# Patient Record
Sex: MALE | Race: WHITE | NOT HISPANIC OR LATINO
[De-identification: names, ages, dates, MRNs, and addresses within clinical notes are randomized per-mention and may not be internally consistent; named-entity substitution may affect disease eponyms.]

---

## 2020-08-07 ENCOUNTER — TRANSCRIPTION ENCOUNTER (OUTPATIENT)
Age: 80
End: 2020-08-07

## 2022-05-14 ENCOUNTER — NON-APPOINTMENT (OUTPATIENT)
Age: 82
End: 2022-05-14

## 2022-05-15 ENCOUNTER — EMERGENCY (EMERGENCY)
Facility: HOSPITAL | Age: 82
LOS: 0 days | Discharge: HOME | End: 2022-05-15
Attending: EMERGENCY MEDICINE
Payer: MEDICARE

## 2022-05-15 VITALS
RESPIRATION RATE: 18 BRPM | SYSTOLIC BLOOD PRESSURE: 154 MMHG | HEART RATE: 66 BPM | DIASTOLIC BLOOD PRESSURE: 81 MMHG | OXYGEN SATURATION: 96 % | TEMPERATURE: 98 F

## 2022-05-15 VITALS — WEIGHT: 203.93 LBS | HEIGHT: 69 IN

## 2022-05-15 DIAGNOSIS — R42 DIZZINESS AND GIDDINESS: ICD-10-CM

## 2022-05-15 DIAGNOSIS — I25.10 ATHEROSCLEROTIC HEART DISEASE OF NATIVE CORONARY ARTERY WITHOUT ANGINA PECTORIS: ICD-10-CM

## 2022-05-15 DIAGNOSIS — Z20.822 CONTACT WITH AND (SUSPECTED) EXPOSURE TO COVID-19: ICD-10-CM

## 2022-05-15 DIAGNOSIS — Z88.0 ALLERGY STATUS TO PENICILLIN: ICD-10-CM

## 2022-05-15 LAB
ALBUMIN SERPL ELPH-MCNC: 3.5 G/DL — SIGNIFICANT CHANGE UP (ref 3.3–5)
ALP SERPL-CCNC: 50 U/L — SIGNIFICANT CHANGE UP (ref 40–120)
ALT FLD-CCNC: 27 U/L — SIGNIFICANT CHANGE UP (ref 12–78)
ANION GAP SERPL CALC-SCNC: 4 MMOL/L — LOW (ref 5–17)
AST SERPL-CCNC: 26 U/L — SIGNIFICANT CHANGE UP (ref 15–37)
BASOPHILS # BLD AUTO: 0.03 K/UL — SIGNIFICANT CHANGE UP (ref 0–0.2)
BASOPHILS NFR BLD AUTO: 0.4 % — SIGNIFICANT CHANGE UP (ref 0–2)
BILIRUB SERPL-MCNC: 0.5 MG/DL — SIGNIFICANT CHANGE UP (ref 0.2–1.2)
BUN SERPL-MCNC: 18 MG/DL — SIGNIFICANT CHANGE UP (ref 7–23)
CALCIUM SERPL-MCNC: 8.6 MG/DL — SIGNIFICANT CHANGE UP (ref 8.5–10.1)
CHLORIDE SERPL-SCNC: 111 MMOL/L — HIGH (ref 96–108)
CO2 SERPL-SCNC: 27 MMOL/L — SIGNIFICANT CHANGE UP (ref 22–31)
CREAT SERPL-MCNC: 0.83 MG/DL — SIGNIFICANT CHANGE UP (ref 0.5–1.3)
EGFR: 88 ML/MIN/1.73M2 — SIGNIFICANT CHANGE UP
EOSINOPHIL # BLD AUTO: 0.15 K/UL — SIGNIFICANT CHANGE UP (ref 0–0.5)
EOSINOPHIL NFR BLD AUTO: 2 % — SIGNIFICANT CHANGE UP (ref 0–6)
GLUCOSE SERPL-MCNC: 94 MG/DL — SIGNIFICANT CHANGE UP (ref 70–99)
HCT VFR BLD CALC: 45.2 % — SIGNIFICANT CHANGE UP (ref 39–50)
HGB BLD-MCNC: 15.3 G/DL — SIGNIFICANT CHANGE UP (ref 13–17)
IMM GRANULOCYTES NFR BLD AUTO: 0.1 % — SIGNIFICANT CHANGE UP (ref 0–1.5)
LYMPHOCYTES # BLD AUTO: 2.13 K/UL — SIGNIFICANT CHANGE UP (ref 1–3.3)
LYMPHOCYTES # BLD AUTO: 28.4 % — SIGNIFICANT CHANGE UP (ref 13–44)
MCHC RBC-ENTMCNC: 32.8 PG — SIGNIFICANT CHANGE UP (ref 27–34)
MCHC RBC-ENTMCNC: 33.8 GM/DL — SIGNIFICANT CHANGE UP (ref 32–36)
MCV RBC AUTO: 96.8 FL — SIGNIFICANT CHANGE UP (ref 80–100)
MONOCYTES # BLD AUTO: 0.58 K/UL — SIGNIFICANT CHANGE UP (ref 0–0.9)
MONOCYTES NFR BLD AUTO: 7.7 % — SIGNIFICANT CHANGE UP (ref 2–14)
NEUTROPHILS # BLD AUTO: 4.61 K/UL — SIGNIFICANT CHANGE UP (ref 1.8–7.4)
NEUTROPHILS NFR BLD AUTO: 61.4 % — SIGNIFICANT CHANGE UP (ref 43–77)
PLATELET # BLD AUTO: 210 K/UL — SIGNIFICANT CHANGE UP (ref 150–400)
POTASSIUM SERPL-MCNC: 4.1 MMOL/L — SIGNIFICANT CHANGE UP (ref 3.5–5.3)
POTASSIUM SERPL-SCNC: 4.1 MMOL/L — SIGNIFICANT CHANGE UP (ref 3.5–5.3)
PROT SERPL-MCNC: 6.9 GM/DL — SIGNIFICANT CHANGE UP (ref 6–8.3)
RBC # BLD: 4.67 M/UL — SIGNIFICANT CHANGE UP (ref 4.2–5.8)
RBC # FLD: 12.4 % — SIGNIFICANT CHANGE UP (ref 10.3–14.5)
SODIUM SERPL-SCNC: 142 MMOL/L — SIGNIFICANT CHANGE UP (ref 135–145)
TROPONIN I, HIGH SENSITIVITY RESULT: 38.95 NG/L — SIGNIFICANT CHANGE UP
WBC # BLD: 7.51 K/UL — SIGNIFICANT CHANGE UP (ref 3.8–10.5)
WBC # FLD AUTO: 7.51 K/UL — SIGNIFICANT CHANGE UP (ref 3.8–10.5)

## 2022-05-15 PROCEDURE — 71046 X-RAY EXAM CHEST 2 VIEWS: CPT

## 2022-05-15 PROCEDURE — U0003: CPT

## 2022-05-15 PROCEDURE — 71046 X-RAY EXAM CHEST 2 VIEWS: CPT | Mod: 26

## 2022-05-15 PROCEDURE — 85025 COMPLETE CBC W/AUTO DIFF WBC: CPT

## 2022-05-15 PROCEDURE — U0005: CPT

## 2022-05-15 PROCEDURE — 36415 COLL VENOUS BLD VENIPUNCTURE: CPT

## 2022-05-15 PROCEDURE — 84484 ASSAY OF TROPONIN QUANT: CPT

## 2022-05-15 PROCEDURE — 99285 EMERGENCY DEPT VISIT HI MDM: CPT | Mod: FS

## 2022-05-15 PROCEDURE — 80053 COMPREHEN METABOLIC PANEL: CPT

## 2022-05-15 PROCEDURE — 93010 ELECTROCARDIOGRAM REPORT: CPT

## 2022-05-15 PROCEDURE — 99285 EMERGENCY DEPT VISIT HI MDM: CPT | Mod: 25

## 2022-05-15 PROCEDURE — 93005 ELECTROCARDIOGRAM TRACING: CPT

## 2022-05-15 RX ORDER — MECLIZINE HCL 12.5 MG
1 TABLET ORAL
Qty: 15 | Refills: 0
Start: 2022-05-15 | End: 2022-05-19

## 2022-05-15 NOTE — ED PROVIDER NOTE - NS ED ATTENDING STATEMENT MOD
Attending Only This was a shared visit with the THIAGO. I reviewed and verified the documentation and independently performed the documented:

## 2022-05-15 NOTE — ED PROVIDER NOTE - OBJECTIVE STATEMENT
81 y/o male with a PMHx of CAD s/p CABG due to four way blockage in [address] by Dr. Stahl presents to the ED with dizziness. Pt reports mild vertigo, but felt better after sitting down. Also states it was less severe than previous episodes. Pt believes it was due to standing up too quickly from bed. Went to urgent care in order to have Meclizine prescribed since that has helped previous episodes. Pt is on Zetia, lipitor, and baby ASA. Pt reports EKG is chronically abnormal and goes to cardiologist every three months. Denies CP, SOB, diaphoresis, and nausea. Cardio: Dr. Vora . Pt has an appointment with cardiologist on the 24th.

## 2022-05-15 NOTE — ED PROVIDER NOTE - PATIENT PORTAL LINK FT
You can access the FollowMyHealth Patient Portal offered by [facility] by registering at the following website: [email or web address]. By joining [employer]’s FollowMyHealth portal, you will also be able to view your health information using other applications (apps) compatible with our system.

## 2022-05-15 NOTE — ED PROVIDER NOTE - CLINICAL SUMMARY MEDICAL DECISION MAKING FREE TEXT BOX
pt presents with abnormal EKG from urgent care with no coronary symptoms has grossly abnormal EKG will compare prior EKG and determine need for further workup.

## 2022-05-15 NOTE — ED PROVIDER NOTE - ATTENDING APP SHARED VISIT CONTRIBUTION OF CARE
I personally saw the patient with the THIAGO, and completed the key components of the history and physical exam. I then discussed the management plan with the THIAGO.

## 2022-05-15 NOTE — ED ADULT NURSE NOTE - OBJECTIVE STATEMENT
patient ambulatory to ED from urgent care.  patient had episode of dizziness this morning last about 15 seconds after sitting down.  went to urgent care, sent for "abnormal EKG."  denies chest pain, SOB, n/v.

## 2022-05-15 NOTE — ED PROVIDER NOTE - PROGRESS NOTE DETAILS
Breanne Sims for attending   discussed with Dr. Vora EKG with cardiologist. Breanne Sims for attending   discussed with Dr. Vora EKG with cardiologist. No change from previous

## 2022-05-15 NOTE — ED ADULT TRIAGE NOTE - CHIEF COMPLAINT QUOTE
pt presents to Ed c/o dizziness that began this Am. Pt was seen at urgent care where they performed EKG and finger stick. As per pt, tests were negative in urgent care but pt was advised to go to ED for further eval. hx quadruple bypass.

## 2023-04-26 ENCOUNTER — OFFICE (OUTPATIENT)
Dept: URBAN - METROPOLITAN AREA CLINIC 94 | Facility: CLINIC | Age: 83
Setting detail: OPHTHALMOLOGY
End: 2023-04-26
Payer: MEDICARE

## 2023-04-26 DIAGNOSIS — H35.373: ICD-10-CM

## 2023-04-26 DIAGNOSIS — H43.813: ICD-10-CM

## 2023-04-26 PROCEDURE — 92134 CPTRZ OPH DX IMG PST SGM RTA: CPT | Performed by: OPHTHALMOLOGY

## 2023-04-26 PROCEDURE — 92014 COMPRE OPH EXAM EST PT 1/>: CPT | Performed by: OPHTHALMOLOGY

## 2023-04-26 ASSESSMENT — CONFRONTATIONAL VISUAL FIELD TEST (CVF)
OD_FINDINGS: FULL
OS_FINDINGS: FULL

## 2023-04-26 ASSESSMENT — KERATOMETRY
OS_K1POWER_DIOPTERS: 44.00
OD_K2POWER_DIOPTERS: 45.00
OD_AXISANGLE_DEGREES: 090
OS_K2POWER_DIOPTERS: 44.25
OD_K1POWER_DIOPTERS: 45.00
OS_AXISANGLE_DEGREES: 021

## 2023-04-26 ASSESSMENT — LID EXAM ASSESSMENTS
OS_BLEPHARITIS: LUL 1+ 2+
OD_BLEPHARITIS: RUL 1+ 2+

## 2023-04-26 ASSESSMENT — REFRACTION_AUTOREFRACTION
OD_AXIS: 099
OD_CYLINDER: -0.25
OS_AXIS: 094
OS_CYLINDER: -0.75
OS_SPHERE: +0.75
OD_SPHERE: PLANO

## 2023-04-26 ASSESSMENT — VISUAL ACUITY
OS_BCVA: 20/25+1
OD_BCVA: 20/25

## 2023-04-26 ASSESSMENT — TONOMETRY: OS_IOP_MMHG: 10

## 2023-04-26 ASSESSMENT — AXIALLENGTH_DERIVED: OS_AL: 23.2221

## 2023-04-26 ASSESSMENT — SUPERFICIAL PUNCTATE KERATITIS (SPK)
OD_SPK: T
OS_SPK: T

## 2023-04-26 ASSESSMENT — SPHEQUIV_DERIVED: OS_SPHEQUIV: 0.375

## 2023-05-08 PROBLEM — I25.10 ATHEROSCLEROTIC HEART DISEASE OF NATIVE CORONARY ARTERY WITHOUT ANGINA PECTORIS: Chronic | Status: ACTIVE | Noted: 2022-05-16

## 2023-06-19 PROBLEM — Z00.00 ENCOUNTER FOR PREVENTIVE HEALTH EXAMINATION: Status: ACTIVE | Noted: 2023-06-19

## 2023-07-11 ENCOUNTER — APPOINTMENT (OUTPATIENT)
Dept: ORTHOPEDIC SURGERY | Facility: CLINIC | Age: 83
End: 2023-07-11
Payer: MEDICARE

## 2023-07-11 ENCOUNTER — TRANSCRIPTION ENCOUNTER (OUTPATIENT)
Age: 83
End: 2023-07-11

## 2023-07-11 VITALS — HEART RATE: 75 BPM | SYSTOLIC BLOOD PRESSURE: 129 MMHG | DIASTOLIC BLOOD PRESSURE: 80 MMHG

## 2023-07-11 VITALS — HEIGHT: 69 IN | WEIGHT: 210 LBS | BODY MASS INDEX: 31.1 KG/M2

## 2023-07-11 DIAGNOSIS — Z78.9 OTHER SPECIFIED HEALTH STATUS: ICD-10-CM

## 2023-07-11 PROCEDURE — 99204 OFFICE O/P NEW MOD 45 MIN: CPT

## 2023-07-11 PROCEDURE — 73564 X-RAY EXAM KNEE 4 OR MORE: CPT | Mod: RT

## 2023-07-11 RX ORDER — ATORVASTATIN CALCIUM 40 MG/1
40 TABLET, FILM COATED ORAL
Refills: 0 | Status: ACTIVE | COMMUNITY

## 2023-07-11 RX ORDER — RAMIPRIL 10 MG
10 TABLET ORAL
Refills: 0 | Status: ACTIVE | COMMUNITY

## 2023-07-11 RX ORDER — EZETIMIBE 10 MG/1
10 TABLET ORAL
Refills: 0 | Status: ACTIVE | COMMUNITY

## 2023-07-11 RX ORDER — ASPIRIN 81 MG
81 TABLET, DELAYED RELEASE (ENTERIC COATED) ORAL
Refills: 0 | Status: ACTIVE | COMMUNITY

## 2023-07-11 NOTE — CONSULT LETTER
[Dear  ___] : Dear  [unfilled], [FreeTextEntry1] : I had the pleasure of evaluating your patient in the office today for complaints of right knee pain secondary to knee osteoarthritis. I have enclosed a copy of today's office notes for your charts and for your review. Patient will require full medical clearance if he wishes to proceed with surgery. \par \par Sincerely, \par \par Jessee Ch M.D.\par Professor and \par Department of Orthopedic Surgery\par [facility] [place]\par

## 2023-07-11 NOTE — DISCUSSION/SUMMARY
[Surgical risks reviewed] : Surgical risks reviewed [de-identified] : Pt is a pleasant 83 year old male with R knee pain secondary to knee osteoarthritis.   A lengthy discussion was held regarding the patients condition and treatment options including all risks, benefits, prognosis and outcomes of each were discussed in detail.  Non-operative treatment was discussed and a knee treatment handout was given and reviewed with the patient. Discussed surgical options of total knee arthroplasty. Explained that the knee replacement should be decided based on when the patient feels ready and hast exhausted all other nonoperative interventions. Dedicated time for recovery is need, for physical therapy and use of ambulatory aides and this should be considered by patient as well. Risk and benefits of surgery were discussed. Patient expectations were set based on discussion on satisfaction levels and risks of complications. Discussed numerous minor and major risks of the postoperative course. Recommended that prior to surgical intervention, patient should do more PT to strengthen musculature in LLE. Patient should also attempt weight loss strategies. Patient will require full medical clearance if he wishes to proceed with surgery. Discussed expected postoperative course. Patient to book surgical appointment for October.\par The patient will contact me if there are any concerns. The patient expressed understanding and all questions were answered.\par

## 2023-07-11 NOTE — HISTORY OF PRESENT ILLNESS
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 He denies any injury or trauma. He was seen by Dr. Kj Dennis and had cortisone and HA injection which did help. He states the pain is a 5/10 especially when he gets up from a sitting position.He has been going to PT which does help. He has difficulty getting up from a sitting position and pain with walking. He denies any numbness or tingling. \par \par \par Hx: Quadruple bypass in 2004 - ASA. HLD. Squamous cell about 20 years agoand basal cell about a year ago. MOHs

## 2023-07-11 NOTE — PHYSICAL EXAM
[de-identified] : Pt is a pleasant 83 year old male in NAD and AAOx3. 31.0 BMI. The pt has a mildly antalgic gait. Physical examination of both knees reveals normal contours,  no signs of infection, no erythema, no swelling, no effusion, no distal lymphedema or phlebitis, no patholaxity. Mild venous stasis changes. Incisional scars consistent with saphenous harvest. Mild pedal edema. ROM of the Right knee reveals 10°-100° Strength is 5/5 within this arc of motion. There is pain on palpation to the medial<lateral joint line. Patient noted to have genu varum. There are no neurological deficits. DP/PT 1+\par  [de-identified] : X-rays were ordered, obtained, and interpreted by me today: 4 views of the R knee demonstrate severe patellofemoral OA and tricompartment degenerative changes, vascular clips noted, with no acute fracture or dislocation.\par

## 2023-07-27 ENCOUNTER — NON-APPOINTMENT (OUTPATIENT)
Age: 83
End: 2023-07-27

## 2023-10-02 ENCOUNTER — OUTPATIENT (OUTPATIENT)
Dept: OUTPATIENT SERVICES | Facility: HOSPITAL | Age: 83
LOS: 1 days | End: 2023-10-02
Payer: MEDICARE

## 2023-10-02 VITALS
HEART RATE: 82 BPM | RESPIRATION RATE: 18 BRPM | TEMPERATURE: 98 F | DIASTOLIC BLOOD PRESSURE: 88 MMHG | SYSTOLIC BLOOD PRESSURE: 142 MMHG | HEIGHT: 69 IN | OXYGEN SATURATION: 96 % | WEIGHT: 214.95 LBS

## 2023-10-02 DIAGNOSIS — Z95.1 PRESENCE OF AORTOCORONARY BYPASS GRAFT: Chronic | ICD-10-CM

## 2023-10-02 DIAGNOSIS — Z98.890 OTHER SPECIFIED POSTPROCEDURAL STATES: Chronic | ICD-10-CM

## 2023-10-02 DIAGNOSIS — Z01.818 ENCOUNTER FOR OTHER PREPROCEDURAL EXAMINATION: ICD-10-CM

## 2023-10-02 DIAGNOSIS — Z29.9 ENCOUNTER FOR PROPHYLACTIC MEASURES, UNSPECIFIED: ICD-10-CM

## 2023-10-02 DIAGNOSIS — M17.12 UNILATERAL PRIMARY OSTEOARTHRITIS, LEFT KNEE: ICD-10-CM

## 2023-10-02 DIAGNOSIS — M17.11 UNILATERAL PRIMARY OSTEOARTHRITIS, RIGHT KNEE: ICD-10-CM

## 2023-10-02 LAB
ANION GAP SERPL CALC-SCNC: 16 MMOL/L — SIGNIFICANT CHANGE UP (ref 5–17)
BLD GP AB SCN SERPL QL: NEGATIVE — SIGNIFICANT CHANGE UP
BUN SERPL-MCNC: 18 MG/DL — SIGNIFICANT CHANGE UP (ref 7–23)
CALCIUM SERPL-MCNC: 9.4 MG/DL — SIGNIFICANT CHANGE UP (ref 8.4–10.5)
CHLORIDE SERPL-SCNC: 106 MMOL/L — SIGNIFICANT CHANGE UP (ref 96–108)
CO2 SERPL-SCNC: 22 MMOL/L — SIGNIFICANT CHANGE UP (ref 22–31)
CREAT SERPL-MCNC: 0.89 MG/DL — SIGNIFICANT CHANGE UP (ref 0.5–1.3)
EGFR: 86 ML/MIN/1.73M2 — SIGNIFICANT CHANGE UP
GLUCOSE SERPL-MCNC: 83 MG/DL — SIGNIFICANT CHANGE UP (ref 70–99)
HCT VFR BLD CALC: 47.6 % — SIGNIFICANT CHANGE UP (ref 39–50)
HGB BLD-MCNC: 15.9 G/DL — SIGNIFICANT CHANGE UP (ref 13–17)
MCHC RBC-ENTMCNC: 32.3 PG — SIGNIFICANT CHANGE UP (ref 27–34)
MCHC RBC-ENTMCNC: 33.4 GM/DL — SIGNIFICANT CHANGE UP (ref 32–36)
MCV RBC AUTO: 96.6 FL — SIGNIFICANT CHANGE UP (ref 80–100)
NRBC # BLD: 0 /100 WBCS — SIGNIFICANT CHANGE UP (ref 0–0)
PLATELET # BLD AUTO: 259 K/UL — SIGNIFICANT CHANGE UP (ref 150–400)
POTASSIUM SERPL-MCNC: 4 MMOL/L — SIGNIFICANT CHANGE UP (ref 3.5–5.3)
POTASSIUM SERPL-SCNC: 4 MMOL/L — SIGNIFICANT CHANGE UP (ref 3.5–5.3)
RBC # BLD: 4.93 M/UL — SIGNIFICANT CHANGE UP (ref 4.2–5.8)
RBC # FLD: 12.7 % — SIGNIFICANT CHANGE UP (ref 10.3–14.5)
RH IG SCN BLD-IMP: POSITIVE — SIGNIFICANT CHANGE UP
SODIUM SERPL-SCNC: 144 MMOL/L — SIGNIFICANT CHANGE UP (ref 135–145)
WBC # BLD: 7.32 K/UL — SIGNIFICANT CHANGE UP (ref 3.8–10.5)
WBC # FLD AUTO: 7.32 K/UL — SIGNIFICANT CHANGE UP (ref 3.8–10.5)

## 2023-10-02 PROCEDURE — 86900 BLOOD TYPING SEROLOGIC ABO: CPT

## 2023-10-02 PROCEDURE — 87641 MR-STAPH DNA AMP PROBE: CPT

## 2023-10-02 PROCEDURE — 85027 COMPLETE CBC AUTOMATED: CPT

## 2023-10-02 PROCEDURE — G0463: CPT

## 2023-10-02 PROCEDURE — 86901 BLOOD TYPING SEROLOGIC RH(D): CPT

## 2023-10-02 PROCEDURE — 87640 STAPH A DNA AMP PROBE: CPT

## 2023-10-02 PROCEDURE — 80048 BASIC METABOLIC PNL TOTAL CA: CPT

## 2023-10-02 PROCEDURE — 83036 HEMOGLOBIN GLYCOSYLATED A1C: CPT

## 2023-10-02 PROCEDURE — 86850 RBC ANTIBODY SCREEN: CPT

## 2023-10-02 NOTE — H&P PST ADULT - ASSESSMENT
CAPRINI SCORE [CLOT updated 18]    AGE RELATED RISK FACTORS                                                       MOBILITY RELATED FACTORS  [ ] Age 41-60 years                                            (1 Point)                    [ ] Bed rest                                                        (1 Point)  [ ] Age: 61-74 years                                           (2 Points)                  [ ] Plaster cast                                                   (2 Points)  [ ] Age= 75 years                                              (3 Points)                    [ ] Bed bound for more than 72 hours                 (2 Points)    DISEASE RELATED RISK FACTORS                                               GENDER SPECIFIC FACTORS  [ ] Edema in the lower extremities                       (1 Point)              [ ] Pregnancy                                                     (1 Point)  [ ] Varicose veins                                               (1 Point)                     [ ] Post-partum < 6 weeks                                   (1 Point)             [ ] BMI > 25 Kg/m2                                            (1 Point)                     [ ] Hormonal therapy  or oral contraception          (1 Point)                 [ ] Sepsis (in the previous month)                        (1 Point)               [ ] History of pregnancy complications                 (1 point)  [ ] Pneumonia or serious lung disease                                               [ ] Unexplained or recurrent                     (1 Point)           (in the previous month)                               (1 Point)  [ ] Abnormal pulmonary function test                     (1 Point)                 SURGERY RELATED RISK FACTORS  [ ] Acute myocardial infarction                              (1 Point)               [ ]  Section                                             (1 Point)  [ ] Congestive heart failure (in the previous month)  (1 Point)      [ ] Minor surgery                                                  (1 Point)   [ ] Inflammatory bowel disease                             (1 Point)               [ ] Arthroscopic surgery                                        (2 Points)  [ ] Central venous access                                      (2 Points)                [ ] General surgery lasting more than 45 minutes (2 points)  [ ] Present or previous malignancy                     (2 Points)                [ ] Elective arthroplasty                                         (5 points)    [ ] Stroke (in the previous month)                          (5 Points)                                                                                                                                                           HEMATOLOGY RELATED FACTORS                                                 TRAUMA RELATED RISK FACTORS  [ ] Prior episodes of VTE                                     (3 Points)                [ ] Fracture of the hip, pelvis, or leg                       (5 Points)  [ ] Positive family history for VTE                         (3 Points)             [ ] Acute spinal cord injury (in the previous month)  (5 Points)  [ ] Prothrombin 73812 A                                     (3 Points)               [ ] Paralysis  (less than 1 month)                             (5 Points)  [ ] Factor V Leiden                                             (3 Points)                  [ ] Multiple Trauma within 1 month                        (5 Points)  [ ] Lupus anticoagulants                                     (3 Points)                                                           [ ] Anticardiolipin antibodies                               (3 Points)                                                       [ ] High homocysteine in the blood                      (3 Points)                                             [ ] Other congenital or acquired thrombophilia      (3 Points)                                                [ ] Heparin induced thrombocytopenia                  (3 Points)                                     Total Score [ 9  ]  Denies loose/cracked/removable teeth  Mallampati II

## 2023-10-02 NOTE — H&P PST ADULT - HISTORY OF PRESENT ILLNESS
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 He denies any injury or trauma. He was seen by Dr. Kj Dennis and had cortisone and HA injection which did help. He states the pain is a 5/10 especially when he gets up from a sitting position and pain with walking. He has been going to PT which does help and denies any numbness or tingling. PMH also includes HTN, HLD, GERD and CAD (s/p CABG 19yrs ago, followed by Cards- note in chart). Denies recent fevers, chills, cough, chest pain or SOB and feels well otherwise.  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 He denies any injury or trauma. He was seen by Dr. Kj Dennis and had cortisone and HA injection which did help. He states the pain is a 5/10 especially when he gets up from a sitting position and pain with walking. He has been going to PT which does help and denies any numbness or tingling. He now presents to PST for a scheduled Right Total Knee Replacement on 10/16/2023. PMH also includes HTN, HLD, GERD and CAD (s/p CABG 19yrs ago, followed by Cards- note in chart). Denies recent fevers, chills, cough, chest pain or SOB and feels well otherwise.

## 2023-10-02 NOTE — H&P PST ADULT - PROBLEM SELECTOR PLAN 1
Scheduled for sx on 10/16/2023. Surgical, chlorhexidine and ERP instructions reviewed and provided to pt. PCP and Cards both seen already, notes in chart.

## 2023-10-02 NOTE — H&P PST ADULT - NSICDXPASTMEDICALHX_GEN_ALL_CORE_FT
PAST MEDICAL HISTORY:  CAD (coronary artery disease)     GERD (gastroesophageal reflux disease)     HLD (hyperlipidemia)     HTN (hypertension)     Unilateral primary osteoarthritis, left knee

## 2023-10-02 NOTE — H&P PST ADULT - NSICDXPASTSURGICALHX_GEN_ALL_CORE_FT
PAST SURGICAL HISTORY:  History of colonoscopy     History of inguinal hernia repair     S/P CABG x 4

## 2023-10-03 LAB
A1C WITH ESTIMATED AVERAGE GLUCOSE RESULT: 5.8 % — HIGH (ref 4–5.6)
ESTIMATED AVERAGE GLUCOSE: 120 MG/DL — HIGH (ref 68–114)
MRSA PCR RESULT.: SIGNIFICANT CHANGE UP
S AUREUS DNA NOSE QL NAA+PROBE: SIGNIFICANT CHANGE UP

## 2023-10-15 ENCOUNTER — TRANSCRIPTION ENCOUNTER (OUTPATIENT)
Age: 83
End: 2023-10-15

## 2023-10-16 ENCOUNTER — TRANSCRIPTION ENCOUNTER (OUTPATIENT)
Age: 83
End: 2023-10-16

## 2023-10-16 ENCOUNTER — APPOINTMENT (OUTPATIENT)
Dept: ORTHOPEDIC SURGERY | Facility: HOSPITAL | Age: 83
End: 2023-10-16

## 2023-10-16 ENCOUNTER — INPATIENT (INPATIENT)
Facility: HOSPITAL | Age: 83
LOS: 0 days | Discharge: HOME HEALTH | DRG: 470 | End: 2023-10-17
Attending: ORTHOPAEDIC SURGERY | Admitting: ORTHOPAEDIC SURGERY
Payer: COMMERCIAL

## 2023-10-16 VITALS
RESPIRATION RATE: 16 BRPM | SYSTOLIC BLOOD PRESSURE: 149 MMHG | WEIGHT: 214.95 LBS | DIASTOLIC BLOOD PRESSURE: 84 MMHG | HEIGHT: 69 IN | TEMPERATURE: 98 F | OXYGEN SATURATION: 95 % | HEART RATE: 93 BPM

## 2023-10-16 DIAGNOSIS — Z98.890 OTHER SPECIFIED POSTPROCEDURAL STATES: Chronic | ICD-10-CM

## 2023-10-16 DIAGNOSIS — M17.11 UNILATERAL PRIMARY OSTEOARTHRITIS, RIGHT KNEE: ICD-10-CM

## 2023-10-16 DIAGNOSIS — M17.12 UNILATERAL PRIMARY OSTEOARTHRITIS, LEFT KNEE: ICD-10-CM

## 2023-10-16 DIAGNOSIS — Z95.1 PRESENCE OF AORTOCORONARY BYPASS GRAFT: Chronic | ICD-10-CM

## 2023-10-16 LAB
ANION GAP SERPL CALC-SCNC: 11 MMOL/L — SIGNIFICANT CHANGE UP (ref 5–17)
BUN SERPL-MCNC: 13 MG/DL — SIGNIFICANT CHANGE UP (ref 7–23)
CALCIUM SERPL-MCNC: 8.4 MG/DL — SIGNIFICANT CHANGE UP (ref 8.4–10.5)
CHLORIDE SERPL-SCNC: 105 MMOL/L — SIGNIFICANT CHANGE UP (ref 96–108)
CO2 SERPL-SCNC: 24 MMOL/L — SIGNIFICANT CHANGE UP (ref 22–31)
CREAT SERPL-MCNC: 0.81 MG/DL — SIGNIFICANT CHANGE UP (ref 0.5–1.3)
EGFR: 89 ML/MIN/1.73M2 — SIGNIFICANT CHANGE UP
GLUCOSE BLDC GLUCOMTR-MCNC: 106 MG/DL — HIGH (ref 70–99)
GLUCOSE SERPL-MCNC: 122 MG/DL — HIGH (ref 70–99)
HCT VFR BLD CALC: 40.7 % — SIGNIFICANT CHANGE UP (ref 39–50)
HGB BLD-MCNC: 13.5 G/DL — SIGNIFICANT CHANGE UP (ref 13–17)
MCHC RBC-ENTMCNC: 32.4 PG — SIGNIFICANT CHANGE UP (ref 27–34)
MCHC RBC-ENTMCNC: 33.2 GM/DL — SIGNIFICANT CHANGE UP (ref 32–36)
MCV RBC AUTO: 97.6 FL — SIGNIFICANT CHANGE UP (ref 80–100)
NRBC # BLD: 0 /100 WBCS — SIGNIFICANT CHANGE UP (ref 0–0)
PLATELET # BLD AUTO: 187 K/UL — SIGNIFICANT CHANGE UP (ref 150–400)
POTASSIUM SERPL-MCNC: 4.3 MMOL/L — SIGNIFICANT CHANGE UP (ref 3.5–5.3)
POTASSIUM SERPL-SCNC: 4.3 MMOL/L — SIGNIFICANT CHANGE UP (ref 3.5–5.3)
RBC # BLD: 4.17 M/UL — LOW (ref 4.2–5.8)
RBC # FLD: 12.4 % — SIGNIFICANT CHANGE UP (ref 10.3–14.5)
SODIUM SERPL-SCNC: 140 MMOL/L — SIGNIFICANT CHANGE UP (ref 135–145)
WBC # BLD: 8.13 K/UL — SIGNIFICANT CHANGE UP (ref 3.8–10.5)
WBC # FLD AUTO: 8.13 K/UL — SIGNIFICANT CHANGE UP (ref 3.8–10.5)

## 2023-10-16 PROCEDURE — 73560 X-RAY EXAM OF KNEE 1 OR 2: CPT | Mod: 26,RT

## 2023-10-16 PROCEDURE — 27447 TOTAL KNEE ARTHROPLASTY: CPT | Mod: RT

## 2023-10-16 DEVICE — CEMENT SIMPLEX P 40GM: Type: IMPLANTABLE DEVICE | Site: RIGHT | Status: FUNCTIONAL

## 2023-10-16 DEVICE — TRAY TIB I-BEAM FIX BAR 71MM: Type: IMPLANTABLE DEVICE | Site: RIGHT | Status: FUNCTIONAL

## 2023-10-16 DEVICE — PAT 3 PEG 34MM POLY: Type: IMPLANTABLE DEVICE | Site: RIGHT | Status: FUNCTIONAL

## 2023-10-16 DEVICE — BEARING TIB VANGUARD VE PS 71/75X10MM: Type: IMPLANTABLE DEVICE | Site: RIGHT | Status: FUNCTIONAL

## 2023-10-16 DEVICE — IMPLANTABLE DEVICE: Type: IMPLANTABLE DEVICE | Site: RIGHT | Status: FUNCTIONAL

## 2023-10-16 RX ORDER — CEFAZOLIN SODIUM 1 G
2000 VIAL (EA) INJECTION EVERY 8 HOURS
Refills: 0 | Status: DISCONTINUED | OUTPATIENT
Start: 2023-10-16 | End: 2023-10-16

## 2023-10-16 RX ORDER — ACETAMINOPHEN 500 MG
2 TABLET ORAL
Qty: 56 | Refills: 0
Start: 2023-10-16 | End: 2023-10-29

## 2023-10-16 RX ORDER — EZETIMIBE 10 MG/1
10 TABLET ORAL DAILY
Refills: 0 | Status: DISCONTINUED | OUTPATIENT
Start: 2023-10-16 | End: 2023-10-17

## 2023-10-16 RX ORDER — OXYCODONE HYDROCHLORIDE 5 MG/1
10 TABLET ORAL ONCE
Refills: 0 | Status: DISCONTINUED | OUTPATIENT
Start: 2023-10-16 | End: 2023-10-16

## 2023-10-16 RX ORDER — TRAMADOL HYDROCHLORIDE 50 MG/1
50 TABLET ORAL ONCE
Refills: 0 | Status: DISCONTINUED | OUTPATIENT
Start: 2023-10-16 | End: 2023-10-16

## 2023-10-16 RX ORDER — FOLIC ACID 0.8 MG
1 TABLET ORAL DAILY
Refills: 0 | Status: DISCONTINUED | OUTPATIENT
Start: 2023-10-16 | End: 2023-10-17

## 2023-10-16 RX ORDER — ONDANSETRON 8 MG/1
4 TABLET, FILM COATED ORAL EVERY 4 HOURS
Refills: 0 | Status: DISCONTINUED | OUTPATIENT
Start: 2023-10-16 | End: 2023-10-16

## 2023-10-16 RX ORDER — PANTOPRAZOLE SODIUM 20 MG/1
40 TABLET, DELAYED RELEASE ORAL
Refills: 0 | Status: DISCONTINUED | OUTPATIENT
Start: 2023-10-16 | End: 2023-10-17

## 2023-10-16 RX ORDER — ASCORBIC ACID 60 MG
500 TABLET,CHEWABLE ORAL
Refills: 0 | Status: DISCONTINUED | OUTPATIENT
Start: 2023-10-16 | End: 2023-10-17

## 2023-10-16 RX ORDER — ONDANSETRON 8 MG/1
4 TABLET, FILM COATED ORAL EVERY 6 HOURS
Refills: 0 | Status: DISCONTINUED | OUTPATIENT
Start: 2023-10-16 | End: 2023-10-17

## 2023-10-16 RX ORDER — GENTAMICIN SULFATE 40 MG/ML
80 VIAL (ML) INJECTION ONCE
Refills: 0 | Status: DISCONTINUED | OUTPATIENT
Start: 2023-10-16 | End: 2023-10-16

## 2023-10-16 RX ORDER — ASPIRIN/CALCIUM CARB/MAGNESIUM 324 MG
325 TABLET ORAL
Refills: 0 | Status: DISCONTINUED | OUTPATIENT
Start: 2023-10-16 | End: 2023-10-17

## 2023-10-16 RX ORDER — ATORVASTATIN CALCIUM 80 MG/1
40 TABLET, FILM COATED ORAL AT BEDTIME
Refills: 0 | Status: DISCONTINUED | OUTPATIENT
Start: 2023-10-16 | End: 2023-10-17

## 2023-10-16 RX ORDER — OXYCODONE HYDROCHLORIDE 5 MG/1
10 TABLET ORAL
Refills: 0 | Status: DISCONTINUED | OUTPATIENT
Start: 2023-10-16 | End: 2023-10-17

## 2023-10-16 RX ORDER — ACETAMINOPHEN 500 MG
975 TABLET ORAL EVERY 8 HOURS
Refills: 0 | Status: DISCONTINUED | OUTPATIENT
Start: 2023-10-17 | End: 2023-10-17

## 2023-10-16 RX ORDER — LISINOPRIL 2.5 MG/1
40 TABLET ORAL DAILY
Refills: 0 | Status: DISCONTINUED | OUTPATIENT
Start: 2023-10-16 | End: 2023-10-17

## 2023-10-16 RX ORDER — VANCOMYCIN HCL 1 G
1500 VIAL (EA) INTRAVENOUS ONCE
Refills: 0 | Status: COMPLETED | OUTPATIENT
Start: 2023-10-16 | End: 2023-10-16

## 2023-10-16 RX ORDER — POLYETHYLENE GLYCOL 3350 17 G/17G
17 POWDER, FOR SOLUTION ORAL AT BEDTIME
Refills: 0 | Status: DISCONTINUED | OUTPATIENT
Start: 2023-10-16 | End: 2023-10-17

## 2023-10-16 RX ORDER — ASPIRIN/CALCIUM CARB/MAGNESIUM 324 MG
325 TABLET ORAL
Refills: 0 | Status: DISCONTINUED | OUTPATIENT
Start: 2023-10-16 | End: 2023-10-16

## 2023-10-16 RX ORDER — LIDOCAINE HCL 20 MG/ML
0.2 VIAL (ML) INJECTION ONCE
Refills: 0 | Status: DISCONTINUED | OUTPATIENT
Start: 2023-10-16 | End: 2023-10-16

## 2023-10-16 RX ORDER — SENNA PLUS 8.6 MG/1
2 TABLET ORAL AT BEDTIME
Refills: 0 | Status: DISCONTINUED | OUTPATIENT
Start: 2023-10-16 | End: 2023-10-17

## 2023-10-16 RX ORDER — ACETAMINOPHEN 500 MG
2 TABLET ORAL
Refills: 0 | DISCHARGE

## 2023-10-16 RX ORDER — ASPIRIN/CALCIUM CARB/MAGNESIUM 324 MG
1 TABLET ORAL
Qty: 84 | Refills: 0
Start: 2023-10-16 | End: 2023-11-26

## 2023-10-16 RX ORDER — CHLORHEXIDINE GLUCONATE 213 G/1000ML
1 SOLUTION TOPICAL ONCE
Refills: 0 | Status: DISCONTINUED | OUTPATIENT
Start: 2023-10-16 | End: 2023-10-16

## 2023-10-16 RX ORDER — SODIUM CHLORIDE 9 MG/ML
500 INJECTION INTRAMUSCULAR; INTRAVENOUS; SUBCUTANEOUS ONCE
Refills: 0 | Status: COMPLETED | OUTPATIENT
Start: 2023-10-17 | End: 2023-10-17

## 2023-10-16 RX ORDER — ASPIRIN/CALCIUM CARB/MAGNESIUM 324 MG
1 TABLET ORAL
Refills: 0 | DISCHARGE

## 2023-10-16 RX ORDER — OXYCODONE HYDROCHLORIDE 5 MG/1
5 TABLET ORAL ONCE
Refills: 0 | Status: DISCONTINUED | OUTPATIENT
Start: 2023-10-16 | End: 2023-10-16

## 2023-10-16 RX ORDER — PANTOPRAZOLE SODIUM 20 MG/1
40 TABLET, DELAYED RELEASE ORAL ONCE
Refills: 0 | Status: COMPLETED | OUTPATIENT
Start: 2023-10-16 | End: 2023-10-16

## 2023-10-16 RX ORDER — TRAMADOL HYDROCHLORIDE 50 MG/1
25 TABLET ORAL EVERY 6 HOURS
Refills: 0 | Status: DISCONTINUED | OUTPATIENT
Start: 2023-10-16 | End: 2023-10-17

## 2023-10-16 RX ORDER — OXYCODONE HYDROCHLORIDE 5 MG/1
5 TABLET ORAL
Refills: 0 | Status: DISCONTINUED | OUTPATIENT
Start: 2023-10-16 | End: 2023-10-17

## 2023-10-16 RX ORDER — OXYCODONE HYDROCHLORIDE 5 MG/1
1 TABLET ORAL
Qty: 28 | Refills: 0
Start: 2023-10-16 | End: 2023-10-22

## 2023-10-16 RX ORDER — ACETAMINOPHEN 500 MG
1000 TABLET ORAL ONCE
Refills: 0 | Status: COMPLETED | OUTPATIENT
Start: 2023-10-16 | End: 2023-10-16

## 2023-10-16 RX ORDER — ACETAMINOPHEN 500 MG
650 TABLET ORAL EVERY 6 HOURS
Refills: 0 | Status: DISCONTINUED | OUTPATIENT
Start: 2023-10-16 | End: 2023-10-16

## 2023-10-16 RX ORDER — SODIUM CHLORIDE 9 MG/ML
3 INJECTION INTRAMUSCULAR; INTRAVENOUS; SUBCUTANEOUS EVERY 8 HOURS
Refills: 0 | Status: DISCONTINUED | OUTPATIENT
Start: 2023-10-16 | End: 2023-10-16

## 2023-10-16 RX ORDER — TRAMADOL HYDROCHLORIDE 50 MG/1
1 TABLET ORAL
Qty: 40 | Refills: 0
Start: 2023-10-16

## 2023-10-16 RX ORDER — SODIUM CHLORIDE 9 MG/ML
500 INJECTION INTRAMUSCULAR; INTRAVENOUS; SUBCUTANEOUS ONCE
Refills: 0 | Status: COMPLETED | OUTPATIENT
Start: 2023-10-16 | End: 2023-10-16

## 2023-10-16 RX ADMIN — ONDANSETRON 4 MILLIGRAM(S): 8 TABLET, FILM COATED ORAL at 12:28

## 2023-10-16 RX ADMIN — Medication 500 MILLIGRAM(S): at 17:43

## 2023-10-16 RX ADMIN — Medication 300 MILLIGRAM(S): at 17:43

## 2023-10-16 RX ADMIN — Medication 650 MILLIGRAM(S): at 13:51

## 2023-10-16 RX ADMIN — SODIUM CHLORIDE 1000 MILLILITER(S): 9 INJECTION INTRAMUSCULAR; INTRAVENOUS; SUBCUTANEOUS at 15:52

## 2023-10-16 RX ADMIN — Medication 300 MILLIGRAM(S): at 05:56

## 2023-10-16 RX ADMIN — Medication 1000 MILLIGRAM(S): at 22:00

## 2023-10-16 RX ADMIN — Medication 650 MILLIGRAM(S): at 13:49

## 2023-10-16 RX ADMIN — TRAMADOL HYDROCHLORIDE 50 MILLIGRAM(S): 50 TABLET ORAL at 06:18

## 2023-10-16 RX ADMIN — PANTOPRAZOLE SODIUM 40 MILLIGRAM(S): 20 TABLET, DELAYED RELEASE ORAL at 06:19

## 2023-10-16 RX ADMIN — Medication 325 MILLIGRAM(S): at 17:44

## 2023-10-16 RX ADMIN — Medication 400 MILLIGRAM(S): at 21:29

## 2023-10-16 NOTE — DISCHARGE NOTE PROVIDER - NSDCMRMEDTOKEN_GEN_ALL_CORE_FT
acetaminophen 500 mg oral tablet: 2 tab(s) orally 2 times a day as needed for  mild pain  Allegra 180 mg oral tablet: 1 tab(s) orally once a day (at bedtime)  Altace 10 mg oral tablet: orally once a day (at bedtime)  aspirin 325 mg oral tablet: 1 tab(s) orally 2 times a day  Lipitor 40 mg oral tablet: 1 tab(s) orally once a day (at bedtime)  Multiple Vitamins oral tablet: 1 tab(s) orally once a day (at bedtime)  naproxen 500 mg oral tablet: 1 tab(s) orally 2 times a day as needed for  mild pain  oxyCODONE 5 mg oral tablet: 1 tab(s) orally every 6 hours as needed for  severe pain MDD: 4  Pepcid 40 mg oral tablet: 1 tab(s) orally once a day (at bedtime)  traMADol 50 mg oral tablet: 1 tab(s) orally every 6 hours as needed for  moderate pain MDD: 4  Vitamin C 500 mg oral capsule: 2 cap(s) orally once a day (at bedtime)  Vitamin D3 50 mcg (2000 intl units) oral tablet: 1 tab(s) orally once a day (at bedtime)  Zetia 10 mg oral tablet: 1 tab(s) orally once a day (at bedtime)

## 2023-10-16 NOTE — OCCUPATIONAL THERAPY INITIAL EVALUATION ADULT - PERTINENT HX OF CURRENT PROBLEM, REHAB EVAL
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 He denies any injury or trauma. He was seen by Dr. Kj Dennis and had cortisone and HA injection which did help. He states the pain is a 5/10 especially when he gets up from a sitting position and pain with walking. He has been going to PT which does help and denies any numbness or tingling. He now presents to PST for a scheduled Right Total Knee Replacement on 10/16/2023. PMH also includes HTN, HLD, GERD and CAD (s/p CABG 19yrs ago, followed by Cards- note in chart). Denies recent fevers, chills, cough, chest pain or SOB and feels well otherwise. Now s/p Right total knee replacement 10/16.

## 2023-10-16 NOTE — BRIEF OPERATIVE NOTE - NSICDXBRIEFPREOP_GEN_ALL_CORE_FT
PRE-OP DIAGNOSIS:  Unilateral primary osteoarthritis, right knee 16-Oct-2023 08:51:30  Pancho Thomas

## 2023-10-16 NOTE — PHYSICAL THERAPY INITIAL EVALUATION ADULT - IMPAIRED TRANSFERS: BED/CHAIR, REHAB EVAL
impaired balance/decreased flexibility/pain/impaired postural control/impaired sensory feedback/decreased strength

## 2023-10-16 NOTE — CHART NOTE - NSCHARTNOTEFT_GEN_A_CORE
Resting without complaints.  No Chest Pain, SOB, N/V.    T(C): 36.7 (10-16-23 @ 09:03), Max: 36.7 (10-16-23 @ 05:53)  HR: 61 (10-16-23 @ 10:00) (61 - 93)  BP: 121/70 (10-16-23 @ 10:00) (90/55 - 149/84)  RR: 16 (10-16-23 @ 10:00) (16 - 16)  SpO2: 100% (10-16-23 @ 10:00) (91% - 100%)      Exam:  Alert and [place], No Acute Distress  Card: +S1/S2, RRR  Pulm: CTAB  Laterality: R Knee  Knee Immobilizer in place, ace bandage c/d/i  Calves soft, non-tender bilaterally  +PF/DF/EHL/FHL  SILT  + DP pulse    Xray: S/P R Total Knee Arthroplasty, Prosthesis in place and intact with no signs of dorothy-prosthetic Fx                          13.5   8.13  )-----------( 187      ( 16 Oct 2023 09:55 )             40.7    10-16    140  |  105  |  13  ----------------------------<  122<H>  4.3   |  24  |  0.81    Ca    8.4      16 Oct 2023 09:55        A/P: 83y Male S/p R Total Knee Arthroplasty. VSS. NAD  -PT/OT-WBAT RLE, Knee Immobilizer when in bed sleeping  -IS  -DVT PPx: ASA 325mg PO BID, SCDs, early OOB and Amb  -Pain Control  -Continue Current Tx  -Dispo planning to home once PT cleared.    Ted Dennis PA-C  Orthopedic Surgery Team  Team Pager #[phone]

## 2023-10-16 NOTE — DISCHARGE NOTE PROVIDER - NSDCFUSCHEDAPPT_GEN_ALL_CORE_FT
Jessee Ch  [facility] Physician [facility]  ORTHOSURG [address] [facility]  Scheduled Appointment: 11/14/2023

## 2023-10-16 NOTE — PHYSICAL THERAPY INITIAL EVALUATION ADULT - OCCUPATION
General


Chief Complaint:  Skin/Wound Problems


Stated Complaint:  SUNBURN


Nursing Triage Note:  


MOTHER REPORTED THE PT HAD BEEN SWIMMING 2 DAYS AGO AND WAS SUNBURNED. THE 


PATIENT WAS GIVEN BENADRYL AND ALOE VERA ON THE SUNBURN. THE SUNBURN IS ON HER 


STOMACH AND BACK AS WELL AS THE TOP OF  HER  CHEST AND  ARMS.


Source:  family





History of Present Illness


Date Seen by Provider:  Jun 30, 2019


Time Seen by Provider:  01:00


Initial Comments


Patient was brought in by mom with a sunburn. Mom reports that she been swimming

2 days ago and now has a sunburn. Sunburn is over her stomach, chest back and 

arms. There is no blistering. Mom reports she got really fussy and upset so she 

gave her Benadryl and brought her here. She does report that she's also use 

Allegra.





Allergies and Home Medications


Allergies


Coded Allergies:  


     No Known Drug Allergies (Unverified , 6/30/19)





Patient Home Medication List


Home Medication List Reviewed:  Yes





Review of Systems


Review of Systems


Constitutional:  No chills, No fever


EENTM:  see HPI


Respiratory:  no symptoms reported


Cardiovascular:  no symptoms reported


Genitourinary:  no symptoms reported


Skin:  see HPI





Past Medical-Social-Family Hx


Past Med/Social Hx:  Reviewed Nursing Past Med/Soc Hx


Patient Social History


Recent Foreign Travel:  No


Contact w/Someone Who Travel:  No


Recent Infectious Disease Expo:  No


Ebola Symptoms:  Denies Symptoms Listed





Physical Exam


Vital Signs





Vital Signs - First Documented








 6/30/19





 00:56


 


Pulse 76


 


Resp 16


 


B/P (MAP) 0/0


 


O2 Delivery Room Air





Capillary Refill :


General Appearance:  WD/WN, no apparent distress


Neck:  non-tender


Cardiovascular:  normal peripheral pulses, regular rate, rhythm


Respiratory:  lungs clear, normal breath sounds


Gastrointestinal:  non tender, soft


Neurologic/Psychiatric:  normal mood/affect, oriented x 3


Skin:  other (Patient with first-degree sunburn with no blistering to her 

stomach, chest, back and arms with her stomach being the worst)





Progress/Results/Core Measures


Results/Orders


My Orders





Orders - RADHA BATEMAN DO


Acetaminophen Oral Solution (Tylenol Ora (6/30/19 01:15)





Vital Signs/I&O











 6/30/19





 00:56


 


Pulse 76


 


Resp 16


 


B/P (MAP) 0/0


 


O2 Delivery Room Air











Departure


Impression





   Primary Impression:  


   1st degree sunburn


Disposition:  01 HOME, SELF-CARE


Condition:  Stable





Departure-Patient Inst.


Referrals:  


CULLEN ARAUZ MD (PCP/Family)


Primary Care Physician


Patient Instructions:  Sunburn (DC), Sunburn





Add. Discharge Instructions:  


Topical over-the-counter sunburn lotions with lidocaine, Aloa Vera other topical

sunburn lotions as directed on package Tylenol or ibuprofen every 4-6 hours as 

needed for pain 


All discharge instructions reviewed with patient and/or family. Voiced 

understanding.











RADHA BATEMAN DO               Jun 30, 2019 01:10 retired

## 2023-10-16 NOTE — DISCHARGE NOTE PROVIDER - NSDCHC_MEDRECSTATUS_GEN_ALL_CORE
Admission Reconciliation is Not Complete  Discharge Reconciliation is Completed Admission Reconciliation is Completed  Discharge Reconciliation is Not Complete

## 2023-10-16 NOTE — DISCHARGE NOTE PROVIDER - HOSPITAL COURSE
The patient is a 83y year old Male status post elective right total knee arthroplasty after failing outpatient nonoperative conservative management. The patient's goal of Care: "to be more activate, want to travel again." The patient presented to [facility] after being medically cleared for an elective surgical procedure. The patient was taken to the operating room on the date mentioned above. Prophylactic antibiotics were started before the procedure. There were no complications during the procedure and patient tolerated the procedure well. The patient was transferred to the recovery room in stable condition. The patient was placed on aspirin 325mg twice a day for anticoagulation. All appropriate home medications were continued. The patient received physical therapy evaluation in the PACU. The rest of the hospital stay was unremarkable and he was discharged from the recovery room.

## 2023-10-16 NOTE — PRE-ANESTHESIA EVALUATION ADULT - LAST ECHOCARDIOGRAM
Pharmacy request.   Former [facility] patient.     Patient was last seen in office on 6/10/2020 by Dr. Aranda for TCM.   Last refill of the gabapentin on 11/16/2020 #90.  Unable to refill medication which was sent to the provider on call. Please advise.    w/in the last 6mo normal as per pt

## 2023-10-16 NOTE — BRIEF OPERATIVE NOTE - NSICDXBRIEFPOSTOP_GEN_ALL_CORE_FT
POST-OP DIAGNOSIS:  Unilateral primary osteoarthritis, right knee 16-Oct-2023 08:51:27  Pancho Thomas

## 2023-10-16 NOTE — PHYSICAL THERAPY INITIAL EVALUATION ADULT - PLANNED THERAPY INTERVENTIONS, PT EVAL
Goal: In 2-weeks pt will be able to ascend/descend 1 flight of stairs to facilitate safe entry to living room upon d/c home./balance training/bed mobility training/gait training/strengthening/transfer training

## 2023-10-16 NOTE — DISCHARGE NOTE PROVIDER - NSDCFUADDINST_GEN_ALL_CORE_FT
Discharge Instructions for Knee Arthroplasty    1. Pain medication will be sent electronically to your pharmacy - take these as needed.     2. Follow up with your orthopaedic surgeon in office in 2 weeks. Please call the office number to make an appointment, or if you have any questions.    3. Call with fevers over 101F; wound redness; drainage or opened incisions; or calf pain/swelling.    4. Resume your previous diet.     5. You may walking with full weight bearing as tolerated; use your assistive devices (walker/cane) as needed. Walk plenty.     6. Follow your daily physical therapy protocol.     7. Keep your dressing clean and dry. Change the dressing every 7 days or if it becomes visibly soiled. To change, cover the incision with gauze and then with Tegaderm or paper tape on top.     8. It is ok to shower with an Aquacel or Mepilex bandage only.  Avoid direct water beating on bandage. Otherwise, if you have any other kind of dressing, do not shower and keep your dressing clean and dry.     9. Do not take a bath/soak/hot tub, and do not submerge your dressing.     10. Continue to place ice packs on the knee.

## 2023-10-16 NOTE — OCCUPATIONAL THERAPY INITIAL EVALUATION ADULT - NSOTDISCHREC_GEN_A_CORE
assist with ADLs and functional transfers as needed/No skilled OT needs assist with ADLs and functional transfers as needed/Home OT

## 2023-10-16 NOTE — DISCHARGE NOTE PROVIDER - NSDCCPTREATMENT_GEN_ALL_CORE_FT
PRINCIPAL PROCEDURE  Procedure: Right total knee replacement  Findings and Treatment: [employer]

## 2023-10-16 NOTE — PHYSICAL THERAPY INITIAL EVALUATION ADULT - PERTINENT HX OF CURRENT PROBLEM, REHAB EVAL
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 He denies any injury or trauma. He was seen by Dr. Kj Dennis and had cortisone and HA injection which did help. He states the pain is a 5/10 especially when he gets up from a sitting position and pain with walking. He has been going to PT which does help and denies any numbness or tingling. He now presents to PST for a scheduled Right Total Knee Replacement on 10/16/2023. PMH also includes HTN, HLD, GERD and CAD (s/p CABG 19yrs ago, followed by Cards- note in chart). Denies recent fevers, chills, cough, chest pain or SOB and feels well otherwise. Pt now s/p R TKA WBAT RLE, Knee Immobilizer when in bed sleeping.

## 2023-10-16 NOTE — PHYSICAL THERAPY INITIAL EVALUATION ADULT - IMPAIRMENTS CONTRIBUTING TO GAIT DEVIATIONS, PT EVAL
impaired balance/decreased flexibility/narrow base of support/pain/impaired postural control/impaired sensory feedback/decreased strength

## 2023-10-16 NOTE — OCCUPATIONAL THERAPY INITIAL EVALUATION ADULT - ADDITIONAL COMMENTS
Pt reports living in a condo with his wife, no stairs to enter, elevator access, walk in shower. Pt reports being independent with ADLs and not ambulating with any AD prior to admission.

## 2023-10-16 NOTE — DISCHARGE NOTE PROVIDER - CARE PROVIDER_API CALL
Jessee Ch  Orthopaedic Surgery  [address], [address]  [place] [address]  Phone: [phone]  Fax: [phone]  Follow Up Time:

## 2023-10-16 NOTE — PHYSICAL THERAPY INITIAL EVALUATION ADULT - ADDITIONAL COMMENTS
Pt lives with wife in a private condo with elevator access to main level with bedroom/bathroom, stairs to living room. Walk-in shower with grab bars. Prior to admission, pt was independent with ADLs and ambulation without AD. Pt denies owning any DME, states wife is available to assist him as needed at home.

## 2023-10-17 ENCOUNTER — TRANSCRIPTION ENCOUNTER (OUTPATIENT)
Age: 83
End: 2023-10-17

## 2023-10-17 VITALS
DIASTOLIC BLOOD PRESSURE: 76 MMHG | SYSTOLIC BLOOD PRESSURE: 121 MMHG | OXYGEN SATURATION: 96 % | HEART RATE: 75 BPM | TEMPERATURE: 98 F | RESPIRATION RATE: 18 BRPM

## 2023-10-17 LAB
ANION GAP SERPL CALC-SCNC: 18 MMOL/L — HIGH (ref 5–17)
ANION GAP SERPL CALC-SCNC: 18 MMOL/L — HIGH (ref 5–17)
BUN SERPL-MCNC: 15 MG/DL — SIGNIFICANT CHANGE UP (ref 7–23)
BUN SERPL-MCNC: 15 MG/DL — SIGNIFICANT CHANGE UP (ref 7–23)
CALCIUM SERPL-MCNC: 8.9 MG/DL — SIGNIFICANT CHANGE UP (ref 8.4–10.5)
CALCIUM SERPL-MCNC: 8.9 MG/DL — SIGNIFICANT CHANGE UP (ref 8.4–10.5)
CHLORIDE SERPL-SCNC: 104 MMOL/L — SIGNIFICANT CHANGE UP (ref 96–108)
CHLORIDE SERPL-SCNC: 104 MMOL/L — SIGNIFICANT CHANGE UP (ref 96–108)
CO2 SERPL-SCNC: 20 MMOL/L — LOW (ref 22–31)
CO2 SERPL-SCNC: 20 MMOL/L — LOW (ref 22–31)
CREAT SERPL-MCNC: 0.86 MG/DL — SIGNIFICANT CHANGE UP (ref 0.5–1.3)
CREAT SERPL-MCNC: 0.86 MG/DL — SIGNIFICANT CHANGE UP (ref 0.5–1.3)
EGFR: 87 ML/MIN/1.73M2 — SIGNIFICANT CHANGE UP
EGFR: 87 ML/MIN/1.73M2 — SIGNIFICANT CHANGE UP
GLUCOSE SERPL-MCNC: 118 MG/DL — HIGH (ref 70–99)
GLUCOSE SERPL-MCNC: 118 MG/DL — HIGH (ref 70–99)
HCT VFR BLD CALC: 43.7 % — SIGNIFICANT CHANGE UP (ref 39–50)
HCT VFR BLD CALC: 43.7 % — SIGNIFICANT CHANGE UP (ref 39–50)
HGB BLD-MCNC: 14.5 G/DL — SIGNIFICANT CHANGE UP (ref 13–17)
HGB BLD-MCNC: 14.5 G/DL — SIGNIFICANT CHANGE UP (ref 13–17)
MCHC RBC-ENTMCNC: 32.2 PG — SIGNIFICANT CHANGE UP (ref 27–34)
MCHC RBC-ENTMCNC: 32.2 PG — SIGNIFICANT CHANGE UP (ref 27–34)
MCHC RBC-ENTMCNC: 33.2 GM/DL — SIGNIFICANT CHANGE UP (ref 32–36)
MCHC RBC-ENTMCNC: 33.2 GM/DL — SIGNIFICANT CHANGE UP (ref 32–36)
MCV RBC AUTO: 97.1 FL — SIGNIFICANT CHANGE UP (ref 80–100)
MCV RBC AUTO: 97.1 FL — SIGNIFICANT CHANGE UP (ref 80–100)
NRBC # BLD: 0 /100 WBCS — SIGNIFICANT CHANGE UP (ref 0–0)
NRBC # BLD: 0 /100 WBCS — SIGNIFICANT CHANGE UP (ref 0–0)
PLATELET # BLD AUTO: 220 K/UL — SIGNIFICANT CHANGE UP (ref 150–400)
PLATELET # BLD AUTO: 220 K/UL — SIGNIFICANT CHANGE UP (ref 150–400)
POTASSIUM SERPL-MCNC: 4.5 MMOL/L — SIGNIFICANT CHANGE UP (ref 3.5–5.3)
POTASSIUM SERPL-MCNC: 4.5 MMOL/L — SIGNIFICANT CHANGE UP (ref 3.5–5.3)
POTASSIUM SERPL-SCNC: 4.5 MMOL/L — SIGNIFICANT CHANGE UP (ref 3.5–5.3)
POTASSIUM SERPL-SCNC: 4.5 MMOL/L — SIGNIFICANT CHANGE UP (ref 3.5–5.3)
RBC # BLD: 4.5 M/UL — SIGNIFICANT CHANGE UP (ref 4.2–5.8)
RBC # BLD: 4.5 M/UL — SIGNIFICANT CHANGE UP (ref 4.2–5.8)
RBC # FLD: 12.6 % — SIGNIFICANT CHANGE UP (ref 10.3–14.5)
RBC # FLD: 12.6 % — SIGNIFICANT CHANGE UP (ref 10.3–14.5)
SODIUM SERPL-SCNC: 142 MMOL/L — SIGNIFICANT CHANGE UP (ref 135–145)
SODIUM SERPL-SCNC: 142 MMOL/L — SIGNIFICANT CHANGE UP (ref 135–145)
WBC # BLD: 15.22 K/UL — HIGH (ref 3.8–10.5)
WBC # BLD: 15.22 K/UL — HIGH (ref 3.8–10.5)
WBC # FLD AUTO: 15.22 K/UL — HIGH (ref 3.8–10.5)
WBC # FLD AUTO: 15.22 K/UL — HIGH (ref 3.8–10.5)

## 2023-10-17 PROCEDURE — C1776: CPT

## 2023-10-17 PROCEDURE — 73560 X-RAY EXAM OF KNEE 1 OR 2: CPT

## 2023-10-17 PROCEDURE — 97166 OT EVAL MOD COMPLEX 45 MIN: CPT

## 2023-10-17 PROCEDURE — 85027 COMPLETE CBC AUTOMATED: CPT

## 2023-10-17 PROCEDURE — C1713: CPT

## 2023-10-17 PROCEDURE — 82962 GLUCOSE BLOOD TEST: CPT

## 2023-10-17 PROCEDURE — 80048 BASIC METABOLIC PNL TOTAL CA: CPT

## 2023-10-17 PROCEDURE — 97116 GAIT TRAINING THERAPY: CPT

## 2023-10-17 PROCEDURE — 97110 THERAPEUTIC EXERCISES: CPT

## 2023-10-17 PROCEDURE — 27447 TOTAL KNEE ARTHROPLASTY: CPT

## 2023-10-17 PROCEDURE — 36415 COLL VENOUS BLD VENIPUNCTURE: CPT

## 2023-10-17 PROCEDURE — 97161 PT EVAL LOW COMPLEX 20 MIN: CPT

## 2023-10-17 RX ADMIN — Medication 975 MILLIGRAM(S): at 05:47

## 2023-10-17 RX ADMIN — TRAMADOL HYDROCHLORIDE 25 MILLIGRAM(S): 50 TABLET ORAL at 06:17

## 2023-10-17 RX ADMIN — SODIUM CHLORIDE 1000 MILLILITER(S): 9 INJECTION INTRAMUSCULAR; INTRAVENOUS; SUBCUTANEOUS at 05:48

## 2023-10-17 RX ADMIN — PANTOPRAZOLE SODIUM 40 MILLIGRAM(S): 20 TABLET, DELAYED RELEASE ORAL at 05:48

## 2023-10-17 RX ADMIN — LISINOPRIL 40 MILLIGRAM(S): 2.5 TABLET ORAL at 06:45

## 2023-10-17 RX ADMIN — Medication 500 MILLIGRAM(S): at 05:48

## 2023-10-17 RX ADMIN — Medication 325 MILLIGRAM(S): at 05:47

## 2023-10-17 RX ADMIN — Medication 975 MILLIGRAM(S): at 06:17

## 2023-10-17 RX ADMIN — TRAMADOL HYDROCHLORIDE 25 MILLIGRAM(S): 50 TABLET ORAL at 05:48

## 2023-10-17 NOTE — DISCHARGE NOTE NURSING/CASE MANAGEMENT/SOCIAL WORK - NSDCPEFALRISK_GEN_ALL_CORE
For information on Fall & Injury Prevention, visit: [email or web address] OR  [email or web address] OR  https://www.cdc.gov/steadi/patient.html

## 2023-10-17 NOTE — DISCHARGE NOTE NURSING/CASE MANAGEMENT/SOCIAL WORK - PATIENT PORTAL LINK FT
You can access the FollowMyHealth Patient Portal offered by [facility] by registering at the following website: [email or web address]. By joining [facility]’s FollowMyHealth portal, you will also be able to view your health information using other applications (apps) compatible with our system.

## 2023-10-17 NOTE — PROGRESS NOTE ADULT - SUBJECTIVE AND OBJECTIVE BOX
Patient seen and examined at bedside. Pain is well controlled with tylenol and tramadol. Feels ready to go home later today. Doing well with PT. Patient denies any numbness, tingling, weakness, or any other orthopaedic complaint.    Exam:  T(C): 36.7 (10-17-23 @ 04:23), Max: 36.7 (10-16-23 @ 05:53)  T(F): 98 (10-17-23 @ 04:23), Max: 98.1 (10-16-23 @ 05:53)  HR: 75 (10-17-23 @ 04:23) (56 - 93)  BP: 122/76 (10-17-23 @ 04:23) (90/55 - 154/78)  RR: 18 (10-17-23 @ 04:23) (16 - 18)  SpO2: 93% (10-17-23 @ 04:23) (91% - 100%)    Gen: Resting in bed, no acute distress  RLE  Dressing in place, clean/dry/intact. Knee immobilizer off at this time.  Lynda-incisional tenderness to palpation; otherwise, NTTP throughout the rest of the extremity.   SILT L2-S1.  GSC/TA/EHL/FHL intact. Q/H unable to assess 2' pain.   DP pulse palpable.   No calf tenderness bilaterally.   Compartments soft and compressible.                           13.5   8.13  )-----------( 187      ( 16 Oct 2023 09:55 )             40.7       10-16    140  |  105  |  13  ----------------------------<  122<H>  4.3   |  24  |  0.81    Ca    8.4      16 Oct 2023 09:55    Assessment and Plan:  83y Male POD1 R TKA    WBAT, PT/OT. Knee immobilizer while sleeping.   Pain management PRN. Medications sent to pharmacy, picked up by wife.   Continue prophylactic antibiotics  DVT PPx: aspirin 325mg BID  Incentive spirometry  Dispo: Home per PT today  Will discuss with Dr. Ch and advise of any changes to the plan.

## 2023-10-18 ENCOUNTER — TRANSCRIPTION ENCOUNTER (OUTPATIENT)
Age: 83
End: 2023-10-18

## 2023-10-20 ENCOUNTER — TRANSCRIPTION ENCOUNTER (OUTPATIENT)
Age: 83
End: 2023-10-20

## 2023-10-21 ENCOUNTER — TRANSCRIPTION ENCOUNTER (OUTPATIENT)
Age: 83
End: 2023-10-21

## 2023-10-27 ENCOUNTER — TRANSCRIPTION ENCOUNTER (OUTPATIENT)
Age: 83
End: 2023-10-27

## 2023-10-28 ENCOUNTER — TRANSCRIPTION ENCOUNTER (OUTPATIENT)
Age: 83
End: 2023-10-28

## 2023-11-04 ENCOUNTER — TRANSCRIPTION ENCOUNTER (OUTPATIENT)
Age: 83
End: 2023-11-04

## 2023-11-08 ENCOUNTER — TRANSCRIPTION ENCOUNTER (OUTPATIENT)
Age: 83
End: 2023-11-08

## 2023-11-12 ENCOUNTER — TRANSCRIPTION ENCOUNTER (OUTPATIENT)
Age: 83
End: 2023-11-12

## 2023-11-14 ENCOUNTER — APPOINTMENT (OUTPATIENT)
Dept: ORTHOPEDIC SURGERY | Facility: CLINIC | Age: 83
End: 2023-11-14
Payer: MEDICARE

## 2023-11-14 PROBLEM — E78.5 HYPERLIPIDEMIA, UNSPECIFIED: Chronic | Status: ACTIVE | Noted: 2023-10-02

## 2023-11-14 PROBLEM — M17.11 UNILATERAL PRIMARY OSTEOARTHRITIS, RIGHT KNEE: Chronic | Status: ACTIVE | Noted: 2023-10-02

## 2023-11-14 PROBLEM — I10 ESSENTIAL (PRIMARY) HYPERTENSION: Chronic | Status: ACTIVE | Noted: 2023-10-02

## 2023-11-14 PROBLEM — K21.9 GASTRO-ESOPHAGEAL REFLUX DISEASE WITHOUT ESOPHAGITIS: Chronic | Status: ACTIVE | Noted: 2023-10-02

## 2023-11-14 PROCEDURE — 73562 X-RAY EXAM OF KNEE 3: CPT | Mod: RT

## 2023-11-14 PROCEDURE — 99024 POSTOP FOLLOW-UP VISIT: CPT

## 2023-11-14 RX ORDER — TRAMADOL HYDROCHLORIDE 50 MG/1
50 TABLET, COATED ORAL
Qty: 30 | Refills: 0 | Status: ACTIVE | COMMUNITY
Start: 2023-11-14 | End: 1900-01-01

## 2023-12-05 ENCOUNTER — TRANSCRIPTION ENCOUNTER (OUTPATIENT)
Age: 83
End: 2023-12-05

## 2023-12-10 ENCOUNTER — NON-APPOINTMENT (OUTPATIENT)
Age: 83
End: 2023-12-10

## 2023-12-14 ENCOUNTER — APPOINTMENT (OUTPATIENT)
Dept: ORTHOPEDIC SURGERY | Facility: CLINIC | Age: 83
End: 2023-12-14
Payer: MEDICARE

## 2023-12-14 PROCEDURE — 99024 POSTOP FOLLOW-UP VISIT: CPT

## 2023-12-14 NOTE — HISTORY OF PRESENT ILLNESS
[de-identified] : 82 y/o Male s/p 10/16/2023 Right Total Knee Replacement is overall doing well. He is going to PT 2 x week at [facility].  He states that his Physical Therapist and his Physical Therapist's student are "really working [him]" and that it "hurts like hell," but adds he feels he is making progress. He denies any numbness or tingling.   Hx: Quadruple bypass in 2004 - ASA. HLD. Squamous cell about 20 years ago and basal cell about a year ago. MOHs.

## 2023-12-14 NOTE — CONSULT LETTER
[Dear  ___] : Dear  [unfilled], [Consult Letter:] : I had the pleasure of evaluating your patient, [unfilled]. [Consult Closing:] : Thank you very much for allowing me to participate in the care of this patient.  If you have any questions, please do not hesitate to contact me. [FreeTextEntry1] : I had the pleasure of evaluating your patient in the office today for routine follow up s/p right total knee replacement. I have enclosed a copy of today's office notes for your charts and for your review.  Sincerely,   Jessee Ch M.D. Professor and  Department of Orthopedic Surgery [facility] [place]

## 2023-12-14 NOTE — REASON FOR VISIT
[Post Operative Visit] : a post operative visit for [Aftercare Following Surgery] : aftercare following surgery [Spouse] : spouse [FreeTextEntry2] : POSTOP s/p 10/16/2023 Right Total Knee Replacment

## 2023-12-14 NOTE — PHYSICAL EXAM
[Wide-Based] : wide-based [LE] : Sensory: Intact in bilateral lower extremities [Knee] : patellar 2+ and symmetric bilaterally [de-identified] : Pt is a pleasant 83 year old male in NAD and AAOx3. 31.0 BMI. The patient has a mildly antalgic gait. Physical examination of both knees reveals a well-healing surgical incision over the Right knee as well as mild venous stasis changes, incisional scars consistent with saphenous harvest, and; mild pedal edema; both knees otherwise with normal contours, skin intact with no signs of infection, no erythema, no swelling, no effusion, no distal lymphedema or phlebitis, no patholaxity. ROM of the Right knee reveals -10 to 100. Strength is 5/5 within this arc of motion.  [de-identified] : X-rays from previous office visit (11/14/23) were again reviewed today (12/12/23): 4 views of the Right knee status-post total arthroplasty demonstrate well-aligned components, with no acute fracture or dislocation, nor any evidence of osteolysis or loosnening. .

## 2023-12-14 NOTE — DISCUSSION/SUMMARY
[de-identified] : The patient is a pleasant 83M status-post Right total knee arthroplasty. Imaging findings were reviewed and discussed with the patient.  A lengthy discussion was held regarding the patient's condition, postoperative course, and next steps. Patient states that overall he has been "doing okay." The patent states that feels that he is progressing at Physical Therapy, and adds that his providers are "really working [him]" even though "it hurts like hell hell." The patient clarifies he is very pleased to his Physical Therapist. However, the patient's spouse, present throughout the patient's interview and examination, states that the patient has not been keeping up with his home exercises. The patient is counseled that at present, he is still unable to achieve full extension, but that he has made progress in flexion by 18 degrees. The patient is advised to continue to push himself at Physical Therapy. The patient provided with a new prescription for Physical Therapy, with recommendations made to be more aggressive with range of motion and regaining extension, with specific recommendations made for utilization of prone hangs with weight and hyperextension roll use, as well as teaching of a home exercise program (10 minutes extension exercises, 10x day or for 100 minutes per day total, every day). HEP directive were written down and provided to the patient o a handout.  All of the patient's questions and concerns were answered and addressed. The patient expressed understanding and is in agreement with the plan. Patient advised to follow up in 2 months.

## 2023-12-20 ENCOUNTER — TRANSCRIPTION ENCOUNTER (OUTPATIENT)
Age: 83
End: 2023-12-20

## 2023-12-27 ENCOUNTER — TRANSCRIPTION ENCOUNTER (OUTPATIENT)
Age: 83
End: 2023-12-27

## 2024-01-09 ENCOUNTER — APPOINTMENT (OUTPATIENT)
Dept: ORTHOPEDIC SURGERY | Facility: CLINIC | Age: 84
End: 2024-01-09
Payer: MEDICARE

## 2024-01-09 DIAGNOSIS — M17.11 UNILATERAL PRIMARY OSTEOARTHRITIS, RIGHT KNEE: ICD-10-CM

## 2024-01-09 PROCEDURE — 99024 POSTOP FOLLOW-UP VISIT: CPT

## 2024-01-16 ENCOUNTER — NON-APPOINTMENT (OUTPATIENT)
Age: 84
End: 2024-01-16

## 2024-02-08 ENCOUNTER — APPOINTMENT (OUTPATIENT)
Dept: ORTHOPEDIC SURGERY | Facility: CLINIC | Age: 84
End: 2024-02-08

## 2024-03-21 ENCOUNTER — INPATIENT (INPATIENT)
Facility: HOSPITAL | Age: 84
LOS: 3 days | Discharge: NURSING, REHAB OR LONG-TERM CARE | DRG: 948 | End: 2024-03-25
Attending: INTERNAL MEDICINE | Admitting: STUDENT IN AN ORGANIZED HEALTH CARE EDUCATION/TRAINING PROGRAM
Payer: MEDICARE

## 2024-03-21 VITALS
TEMPERATURE: 98 F | OXYGEN SATURATION: 92 % | HEART RATE: 89 BPM | WEIGHT: 199.96 LBS | RESPIRATION RATE: 18 BRPM | DIASTOLIC BLOOD PRESSURE: 88 MMHG | HEIGHT: 69 IN | SYSTOLIC BLOOD PRESSURE: 156 MMHG

## 2024-03-21 DIAGNOSIS — Z96.651 PRESENCE OF RIGHT ARTIFICIAL KNEE JOINT: ICD-10-CM

## 2024-03-21 DIAGNOSIS — Z79.82 LONG TERM (CURRENT) USE OF ASPIRIN: ICD-10-CM

## 2024-03-21 DIAGNOSIS — Z88.0 ALLERGY STATUS TO PENICILLIN: ICD-10-CM

## 2024-03-21 DIAGNOSIS — Z98.890 OTHER SPECIFIED POSTPROCEDURAL STATES: Chronic | ICD-10-CM

## 2024-03-21 DIAGNOSIS — A08.4 VIRAL INTESTINAL INFECTION, UNSPECIFIED: ICD-10-CM

## 2024-03-21 DIAGNOSIS — K21.9 GASTRO-ESOPHAGEAL REFLUX DISEASE WITHOUT ESOPHAGITIS: ICD-10-CM

## 2024-03-21 DIAGNOSIS — Z95.1 PRESENCE OF AORTOCORONARY BYPASS GRAFT: ICD-10-CM

## 2024-03-21 DIAGNOSIS — Z95.1 PRESENCE OF AORTOCORONARY BYPASS GRAFT: Chronic | ICD-10-CM

## 2024-03-21 DIAGNOSIS — I45.10 UNSPECIFIED RIGHT BUNDLE-BRANCH BLOCK: ICD-10-CM

## 2024-03-21 DIAGNOSIS — I25.10 ATHEROSCLEROTIC HEART DISEASE OF NATIVE CORONARY ARTERY WITHOUT ANGINA PECTORIS: ICD-10-CM

## 2024-03-21 DIAGNOSIS — E78.5 HYPERLIPIDEMIA, UNSPECIFIED: ICD-10-CM

## 2024-03-21 DIAGNOSIS — G47.33 OBSTRUCTIVE SLEEP APNEA (ADULT) (PEDIATRIC): ICD-10-CM

## 2024-03-21 DIAGNOSIS — I08.0 RHEUMATIC DISORDERS OF BOTH MITRAL AND AORTIC VALVES: ICD-10-CM

## 2024-03-21 DIAGNOSIS — E86.0 DEHYDRATION: ICD-10-CM

## 2024-03-21 DIAGNOSIS — I10 ESSENTIAL (PRIMARY) HYPERTENSION: ICD-10-CM

## 2024-03-21 LAB
ALBUMIN SERPL ELPH-MCNC: 3.2 G/DL — LOW (ref 3.3–5)
ALP SERPL-CCNC: 65 U/L — SIGNIFICANT CHANGE UP (ref 40–120)
ALT FLD-CCNC: 22 U/L — SIGNIFICANT CHANGE UP (ref 12–78)
ANION GAP SERPL CALC-SCNC: 5 MMOL/L — SIGNIFICANT CHANGE UP (ref 5–17)
APPEARANCE UR: CLEAR — SIGNIFICANT CHANGE UP
APTT BLD: 28.6 SEC — SIGNIFICANT CHANGE UP (ref 24.5–35.6)
AST SERPL-CCNC: 26 U/L — SIGNIFICANT CHANGE UP (ref 15–37)
BACTERIA # UR AUTO: NEGATIVE /HPF — SIGNIFICANT CHANGE UP
BASOPHILS # BLD AUTO: 0.03 K/UL — SIGNIFICANT CHANGE UP (ref 0–0.2)
BASOPHILS NFR BLD AUTO: 0.4 % — SIGNIFICANT CHANGE UP (ref 0–2)
BILIRUB SERPL-MCNC: 0.8 MG/DL — SIGNIFICANT CHANGE UP (ref 0.2–1.2)
BILIRUB UR-MCNC: NEGATIVE — SIGNIFICANT CHANGE UP
BUN SERPL-MCNC: 10 MG/DL — SIGNIFICANT CHANGE UP (ref 7–23)
CALCIUM SERPL-MCNC: 8.6 MG/DL — SIGNIFICANT CHANGE UP (ref 8.5–10.1)
CAST: 0 /LPF — SIGNIFICANT CHANGE UP (ref 0–4)
CHLORIDE SERPL-SCNC: 113 MMOL/L — HIGH (ref 96–108)
CK SERPL-CCNC: 207 U/L — SIGNIFICANT CHANGE UP (ref 26–308)
CO2 SERPL-SCNC: 30 MMOL/L — SIGNIFICANT CHANGE UP (ref 22–31)
COLOR SPEC: YELLOW — SIGNIFICANT CHANGE UP
CREAT SERPL-MCNC: 0.91 MG/DL — SIGNIFICANT CHANGE UP (ref 0.5–1.3)
DIFF PNL FLD: ABNORMAL
EGFR: 85 ML/MIN/1.73M2 — SIGNIFICANT CHANGE UP
EOSINOPHIL # BLD AUTO: 0.11 K/UL — SIGNIFICANT CHANGE UP (ref 0–0.5)
EOSINOPHIL NFR BLD AUTO: 1.3 % — SIGNIFICANT CHANGE UP (ref 0–6)
GLUCOSE SERPL-MCNC: 105 MG/DL — HIGH (ref 70–99)
GLUCOSE UR QL: NEGATIVE MG/DL — SIGNIFICANT CHANGE UP
HCT VFR BLD CALC: 42.5 % — SIGNIFICANT CHANGE UP (ref 39–50)
HGB BLD-MCNC: 14 G/DL — SIGNIFICANT CHANGE UP (ref 13–17)
IMM GRANULOCYTES NFR BLD AUTO: 0.4 % — SIGNIFICANT CHANGE UP (ref 0–0.9)
INR BLD: 1.18 RATIO — SIGNIFICANT CHANGE UP (ref 0.85–1.18)
KETONES UR-MCNC: NEGATIVE MG/DL — SIGNIFICANT CHANGE UP
LEUKOCYTE ESTERASE UR-ACNC: NEGATIVE — SIGNIFICANT CHANGE UP
LYMPHOCYTES # BLD AUTO: 1.73 K/UL — SIGNIFICANT CHANGE UP (ref 1–3.3)
LYMPHOCYTES # BLD AUTO: 20.9 % — SIGNIFICANT CHANGE UP (ref 13–44)
MCHC RBC-ENTMCNC: 31.6 PG — SIGNIFICANT CHANGE UP (ref 27–34)
MCHC RBC-ENTMCNC: 32.9 GM/DL — SIGNIFICANT CHANGE UP (ref 32–36)
MCV RBC AUTO: 95.9 FL — SIGNIFICANT CHANGE UP (ref 80–100)
MONOCYTES # BLD AUTO: 0.57 K/UL — SIGNIFICANT CHANGE UP (ref 0–0.9)
MONOCYTES NFR BLD AUTO: 6.9 % — SIGNIFICANT CHANGE UP (ref 2–14)
NEUTROPHILS # BLD AUTO: 5.8 K/UL — SIGNIFICANT CHANGE UP (ref 1.8–7.4)
NEUTROPHILS NFR BLD AUTO: 70.1 % — SIGNIFICANT CHANGE UP (ref 43–77)
NITRITE UR-MCNC: NEGATIVE — SIGNIFICANT CHANGE UP
PH UR: 5.5 — SIGNIFICANT CHANGE UP (ref 5–8)
PLATELET # BLD AUTO: 237 K/UL — SIGNIFICANT CHANGE UP (ref 150–400)
POTASSIUM SERPL-MCNC: 3.4 MMOL/L — LOW (ref 3.5–5.3)
POTASSIUM SERPL-SCNC: 3.4 MMOL/L — LOW (ref 3.5–5.3)
PROT SERPL-MCNC: 6.5 GM/DL — SIGNIFICANT CHANGE UP (ref 6–8.3)
PROT UR-MCNC: SIGNIFICANT CHANGE UP MG/DL
PROTHROM AB SERPL-ACNC: 13.3 SEC — HIGH (ref 9.5–13)
RBC # BLD: 4.43 M/UL — SIGNIFICANT CHANGE UP (ref 4.2–5.8)
RBC # FLD: 12.8 % — SIGNIFICANT CHANGE UP (ref 10.3–14.5)
RBC CASTS # UR COMP ASSIST: 4 /HPF — SIGNIFICANT CHANGE UP (ref 0–4)
SODIUM SERPL-SCNC: 148 MMOL/L — HIGH (ref 135–145)
SP GR SPEC: 1.02 — SIGNIFICANT CHANGE UP (ref 1–1.03)
SQUAMOUS # UR AUTO: 1 /HPF — SIGNIFICANT CHANGE UP (ref 0–5)
TROPONIN I, HIGH SENSITIVITY RESULT: 105.38 NG/L — HIGH
TROPONIN I, HIGH SENSITIVITY RESULT: 95.59 NG/L — HIGH
UROBILINOGEN FLD QL: 1 MG/DL — SIGNIFICANT CHANGE UP (ref 0.2–1)
WBC # BLD: 8.27 K/UL — SIGNIFICANT CHANGE UP (ref 3.8–10.5)
WBC # FLD AUTO: 8.27 K/UL — SIGNIFICANT CHANGE UP (ref 3.8–10.5)
WBC UR QL: 0 /HPF — SIGNIFICANT CHANGE UP (ref 0–5)

## 2024-03-21 PROCEDURE — 99285 EMERGENCY DEPT VISIT HI MDM: CPT

## 2024-03-21 RX ORDER — SODIUM CHLORIDE 9 MG/ML
1000 INJECTION INTRAMUSCULAR; INTRAVENOUS; SUBCUTANEOUS
Refills: 0 | Status: DISCONTINUED | OUTPATIENT
Start: 2024-03-21 | End: 2024-03-22

## 2024-03-21 RX ORDER — ACETAMINOPHEN 500 MG
650 TABLET ORAL ONCE
Refills: 0 | Status: DISCONTINUED | OUTPATIENT
Start: 2024-03-21 | End: 2024-03-25

## 2024-03-21 RX ORDER — ONDANSETRON 8 MG/1
4 TABLET, FILM COATED ORAL EVERY 8 HOURS
Refills: 0 | Status: DISCONTINUED | OUTPATIENT
Start: 2024-03-21 | End: 2024-03-25

## 2024-03-21 RX ORDER — SODIUM CHLORIDE 9 MG/ML
500 INJECTION INTRAMUSCULAR; INTRAVENOUS; SUBCUTANEOUS
Refills: 0 | Status: DISCONTINUED | OUTPATIENT
Start: 2024-03-21 | End: 2024-03-23

## 2024-03-21 RX ORDER — LANOLIN ALCOHOL/MO/W.PET/CERES
3 CREAM (GRAM) TOPICAL AT BEDTIME
Refills: 0 | Status: DISCONTINUED | OUTPATIENT
Start: 2024-03-21 | End: 2024-03-25

## 2024-03-21 RX ORDER — ASPIRIN/CALCIUM CARB/MAGNESIUM 324 MG
81 TABLET ORAL DAILY
Refills: 0 | Status: DISCONTINUED | OUTPATIENT
Start: 2024-03-21 | End: 2024-03-25

## 2024-03-21 RX ORDER — CHOLECALCIFEROL (VITAMIN D3) 125 MCG
1 CAPSULE ORAL
Refills: 0 | DISCHARGE

## 2024-03-21 RX ORDER — FEXOFENADINE HCL 30 MG
1 TABLET ORAL
Refills: 0 | DISCHARGE

## 2024-03-21 RX ORDER — POTASSIUM CHLORIDE 20 MEQ
40 PACKET (EA) ORAL EVERY 4 HOURS
Refills: 0 | Status: COMPLETED | OUTPATIENT
Start: 2024-03-21 | End: 2024-03-21

## 2024-03-21 RX ORDER — FAMOTIDINE 10 MG/ML
40 INJECTION INTRAVENOUS
Refills: 0 | Status: DISCONTINUED | OUTPATIENT
Start: 2024-03-21 | End: 2024-03-25

## 2024-03-21 RX ORDER — LISINOPRIL 2.5 MG/1
40 TABLET ORAL DAILY
Refills: 0 | Status: DISCONTINUED | OUTPATIENT
Start: 2024-03-21 | End: 2024-03-25

## 2024-03-21 RX ORDER — ASCORBIC ACID 60 MG
2 TABLET,CHEWABLE ORAL
Refills: 0 | DISCHARGE

## 2024-03-21 RX ORDER — HEPARIN SODIUM 5000 [USP'U]/ML
5000 INJECTION INTRAVENOUS; SUBCUTANEOUS EVERY 8 HOURS
Refills: 0 | Status: DISCONTINUED | OUTPATIENT
Start: 2024-03-21 | End: 2024-03-25

## 2024-03-21 RX ORDER — ATORVASTATIN CALCIUM 80 MG/1
40 TABLET, FILM COATED ORAL AT BEDTIME
Refills: 0 | Status: DISCONTINUED | OUTPATIENT
Start: 2024-03-21 | End: 2024-03-25

## 2024-03-21 RX ORDER — SODIUM CHLORIDE 9 MG/ML
500 INJECTION INTRAMUSCULAR; INTRAVENOUS; SUBCUTANEOUS ONCE
Refills: 0 | Status: COMPLETED | OUTPATIENT
Start: 2024-03-21 | End: 2024-03-21

## 2024-03-21 RX ADMIN — Medication 1 TABLET(S): at 21:36

## 2024-03-21 RX ADMIN — SODIUM CHLORIDE 50 MILLILITER(S): 9 INJECTION INTRAMUSCULAR; INTRAVENOUS; SUBCUTANEOUS at 21:44

## 2024-03-21 RX ADMIN — Medication 40 MILLIEQUIVALENT(S): at 21:37

## 2024-03-21 RX ADMIN — SODIUM CHLORIDE 75 MILLILITER(S): 9 INJECTION INTRAMUSCULAR; INTRAVENOUS; SUBCUTANEOUS at 15:26

## 2024-03-21 RX ADMIN — LISINOPRIL 40 MILLIGRAM(S): 2.5 TABLET ORAL at 23:14

## 2024-03-21 RX ADMIN — ATORVASTATIN CALCIUM 40 MILLIGRAM(S): 80 TABLET, FILM COATED ORAL at 21:36

## 2024-03-21 RX ADMIN — Medication 40 MILLIEQUIVALENT(S): at 19:13

## 2024-03-21 RX ADMIN — SODIUM CHLORIDE 75 MILLILITER(S): 9 INJECTION INTRAMUSCULAR; INTRAVENOUS; SUBCUTANEOUS at 16:26

## 2024-03-21 RX ADMIN — FAMOTIDINE 40 MILLIGRAM(S): 10 INJECTION INTRAVENOUS at 21:36

## 2024-03-21 RX ADMIN — Medication 81 MILLIGRAM(S): at 21:36

## 2024-03-21 RX ADMIN — SODIUM CHLORIDE 500 MILLILITER(S): 9 INJECTION INTRAMUSCULAR; INTRAVENOUS; SUBCUTANEOUS at 11:26

## 2024-03-21 RX ADMIN — HEPARIN SODIUM 5000 UNIT(S): 5000 INJECTION INTRAVENOUS; SUBCUTANEOUS at 21:39

## 2024-03-21 NOTE — ED PROVIDER NOTE - MUSCULOSKELETAL, MLM
Spine appears normal, range of motion is not limited, no muscle or joint tenderness. MAEx4. Right surgical scar well healed, nontender. Spine appears normal. MAEx4. Right knee surgical scar well healed, nontender.  B/L SLR 20 degrees.  Back, chest wall, pelvis: NT & stable.  No focal extremity tenderness.

## 2024-03-21 NOTE — ED ADULT NURSE NOTE - BREATHING, MLM
[General Appearance - In No Acute Distress] : in no acute distress [General Appearance - Alert] : alert [Sclera] : the sclera and conjunctiva were normal [Neck Appearance] : the appearance of the neck was normal [] : the neck was supple [Neck Cervical Mass (___cm)] : no neck mass was observed [Heart Sounds] : normal S1 and S2 [Edema] : there was no peripheral edema [Auscultation Breath Sounds / Voice Sounds] : lungs were clear to auscultation bilaterally [No Palpable Adenopathy] : no palpable adenopathy [Skin Color & Pigmentation] : normal skin color and pigmentation [Oriented To Time, Place, And Person] : oriented to person, place, and time Spontaneous, unlabored and symmetrical

## 2024-03-21 NOTE — ED PROVIDER NOTE - NSICDXPASTMEDICALHX_GEN_ALL_CORE_FT
PAST MEDICAL HISTORY:  CAD (coronary artery disease)     GERD (gastroesophageal reflux disease)     HLD (hyperlipidemia)     HTN (hypertension)     Unilateral primary osteoarthritis, right knee

## 2024-03-21 NOTE — ED PROVIDER NOTE - NEUROLOGICAL, MLM
Alert and oriented, no focal deficits, no motor or sensory deficits. CN normal. Normal speech Alert and oriented, no focal deficits, no motor or sensory deficits. CNs normal. Normal speech

## 2024-03-21 NOTE — H&P ADULT - HISTORY OF PRESENT ILLNESS
" 82 y/o male with a PMHx of CAD s/p CABG, GERD, HLD, HTN, OA, right TKR, 10/23 with prolonged rehab currently living at home with wife and doing outpatient PT, occasional falls without injury, s/p increased diarrhea 2 weeks ago (Cdiff and GI PCR negative) unilateral primary osteoarthritis presents to the ED c/o continued generalized weakness despite increased fluid intake advised by PCP. Pt reports around 2am he walked into the kitchen to get some water and after opening fridge door he felt weak and his b/l knees buckled. Pt had a controlled fall to the ground. Pt was too weak to get back up. Pt slept on rug overnight. No injury from last nights fall. No LOC. On ASA. Denies HA, abd pain, SOB. No other complaints at this time."      ED course: Na 148/K 3.4. TNI: 95. UA: negative  CXR: unofficial read: no obvious changes              PAST MEDICAL/SURGICAL/FAMILY/SOCIAL HISTORY:    Past Medical, Past Surgical, and Family History:  PAST MEDICAL HISTORY:  CAD (coronary artery disease)     GERD (gastroesophageal reflux disease)     HLD (hyperlipidemia)     HTN (hypertension)     Unilateral primary osteoarthritis, right knee.     PAST SURGICAL HISTORY:  History of colonoscopy     History of inguinal hernia repair    right TKR 10/2023    S/P CABG x 4.     FAMILY HISTORY:  Father  Still living? No  Family history of heart failure, Age at diagnosis: Age Unknown    Mother  Still living? No  Family history of Alzheimer's disease, Age at diagnosis: Age Unknown.    · Social Concerns: None    ALLERGIES AND HOME MEDICATIONS:   Allergies:        Allergies:  	penicillins: Drug Category, Rash, Originally Entered as [Unknown] reaction to [Penicillins]

## 2024-03-21 NOTE — ED PROVIDER NOTE - CLINICAL SUMMARY MEDICAL DECISION MAKING FREE TEXT BOX
82 y/o male with a PMHx of CAD s/p CABG, GERD, HLD, HTN, OA, right TKR, 10/23 with prolonged rehab currently living at home with wife and doing outpatient PT, occasional falls without injury, s/p increased diarrhea 2 weeks ago (Cdiff and GI PCR negative) unilateral primary osteoarthritis presents to the ED c/o continued generalized weakness despite increased fluid intake advised by PCP. Pt reports around 2am he walked into the kitchen to get some water and after opening fridge door he felt weak and his b/l knees buckled. Pt had a controlled fall to the ground. Pt was too weak to get back up. Pt slept on rug overnight. Pt's wife is concerned for dehydration and general health condition. Plan: labs including urine, IVF, observe, reassess.    Labs results reviewed. Pt not significantly dehydrated. Troponin added and found to be elevated to 96. Case discussed with admit hospitalist Dr. Engel who accepts tele admission. 82 y/o male with a PMHx of CAD s/p CABG, GERD, HLD, HTN, OA, right TKR, 10/23 with prolonged rehab currently living at home with wife and doing outpatient PT, occasional falls without injury, s/p increased diarrhea 2 weeks ago (outpt Cdiff and GI PCR negative), BIBA from home c/o continued generalized weakness despite increased fluid intake advised by PCP. Pt reports around 2am he walked into the kitchen to get some water and after opening fridge door he felt weak and his b/l knees buckled. Pt had a controlled fall to the ground. Pt was too weak to get back up. Pt slept on rug overnight. Pt's wife is concerned for dehydration and general health condition.   Plan: labs including urine, IVF, fall precautions; observe, reassess.    Labs results reviewed. Pt not significantly dehydrated. Troponin added and found to be elevated to 96. Case discussed with admit hospitalist Dr. Engel who accepts Tele Observation.

## 2024-03-21 NOTE — ED PROVIDER NOTE - SKIN, MLM
Skin normal color for race, warm, dry and intact. No evidence of rash. No tactile warmth. No external signs of trauma.

## 2024-03-21 NOTE — H&P ADULT - ASSESSMENT
84 y/o male with a PMHx of CAD s/p CABG, GERD, HLD, HTN, OA, right TKR, 10/23 with prolonged rehab currently living at home with wife and doing outpatient PT, occasional falls without injury, s/p increased diarrhea 2 weeks ago (Cdiff and GI PCR negative) unilateral primary osteoarthritis presents to the ED c/o continued generalized weakness despite increased fluid intake advised by PCP. Pt reports around 2am he walked into the kitchen to get some water and after opening fridge door he felt weak and his b/l knees buckled. Pt had a controlled fall to the ground. Pt was too weak to get back up. Pt slept on rug overnight. No injury from last nights fall. No LOC. On ASA. Denies HA, abd pain, SOB. No other complaints at this time        #Weakness suspect dehydration   - dispo to tele/obs - remote tele is appropriate  - ivf hydration  - replete electrolytes  - EKG: NSR, with PVCs, chronic rbbb  - mild elevation in troponin  - trend trop 2 more times  - Check TSH/Vit D level  - no recent echo in chart  - if wma or rise in troponin consult cardiology  - ASA, statin for now  - Knee xray (unofficial) seen, no obvious hardware malfunction  - PT consult  - resume all other home meds    DVT px: HSQ

## 2024-03-21 NOTE — PATIENT PROFILE ADULT - FUNCTIONAL ASSESSMENT - BASIC MOBILITY 2.
----- Message from Betty Lawrence sent at 6/5/2017  3:36 PM EDT -----  Contact: [phone]  PTS is having rare symptoms gets very angry and some times is very weak. PTS wife call and want Dr. Braxton to call her back to make sure every thing is ok with that.   3 = A little assistance

## 2024-03-21 NOTE — ED ADULT NURSE REASSESSMENT NOTE - NS ED NURSE REASSESS COMMENT FT1
Pt aox3, still c/o generalized weakness, NS IVF in progress. 2nd trop elevated, Pt denies any chest pain.  SR on monitor. No SOB or resp distress. VSS,  BLE knee swollen RT>LT. Abd softly distended, pt voiding via urinal without difficulty. Pt tolerated 1/2 of dinner tray. Wife at bedside. Admission patient profile completed. Pt awaiting bed assignment.

## 2024-03-21 NOTE — H&P ADULT - CONVERSATION DETAILS
wife anyi at bedside    All r/b/a of cpr explained    They do not wish to set limits  Full code  Time spent: 18 min

## 2024-03-21 NOTE — ED PROVIDER NOTE - OBJECTIVE STATEMENT
84 y/o male with a PMHx of CAD s/p CABG, GERD, HLD, HTN, OA, right TKR, 10/23 with prolonged rehab currently living at home with wife and doing outpatient PT, occasional falls without injury, s/p increased diarrhea 2 weeks ago (Cdiff and GI PCR negative) unilateral primary osteoarthritis presents to the ED c/o continued generalized weakness despite increased fluid intake advised by PCP. Pt reports around 2am he walked into the kitchen to get some water and after opening fridge door he felt weak and his b/l knees buckled. Pt had a controlled fall to the ground. Pt was too weak to get back up. Pt slept on rug overnight. No injury from last nights fall. No LOC. On ASA. Denies HA, abd pain, SOB. No other complaints at this time. PCP: Dr. Castaneda. 84 y/o male with a PMHx of CAD s/p CABG, GERD, HLD, HTN, OA, s/p right TKR 10/2023 with prolonged rehab currently living at home with wife and doing outpatient PT, occasional falls without injury, s/p increased diarrhea 2 weeks ago (outpt Cdiff and GI PCR negative), BIBA from home to the ED c/o continued generalized weakness despite increased fluid intake advised by PCP. Pt reports around 2am he walked into the kitchen to get some water and after opening fridge door he felt very weak and his b/l knees buckled. Pt had a controlled fall to the ground. Pt was too weak to get back up. Pt slept on rug overnight. No injury from last night's fall. No LOC. On ASA. Denies HA, abd pain, SOB, F/C, cp, back pain, N/V/D. No other complaints at this time.   PCP: Dr. Castaneda.

## 2024-03-21 NOTE — ED ADULT NURSE NOTE - IS THE PATIENT ABLE TO BE SCREENED?
[] : Resident [FreeTextEntry3] : Here for follow up for depressive sx likely related to work stress , no SI, HI\par Will increase lexapro to 10mg QD \par RTC in 4 weeks, if with persistent sx or no improvement, discussed potentially starting cymbalta  [Time Spent: ___ minutes] : I have spent [unfilled] minutes of time on the encounter. Yes

## 2024-03-21 NOTE — ED ADULT NURSE NOTE - NSFALLHARMRISKINTERV_ED_ALL_ED

## 2024-03-21 NOTE — PATIENT PROFILE ADULT - VISION (WITH CORRECTIVE LENSES IF THE PATIENT USUALLY WEARS THEM):
reading glasses -- not with pt/Partially impaired: cannot see medication labels or newsprint, but can see obstacles in path, and the surrounding layout; can count fingers at arm's length

## 2024-03-21 NOTE — ED ADULT NURSE REASSESSMENT NOTE - NS ED NURSE REASSESS COMMENT FT1
Pt received A&Ox4 VSS taken and stable. Pt resting comfortably on stretcher, denies pain. IVF infusing. Plan of care reviewed-bed assignment pending.

## 2024-03-21 NOTE — H&P ADULT - NSHPPHYSICALEXAM_GEN_ALL_CORE
ICU Vital Signs Last 24 Hrs  T(C): 36.7 (21 Mar 2024 09:06), Max: 36.7 (21 Mar 2024 09:06)  T(F): 98 (21 Mar 2024 09:06), Max: 98 (21 Mar 2024 09:06)  HR: 89 (21 Mar 2024 09:06) (89 - 89)  BP: 156/88 (21 Mar 2024 09:06) (156/88 - 156/88)  BP(mean): --  ABP: --  ABP(mean): --  RR: 18 (21 Mar 2024 09:06) (18 - 18)  SpO2: 92% (21 Mar 2024 09:06) (92% - 92%)    O2 Parameters below as of 21 Mar 2024 09:06  Patient On (Oxygen Delivery Method): room air            PHYSICAL EXAM:    Constitutional: NAD, awake and alert  HEENT: PERR, EOMI, Normal Hearing, MMM  Neck: Soft and supple, No LAD, No JVD  Respiratory: Breath sounds are clear bilaterally, No wheezing, rales or rhonchi  Cardiovascular: S1 and S2, regular rate and rhythm, no Murmurs, gallops or rubs  Gastrointestinal: Bowel Sounds present, soft, nontender, nondistended, no guarding, no rebound  Extremities: No peripheral edema  Vascular: 2+ peripheral pulses  Neurological: A/O x 3, no focal deficits  Musculoskeletal: 5/5 strength b/l upper and lower extremities  Skin: No rashes

## 2024-03-21 NOTE — ED ADULT NURSE NOTE - OBJECTIVE STATEMENT
Pt presents to er with complaints of generalized weakness over the last 2 weeks, states 2 weeks ago he had the noro virus and fevers, no recent fevers, pt had multiple episodes of emesis/diatrrhea which was tested and came back neg, has a history of quadruple bypass 20yrs ago,on asa daily, sees  for cardiology, had a right knee replacement Oct 16th 2023, got up today with his rolling walker and had a mechanical fall, pt has a known history of multiple falls since knee surgery, denies injuries from fall this morning, denies head strike, pt is able to move upper/lower extremities without difficulty, lungs clear to auscultation bilaterally, abd soft non-tender, pt denies chest pain, sob, nsr with intermittent pvc's in/out of bigeminy.

## 2024-03-21 NOTE — ED PROVIDER NOTE - CARE PLAN
1 Principal Discharge DX:	General weakness  Secondary Diagnosis:	Frequent falls  Secondary Diagnosis:	Troponin I above reference range

## 2024-03-21 NOTE — H&P ADULT - NSHPREVIEWOFSYSTEMS_GEN_ALL_CORE
REVIEW OF SYSTEMS:    CONSTITUTIONAL: +weakness  EYES/ENT: No visual changes;  No vertigo or throat pain   NECK: No pain or stiffness  RESPIRATORY: No cough, wheezing, hemoptysis; No shortness of breath  CARDIOVASCULAR: No chest pain or palpitations  GASTROINTESTINAL: No abdominal or epigastric pain. No nausea, vomiting, or hematemesis; No diarrhea or constipation. No melena or hematochezia.  GENITOURINARY: No dysuria, frequency or hematuria  NEUROLOGICAL: No numbness or weakness  SKIN: No itching, burning, rashes, or lesions   All other review of systems is negative unless indicated above

## 2024-03-21 NOTE — H&P ADULT - NSHPLABSRESULTS_GEN_ALL_CORE
CBC:            14.0   8.27  )-----------( 237      ( 03-21-24 @ 09:56 )             42.5         Chem:         ( 03-21-24 @ 09:56 )    148<H>  |  113<H>  |  10  ----------------------------<  105<H>  3.4<L>   |  30  |  0.91        Liver Functions: ( 03-21-24 @ 09:56 )  Alb: 3.2 g/dL / Pro: 6.5 gm/dL / ALK PHOS: 65 U/L / ALT: 22 U/L / AST: 26 U/L / GGT: x              Type & Screen:

## 2024-03-21 NOTE — ED ADULT TRIAGE NOTE - CHIEF COMPLAINT QUOTE
Patient presents to the ER with complaints of generalized weakness, diarrhea, fever and fall out of bed. Patient reports "I slid down to the floor." Patient denies any pain, headstrike, LOC. Patient on aspirin.

## 2024-03-21 NOTE — ED PROVIDER NOTE - NSTIMEPROVIDERCAREINITIATE_GEN_ER
· Assessment	  50 year old F with PMHx of anxiety, HTN, GERD presenting with 2 months of progressive UE and LE pain and weakness, then choreiform movement followed by hypoxic respiratory failure s/p intubation. Patient remains intubated and sedated, with recent fevers, last fever 2/8/22 8 am 101.1. 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 She received her last PLEX on 03/24, awaiting discharge to rehab Was able to mouth her words and use the alphabet board.  Patient with some improvement in her motor power. DistaL>Proximal. Trace reflexes in upper and lower extremities.     Impression:  - Decrease prednisone to 20 mg daily for 1 week, then continue 10 mg daily  - c/w Azathioprine 50 mg daily.   - C/w PLEX treatment plan  --->Received PLEX on 03/24/2022,   - Spoke to , NH won't be able to bring patient to [facility] so will need to arrange for elective admission for subsequent PLEX as outpatient.  Will coordinate with [facility]. Contact info of NH has been shared with Neurology office.  -Recommend picture communication chart/ board as long as she is on the vent, Speech therapy consult for assistive communication device.  · Assessment	  50 year old F with PMHx of anxiety, HTN, GERD presenting with 2 months of progressive UE and LE pain and weakness, then choreiform movement followed by hypoxic respiratory failure s/p intubation. Patient remains intubated and sedated, with recent fevers, last fever 2/8/22 8 am 101.1. 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 She received her last PLEX on 03/24, awaiting discharge to rehab Was able to mouth her words and use the alphabet board.  Patient with some improvement in her motor power. DistaL>Proximal. Trace reflexes in upper and lower extremities.     Impression:  - Decrease prednisone to 20 mg daily for 1 week, then continue 10 mg daily  - Increase  Azathioprine 50 mg BID  - C/w PLEX treatment plan  --->Received PLEX on 03/24/2022,   - Spoke to , NH won't be able to bring patient to [facility] so will need to arrange for elective admission for subsequent PLEX as outpatient.  Will coordinate with [facility] [place]. Contact info of NH has been shared with Neurology office.  -Recommend picture communication chart/ board as long as she is on the vent, Speech therapy consult for assistive communication device.  · Assessment	  50 year old F with PMHx of anxiety, HTN, GERD presenting with 2 months of progressive UE and LE pain and weakness, then choreiform movement followed by hypoxic respiratory failure s/p intubation. Patient remains intubated and sedated, with recent fevers, last fever 2/8/22 8 am 101.1. 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 She received her last PLEX on 03/24, awaiting discharge to rehab Was able to mouth her words and use the alphabet board.  Patient with some improvement in her motor power. DistaL>Proximal. Trace reflexes in upper and lower extremities.     Impression:  - Decrease prednisone to 20 mg daily for 1 week, then continue 10 mg daily until follow up   - Increase  Azathioprine 50 mg BID  - C/w PLEX treatment plan  --->Received PLEX on 03/24/2022,   - Spoke to , NH won't be able to bring patient to [facility] so will need to arrange for elective admission for subsequent PLEX as outpatient.  Will coordinate with [facility] [place]. Contact info of NH has been shared with Neurology office.  -Recommend picture communication chart/ board as long as she is on the vent, Speech therapy consult for assistive communication device.  21-Mar-2024 09:28

## 2024-03-21 NOTE — ED PROVIDER NOTE - CONSTITUTIONAL, MLM
Elderly white male, awake, alert, oriented to person, place, time/situation and in no apparent distress. normal...

## 2024-03-21 NOTE — ED PROVIDER NOTE - ENMT, MLM
Airway patent, Nasal mucosa clear. Mouth with mildly dry mucosa. Throat has no vesicles, no oropharyngeal exudates and uvula is midline. NC/AT. Airway patent, Nasal mucosa clear. Mouth with mildly dry mucosa. Throat has no vesicles, no oropharyngeal exudates and uvula is midline.

## 2024-03-22 DIAGNOSIS — R53.1 WEAKNESS: ICD-10-CM

## 2024-03-22 LAB
ANION GAP SERPL CALC-SCNC: 3 MMOL/L — LOW (ref 5–17)
BUN SERPL-MCNC: 10 MG/DL — SIGNIFICANT CHANGE UP (ref 7–23)
CALCIUM SERPL-MCNC: 8.6 MG/DL — SIGNIFICANT CHANGE UP (ref 8.5–10.1)
CHLORIDE SERPL-SCNC: 113 MMOL/L — HIGH (ref 96–108)
CO2 SERPL-SCNC: 29 MMOL/L — SIGNIFICANT CHANGE UP (ref 22–31)
CREAT SERPL-MCNC: 0.79 MG/DL — SIGNIFICANT CHANGE UP (ref 0.5–1.3)
CULTURE RESULTS: SIGNIFICANT CHANGE UP
EGFR: 89 ML/MIN/1.73M2 — SIGNIFICANT CHANGE UP
GLUCOSE SERPL-MCNC: 98 MG/DL — SIGNIFICANT CHANGE UP (ref 70–99)
POTASSIUM SERPL-MCNC: 3.9 MMOL/L — SIGNIFICANT CHANGE UP (ref 3.5–5.3)
POTASSIUM SERPL-SCNC: 3.9 MMOL/L — SIGNIFICANT CHANGE UP (ref 3.5–5.3)
SODIUM SERPL-SCNC: 145 MMOL/L — SIGNIFICANT CHANGE UP (ref 135–145)
SPECIMEN SOURCE: SIGNIFICANT CHANGE UP
TROPONIN I, HIGH SENSITIVITY RESULT: 84.73 NG/L — HIGH
TSH SERPL-MCNC: 2.07 UU/ML — SIGNIFICANT CHANGE UP (ref 0.34–4.82)

## 2024-03-22 PROCEDURE — 97116 GAIT TRAINING THERAPY: CPT | Mod: GP

## 2024-03-22 PROCEDURE — 36415 COLL VENOUS BLD VENIPUNCTURE: CPT

## 2024-03-22 PROCEDURE — 93306 TTE W/DOPPLER COMPLETE: CPT

## 2024-03-22 PROCEDURE — 84484 ASSAY OF TROPONIN QUANT: CPT

## 2024-03-22 PROCEDURE — 97163 PT EVAL HIGH COMPLEX 45 MIN: CPT | Mod: GP

## 2024-03-22 PROCEDURE — 80048 BASIC METABOLIC PNL TOTAL CA: CPT

## 2024-03-22 PROCEDURE — 99232 SBSQ HOSP IP/OBS MODERATE 35: CPT

## 2024-03-22 PROCEDURE — 84443 ASSAY THYROID STIM HORMONE: CPT

## 2024-03-22 PROCEDURE — 93306 TTE W/DOPPLER COMPLETE: CPT | Mod: 26

## 2024-03-22 RX ADMIN — FAMOTIDINE 40 MILLIGRAM(S): 10 INJECTION INTRAVENOUS at 11:00

## 2024-03-22 RX ADMIN — HEPARIN SODIUM 5000 UNIT(S): 5000 INJECTION INTRAVENOUS; SUBCUTANEOUS at 22:51

## 2024-03-22 RX ADMIN — FAMOTIDINE 40 MILLIGRAM(S): 10 INJECTION INTRAVENOUS at 22:50

## 2024-03-22 RX ADMIN — LISINOPRIL 40 MILLIGRAM(S): 2.5 TABLET ORAL at 11:00

## 2024-03-22 RX ADMIN — HEPARIN SODIUM 5000 UNIT(S): 5000 INJECTION INTRAVENOUS; SUBCUTANEOUS at 14:38

## 2024-03-22 RX ADMIN — Medication 1 TABLET(S): at 22:50

## 2024-03-22 RX ADMIN — ATORVASTATIN CALCIUM 40 MILLIGRAM(S): 80 TABLET, FILM COATED ORAL at 22:50

## 2024-03-22 RX ADMIN — HEPARIN SODIUM 5000 UNIT(S): 5000 INJECTION INTRAVENOUS; SUBCUTANEOUS at 05:54

## 2024-03-22 RX ADMIN — Medication 81 MILLIGRAM(S): at 11:00

## 2024-03-22 NOTE — PROGRESS NOTE ADULT - SUBJECTIVE AND OBJECTIVE BOX
HOSPITALIST PROGRESS NOTE    Admission HPI: 84 y/o male with a PMHx of CAD s/p CABG, GERD, HLD, HTN, OA, right TKR, 10/23 with prolonged rehab currently living at home with wife and doing outpatient PT, occasional falls without injury, s/p increased diarrhea 2 weeks ago (Cdiff and GI PCR negative) unilateral primary osteoarthritis presents to the ED c/o continued generalized weakness despite increased fluid intake advised by PCP. Pt reports around 2am he walked into the kitchen to get some water and after opening fridge door he felt weak and his b/l knees buckled. Pt had a controlled fall to the ground. Pt was too weak to get back up. Pt slept on rug overnight. No injury from last nights fall. No LOC. On ASA. Denies HA, abd pain, SOB. No other complaints at this time.  ED course: Na 148/K 3.4. TNI: 95. UA: negative  CXR: unofficial read: no obvious changes    3/22: Patient feels better s/p IV fluids. No acute complaints. Still felt weak while working with PT - recommended for REBEKA. Will need 3 inpatient nights prior to discharge to rehab.     ROS: All 10 systems reviewed and found to be negative with the exception of what has been described above.     VITAL SIGNS:  Vital Signs Last 24 Hrs  T(C): 36.9 (22 Mar 2024 16:10), Max: 37.4 (22 Mar 2024 08:39)  T(F): 98.4 (22 Mar 2024 16:10), Max: 99.3 (22 Mar 2024 08:39)  HR: 69 (22 Mar 2024 16:10) (64 - 87)  BP: 117/64 (22 Mar 2024 16:10) (117/64 - 140/95)  BP(mean): 109 (21 Mar 2024 20:06) (109 - 109)  RR: 18 (22 Mar 2024 16:10) (18 - 18)  SpO2: 96% (22 Mar 2024 16:10) (95% - 96%)    Parameters below as of 22 Mar 2024 16:10  Patient On (Oxygen Delivery Method): room air      PHYSICAL EXAM:  General: No acute distress  HEENT: NC/AT; PERRL, anicteric sclera; MMM  Neck: Supple  Cardiovascular: +S1/S2, RRR, no murmurs, rubs, gallops  Respiratory: CTA B/L; no W/R/R  Gastrointestinal: soft, NT/ND; +BSx4  Extremities: WWP; no edema, clubbing or cyanosis. Healed scar on R knee  Vascular: 2+ radial, DP/PT pulses B/L  Neurological: AAOx3; no focal deficits    MEDICATIONS:  MEDICATIONS  (STANDING):  aspirin  chewable 81 milliGRAM(s) Oral daily  atorvastatin 40 milliGRAM(s) Oral at bedtime  famotidine    Tablet 40 milliGRAM(s) Oral two times a day  heparin   Injectable 5000 Unit(s) SubCutaneous every 8 hours  lisinopril 40 milliGRAM(s) Oral daily  multivitamin 1 Tablet(s) Oral at bedtime  sodium chloride 0.9%. 500 milliLiter(s) (75 mL/Hr) IV Continuous <Continuous>    MEDICATIONS  (PRN):  acetaminophen     Tablet .. 650 milliGRAM(s) Oral once PRN Temp greater or equal to 38C (100.4F), Mild Pain (1 - 3)  aluminum hydroxide/magnesium hydroxide/simethicone Suspension 30 milliLiter(s) Oral every 4 hours PRN Dyspepsia  melatonin 3 milliGRAM(s) Oral at bedtime PRN Insomnia  ondansetron Injectable 4 milliGRAM(s) IV Push every 8 hours PRN Nausea and/or Vomiting      ALLERGIES:  Allergies    penicillins (Rash)    Intolerances        LABS:                        14.0   8.27  )-----------( 237      ( 21 Mar 2024 09:56 )             42.5     03-22    145  |  113<H>  |  10  ----------------------------<  98  3.9   |  29  |  0.79    Ca    8.6      22 Mar 2024 07:11    TPro  6.5  /  Alb  3.2<L>  /  TBili  0.8  /  DBili  x   /  AST  26  /  ALT  22  /  AlkPhos  65  03-21    PT/INR - ( 21 Mar 2024 09:56 )   PT: 13.3 sec;   INR: 1.18 ratio         PTT - ( 21 Mar 2024 09:56 )  PTT:28.6 sec  Urinalysis Basic - ( 22 Mar 2024 07:11 )    Color: x / Appearance: x / SG: x / pH: x  Gluc: 98 mg/dL / Ketone: x  / Bili: x / Urobili: x   Blood: x / Protein: x / Nitrite: x   Leuk Esterase: x / RBC: x / WBC x   Sq Epi: x / Non Sq Epi: x / Bacteria: x      CAPILLARY BLOOD GLUCOSE            RADIOLOGY & ADDITIONAL TESTS: Reviewed.

## 2024-03-22 NOTE — PHYSICAL THERAPY INITIAL EVALUATION ADULT - PERTINENT HX OF CURRENT PROBLEM, REHAB EVAL
Pt admitted t  secondary to bilateral LE weakness, and controlled fall to ground. Pt slept on rug overnight. See troponin results in results section. Pt admitted t  secondary to bilateral LE weakness, and controlled fall to ground. Pt slept on rug overnight. See troponin results in results section. Pt s/p R TKR 10/16/2023.

## 2024-03-22 NOTE — PHYSICAL THERAPY INITIAL EVALUATION ADULT - PLANNED THERAPY INTERVENTIONS, PT EVAL
Eval, amb, transfers, beb mobility x 15'. Pt left in bed with all observation section equipment/material intact and bed alarm activated. Pt with no c/o pain. Will cont to follow./bed mobility training/gait training/ROM/strengthening/transfer training

## 2024-03-22 NOTE — PROGRESS NOTE ADULT - ASSESSMENT
82 y/o male with a PMHx of CAD s/p CABG, GERD, HLD, HTN, OA, right TKR, 10/23 with prolonged rehab currently living at home with wife and doing outpatient PT, occasional falls without injury, s/p increased diarrhea 2 weeks ago (Cdiff and GI PCR negative) unilateral primary osteoarthritis presents to the ED c/o continued generalized weakness despite increased fluid intake advised by PCP. Pt reports around 2am he walked into the kitchen to get some water and after opening fridge door he felt weak and his b/l knees buckled. Pt had a controlled fall to the ground. Pt was too weak to get back up. Pt slept on rug overnight. No injury from last nights fall. No LOC. On ASA. Denies HA, abd pain, SOB. No other complaints at this time    #Weakness suspect dehydration in setting of diarrhea x1 week   - Diarrhea now resolved, likely had viral gastroenteritis   - No need for remote tele  - s/p ivf hydration  - EKG: NSR, with PVCs, chronic rbbb  - Trop trend stable  - Echo results pending, prelim read with normal EF  - Knee xray (unofficial) seen, no obvious hardware malfunction  - PT consult appreciated  - resume all other home meds    DVT px: HSQ    DISPO: REBEKA (needs 3 hospital night stays). Discharge to [facility] rehab on Monday

## 2024-03-22 NOTE — PHYSICAL THERAPY INITIAL EVALUATION ADULT - ADDITIONAL COMMENTS
Pt lives with wife in a private condo with elevator access to main level with bedroom/bathroom, stairs to living room. Walk-in shower with grab bars. Prior to admission, pt was independent with ADLs and ambulation without AD. Pt denies owning any DME, states wife is available to assist him as needed at home. Info obtained from [employer] 10/2023. Pt lives with wife in a private condo with elevator access to main level with bedroom/bathroom, stairs to living room. Walk-in shower with grab bars. Prior to admission, pt was independent with ADLs and ambulation without AD. Pt denies owning any DME, states wife is available to assist him as needed at home. Info obtained from eval 10/2023. Update 3/22/2024: same info as previously documented, pt stating he has been falling a lot.

## 2024-03-22 NOTE — PHYSICAL THERAPY INITIAL EVALUATION ADULT - ACTIVE RANGE OF MOTION EXAMINATION, REHAB EVAL
Right knee ext/flex ~0-~85 degrees, and bilateral DF neutral./Left UE Active ROM was WFL (within functional limits)/Right UE Active ROM was WFL (within functional limits)/Left LE Active ROM was WFL (within functional limits)/Right LE Active ROM was WFL (within functional limits)

## 2024-03-23 PROCEDURE — 99232 SBSQ HOSP IP/OBS MODERATE 35: CPT

## 2024-03-23 RX ADMIN — HEPARIN SODIUM 5000 UNIT(S): 5000 INJECTION INTRAVENOUS; SUBCUTANEOUS at 06:38

## 2024-03-23 RX ADMIN — Medication 81 MILLIGRAM(S): at 10:46

## 2024-03-23 RX ADMIN — HEPARIN SODIUM 5000 UNIT(S): 5000 INJECTION INTRAVENOUS; SUBCUTANEOUS at 21:10

## 2024-03-23 RX ADMIN — LISINOPRIL 40 MILLIGRAM(S): 2.5 TABLET ORAL at 10:47

## 2024-03-23 RX ADMIN — FAMOTIDINE 40 MILLIGRAM(S): 10 INJECTION INTRAVENOUS at 10:47

## 2024-03-23 RX ADMIN — HEPARIN SODIUM 5000 UNIT(S): 5000 INJECTION INTRAVENOUS; SUBCUTANEOUS at 15:17

## 2024-03-23 RX ADMIN — ATORVASTATIN CALCIUM 40 MILLIGRAM(S): 80 TABLET, FILM COATED ORAL at 21:05

## 2024-03-23 RX ADMIN — FAMOTIDINE 40 MILLIGRAM(S): 10 INJECTION INTRAVENOUS at 21:05

## 2024-03-23 RX ADMIN — Medication 1 TABLET(S): at 21:05

## 2024-03-23 NOTE — PROGRESS NOTE ADULT - ASSESSMENT
84 y/o male with a PMHx of CAD s/p CABG, GERD, HLD, HTN, OA, right TKR, 10/23 with prolonged rehab currently living at home with wife and doing outpatient PT, occasional falls without injury, s/p increased diarrhea 2 weeks ago (Cdiff and GI PCR negative) unilateral primary osteoarthritis presents to the ED c/o continued generalized weakness despite increased fluid intake advised by PCP. Pt reports around 2am he walked into the kitchen to get some water and after opening fridge door he felt weak and his b/l knees buckled. Pt had a controlled fall to the ground. Pt was too weak to get back up. Pt slept on rug overnight. No injury from last nights fall. No LOC. On ASA. Denies HA, abd pain, SOB. No other complaints at this time    #Weakness suspect dehydration in setting of diarrhea x1 week   - Diarrhea now resolved, likely had viral gastroenteritis   - No need for remote tele  - s/p ivf hydration  - EKG: NSR, with PVCs, chronic rbbb  - Trop trend stable  - Echo results pending, prelim read with normal EF  - Knee xray (unofficial) seen, no obvious hardware malfunction  - PT consult appreciated  - resume all other home meds    DVT px: HSQ    DISPO: REBEKA (needs 3 hospital night stays). Discharge to [facility] rehab on Monday   84 y/o male with a PMHx of CAD s/p CABG, GERD, HLD, HTN, OA, right TKR, 10/23 with prolonged rehab currently living at home with wife and doing outpatient PT, occasional falls without injury, s/p increased diarrhea 2 weeks ago (Cdiff and GI PCR negative) unilateral primary osteoarthritis presents to the ED c/o continued generalized weakness despite increased fluid intake advised by PCP. Pt reports around 2am he walked into the kitchen to get some water and after opening fridge door he felt weak and his b/l knees buckled. Pt had a controlled fall to the ground. Pt was too weak to get back up. Pt slept on rug overnight. No injury from last nights fall. No LOC. On ASA. Denies HA, abd pain, SOB. No other complaints at this time    #Weakness suspect dehydration in setting of diarrhea x1 week   - EKG: NSR, with PVCs, chronic rbbb  - Diarrhea now resolved, likely had viral gastroenteritis   - s/p ivf hydration  - Trop trend stable  - Echo: Normal EF. Mild AS. Mod MR  - PT consult appreciated -> REBEKA  - resume all other home meds    DVT px: HSQ    DISPO: REBEKA (needs 3 hospital night stays). Discharge to [facility] rehab on Monday

## 2024-03-23 NOTE — PROGRESS NOTE ADULT - SUBJECTIVE AND OBJECTIVE BOX
incomplete note    HOSPITALIST PROGRESS NOTE    Admission HPI: 82 y/o male with a PMHx of CAD s/p CABG, GERD, HLD, HTN, OA, right TKR, 10/23 with prolonged rehab currently living at home with wife and doing outpatient PT, occasional falls without injury, s/p increased diarrhea 2 weeks ago (Cdiff and GI PCR negative) unilateral primary osteoarthritis presents to the ED c/o continued generalized weakness despite increased fluid intake advised by PCP. Pt reports around 2am he walked into the kitchen to get some water and after opening fridge door he felt weak and his b/l knees buckled. Pt had a controlled fall to the ground. Pt was too weak to get back up. Pt slept on rug overnight. No injury from last nights fall. No LOC. On ASA. Denies HA, abd pain, SOB. No other complaints at this time.  ED course: Na 148/K 3.4. TNI: 95. UA: negative  CXR: unofficial read: no obvious changes    3/22: Patient feels better s/p IV fluids. No acute complaints. Still felt weak while working with PT - recommended for REBEKA. Will need 3 inpatient nights prior to discharge to rehab.     3/23:     ROS: All 10 systems reviewed and found to be negative with the exception of what has been described above.     VITAL SIGNS:  Vital Signs Last 24 Hrs  T(C): 36.3 (23 Mar 2024 08:30), Max: 36.9 (22 Mar 2024 16:10)  T(F): 97.4 (23 Mar 2024 08:30), Max: 98.4 (22 Mar 2024 16:10)  HR: 73 (23 Mar 2024 08:30) (69 - 84)  BP: 127/82 (23 Mar 2024 08:30) (117/64 - 127/82)  BP(mean): 90 (22 Mar 2024 23:32) (90 - 90)  RR: 18 (23 Mar 2024 08:30) (18 - 18)  SpO2: 96% (23 Mar 2024 08:30) (93% - 96%)    Parameters below as of 23 Mar 2024 08:30  Patient On (Oxygen Delivery Method): room air    PHYSICAL EXAM:  General: No acute distress  HEENT: NC/AT; PERRL, anicteric sclera; MMM  Neck: Supple  Cardiovascular: +S1/S2, RRR, no murmurs, rubs, gallops  Respiratory: CTA B/L; no W/R/R  Gastrointestinal: soft, NT/ND; +BSx4  Extremities: WWP; no edema, clubbing or cyanosis. Healed scar on R knee  Vascular: 2+ radial, DP/PT pulses B/L  Neurological: AAOx3; no focal deficits    MEDICATIONS:  MEDICATIONS  (STANDING):  aspirin  chewable 81 milliGRAM(s) Oral daily  atorvastatin 40 milliGRAM(s) Oral at bedtime  famotidine    Tablet 40 milliGRAM(s) Oral two times a day  heparin   Injectable 5000 Unit(s) SubCutaneous every 8 hours  lisinopril 40 milliGRAM(s) Oral daily  multivitamin 1 Tablet(s) Oral at bedtime  sodium chloride 0.9%. 500 milliLiter(s) (75 mL/Hr) IV Continuous <Continuous>    MEDICATIONS  (PRN):  acetaminophen     Tablet .. 650 milliGRAM(s) Oral once PRN Temp greater or equal to 38C (100.4F), Mild Pain (1 - 3)  aluminum hydroxide/magnesium hydroxide/simethicone Suspension 30 milliLiter(s) Oral every 4 hours PRN Dyspepsia  melatonin 3 milliGRAM(s) Oral at bedtime PRN Insomnia  ondansetron Injectable 4 milliGRAM(s) IV Push every 8 hours PRN Nausea and/or Vomiting      ALLERGIES:  Allergies    penicillins (Rash)    Intolerances        LABS:                        14.0   8.27  )-----------( 237      ( 21 Mar 2024 09:56 )             42.5     03-22    145  |  113<H>  |  10  ----------------------------<  98  3.9   |  29  |  0.79    Ca    8.6      22 Mar 2024 07:11    TPro  6.5  /  Alb  3.2<L>  /  TBili  0.8  /  DBili  x   /  AST  26  /  ALT  22  /  AlkPhos  65  03-21    PT/INR - ( 21 Mar 2024 09:56 )   PT: 13.3 sec;   INR: 1.18 ratio         PTT - ( 21 Mar 2024 09:56 )  PTT:28.6 sec  Urinalysis Basic - ( 22 Mar 2024 07:11 )    Color: x / Appearance: x / SG: x / pH: x  Gluc: 98 mg/dL / Ketone: x  / Bili: x / Urobili: x   Blood: x / Protein: x / Nitrite: x   Leuk Esterase: x / RBC: x / WBC x   Sq Epi: x / Non Sq Epi: x / Bacteria: x      CAPILLARY BLOOD GLUCOSE            RADIOLOGY & ADDITIONAL TESTS: Reviewed. HOSPITALIST PROGRESS NOTE    Admission HPI: 84 y/o male with a PMHx of CAD s/p CABG, GERD, HLD, HTN, OA, right TKR, 10/23 with prolonged rehab currently living at home with wife and doing outpatient PT, occasional falls without injury, s/p increased diarrhea 2 weeks ago (Cdiff and GI PCR negative) unilateral primary osteoarthritis presents to the ED c/o continued generalized weakness despite increased fluid intake advised by PCP. Pt reports around 2am he walked into the kitchen to get some water and after opening fridge door he felt weak and his b/l knees buckled. Pt had a controlled fall to the ground. Pt was too weak to get back up. Pt slept on rug overnight. No injury from last nights fall. No LOC. On ASA. Denies HA, abd pain, SOB. No other complaints at this time.  ED course: Na 148/K 3.4. TNI: 95. UA: negative  CXR: unofficial read: no obvious changes    3/22: Patient feels better s/p IV fluids. No acute complaints. Still felt weak while working with PT - recommended for REBEKA. Will need 3 inpatient nights prior to discharge to rehab.     3/23: Patient seen and examined at bedside. Awaiting REBEKA - needs 3 night stay. No complaints. Had a normal BM this morning.    ROS: All 10 systems reviewed and found to be negative with the exception of what has been described above.     VITAL SIGNS:  Vital Signs Last 24 Hrs  T(C): 36.3 (23 Mar 2024 08:30), Max: 36.9 (22 Mar 2024 16:10)  T(F): 97.4 (23 Mar 2024 08:30), Max: 98.4 (22 Mar 2024 16:10)  HR: 73 (23 Mar 2024 08:30) (69 - 84)  BP: 127/82 (23 Mar 2024 08:30) (117/64 - 127/82)  BP(mean): 90 (22 Mar 2024 23:32) (90 - 90)  RR: 18 (23 Mar 2024 08:30) (18 - 18)  SpO2: 96% (23 Mar 2024 08:30) (93% - 96%)    Parameters below as of 23 Mar 2024 08:30  Patient On (Oxygen Delivery Method): room air    PHYSICAL EXAM:  General: No acute distress  HEENT: NC/AT; PERRL, anicteric sclera; MMM  Neck: Supple  Cardiovascular: +S1/S2, RRR, no murmurs, rubs, gallops  Respiratory: CTA B/L; no W/R/R  Gastrointestinal: soft, NT/ND; +BSx4  Extremities: WWP; no edema, clubbing or cyanosis. Healed scar on R knee  Vascular: 2+ radial, DP/PT pulses B/L  Neurological: AAOx3; no focal deficits    MEDICATIONS:  MEDICATIONS  (STANDING):  aspirin  chewable 81 milliGRAM(s) Oral daily  atorvastatin 40 milliGRAM(s) Oral at bedtime  famotidine    Tablet 40 milliGRAM(s) Oral two times a day  heparin   Injectable 5000 Unit(s) SubCutaneous every 8 hours  lisinopril 40 milliGRAM(s) Oral daily  multivitamin 1 Tablet(s) Oral at bedtime  sodium chloride 0.9%. 500 milliLiter(s) (75 mL/Hr) IV Continuous <Continuous>    MEDICATIONS  (PRN):  acetaminophen     Tablet .. 650 milliGRAM(s) Oral once PRN Temp greater or equal to 38C (100.4F), Mild Pain (1 - 3)  aluminum hydroxide/magnesium hydroxide/simethicone Suspension 30 milliLiter(s) Oral every 4 hours PRN Dyspepsia  melatonin 3 milliGRAM(s) Oral at bedtime PRN Insomnia  ondansetron Injectable 4 milliGRAM(s) IV Push every 8 hours PRN Nausea and/or Vomiting      ALLERGIES:  Allergies    penicillins (Rash)    Intolerances        LABS:                        14.0   8.27  )-----------( 237      ( 21 Mar 2024 09:56 )             42.5     03-22    145  |  113<H>  |  10  ----------------------------<  98  3.9   |  29  |  0.79    Ca    8.6      22 Mar 2024 07:11    TPro  6.5  /  Alb  3.2<L>  /  TBili  0.8  /  DBili  x   /  AST  26  /  ALT  22  /  AlkPhos  65  03-21    PT/INR - ( 21 Mar 2024 09:56 )   PT: 13.3 sec;   INR: 1.18 ratio         PTT - ( 21 Mar 2024 09:56 )  PTT:28.6 sec  Urinalysis Basic - ( 22 Mar 2024 07:11 )    Color: x / Appearance: x / SG: x / pH: x  Gluc: 98 mg/dL / Ketone: x  / Bili: x / Urobili: x   Blood: x / Protein: x / Nitrite: x   Leuk Esterase: x / RBC: x / WBC x   Sq Epi: x / Non Sq Epi: x / Bacteria: x      CAPILLARY BLOOD GLUCOSE            RADIOLOGY & ADDITIONAL TESTS: Reviewed.

## 2024-03-24 PROCEDURE — 99232 SBSQ HOSP IP/OBS MODERATE 35: CPT

## 2024-03-24 RX ADMIN — FAMOTIDINE 40 MILLIGRAM(S): 10 INJECTION INTRAVENOUS at 10:01

## 2024-03-24 RX ADMIN — HEPARIN SODIUM 5000 UNIT(S): 5000 INJECTION INTRAVENOUS; SUBCUTANEOUS at 05:33

## 2024-03-24 RX ADMIN — Medication 1 TABLET(S): at 21:59

## 2024-03-24 RX ADMIN — HEPARIN SODIUM 5000 UNIT(S): 5000 INJECTION INTRAVENOUS; SUBCUTANEOUS at 22:02

## 2024-03-24 RX ADMIN — FAMOTIDINE 40 MILLIGRAM(S): 10 INJECTION INTRAVENOUS at 21:58

## 2024-03-24 RX ADMIN — HEPARIN SODIUM 5000 UNIT(S): 5000 INJECTION INTRAVENOUS; SUBCUTANEOUS at 15:23

## 2024-03-24 RX ADMIN — Medication 81 MILLIGRAM(S): at 10:01

## 2024-03-24 RX ADMIN — ATORVASTATIN CALCIUM 40 MILLIGRAM(S): 80 TABLET, FILM COATED ORAL at 21:59

## 2024-03-24 NOTE — PROGRESS NOTE ADULT - ASSESSMENT
82 y/o male with a PMHx of CAD s/p CABG, GERD, HLD, HTN, OA, right TKR, 10/23 with prolonged rehab currently living at home with wife and doing outpatient PT, occasional falls without injury, s/p increased diarrhea 2 weeks ago (Cdiff and GI PCR negative) unilateral primary osteoarthritis presents to the ED c/o continued generalized weakness despite increased fluid intake advised by PCP. Pt reports around 2am he walked into the kitchen to get some water and after opening fridge door he felt weak and his b/l knees buckled. Pt had a controlled fall to the ground. Pt was too weak to get back up. Pt slept on rug overnight. No injury from last nights fall. No LOC. On ASA. Denies HA, abd pain, SOB. No other complaints at this time    #Weakness suspect dehydration in setting of diarrhea x1 week   - EKG: NSR, with PVCs, chronic rbbb  - Diarrhea now resolved, likely had viral gastroenteritis   - s/p ivf hydration  - Trop trend stable  - Echo: Normal EF. Mild AS. Mod MR  - PT consult appreciated -> REBEKA  - resume all other home meds    DVT px: HSQ    DISPO: REBEKA (needs 3 hospital night stays). Discharge to [facility] rehab on Monday

## 2024-03-24 NOTE — PROGRESS NOTE ADULT - SUBJECTIVE AND OBJECTIVE BOX
HOSPITALIST PROGRESS NOTE    Admission HPI: 84 y/o male with a PMHx of CAD s/p CABG, GERD, HLD, HTN, OA, right TKR, 10/23 with prolonged rehab currently living at home with wife and doing outpatient PT, occasional falls without injury, s/p increased diarrhea 2 weeks ago (Cdiff and GI PCR negative) unilateral primary osteoarthritis presents to the ED c/o continued generalized weakness despite increased fluid intake advised by PCP. Pt reports around 2am he walked into the kitchen to get some water and after opening fridge door he felt weak and his b/l knees buckled. Pt had a controlled fall to the ground. Pt was too weak to get back up. Pt slept on rug overnight. No injury from last nights fall. No LOC. On ASA. Denies HA, abd pain, SOB. No other complaints at this time.  ED course: Na 148/K 3.4. TNI: 95. UA: negative  CXR: unofficial read: no obvious changes    3/22: Patient feels better s/p IV fluids. No acute complaints. Still felt weak while working with PT - recommended for REBEKA. Will need 3 inpatient nights prior to discharge to rehab.     3/23: Patient seen and examined at bedside. Awaiting REBEKA - needs 3 night stay. No complaints. Had a normal BM this morning.    3/24: no new complaints    ROS: All 10 systems reviewed and found to be negative with the exception of what has been described above.     VITAL SIGNS:    Vital Signs Last 24 Hrs  T(C): 37.1 (24 Mar 2024 08:53), Max: 37.1 (23 Mar 2024 16:04)  T(F): 98.8 (24 Mar 2024 08:53), Max: 98.8 (23 Mar 2024 16:04)  HR: 73 (24 Mar 2024 08:53) (73 - 87)  BP: 127/79 (24 Mar 2024 08:53) (108/54 - 127/79)  BP(mean): --  RR: 18 (24 Mar 2024 08:53) (18 - 18)  SpO2: 93% (24 Mar 2024 08:53) (92% - 93%)    Parameters below as of 24 Mar 2024 08:53  Patient On (Oxygen Delivery Method): room air        PHYSICAL EXAM:  General: No acute distress  HEENT: NC/AT; PERRL, anicteric sclera; MMM  Neck: Supple  Cardiovascular: +S1/S2, RRR, no murmurs, rubs, gallops  Respiratory: CTA B/L; no W/R/R  Gastrointestinal: soft, NT/ND; +BSx4  Extremities: WWP; no edema, clubbing or cyanosis. Healed scar on R knee  Vascular: 2+ radial, DP/PT pulses B/L  Neurological: AAOx3; no focal deficits    MEDICATIONS:  MEDICATIONS  (STANDING):  aspirin  chewable 81 milliGRAM(s) Oral daily  atorvastatin 40 milliGRAM(s) Oral at bedtime  famotidine    Tablet 40 milliGRAM(s) Oral two times a day  heparin   Injectable 5000 Unit(s) SubCutaneous every 8 hours  lisinopril 40 milliGRAM(s) Oral daily  multivitamin 1 Tablet(s) Oral at bedtime  sodium chloride 0.9%. 500 milliLiter(s) (75 mL/Hr) IV Continuous <Continuous>    MEDICATIONS  (PRN):  acetaminophen     Tablet .. 650 milliGRAM(s) Oral once PRN Temp greater or equal to 38C (100.4F), Mild Pain (1 - 3)  aluminum hydroxide/magnesium hydroxide/simethicone Suspension 30 milliLiter(s) Oral every 4 hours PRN Dyspepsia  melatonin 3 milliGRAM(s) Oral at bedtime PRN Insomnia  ondansetron Injectable 4 milliGRAM(s) IV Push every 8 hours PRN Nausea and/or Vomiting      ALLERGIES:  Allergies    penicillins (Rash)    Intolerances        LABS:                        14.0   8.27  )-----------( 237      ( 21 Mar 2024 09:56 )             42.5     03-22    145  |  113<H>  |  10  ----------------------------<  98  3.9   |  29  |  0.79    Ca    8.6      22 Mar 2024 07:11    TPro  6.5  /  Alb  3.2<L>  /  TBili  0.8  /  DBili  x   /  AST  26  /  ALT  22  /  AlkPhos  65  03-21    PT/INR - ( 21 Mar 2024 09:56 )   PT: 13.3 sec;   INR: 1.18 ratio         PTT - ( 21 Mar 2024 09:56 )  PTT:28.6 sec  Urinalysis Basic - ( 22 Mar 2024 07:11 )    Color: x / Appearance: x / SG: x / pH: x  Gluc: 98 mg/dL / Ketone: x  / Bili: x / Urobili: x   Blood: x / Protein: x / Nitrite: x   Leuk Esterase: x / RBC: x / WBC x   Sq Epi: x / Non Sq Epi: x / Bacteria: x      CAPILLARY BLOOD GLUCOSE            RADIOLOGY & ADDITIONAL TESTS: Reviewed.

## 2024-03-25 ENCOUNTER — TRANSCRIPTION ENCOUNTER (OUTPATIENT)
Age: 84
End: 2024-03-25

## 2024-03-25 VITALS
DIASTOLIC BLOOD PRESSURE: 76 MMHG | RESPIRATION RATE: 18 BRPM | OXYGEN SATURATION: 94 % | HEART RATE: 82 BPM | SYSTOLIC BLOOD PRESSURE: 128 MMHG | TEMPERATURE: 98 F

## 2024-03-25 PROCEDURE — 99239 HOSP IP/OBS DSCHRG MGMT >30: CPT

## 2024-03-25 RX ADMIN — FAMOTIDINE 40 MILLIGRAM(S): 10 INJECTION INTRAVENOUS at 10:03

## 2024-03-25 RX ADMIN — Medication 81 MILLIGRAM(S): at 10:03

## 2024-03-25 RX ADMIN — HEPARIN SODIUM 5000 UNIT(S): 5000 INJECTION INTRAVENOUS; SUBCUTANEOUS at 05:45

## 2024-03-25 NOTE — DISCHARGE NOTE PROVIDER - NSDCMRMEDTOKEN_GEN_ALL_CORE_FT
acetaminophen 650 mg oral tablet: 2 tab(s) by gastrostomy tube 2 times a day  Altace 10 mg oral tablet: orally once a day (at bedtime)  aspirin 81 mg oral tablet: 1 tab(s) orally once a day  Lipitor 40 mg oral tablet: 1 tab(s) orally once a day (at bedtime)  Multiple Vitamins oral tablet: 1 tab(s) orally once a day (at bedtime)  Pepcid 40 mg oral tablet: 1 tab(s) orally 2 times a day  Zetia 10 mg oral tablet: 1 tab(s) orally once a day (at bedtime)

## 2024-03-25 NOTE — DISCHARGE NOTE PROVIDER - CARE PROVIDER_API CALL
Jose Luis Castaneda.  Internal Medicine  [address] [place] [address]  Phone: [phone]  Fax: [phone]  Follow Up Time:

## 2024-03-25 NOTE — DISCHARGE NOTE PROVIDER - HOSPITAL COURSE
PHYSICAL EXAM:    Daily     Daily     Vital Signs Last 24 Hrs  T(C): 36.6 (25 Mar 2024 08:41), Max: 36.8 (24 Mar 2024 15:20)  T(F): 97.8 (25 Mar 2024 08:41), Max: 98.2 (24 Mar 2024 15:20)  HR: 69 (25 Mar 2024 08:41) (69 - 87)  BP: 121/74 (25 Mar 2024 08:41) (112/74 - 121/74)  BP(mean): 86 (24 Mar 2024 23:11) (86 - 86)  RR: 69 (25 Mar 2024 08:41) (18 - 69)  SpO2: 94% (25 Mar 2024 08:41) (94% - 94%)    Constitutional: Weak  appearing  HEENT: Atraumatic, JOSE, Normal, No congestion  Respiratory: Breath Sounds normal, no rhonchi/wheeze  Cardiovascular: N S1S2;   Gastrointestinal: Abdomen soft, non tender, Bowel Sounds present  Extremities: No edema, peripheral pulses present  Neurological: AAO x 2, no gross focal motor deficits  Skin: Non cellulitic, no rash, ulcers  Lymph Nodes: No lymphadenopathy noted  Back: No CVA tenderness   Musculoskeletal: non tender  Breasts: Deferred  Genitourinary: deferred  Rectal: Deferred      84 y/o male with a PMHx of CAD s/p CABG, GERD, HLD, HTN, OA, right TKR, 10/23 with prolonged rehab currently living at home with wife and doing outpatient PT, occasional falls without injury, s/p increased diarrhea 2 weeks ago (Cdiff and GI PCR negative) unilateral primary osteoarthritis presents to the ED c/o continued generalized weakness despite increased fluid intake advised by PCP. Pt reports around 2am he walked into the kitchen to get some water and after opening fridge door he felt weak and his b/l knees buckled. Pt had a controlled fall to the ground. Pt was too weak to get back up. Pt slept on rug overnight. No injury from last nights fall. No LOC. On ASA. Denies HA, abd pain, SOB. No other complaints at this time    #Weakness suspect dehydration in setting of diarrhea x1 week   - EKG: NSR, with PVCs, chronic rbbb  - Diarrhea now resolved, likely had viral gastroenteritis   - s/p ivf hydration  - Trop trend stable  - Echo: Normal EF. Mild AS. Mod MR  - PT consult appreciated -> REBEKA  - resume all other home meds    d/c to REBEKA    time spent 45 min     PHYSICAL EXAM:      Vital Signs Last 24 Hrs  T(C): 36.6 (25 Mar 2024 08:41), Max: 36.8 (24 Mar 2024 15:20)  T(F): 97.8 (25 Mar 2024 08:41), Max: 98.2 (24 Mar 2024 15:20)  HR: 69 (25 Mar 2024 08:41) (69 - 87)  BP: 121/74 (25 Mar 2024 08:41) (112/74 - 121/74)  BP(mean): 86 (24 Mar 2024 23:11) (86 - 86)  RR: 69 (25 Mar 2024 08:41) (18 - 69)  SpO2: 94% (25 Mar 2024 08:41) (94% - 94%)    Constitutional: Weak  appearing  HEENT: Atraumatic, JOSE, Normal, No congestion  Respiratory: Breath Sounds normal, no rhonchi/wheeze  Cardiovascular: N S1S2;   Gastrointestinal: Abdomen soft, non tender, Bowel Sounds present  Extremities: No edema, peripheral pulses present  Neurological: AAO x 2, no gross focal motor deficits  Skin: Non cellulitic, no rash, ulcers  Lymph Nodes: No lymphadenopathy noted  Back: No CVA tenderness   Musculoskeletal: non tender  Breasts: Deferred  Genitourinary: deferred  Rectal: Deferred      82 y/o male with a PMHx of CAD s/p CABG, GERD, HLD, HTN, OA, right TKR, 10/23 with prolonged rehab currently living at home with wife and doing outpatient PT, occasional falls without injury, s/p increased diarrhea 2 weeks ago (Cdiff and GI PCR negative) unilateral primary osteoarthritis presents to the ED c/o continued generalized weakness despite increased fluid intake advised by PCP. Pt reports around 2am he walked into the kitchen to get some water and after opening fridge door he felt weak and his b/l knees buckled. Pt had a controlled fall to the ground. Pt was too weak to get back up. Pt slept on rug overnight. No injury from last nights fall. No LOC. On ASA. Denies HA, abd pain, SOB. No other complaints at this time    #Weakness suspect dehydration in setting of diarrhea x1 week   - EKG: NSR, with PVCs, chronic rbbb  - Diarrhea now resolved, likely had viral gastroenteritis   - s/p ivf hydration  - Trop trend stable, no MI  - Echo: Normal EF. Mild AS. Mod MR  - PT consult appreciated -> REBEKA  - resume all other home meds    d/c to RE[facility]KA    time spent 45 min

## 2024-03-25 NOTE — DISCHARGE NOTE PROVIDER - NSDCCPCAREPLAN_GEN_ALL_CORE_FT
PRINCIPAL DISCHARGE DIAGNOSIS  Diagnosis: General weakness  Assessment and Plan of Treatment: d/c to rehab      SECONDARY DISCHARGE DIAGNOSES  Diagnosis: Frequent falls  Assessment and Plan of Treatment: rehab    Diagnosis: Troponin I above reference range  Assessment and Plan of Treatment: resolved

## 2024-03-25 NOTE — DISCHARGE NOTE PROVIDER - NSDCADMDATE_GEN_ALL_CORE_FT
22-Mar-2024 02:00
Bed in lowest position, wheels locked, appropriate side rails in place/Call bell, personal items and telephone in reach/Instruct patient to call for assistance before getting out of bed or chair/Non-slip footwear when patient is out of bed/[place] to call system/Physically safe environment - no spills, clutter or unnecessary equipment/Purposeful Proactive Rounding/Room/bathroom lighting operational, light cord in reach

## 2024-03-25 NOTE — DISCHARGE NOTE NURSING/CASE MANAGEMENT/SOCIAL WORK - PATIENT PORTAL LINK FT
You can access the FollowMyHealth Patient Portal offered by [facility] by registering at the following website: [email or web address]. By joining [employer]’s FollowMyHealth portal, you will also be able to view your health information using other applications (apps) compatible with our system.

## 2024-03-25 NOTE — DISCHARGE NOTE PROVIDER - NSDCFUSCHEDAPPT_GEN_ALL_CORE_FT
Jessee Ch  [facility]ician [facility]  ORTHOSURG [address] [facility]  Scheduled Appointment: 04/19/2024

## 2024-04-03 ENCOUNTER — OFFICE (OUTPATIENT)
Dept: URBAN - METROPOLITAN AREA CLINIC 94 | Facility: CLINIC | Age: 84
Setting detail: OPHTHALMOLOGY
End: 2024-04-03
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
## 2024-04-03 PROCEDURE — NO SHOW FE NO SHOW FEE: Performed by: OPHTHALMOLOGY

## 2024-04-19 ENCOUNTER — APPOINTMENT (OUTPATIENT)
Dept: ORTHOPEDIC SURGERY | Facility: CLINIC | Age: 84
End: 2024-04-19

## 2024-04-19 ENCOUNTER — NON-APPOINTMENT (OUTPATIENT)
Age: 84
End: 2024-04-19

## 2024-04-26 NOTE — PHYSICAL THERAPY INITIAL EVALUATION ADULT - LEVEL OF INDEPENDENCE: BED TO CHAIR, REHAB EVAL
[FreeTextEntry1] : - 4/25/24 52-year-old female who presents with concern for tinnitus.  Based on history and exam I do believe that this is likely secondary to hearing loss.  Recommending audiogram and tympanogram for further evaluation.  Tinnitus handout otherwise given with information on conservative treatment strategies.  Patient to follow-up after the above, sooner should symptoms worsen or fail to improve.  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  4/26/24: Audiogram and tympanogram completed and found to have mild hearing loss with normal tympanogram.  This was reviewed at length with the patient.  She is medically cleared for bilateral hearing aids should she want this.  I do not believe that she would notice much of a difference with them.  Ultimately they will be her decision.  She can follow-up with audiology for hearing aid consultation.  At this time I am recommending distraction techniques and cognitive behavioral therapy for tinnitus.  She will need repeat audiogram and tympanogram in 1 year.  Can follow-up at that time.   - Continue follow-up with neurosurgery - Audiogram and tympanogram annually - Tinnitus handout given, cognitive behavioral therapy - Follow-up 1 year
minimum assist (75% patients effort)

## 2024-05-02 ENCOUNTER — INPATIENT (INPATIENT)
Facility: HOSPITAL | Age: 84
LOS: 7 days | Discharge: NURSING, REHAB OR LONG-TERM CARE | DRG: 94 | End: 2024-05-10
Attending: PSYCHIATRY & NEUROLOGY
Payer: MEDICARE

## 2024-05-02 ENCOUNTER — EMERGENCY (EMERGENCY)
Facility: HOSPITAL | Age: 84
LOS: 1 days | Discharge: ANOTHER HOSPITAL | End: 2024-05-02
Attending: EMERGENCY MEDICINE | Admitting: EMERGENCY MEDICINE
Payer: MEDICARE

## 2024-05-02 VITALS
RESPIRATION RATE: 18 BRPM | TEMPERATURE: 99 F | SYSTOLIC BLOOD PRESSURE: 114 MMHG | DIASTOLIC BLOOD PRESSURE: 78 MMHG | OXYGEN SATURATION: 96 % | WEIGHT: 190.04 LBS | HEART RATE: 88 BPM | HEIGHT: 69 IN

## 2024-05-02 VITALS
OXYGEN SATURATION: 95 % | RESPIRATION RATE: 17 BRPM | HEART RATE: 87 BPM | SYSTOLIC BLOOD PRESSURE: 110 MMHG | DIASTOLIC BLOOD PRESSURE: 78 MMHG | TEMPERATURE: 97 F

## 2024-05-02 VITALS
OXYGEN SATURATION: 95 % | RESPIRATION RATE: 18 BRPM | DIASTOLIC BLOOD PRESSURE: 63 MMHG | SYSTOLIC BLOOD PRESSURE: 114 MMHG | HEIGHT: 69 IN | TEMPERATURE: 98 F | HEART RATE: 89 BPM

## 2024-05-02 DIAGNOSIS — Z98.890 OTHER SPECIFIED POSTPROCEDURAL STATES: Chronic | ICD-10-CM

## 2024-05-02 DIAGNOSIS — G62.89 OTHER SPECIFIED POLYNEUROPATHIES: ICD-10-CM

## 2024-05-02 DIAGNOSIS — Z95.1 PRESENCE OF AORTOCORONARY BYPASS GRAFT: Chronic | ICD-10-CM

## 2024-05-02 LAB
ALBUMIN SERPL ELPH-MCNC: 3.8 G/DL — SIGNIFICANT CHANGE UP (ref 3.3–5)
ALP SERPL-CCNC: 97 U/L — SIGNIFICANT CHANGE UP (ref 40–120)
ALT FLD-CCNC: 25 U/L — SIGNIFICANT CHANGE UP (ref 10–45)
ANION GAP SERPL CALC-SCNC: 13 MMOL/L — SIGNIFICANT CHANGE UP (ref 5–17)
APTT BLD: 27.2 SEC — SIGNIFICANT CHANGE UP (ref 24.5–35.6)
AST SERPL-CCNC: 31 U/L — SIGNIFICANT CHANGE UP (ref 10–40)
BASOPHILS # BLD AUTO: 0.03 K/UL — SIGNIFICANT CHANGE UP (ref 0–0.2)
BASOPHILS NFR BLD AUTO: 0.3 % — SIGNIFICANT CHANGE UP (ref 0–2)
BILIRUB SERPL-MCNC: 0.7 MG/DL — SIGNIFICANT CHANGE UP (ref 0.2–1.2)
BUN SERPL-MCNC: 19 MG/DL — SIGNIFICANT CHANGE UP (ref 7–23)
CALCIUM SERPL-MCNC: 9.2 MG/DL — SIGNIFICANT CHANGE UP (ref 8.4–10.5)
CHLORIDE SERPL-SCNC: 101 MMOL/L — SIGNIFICANT CHANGE UP (ref 96–108)
CO2 SERPL-SCNC: 24 MMOL/L — SIGNIFICANT CHANGE UP (ref 22–31)
CREAT SERPL-MCNC: 0.75 MG/DL — SIGNIFICANT CHANGE UP (ref 0.5–1.3)
EGFR: 90 ML/MIN/1.73M2 — SIGNIFICANT CHANGE UP
EOSINOPHIL # BLD AUTO: 0.28 K/UL — SIGNIFICANT CHANGE UP (ref 0–0.5)
EOSINOPHIL NFR BLD AUTO: 3.1 % — SIGNIFICANT CHANGE UP (ref 0–6)
GLUCOSE SERPL-MCNC: 100 MG/DL — HIGH (ref 70–99)
HCT VFR BLD CALC: 43.6 % — SIGNIFICANT CHANGE UP (ref 39–50)
HGB BLD-MCNC: 15.1 G/DL — SIGNIFICANT CHANGE UP (ref 13–17)
IMM GRANULOCYTES NFR BLD AUTO: 0.3 % — SIGNIFICANT CHANGE UP (ref 0–0.9)
INR BLD: 1.37 RATIO — HIGH (ref 0.85–1.18)
LYMPHOCYTES # BLD AUTO: 2.4 K/UL — SIGNIFICANT CHANGE UP (ref 1–3.3)
LYMPHOCYTES # BLD AUTO: 26.5 % — SIGNIFICANT CHANGE UP (ref 13–44)
MCHC RBC-ENTMCNC: 32.3 PG — SIGNIFICANT CHANGE UP (ref 27–34)
MCHC RBC-ENTMCNC: 34.6 GM/DL — SIGNIFICANT CHANGE UP (ref 32–36)
MCV RBC AUTO: 93.4 FL — SIGNIFICANT CHANGE UP (ref 80–100)
MONOCYTES # BLD AUTO: 0.7 K/UL — SIGNIFICANT CHANGE UP (ref 0–0.9)
MONOCYTES NFR BLD AUTO: 7.7 % — SIGNIFICANT CHANGE UP (ref 2–14)
NEUTROPHILS # BLD AUTO: 5.63 K/UL — SIGNIFICANT CHANGE UP (ref 1.8–7.4)
NEUTROPHILS NFR BLD AUTO: 62.1 % — SIGNIFICANT CHANGE UP (ref 43–77)
NRBC # BLD: 0 /100 WBCS — SIGNIFICANT CHANGE UP (ref 0–0)
PLATELET # BLD AUTO: 287 K/UL — SIGNIFICANT CHANGE UP (ref 150–400)
POTASSIUM SERPL-MCNC: 4 MMOL/L — SIGNIFICANT CHANGE UP (ref 3.5–5.3)
POTASSIUM SERPL-SCNC: 4 MMOL/L — SIGNIFICANT CHANGE UP (ref 3.5–5.3)
PROT SERPL-MCNC: 6.8 G/DL — SIGNIFICANT CHANGE UP (ref 6–8.3)
PROTHROM AB SERPL-ACNC: 14.3 SEC — HIGH (ref 9.5–13)
RBC # BLD: 4.67 M/UL — SIGNIFICANT CHANGE UP (ref 4.2–5.8)
RBC # FLD: 13.2 % — SIGNIFICANT CHANGE UP (ref 10.3–14.5)
SODIUM SERPL-SCNC: 138 MMOL/L — SIGNIFICANT CHANGE UP (ref 135–145)
WBC # BLD: 9.07 K/UL — SIGNIFICANT CHANGE UP (ref 3.8–10.5)
WBC # FLD AUTO: 9.07 K/UL — SIGNIFICANT CHANGE UP (ref 3.8–10.5)

## 2024-05-02 PROCEDURE — 99285 EMERGENCY DEPT VISIT HI MDM: CPT | Mod: GC

## 2024-05-02 PROCEDURE — 93010 ELECTROCARDIOGRAM REPORT: CPT

## 2024-05-02 RX ORDER — ASPIRIN/CALCIUM CARB/MAGNESIUM 324 MG
81 TABLET ORAL DAILY
Refills: 0 | Status: DISCONTINUED | OUTPATIENT
Start: 2024-05-02 | End: 2024-05-10

## 2024-05-02 RX ORDER — ACETAMINOPHEN 500 MG
2 TABLET ORAL
Refills: 0 | DISCHARGE

## 2024-05-02 RX ORDER — FAMOTIDINE 10 MG/ML
40 INJECTION INTRAVENOUS
Refills: 0 | Status: DISCONTINUED | OUTPATIENT
Start: 2024-05-02 | End: 2024-05-02

## 2024-05-02 RX ORDER — ATORVASTATIN CALCIUM 80 MG/1
40 TABLET, FILM COATED ORAL AT BEDTIME
Refills: 0 | Status: DISCONTINUED | OUTPATIENT
Start: 2024-05-02 | End: 2024-05-10

## 2024-05-02 RX ORDER — FAMOTIDINE 10 MG/ML
20 INJECTION INTRAVENOUS
Refills: 0 | Status: DISCONTINUED | OUTPATIENT
Start: 2024-05-02 | End: 2024-05-04

## 2024-05-02 RX ORDER — ASPIRIN/CALCIUM CARB/MAGNESIUM 324 MG
1 TABLET ORAL
Refills: 0 | DISCHARGE

## 2024-05-02 RX ORDER — EZETIMIBE 10 MG/1
1 TABLET ORAL
Refills: 0 | DISCHARGE

## 2024-05-02 RX ORDER — ENOXAPARIN SODIUM 100 MG/ML
40 INJECTION SUBCUTANEOUS EVERY 24 HOURS
Refills: 0 | Status: DISCONTINUED | OUTPATIENT
Start: 2024-05-02 | End: 2024-05-02

## 2024-05-02 RX ORDER — RAMIPRIL 5 MG
0 CAPSULE ORAL
Refills: 0 | DISCHARGE

## 2024-05-02 RX ORDER — INFLUENZA VIRUS VACCINE 15; 15; 15; 15 UG/.5ML; UG/.5ML; UG/.5ML; UG/.5ML
0.7 SUSPENSION INTRAMUSCULAR ONCE
Refills: 0 | Status: DISCONTINUED | OUTPATIENT
Start: 2024-05-02 | End: 2024-05-02

## 2024-05-02 RX ORDER — FAMOTIDINE 10 MG/ML
1 INJECTION INTRAVENOUS
Refills: 0 | DISCHARGE

## 2024-05-02 RX ADMIN — ATORVASTATIN CALCIUM 40 MILLIGRAM(S): 80 TABLET, FILM COATED ORAL at 23:05

## 2024-05-02 RX ADMIN — Medication 1 TABLET(S): at 23:05

## 2024-05-02 RX ADMIN — FAMOTIDINE 20 MILLIGRAM(S): 10 INJECTION INTRAVENOUS at 21:27

## 2024-05-02 RX ADMIN — Medication 81 MILLIGRAM(S): at 21:27

## 2024-05-02 NOTE — ED ADULT TRIAGE NOTE - CHIEF COMPLAINT QUOTE
Pt presents to ED via EMS from [facility] for neurology eval. Pt diagnosed with Acute motor axonal neuropathy and advised to come to [facility] for admission.

## 2024-05-02 NOTE — CHART NOTE - NSCHARTNOTEFT_GEN_A_CORE
Duplicate chart note also posted in [facility] chart.    Called by [facility] ED attending this PM - patient presenting with concern for acute motor axonal neuropathy (KATLYN). Patient was reportedly sent from [facility], at the behest of his outpatient neurologist, for inpatient management. Of note, patient was supposed to be sent to [facility]. However, patient was delivered to [facility].    Patient interview:  Patient is encountered in the ED. Patient is wide-awake, sitting up in bed. Extremely pleasant and conversational.    Patient reports to me that he had a "stomach virus" in March 2024, that lasted for 8 days.  Patient reports that he lost a significant amount of weight during this time.  He reports that he felt "wobbly."  Patient reports that he had a fall following his infection.  Patient also reports that 6 months prior he had experienced 3-4 falls after a right knee arthroplasty.  He reports that he had 2 falls on the day that he came home.  Frequently needing to call 911 to have police come lift him up and home.  Patient reports that after his most recent fall, the  that came to lift him up at home, suggested that he go to the hospital to be evaluated.  Patient went to [facility] at the end of March 2024.  He reports that he was admitted to a cardiac unit because his blood pressure was elevated and he had elevated cardiac enzymes.  Patient reports that he spent 4 days in the hospital, before being discharged to rehab.  He reports that he was told that he would be seen by her neurologist at rehab.  However he reports that during the first 3 weeks he was in rehab, he did not see a neurologist.  Patient was eventually referred by his son's friend, who himself is a neurologist, to Yvette Liu MD.  Dr. Liu gabriel "17 tubes of blood."  She also told patient that she wanted to obtain MRIs.  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  Patient tells me that his legs are weaker than his arms.  His right arm is weaker than his left arm.  Patient reports contractures in his fingers in both upper extremities. He reports complete loss of DTRs. Denies sensory symptoms.    Patient states that Dr. Piero has been in touch with a neurologist at [facility].  The plan, per patient, was to go to [facility] for plasma exchange.  The neurology team at [facility] was to report to Dr. Liu daily the patient's progress.  Patient reports that the nurse practitioner at his rehabilitation center sent him to the wrong hospital.  Therefore, patient's wife is in conversation with [facility].  Patient is requesting discharge from the [facility] emergency department, so he can be transferred to [facility] for further care.    I informed patient that the neurology team at [facility] will attempt to expedite his transfer, in conjunction with the [facility] emergency department, to [facility].  I discussed the patient's plan of care with the resident on-call at [facility].

## 2024-05-02 NOTE — ED PROVIDER NOTE - PHYSICAL EXAMINATION
Gen: NAD  HEENT: mucous membranes moist  CV: RRR, +S1/S2, no M/R/G, 2+ radial pulses b/l  Resp: CTAB, no W/R/R, no accessory muscle use, no increased work of breathing  GI: Abdomen soft non-distended, NTTP  MSK: Neurovascularly intact distally all 4 extremities.  Strength 2 out of 5 bilateral lower extremities, 3 out of 5 bilateral upper extremities  Neuro: following commands   Psych: appropriate mood

## 2024-05-02 NOTE — H&P ADULT - TIME BILLING
chart reviewed, face to face with patient history, and physical exam, discussed diagnosis, treatment plan and prognosis with patient. contact outside providers for more details. documentation

## 2024-05-02 NOTE — ED PROVIDER NOTE - CLINICAL SUMMARY MEDICAL DECISION MAKING FREE TEXT BOX
Cory Arredondo MD, PGY2  84-year-old male with past medical history of CAD with CABG, GERD, hyperlipidemia, hypertension, osteoarthritis, right knee replacement presenting to emergency department as transfer from [facility] for neurology evaluation of acute motor axonal neuropathy.  Patient follows with Dr. Yvette Liu, neurology.  Patient states that over the past 6-week he has had progressive weakness in upper and lower extremities.  Now nonambulatory.  Patient was to be transferred to [place] emergency department for neurology evaluation and admission.  Mistake and transfer causing patient to arrive at [facility] emergency department.  Discussed with the emergency department at [place], patient will be transferred there for further evaluation.  Patient hemodynamically stable and afebrile at this time.  Well-appearing in no acute distress.  Neurovascularly intact distally all 4 extremities.  Strength 2 out of 5 bilateral lower extremities, 3 out of 5 bilateral upper extremities.  Rest of exam unremarkable.  Transfer center notified.

## 2024-05-02 NOTE — H&P ADULT - ATTENDING COMMENTS
This is a 82 yo man with PMH of CAD s/p CABG, HTN, DLP presenting with quadriparesis .   Patient reports early March (probably 2nd) he had diarrhea for 8 days. He felt weak but was thinking it was due to diarrhea. He fell couple times and last one, he could not get up. EMS was called and he was brought to [facility]. He was admitted to cardiac ICU as his cardiac enz was slightly elevated. During hospital stay, he was kept in bed, did not walk. He was unable to lift his legs against gravity but able to his arms. He then was DC to rehab and DC. He went to see neurologist and had EMG done with Dr. Liu. He was told he had severe nerve damage and recommend to go to ED.  The team and I spoke with Dr. Wooten over the phone, his EMG showed acute denervation and severe axonal loss. concerning for KATLYN.   Per patient, before he was brought to [facility], he was not able to walk. That was about 2 weeks after diarrhea.   Time course:   8 weeks: diarrhea. he felt weak but thought it was due to severe diarrhea.   6 weeks: weakness, unable to walk. Not sure weakness has progressed?  NO respiratory involvement, SOB, difficulty speaking, or swallowing. BP/HR swing.   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  side effects of IVIG was discussed with patient.  the rest per resident note. This is a 84 yo man with PMH of CAD s/p CABG, HTN, DLP presenting with quadriparesis .   Patient reports early March (probably 2nd) he had diarrhea for 8 days. He felt weak but was thinking it was due to diarrhea. He fell couple times and last one, he could not get up. EMS was called and he was brought to [facility]. He was admitted to cardiac ICU as his cardiac enz was slightly elevated. During hospital stay, he was kept in bed, did not walk. He was unable to lift his legs against gravity but able to his arms. He then was DC to rehab and DC. He went to see neurologist and had EMG done with Dr. Liu. He was told he had severe nerve damage and recommend to go to ED.  The team and I spoke with Dr. Wooten over the phone, his EMG showed acute denervation and severe axonal loss. concerning for KATLYN.   Per patient, before he was brought to [facility], he was not able to walk. That was about 2 weeks after diarrhea.   Time course:   8 weeks: diarrhea. he felt weak but thought it was due to severe diarrhea.   6 weeks: weakness, unable to walk. Not sure weakness has progressed?  NO respiratory involvement, SOB, difficulty speaking, or swallowing. BP/HR swing.   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  side effects of IVIG was discussed with patient.  OT/PT  the rest per resident note.

## 2024-05-02 NOTE — ED ADULT NURSE NOTE - NSFALLRISKINTERV_ED_ALL_ED
Assistance OOB with selected safe patient handling equipment if applicable/Assistance with ambulation/Communicate fall risk and risk factors to all staff, patient, and family/Provide patient with walking aids/Provide visual cue: yellow wristband, yellow gown, etc/Reinforce activity limits and safety measures with patient and family/Call bell, personal items and telephone in reach/Instruct patient to call for assistance before getting out of bed/chair/stretcher/Non-slip footwear applied when patient is off stretcher/[place] to call system/Physically safe environment - no spills, clutter or unnecessary equipment/Purposeful Proactive Rounding/Room/bathroom lighting operational, light cord in reach

## 2024-05-02 NOTE — ED ADULT NURSE NOTE - CHIEF COMPLAINT QUOTE
Pt presents to ED via EMS from [facility] for neurology eval. Pt diagnosed with Acute motor axonal neuropathy and advised to come to [facility] for admission.

## 2024-05-02 NOTE — ED PROVIDER NOTE - OBJECTIVE STATEMENT
84-year-old male with past medical history of CAD with CABG, GERD, hyperlipidemia, hypertension, osteoarthritis, right knee replacement presenting to emergency department as transfer from [facility] for neurology evaluation of acute motor axonal neuropathy.  Patient follows with Dr. Yvette Liu, neurology.  Patient states that over the past 6-week he has had progressive weakness in upper and lower extremities.  Now nonambulatory. 84-year-old male with past medical history of CAD with CABG, GERD, hyperlipidemia, hypertension, osteoarthritis, right knee replacement presenting to emergency department as transfer from [facility] for neurology evaluation of acute motor axonal neuropathy.  Patient follows with Dr. Yvette Lui, neurology.  Patient states that over the past 6-week he has had progressive weakness in upper and lower extremities. Patient notes that prior to developing weakness he had an episode of 8-day long very severe diarrhea. Now nonambulatory.

## 2024-05-02 NOTE — CONSULT NOTE ADULT - ASSESSMENT
ED vitals notable for: ***. Labs notable for: ***. CT imaging disclosed: ***. Exam notable for ***.     Impression:    Recommendations:    Patient/plan d/w  ***. To be seen by attending in the AM with attestation to follow. Plan is not formalized until attending attestation is complete. Recommendations were relayed directly to ***. Note delayed d/t emergent patient care.

## 2024-05-02 NOTE — ED PROVIDER NOTE - DIFFERENTIAL DIAGNOSIS
Ddx includes, however, is not limited to: acute motor axonal neuropathy, other Differential Diagnosis

## 2024-05-02 NOTE — H&P ADULT - HISTORY OF PRESENT ILLNESS
83M R-handed PMHx CAD s/p bypass x4 and HLD presenting as a transfer from [facility]. Patient reports to me that he had a "stomach virus" in March 2024, that lasted for 8 days.  Patient reports that he lost a significant amount of weight during this time.  He reports that he felt "wobbly."  Patient reports that he had a fall following his infection.  Patient also reports that 6 months prior he had experienced 3-4 falls after a right knee arthroplasty.  He reports that he had 2 falls on the day that he came home.  Frequently needing to call 911 to have police come lift him up and home.  Patient reports that after his most recent fall, the  that came to lift him up at home, suggested that he go to the hospital to be evaluated.  Patient went to [facility] at the end of March 2024.  He reports that he was admitted to a cardiac unit because his blood pressure was elevated and he had elevated cardiac enzymes.  Patient reports that he spent 4 days in the hospital, before being discharged to rehab.  He reports that he was told that he would be seen by her neurologist at rehab.  However he reports that during the first 3 weeks he was in rehab, he did not see a neurologist.  Patient was eventually referred by his son's friend, who himself is a neurologist, to Yvette Liu MD.  Dr. Liu gabriel "17 tubes of blood."  She also told patient that she wanted to obtain MRIs.  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  Patient tells me that his legs are weaker than his arms.  His right arm is weaker than his left arm.  Patient reports contractures in his fingers in both upper extremities. He reports complete loss of DTRs. Denies sensory symptoms.    Patient states that Dr. Harry has been in touch with a neurologist at [facility].  The plan, per patient, was to go to [facility] for plasma exchange.  The neurology team at [facility] was to report to Dr. Liu daily the patient's progress.  Patient reports that the nurse practitioner at his rehabilitation center sent him to the wrong hospital.  Therefore, patient's wife is in conversation with [facility].  Patient is requesting discharge from the [facility] emergency department, so he can be transferred to [facility] for further care. 83M R-handed PMHx CAD s/p bypass x4 and HLD presenting as a transfer from [facility]. Patient reports to me that he had a "stomach virus" in March 2024, that lasted for 8 days.  Patient reports that he lost a significant amount of weight during this time.  He reports that he felt "wobbly."  Patient reports that he had a fall following his infection.  Patient also reports that 6 months prior he had experienced 3-4 falls after a right knee arthroplasty.  He reports that he had 2 falls on the day that he came home.  Frequently needing to call 911 to have police come lift him up and home.  Patient reports that after his most recent fall, the  that came to lift him up at home, suggested that he go to the hospital to be evaluated.  Patient went to [facility] at the end of March 2024.  He reports that he was admitted to a cardiac unit because his blood pressure was elevated and he had elevated cardiac enzymes.  Patient reports that he spent 4 days in the hospital, before being discharged to rehab.  He reports that he was told that he would be seen by her neurologist at rehab.  However he reports that during the first 3 weeks he was in rehab, he did not see a neurologist.  Patient was eventually referred by his son's friend, who himself is a neurologist, to Yvette Liu MD.  Dr. Liu gabriel "17 tubes of blood."  She also told patient that she wanted to obtain MRIs.  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  Patient tells me that his legs are weaker than his arms.  His right arm is weaker than his left arm.  Patient reports contractures in his fingers in both upper extremities. He reports complete loss of DTRs. Denies sensory symptoms.

## 2024-05-02 NOTE — ED ADULT NURSE NOTE - OBJECTIVE STATEMENT
Pt AOX4, presents to ED via EMS from [facility] by MD. Bedbound at baseline. Pt presents to the ED with diagnosis of KATLYN/Giullane Reading. Pt denies chest pain, shortness of breath, dyspnea on exertion, breathing is unlabored and even. Pt denies nausea, vomiting or diarrhea. Pt. reports known right sided weakness and B/LLE weakness for over a month. Per patient, he was supposed to be transported to [facility]. MD Rodriguez made aware, at bedside conducting assessment and discussing POC w/ patient.    20G IV placed in LAC. labs drawn, awaiting further orders

## 2024-05-02 NOTE — H&P ADULT - NSHPPHYSICALEXAM_GEN_ALL_CORE
General - non-toxic appearing male in no acute distress  Cardiovascular - peripheral pulses palpable, no edema  Respiratory - breathing comfortably with no increased work of breathing    Neurologic Exam:  Mental status - Awake, Alert, Oriented to person, place, and time. Speech fluent, repetition and naming intact. Follows simple and complex commands. Attention/concentration, recent and remote memory (including registration 3/3 and recall 3/3), and fund of knowledge intact    Cranial nerves - PERRL (4mm -> 3mm b/l), VFF, EOMI, face sensation (V1-V3) intact b/l, facial strength intact without asymmetry b/l, hearing intact b/l, palate with symmetric elevation, trapezius and SCM 5/5 strength b/l, tongue midline on protrusion with full lateral movement    Motor - Normal bulk and tone throughout.    Strength testing            Deltoid      Biceps      Triceps     Wrist Extension    Wrist Flexion     Interossei         R            2                  3               2                     1                              1                        1                       3  L             3+                3               2                     1                              1                        1                       3              Hip Flexion    Knee Flexion    Knee Extension    Dorsiflexion    Plantar Flexion  R             2                        2                                2                           2                            2                           L              2                        2                                  2                          2                            2                              Sensation - Light touch and pinprick intact throughout    DTR's -             Biceps      Triceps     Brachioradialis      Patellar    Ankle    Toes/plantar response  R             1+               1+                  1+                            0            0                 Down  L              1+                1+                 1+                             0           0                 Down    Coordination - unable to assess due to weakness    Gait and station - unable to assess. Wheelchair bound. General - non-toxic appearing male in no acute distress  Cardiovascular - peripheral pulses palpable, no edema  Respiratory - breathing comfortably with no increased work of breathing    Neurologic Exam:  Mental status - Awake, Alert, Oriented to person, place, and time. Speech fluent, repetition and naming intact. Follows simple and complex commands. Attention/concentration, recent and remote memory (including registration 3/3 and recall 3/3), and fund of knowledge intact    Cranial nerves - PERRL (4mm -> 3mm b/l), VFF, EOMI, face sensation (V1-V3) intact b/l, facial strength intact without asymmetry b/l, hearing intact b/l, palate with symmetric elevation, trapezius and SCM 5/5 strength b/l, tongue midline on protrusion with full lateral movement    Motor - Normal bulk and tone throughout.    Strength testing            Deltoid      Biceps      Triceps     Wrist Extension    Wrist Flexion     Interossei         R         2                  3                   2                 1                              1                        1                       3  L          3+                3                   2                 1                              1                        1                       3              Hip Flexion    Knee Flexion    Knee Extension    Dorsiflexion    Plantar Flexion  R             2                        2                            2                           2                            2                           L              2                        2                             2                          2                            2                              Sensation - Light touch and pinprick intact throughout    DTR's -             Biceps      Triceps     Brachioradialis      Patellar    Ankle    Toes/plantar response  R             1+               1+                  1+                             0            0                 neutral   L              1+                1+                 1+                             0           0                 neutral     Coordination - unable to assess due to weakness    Gait and station - unable to assess. Wheelchair bound.

## 2024-05-02 NOTE — ED ADULT NURSE NOTE - NSFALLHARMRISKINTERV_ED_ALL_ED

## 2024-05-02 NOTE — PATIENT PROFILE ADULT - FALL HARM RISK - HARM RISK INTERVENTIONS

## 2024-05-02 NOTE — CONSULT NOTE ADULT - SUBJECTIVE AND OBJECTIVE BOX
Neurology - Consult Note    -  Spectra: [phone] ([facility]), [phone] ([facility]). For new consults, please page: [phone] ([facility]), [phone] ([facility]).  -    HPI: Patient REHANA MEDINA is a 84y (1940) ***-handed wo/man who presents to *** ED on ***, with c/o ***.    PMH significant for: ***    Review of Systems:  INCOMPLETE   All other review of systems is negative unless indicated above.    Allergies:  penicillins (Rash)      PMHx/PSHx/Family Hx: As above, otherwise see below   CAD (coronary artery disease)    S/P CABG x 1    HTN (hypertension)    HLD (hyperlipidemia)    Unilateral primary osteoarthritis, left knee    GERD (gastroesophageal reflux disease)    Unilateral primary osteoarthritis, right knee        Social Hx:  Per HPI    Medications:  MEDICATIONS  (STANDING):    MEDICATIONS  (PRN):      Vitals:  T(C): 36.4 (05-02-24 @ 14:46), Max: 36.4 (05-02-24 @ 14:46)  HR: 89 (05-02-24 @ 14:46) (89 - 89)  BP: 114/63 (05-02-24 @ 14:46) (114/63 - 114/63)  RR: 18 (05-02-24 @ 14:46) (18 - 18)  SpO2: 95% (05-02-24 @ 14:46) (95% - 95%)    Physical Examination: INCOMPLETE  General - Sitting up on ED cart  Cardiovascular - No LE edema  Eyes - Fundoscopy not performed due to safety precautions in the setting of infection risk, non-injected conjunctivae, anicteric sclerae    Neurologic Exam:  Mental status:  - Awake, Alert  - Oriented to: person, place, and time  - Speech: fluent  - Repetition and naming: intact   - Follows simple and cross commands   - Attention/concentration: intact  - Recent and remote memory (including registration and recall): registration intact, 3/3 on 3-word recall  - Fund of knowledge: intact    Cranial nerves - PERRL, VFF on confrontational testing, EOMI - no nystagmus, face sensation (V1-V3) intact b/l, facial strength intact without asymmetry b/l, hearing intact b/l with finger rub test, palate with symmetric elevation, shoulder shrug intact b/l, tongue midline on protrusion with full lateral movement  Dysarthria: ***    Motor - Normal bulk and tone throughout. No pronator drift.  Strength testing (R/L)  Deltoid:  5/5  Biceps:  5/5        Triceps:  5/5       Wrist Extension:  5/5      Wrist Flexion:  5/5       Interossei:  5/5        :  5/5    Hip Flexion:  5/5  Hip Extension:  5/5      Knee Flexion:  5/5      Knee Extension:  5/5      Dorsiflexion:  5/5      Plantar Flexion:  5/5    Sensation - Light touch/vibration intact throughout    DTRs (R/L)  Biceps:  2+/2+        Triceps:  2+/2+       Brachioradialis:  2+/2+        Patellar:  2+/2+      Ankle:  2+/2+      Plantar response:  Down/Down    Coordination - Finger to Nose, Heel to Shin intact b/l. No tremors appreciated.    Gait and station - Unable to assess d/t fall risk/safety concerns.    Labs:          CAPILLARY BLOOD GLUCOSE              CSF:                  Radiology:

## 2024-05-02 NOTE — H&P ADULT - NSHPLABSRESULTS_GEN_ALL_CORE
LABS:                        15.1   9.07  )-----------( 287      ( 02 May 2024 21:36 )             43.6     05-02    138  |  101  |  19  ----------------------------<  100<H>  4.0   |  24  |  0.75    Ca    9.2      02 May 2024 21:36    TPro  6.8  /  Alb  3.8  /  TBili  0.7  /  DBili  x   /  AST  31  /  ALT  25  /  AlkPhos  97  05-02    PT/INR - ( 02 May 2024 21:37 )   PT: 14.3 sec;   INR: 1.37 ratio       PTT - ( 02 May 2024 21:37 )  PTT:27.2 sec      Urinalysis Basic - ( 02 May 2024 21:36 )  Color: x / Appearance: x / SG: x / pH: x  Gluc: 100 mg/dL / Ketone: x  / Bili: x / Urobili: x   Blood: x / Protein: x / Nitrite: x   Leuk Esterase: x / RBC: x / WBC x   Sq Epi: x / Non Sq Epi: x / Bacteria: x

## 2024-05-02 NOTE — ED PROVIDER NOTE - PHYSICAL EXAMINATION
PHYSICAL EXAM:  GEN: NAD   HEAD: Atraumatic  CARDIAC: Normal rate  RESPIRATORY: Breathing unlabored  NEUROLOGICAL:   Strength 2 out of 5 bilateral lower extremities, 3 out of 5 bilateral upper extremities

## 2024-05-02 NOTE — H&P ADULT - NSHPREVIEWOFSYSTEMS_GEN_ALL_CORE
CONSTITUTIONAL: No fever, chills, or malaise  HEAD: No HA or trauma  EYES: No visual changes, diplopia, or pain   ENT:  No sore throat, hoarseness, or hearing loss  NECK: No pain or stiffness  RESPIRATORY: No SOB, cough, wheezing  CARDIOVASCULAR: No chest pain or palpitations  GASTROINTESTINAL: No abdominal pain. No nausea, vomiting; No diarrhea or constipation.  GENITOURINARY: No dysuria  MUSCULOSKELETAL: No muscle or joint pain; no swelling of stiffness  SKIN: No itching, rashes

## 2024-05-02 NOTE — ED PROVIDER NOTE - CLINICAL SUMMARY MEDICAL DECISION MAKING FREE TEXT BOX
84-year-old transferred for neurology evaluation for axonal neuropathy.  Patient with an episode of 80 long diarrhea followed by significant weakness now nonambulatory.  With severe weakness in the lower extremities.  Vital signs stable.  Will admit to neurology for further workup and evaluation. 84-year-old transferred for neurology evaluation for axonal neuropathy.  Patient with an episode of 80 long diarrhea followed by significant weakness now nonambulatory.  With severe weakness in the lower extremities.  Vital signs stable.  Will admit to neurology for further workup and evaluation.    ANGLE Topete MD: Agree with resident/ACP MDM, assessment and plan as above.

## 2024-05-02 NOTE — ED ADULT NURSE REASSESSMENT NOTE - NS ED NURSE REASSESS COMMENT FT1
Patient in no apparent distress, speaks clearly, equal bilateral chest rise/fall  L. AC 20G IV in place  Dr. Rodriguez initiated transfer to [facility], patient awaiting tranfer

## 2024-05-02 NOTE — H&P ADULT - ASSESSMENT
83M R-handed PMHx CAD s/p bypass x4 and HLD [facility]. Pt had an stomach infection and 3 days after infection resolved, began having weakness where he felt "wobbly" and difficulty walking. Fell multiple times prompting rehab. Then later, seen by neurologist who diagnosed pt w/ KATLYN after EMG/NCS study. Vitals stable. CBC and CMP WNL.     Impression: Slow progressive decline of motor deficits post-infection possibly 2/2 infectious vs. inflammatory etiology. Rule out structural etiology.     Plan:  - Speak with Dr. Liu in AM to obtain results of study  - MRI brain w/wo contrast  - MRI C/T/L w/wo contrast  - Send CSF studies - glucose, protein, cell count, acid fast cx, PCR panel, Lyme antibodies, VDRL titer, WNV antibodies, ACE, IgG index, paraneoplastic panel, MBP, OCB  - Send serum studies - B1, B6, folate, B12, HIV, HTLV, copper, zinc, ESR, CRP, MELYSSA, ANCA, DsDNA, RF, TSH w/ reflex T4, SPEP, UPEP, Coags, ionized Ca, fibrinogen  - Hold DVT ppx for now for LP possibly in AM  - Diet regular  - Neurochecks Q4H  - PT/OT evaluation    Case d/w Neurology attending.

## 2024-05-02 NOTE — ED ADULT NURSE NOTE - OBJECTIVE STATEMENT
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 Pt states weeks ago he had a viral infection, began having difficulty ambulating as well as weakness in upper extremities, edema to RUE, has followed outpatient with neurologist who requested to have pt brought to [facility] for treatment. On assessment, pt neuro status in tact, A&Ox4, GCS 15, PERRL, weakness in RUE worse than LUE, answering questions appropriately, respirations even and unlabored on room air, showing no signs of acute distress.

## 2024-05-02 NOTE — ED PROVIDER NOTE - ATTENDING CONTRIBUTION TO CARE
.  The patient is a 84y Male who has a past medical and surgery history of CAD/CABG4  HTN HLD GERD osteoarthritis, right knee P CABG x 4 of colonoscopy of inguinal hernia repair PTED MALIA from [facility] for neurology eval of newly diagnosed  Acute motor axonal neuropathy and advised to come to [facility] for admission   Vital Signs Last 24 Hrs  T(F): 97.6 HR: 89 BP: 114/63 RR: 18 SpO2: 95% (02 May 2024 14:46)   PE: as described; my additions and exceptions are noted in the chart    DATA:  EKG: pending at time of evaluation  LAB: Pending at time of evaluation    IMPRESSION/RISK:  Dx= Complex neurology w/u not amenable to most ED studies   Consideration include Neuro paged on arrival to expedite w/u more urgent eval as patient accidentally transported to wrong facility (was supposed to go to [facility] thru [facility] transfer to ; NH/Patient chose private transfer that selected the wrong facility)   Plan  will check basic labs   await neuro reccs   Will reassess  tranport arranged thru transfer center

## 2024-05-02 NOTE — CHART NOTE - NSCHARTNOTEFT_GEN_A_CORE
Called by ED attending this PM - patient presenting with concern for acute motor axonal neuropathy (KATLYN). Patient was reportedly sent from [facility], at the behest of his outpatient neurologist, for inpatient management. Of note, patient was supposed to be sent to [facility]. However, patient was delivered to [facility].    Patient interview:  Patient is encountered in the ED. Patient is wide-awake, sitting up in bed. Extremely pleasant and conversational.    Patient reports to me that he had a "stomach virus" in March 2024, that lasted for 8 days.  Patient reports that he lost a significant amount of weight during this time.  He reports that he felt "wobbly."  Patient reports that he had a fall following his infection.  Patient also reports that 6 months prior he had experienced 3-4 falls after a right knee arthroplasty.  He reports that he had 2 falls on the day that he came home.  Frequently needing to call 911 to have police come lift him up and home.  Patient reports that after his most recent fall, the  that came to lift him up at home, suggested that he go to the hospital to be evaluated.  Patient went to [facility] at the end of March 2024.  He reports that he was admitted to a cardiac unit because his blood pressure was elevated and he had elevated cardiac enzymes.  Patient reports that he spent 4 days in the hospital, before being discharged to rehab.  He reports that he was told that he would be seen by her neurologist at rehab.  However he reports that during the first 3 weeks he was in rehab, he did not see a neurologist.  Patient was eventually referred by his son's friend, who himself is a neurologist, to Yvette Liu MD.  Dr. Liu gabirel "17 tubes of blood."  She also told patient that she wanted to obtain MRIs.  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  Patient tells me that his legs are weaker than his arms.  His right arm is weaker than his left arm.  Patient reports contractures in his fingers in both upper extremities. He reports complete loss of DTRs.    Patient states that Dr. Harry has been in touch with a neurologist at [facility].  The plan, per patient, was to go to [facility] for plasma exchange.  The neurology team at [facility] was to report to Dr. Liu daily the patient's progress.  Patient reports that the nurse practitioner at his rehabilitation center sent him to the wrong hospital.  Therefore, patient's wife is in conversation with [facility].  Patient is requesting discharge from the [facility] emergency department, so he can be transferred to [facility] for further care.    I informed patient that the neurology team at [facility] will attempt to expedite his transfer, in conjunction with the [facility] emergency department, to [facility].  I discussed the patient's plan of care with the resident on-call at [facility]. Called by ED attending this PM - patient presenting with concern for acute motor axonal neuropathy (KATLYN). Patient was reportedly sent from [facility], at the behest of his outpatient neurologist, for inpatient management. Of note, patient was supposed to be sent to [facility]. However, patient was delivered to [facility].    Patient interview:  Patient is encountered in the ED. Patient is wide-awake, sitting up in bed. Extremely pleasant and conversational.    Patient reports to me that he had a "stomach virus" in March 2024, that lasted for 8 days.  Patient reports that he lost a significant amount of weight during this time.  He reports that he felt "wobbly."  Patient reports that he had a fall following his infection.  Patient also reports that 6 months prior he had experienced 3-4 falls after a right knee arthroplasty.  He reports that he had 2 falls on the day that he came home.  Frequently needing to call 911 to have police come lift him up and home.  Patient reports that after his most recent fall, the  that came to lift him up at home, suggested that he go to the hospital to be evaluated.  Patient went to [facility] at the end of March 2024.  He reports that he was admitted to a cardiac unit because his blood pressure was elevated and he had elevated cardiac enzymes.  Patient reports that he spent 4 days in the hospital, before being discharged to rehab.  He reports that he was told that he would be seen by her neurologist at rehab.  However he reports that during the first 3 weeks he was in rehab, he did not see a neurologist.  Patient was eventually referred by his son's friend, who himself is a neurologist, to Yvette Liu MD.  Dr. Liu gabriel "17 tubes of blood."  She also told patient that she wanted to obtain MRIs.  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  Patient tells me that his legs are weaker than his arms.  His right arm is weaker than his left arm.  Patient reports contractures in his fingers in both upper extremities. He reports complete loss of DTRs. Denies sensory symptoms.    Patient states that Dr. Harry has been in touch with a neurologist at [facility].  The plan, per patient, was to go to [facility] for plasma exchange.  The neurology team at [facility] was to report to Dr. Liu daily the patient's progress.  Patient reports that the nurse practitioner at his rehabilitation center sent him to the wrong hospital.  Therefore, patient's wife is in conversation with [facility].  Patient is requesting discharge from the [facility] emergency department, so he can be transferred to [facility] for further care.    I informed patient that the neurology team at [facility] will attempt to expedite his transfer, in conjunction with the [facility] emergency department, to [facility].  I discussed the patient's plan of care with the resident on-call at [facility].

## 2024-05-03 LAB
A1C WITH ESTIMATED AVERAGE GLUCOSE RESULT: 5.7 % — HIGH (ref 4–5.6)
ANION GAP SERPL CALC-SCNC: 16 MMOL/L — SIGNIFICANT CHANGE UP (ref 5–17)
APPEARANCE CSF: CLEAR — SIGNIFICANT CHANGE UP
APPEARANCE SPUN FLD: COLORLESS — SIGNIFICANT CHANGE UP
BASOPHILS # BLD AUTO: 0.04 K/UL — SIGNIFICANT CHANGE UP (ref 0–0.2)
BASOPHILS NFR BLD AUTO: 0.5 % — SIGNIFICANT CHANGE UP (ref 0–2)
BUN SERPL-MCNC: 17 MG/DL — SIGNIFICANT CHANGE UP (ref 7–23)
C NEOFORM RRNA SPEC NAA+PROBE-ACNC: SIGNIFICANT CHANGE UP
CA-I BLD-SCNC: 1.19 MMOL/L — SIGNIFICANT CHANGE UP (ref 1.15–1.33)
CALCIUM SERPL-MCNC: 9.2 MG/DL — SIGNIFICANT CHANGE UP (ref 8.4–10.5)
CHLORIDE SERPL-SCNC: 102 MMOL/L — SIGNIFICANT CHANGE UP (ref 96–108)
CHOLEST SERPL-MCNC: 119 MG/DL — SIGNIFICANT CHANGE UP
CMV DNA CSF QL NAA+PROBE: SIGNIFICANT CHANGE UP
CO2 SERPL-SCNC: 23 MMOL/L — SIGNIFICANT CHANGE UP (ref 22–31)
COLOR CSF: SIGNIFICANT CHANGE UP
CREAT SERPL-MCNC: 0.7 MG/DL — SIGNIFICANT CHANGE UP (ref 0.5–1.3)
CRP SERPL-MCNC: 4 MG/L — SIGNIFICANT CHANGE UP (ref 0–4)
CRP SERPL-MCNC: 4 MG/L — SIGNIFICANT CHANGE UP (ref 0–4)
CSF PCR RESULT: SIGNIFICANT CHANGE UP
DSDNA AB SER-ACNC: <1 IU/ML — SIGNIFICANT CHANGE UP
E COLI K1 DNA CSF QL NAA+NON-PROBE: SIGNIFICANT CHANGE UP
EGFR: 92 ML/MIN/1.73M2 — SIGNIFICANT CHANGE UP
EOSINOPHIL # BLD AUTO: 0.24 K/UL — SIGNIFICANT CHANGE UP (ref 0–0.5)
EOSINOPHIL NFR BLD AUTO: 2.9 % — SIGNIFICANT CHANGE UP (ref 0–6)
ERYTHROCYTE [SEDIMENTATION RATE] IN BLOOD: 45 MM/HR — HIGH (ref 0–20)
ESCHERICHIA COLI K1: SIGNIFICANT CHANGE UP
ESTIMATED AVERAGE GLUCOSE: 117 MG/DL — HIGH (ref 68–114)
EV RNA CSF QL NAA+PROBE: SIGNIFICANT CHANGE UP
FIBRINOGEN PPP-MCNC: 451 MG/DL — HIGH (ref 200–445)
FOLATE SERPL-MCNC: 11.1 NG/ML — SIGNIFICANT CHANGE UP
FOLATE SERPL-MCNC: 15.5 NG/ML — SIGNIFICANT CHANGE UP
GLUCOSE CSF-MCNC: 64 MG/DL — SIGNIFICANT CHANGE UP (ref 40–70)
GLUCOSE SERPL-MCNC: 89 MG/DL — SIGNIFICANT CHANGE UP (ref 70–99)
GP B STREP DNA SPEC QL NAA+PROBE: SIGNIFICANT CHANGE UP
GRAM STN FLD: SIGNIFICANT CHANGE UP
HAEM INFLU DNA SPEC QL NAA+PROBE: SIGNIFICANT CHANGE UP
HCT VFR BLD CALC: 42.9 % — SIGNIFICANT CHANGE UP (ref 39–50)
HDLC SERPL-MCNC: 44 MG/DL — SIGNIFICANT CHANGE UP
HGB BLD-MCNC: 14.5 G/DL — SIGNIFICANT CHANGE UP (ref 13–17)
HHV6 DNA CSF QL NAA+PROBE: SIGNIFICANT CHANGE UP
HSV1 DNA CSF QL NAA+PROBE: SIGNIFICANT CHANGE UP
HSV2 DNA CSF QL NAA+PROBE: SIGNIFICANT CHANGE UP
IMM GRANULOCYTES NFR BLD AUTO: 0.5 % — SIGNIFICANT CHANGE UP (ref 0–0.9)
INR BLD: 1.4 RATIO — HIGH (ref 0.85–1.18)
L MONOCYTOG DNA SPEC QL NAA+PROBE: SIGNIFICANT CHANGE UP
LDH CSF L TO P-CCNC: 24 U/L — SIGNIFICANT CHANGE UP
LDH FLD-CCNC: 24 U/L — SIGNIFICANT CHANGE UP
LIPID PNL WITH DIRECT LDL SERPL: 59 MG/DL — SIGNIFICANT CHANGE UP
LYMPHOCYTES # BLD AUTO: 1.97 K/UL — SIGNIFICANT CHANGE UP (ref 1–3.3)
LYMPHOCYTES # BLD AUTO: 23.7 % — SIGNIFICANT CHANGE UP (ref 13–44)
LYMPHOCYTES # CSF: 35 % — LOW (ref 40–80)
MCHC RBC-ENTMCNC: 31.8 PG — SIGNIFICANT CHANGE UP (ref 27–34)
MCHC RBC-ENTMCNC: 33.8 GM/DL — SIGNIFICANT CHANGE UP (ref 32–36)
MCV RBC AUTO: 94.1 FL — SIGNIFICANT CHANGE UP (ref 80–100)
MONOCYTES # BLD AUTO: 0.63 K/UL — SIGNIFICANT CHANGE UP (ref 0–0.9)
MONOCYTES NFR BLD AUTO: 7.6 % — SIGNIFICANT CHANGE UP (ref 2–14)
MONOS+MACROS NFR CSF: 62 % — HIGH (ref 15–45)
N MEN DNA SPEC QL NAA+PROBE: SIGNIFICANT CHANGE UP
NEUTROPHILS # BLD AUTO: 5.4 K/UL — SIGNIFICANT CHANGE UP (ref 1.8–7.4)
NEUTROPHILS # CSF: 3 % — SIGNIFICANT CHANGE UP (ref 0–6)
NEUTROPHILS NFR BLD AUTO: 64.8 % — SIGNIFICANT CHANGE UP (ref 43–77)
NIGHT BLUE STAIN TISS: SIGNIFICANT CHANGE UP
NON HDL CHOLESTEROL: 75 MG/DL — SIGNIFICANT CHANGE UP
NRBC # BLD: 0 /100 WBCS — SIGNIFICANT CHANGE UP (ref 0–0)
NRBC NFR CSF: 1 /UL — SIGNIFICANT CHANGE UP (ref 0–5)
PARECHOVIRUS A RNA SPEC QL NAA+PROBE: SIGNIFICANT CHANGE UP
PLATELET # BLD AUTO: 272 K/UL — SIGNIFICANT CHANGE UP (ref 150–400)
POTASSIUM SERPL-MCNC: 3.6 MMOL/L — SIGNIFICANT CHANGE UP (ref 3.5–5.3)
POTASSIUM SERPL-SCNC: 3.6 MMOL/L — SIGNIFICANT CHANGE UP (ref 3.5–5.3)
PROT CSF-MCNC: 77 MG/DL — HIGH (ref 15–45)
PROT SERPL-MCNC: 6 G/DL — SIGNIFICANT CHANGE UP (ref 6–8.3)
PROTHROM AB SERPL-ACNC: 14.6 SEC — HIGH (ref 9.5–13)
RBC # BLD: 4.56 M/UL — SIGNIFICANT CHANGE UP (ref 4.2–5.8)
RBC # CSF: 0 /UL — SIGNIFICANT CHANGE UP (ref 0–0)
RBC # FLD: 13.2 % — SIGNIFICANT CHANGE UP (ref 10.3–14.5)
RHEUMATOID FACT SERPL-ACNC: <10 IU/ML — SIGNIFICANT CHANGE UP (ref 0–13)
S PNEUM DNA SPEC QL NAA+PROBE: SIGNIFICANT CHANGE UP
SODIUM SERPL-SCNC: 141 MMOL/L — SIGNIFICANT CHANGE UP (ref 135–145)
SPECIMEN SOURCE: SIGNIFICANT CHANGE UP
SPECIMEN SOURCE: SIGNIFICANT CHANGE UP
T4 AB SER-ACNC: 7.6 UG/DL — SIGNIFICANT CHANGE UP (ref 4.6–12)
T4 FREE SERPL-MCNC: 1.3 NG/DL — SIGNIFICANT CHANGE UP (ref 0.9–1.8)
T4 FREE+ TSH PNL SERPL: 5.04 UIU/ML — HIGH (ref 0.27–4.2)
TRIGL SERPL-MCNC: 83 MG/DL — SIGNIFICANT CHANGE UP
TSH SERPL-MCNC: 6.32 UIU/ML — HIGH (ref 0.27–4.2)
TUBE TYPE: SIGNIFICANT CHANGE UP
VIT B12 SERPL-MCNC: 738 PG/ML — SIGNIFICANT CHANGE UP (ref 232–1245)
VIT B12 SERPL-MCNC: 780 PG/ML — SIGNIFICANT CHANGE UP (ref 232–1245)
VZV DNA CSF QL NAA+PROBE: SIGNIFICANT CHANGE UP
WBC # BLD: 8.32 K/UL — SIGNIFICANT CHANGE UP (ref 3.8–10.5)
WBC # FLD AUTO: 8.32 K/UL — SIGNIFICANT CHANGE UP (ref 3.8–10.5)

## 2024-05-03 PROCEDURE — 72158 MRI LUMBAR SPINE W/O & W/DYE: CPT | Mod: 26

## 2024-05-03 PROCEDURE — 72157 MRI CHEST SPINE W/O & W/DYE: CPT | Mod: 26

## 2024-05-03 PROCEDURE — 99223 1ST HOSP IP/OBS HIGH 75: CPT

## 2024-05-03 PROCEDURE — 72156 MRI NECK SPINE W/O & W/DYE: CPT | Mod: 26

## 2024-05-03 PROCEDURE — 70553 MRI BRAIN STEM W/O & W/DYE: CPT | Mod: 26

## 2024-05-03 PROCEDURE — 62328 DX LMBR SPI PNXR W/FLUOR/CT: CPT

## 2024-05-03 RX ORDER — IMMUNE GLOBULIN (HUMAN) 10 G/100ML
30 INJECTION INTRAVENOUS; SUBCUTANEOUS DAILY
Refills: 0 | Status: COMPLETED | OUTPATIENT
Start: 2024-05-03 | End: 2024-05-07

## 2024-05-03 RX ORDER — ACETAMINOPHEN 500 MG
650 TABLET ORAL ONCE
Refills: 0 | Status: DISCONTINUED | OUTPATIENT
Start: 2024-05-03 | End: 2024-05-10

## 2024-05-03 RX ORDER — POLYETHYLENE GLYCOL 3350 17 G/17G
17 POWDER, FOR SOLUTION ORAL DAILY
Refills: 0 | Status: DISCONTINUED | OUTPATIENT
Start: 2024-05-03 | End: 2024-05-10

## 2024-05-03 RX ORDER — ACETAMINOPHEN 500 MG
650 TABLET ORAL ONCE
Refills: 0 | Status: COMPLETED | OUTPATIENT
Start: 2024-05-03 | End: 2024-05-03

## 2024-05-03 RX ORDER — IMMUNE GLOBULIN (HUMAN) 10 G/100ML
140 INJECTION INTRAVENOUS; SUBCUTANEOUS DAILY
Refills: 0 | Status: DISCONTINUED | OUTPATIENT
Start: 2024-05-03 | End: 2024-05-03

## 2024-05-03 RX ORDER — ENOXAPARIN SODIUM 100 MG/ML
40 INJECTION SUBCUTANEOUS
Refills: 0 | Status: DISCONTINUED | OUTPATIENT
Start: 2024-05-03 | End: 2024-05-08

## 2024-05-03 RX ORDER — ACETAMINOPHEN 500 MG
650 TABLET ORAL DAILY
Refills: 0 | Status: DISCONTINUED | OUTPATIENT
Start: 2024-05-03 | End: 2024-05-03

## 2024-05-03 RX ORDER — DICLOFENAC SODIUM 30 MG/G
2 GEL TOPICAL DAILY
Refills: 0 | Status: DISCONTINUED | OUTPATIENT
Start: 2024-05-03 | End: 2024-05-10

## 2024-05-03 RX ORDER — DIPHENHYDRAMINE HCL 50 MG
25 CAPSULE ORAL DAILY
Refills: 0 | Status: DISCONTINUED | OUTPATIENT
Start: 2024-05-03 | End: 2024-05-10

## 2024-05-03 RX ORDER — DIPHENHYDRAMINE HCL 50 MG
25 CAPSULE ORAL ONCE
Refills: 0 | Status: DISCONTINUED | OUTPATIENT
Start: 2024-05-03 | End: 2024-05-03

## 2024-05-03 RX ORDER — DIPHENHYDRAMINE HCL 50 MG
25 CAPSULE ORAL DAILY
Refills: 0 | Status: COMPLETED | OUTPATIENT
Start: 2024-05-03 | End: 2024-05-07

## 2024-05-03 RX ADMIN — Medication 81 MILLIGRAM(S): at 12:57

## 2024-05-03 RX ADMIN — POLYETHYLENE GLYCOL 3350 17 GRAM(S): 17 POWDER, FOR SOLUTION ORAL at 20:37

## 2024-05-03 RX ADMIN — Medication 1 TABLET(S): at 21:06

## 2024-05-03 RX ADMIN — FAMOTIDINE 20 MILLIGRAM(S): 10 INJECTION INTRAVENOUS at 05:13

## 2024-05-03 RX ADMIN — Medication 25 MILLIGRAM(S): at 19:57

## 2024-05-03 RX ADMIN — Medication 650 MILLIGRAM(S): at 21:06

## 2024-05-03 RX ADMIN — ATORVASTATIN CALCIUM 40 MILLIGRAM(S): 80 TABLET, FILM COATED ORAL at 21:06

## 2024-05-03 RX ADMIN — IMMUNE GLOBULIN (HUMAN) 50 GRAM(S): 10 INJECTION INTRAVENOUS; SUBCUTANEOUS at 20:37

## 2024-05-03 RX ADMIN — Medication 650 MILLIGRAM(S): at 19:58

## 2024-05-03 RX ADMIN — ENOXAPARIN SODIUM 40 MILLIGRAM(S): 100 INJECTION SUBCUTANEOUS at 21:06

## 2024-05-03 NOTE — OCCUPATIONAL THERAPY INITIAL EVALUATION ADULT - PHYSICAL ASSIST/NONPHYSICAL ASSIST: EATING, OT EVAL
Benefits, risks, and possible complications of procedure explained to patient/caregiver who verbalized understanding and gave verbal consent. verbal cues/nonverbal cues (demo/gestures)/1 person assist

## 2024-05-03 NOTE — OCCUPATIONAL THERAPY INITIAL EVALUATION ADULT - PERTINENT HX OF CURRENT PROBLEM, REHAB EVAL
Pt is a 84 year old male, PMHx of CAD s/p bypass x4 and HLD presenting as a transfer from [facility]. Patient reports to me that he had a "stomach virus" in March 2024, that lasted for 8 days.  Patient reports that he lost a significant amount of weight during this time.  He reports that he felt "wobbly."  Patient reports that he had a fall following his infection.  Patient also reports that 6 months prior he had experienced 3-4 falls after a right knee arthroplasty.  He reports that he had 2 falls on the day that he came home.  Frequently needing to call 911 to have police come lift him up and home.  Patient reports that after his most recent fall, the  that came to lift him up at home, suggested that he go to the hospital to be evaluated.  Patient went to [facility] at the end of March 2024.  He reports that he was admitted to a cardiac unit because his blood pressure was elevated and he had elevated cardiac enzymes.  Patient reports that he spent 4 days in the hospital, before being discharged to rehab.  He reports that he was told that he would be seen by her neurologist at rehab. However he reports that during the first 3 weeks he was in rehab, he did not see a neurologist.  Patient was eventually referred by his son's friend, who himself is a neurologist, to Yvette Liu MD.  Dr. Liu gabriel "17 tubes of blood."  She also told patient that she wanted to obtain MRIs.  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  Patient tells me that his legs are weaker than his arms.  His right arm is weaker than his left arm.  Patient reports contractures in his fingers in both upper extremities. He reports complete loss of DTRs. Denies sensory symptoms.

## 2024-05-03 NOTE — PHYSICAL THERAPY INITIAL EVALUATION ADULT - LEVEL OF INDEPENDENCE, REHAB EVAL
Patient of Dr Tonia Dancer in [place]     [phone] (P) maximum assist (25% patients effort)

## 2024-05-03 NOTE — OCCUPATIONAL THERAPY INITIAL EVALUATION ADULT - NSOTDISCHREC_GEN_A_CORE
If home, Pt will need assist with all ADL and functional mobility and home OT services/Acute Inpatient Rehab

## 2024-05-03 NOTE — PHYSICAL THERAPY INITIAL EVALUATION ADULT - ADDITIONAL COMMENTS
Prior to admission pt with quadriparesis requiring both a wheelchair and Petra lift.     Pt with LE weakness> UE, RUE  weaker than his left arm.  Patient reports contractures in his fingers in both upper extremities. He reports complete loss of DTRs. Denies sensory symptoms. Pt recently at rehab using wheelchair and Petra lift due to with BUE/BLE weakness, limited digit ext of both upper extremities. Prior to march hospitalization pt lives with wife in a condo, private elevator access to apartment, currently elevator is being repaired, ~2 flight of stairs to apartment without use of elevator. Pt owns cane, RW. Prior to onset of symptoms, patient was independent in ADL/functional mobility.

## 2024-05-03 NOTE — PHYSICAL THERAPY INITIAL EVALUATION ADULT - STRENGTHENING, PT EVAL
GOAL: Pt will improve bilateral LE strength to 3/5, for increased limb stability, to facilitate gait in 4 weeks.

## 2024-05-03 NOTE — OCCUPATIONAL THERAPY INITIAL EVALUATION ADULT - DIAGNOSIS, OT EVAL
decreased functional activity endurance, decreased strength, Impaired balance, impaired coordination, limited ROM, impacting ability to perform ADL and functional mobility.

## 2024-05-03 NOTE — PHYSICAL THERAPY INITIAL EVALUATION ADULT - DIAGNOSIS, PT EVAL
Pt p/w impaired strength, balance, motor control, and activity tolerance resulting in mobility deficits.

## 2024-05-03 NOTE — PHYSICAL THERAPY INITIAL EVALUATION ADULT - MANUAL MUSCLE TESTING RESULTS, REHAB EVAL
RUE 2-/5, LUE  shoulder and elbow 3/5, wrist and digits 2-/5, RLE  2-/5, LLE 2-/5/grossly assessed due to

## 2024-05-03 NOTE — OCCUPATIONAL THERAPY INITIAL EVALUATION ADULT - RANGE OF MOTION EXAMINATION, UPPER EXTREMITY
Left UE grossly: active shoulder flexion ~0-90 degrees WFL, active elbow ROM ~0-120 WFL, Wrist very limited active ROM, pain with passive ROM, digits in flexed position limited active flexion/extension, PROM WFL. Right UE grossly: active shoulder ROM ~0-20 limited by pain, active elbow WFL, wrist active ROM very limited, pain with passive ROM, digits in flexion, passive ROM of digits WFL

## 2024-05-03 NOTE — PHYSICAL THERAPY INITIAL EVALUATION ADULT - PERTINENT HX OF CURRENT PROBLEM, REHAB EVAL
83M R-handed PMHx CAD s/p bypass x4 and HLD [facility]. Pt reportedly with "stomach virus" in March 2024, that lasted for 8 days, during which time he lost significant amount of weight. Pt reports that he felt "wobbly" and had a fall following his infection.  Patient also reports that 6 months prior he had experienced 3-4 falls after a right knee arthroplasty, frequently needing to call 911 to have police come lift him up and home.  Patient reports that after his most recent fall, the  that came to lift him up at home, suggested that he go to the hospital to be evaluated.  Pt went to [facility] at the end of March 2024.  Pt reports that he was admitted to a cardiac unit because his blood pressure was elevated and he had elevated cardiac enzymes. Pt reports that he spent 4 days in the hospital, before being discharged to rehab.  Pt eventually saw neurologist Yvette Liu MD who dx'd pt with KATLYN, planned for plasma exchange at [facility].

## 2024-05-03 NOTE — OCCUPATIONAL THERAPY INITIAL EVALUATION ADULT - GENERAL OBSERVATIONS, REHAB EVAL
Upon entry, patient semi-supine in bed +tele +pulse ox +IVL , wife present at bedside, patient agreeable to OT eval, cleared for OT evaluation as per RICARDO Choi

## 2024-05-03 NOTE — PROGRESS NOTE ADULT - SUBJECTIVE AND OBJECTIVE BOX
CLINICAL INDICATION: proximal weakness after viral illness, suspect GBS    Patient presents for fluoroscopically guided lumbar puncture.  Risks and benefits were discussed with the patient and/or patient health care proxy.  Risks discussed included bleeding, infection, nerve damage, and headache.       Status post lumbar puncture at the L3-L4 level utilizing a 20G spinal needle.      24cc of cerebral spinal fluid was obtained.    No immediate complications.

## 2024-05-03 NOTE — OCCUPATIONAL THERAPY INITIAL EVALUATION ADULT - LIVES WITH, PROFILE
Lives with wife in a condo, private elevator access to apartment, currently elevator is being repaired, ~15+ steps to apartment without use of elevator. Pt owns cane, RW. Since ~March, Patient required wheel chair and david lift, unable to ambulate, needed assist with ADL. Prior to onset of symptoms, patient was independent in ADL/functional mobility./spouse

## 2024-05-03 NOTE — OCCUPATIONAL THERAPY INITIAL EVALUATION ADULT - ADL RETRAINING, OT EVAL
[Encouraged to increase physical activity] : Encouraged to increase physical activity
GOAL: Patient will perform grooming sitting at edge of bed with set up and min assist x1 by 4 weeks.

## 2024-05-03 NOTE — OCCUPATIONAL THERAPY INITIAL EVALUATION ADULT - LUE MMT, REHAB EVAL
shoulder and elbow 3/5, wrist and digits 2-/5 shoulder and elbow 3/5, wrist and digits 2-/5/grossly assessed due to

## 2024-05-03 NOTE — OCCUPATIONAL THERAPY INITIAL EVALUATION ADULT - ADDITIONAL COMMENTS
Pt baseline as of ~2 months ago, independent and ambulated. Since symptoms and hospitalization and rehab stay, needs assist with ADL and functional mobility.

## 2024-05-04 LAB
24R-OH-CALCIDIOL SERPL-MCNC: 32.3 NG/ML — SIGNIFICANT CHANGE UP (ref 30–80)
ALBUMIN SERPL ELPH-MCNC: 3.4 G/DL — SIGNIFICANT CHANGE UP (ref 3.3–5)
ALP SERPL-CCNC: 84 U/L — SIGNIFICANT CHANGE UP (ref 40–120)
ALT FLD-CCNC: 20 U/L — SIGNIFICANT CHANGE UP (ref 10–45)
ANION GAP SERPL CALC-SCNC: 14 MMOL/L — SIGNIFICANT CHANGE UP (ref 5–17)
AST SERPL-CCNC: 29 U/L — SIGNIFICANT CHANGE UP (ref 10–40)
B BURGDOR C6 AB SER-ACNC: NEGATIVE — SIGNIFICANT CHANGE UP
B BURGDOR IGG+IGM SER-ACNC: 0.08 INDEX — SIGNIFICANT CHANGE UP (ref 0.01–0.89)
BASOPHILS # BLD AUTO: 0.02 K/UL — SIGNIFICANT CHANGE UP (ref 0–0.2)
BASOPHILS NFR BLD AUTO: 0.3 % — SIGNIFICANT CHANGE UP (ref 0–2)
BILIRUB SERPL-MCNC: 0.6 MG/DL — SIGNIFICANT CHANGE UP (ref 0.2–1.2)
BUN SERPL-MCNC: 16 MG/DL — SIGNIFICANT CHANGE UP (ref 7–23)
CALCIUM SERPL-MCNC: 8.6 MG/DL — SIGNIFICANT CHANGE UP (ref 8.4–10.5)
CHLORIDE SERPL-SCNC: 100 MMOL/L — SIGNIFICANT CHANGE UP (ref 96–108)
CO2 SERPL-SCNC: 23 MMOL/L — SIGNIFICANT CHANGE UP (ref 22–31)
CREAT SERPL-MCNC: 0.73 MG/DL — SIGNIFICANT CHANGE UP (ref 0.5–1.3)
CRYPTOC AG CSF-ACNC: NEGATIVE — SIGNIFICANT CHANGE UP
EBV EA AB SER IA-ACNC: >150 U/ML — HIGH
EBV EA AB TITR SER IF: POSITIVE
EBV EA IGG SER-ACNC: POSITIVE
EBV NA IGG SER IA-ACNC: >600 U/ML — HIGH
EBV PATRN SPEC IB-IMP: SIGNIFICANT CHANGE UP
EBV VCA IGG AVIDITY SER QL IA: POSITIVE
EBV VCA IGM SER IA-ACNC: <10 U/ML — SIGNIFICANT CHANGE UP
EBV VCA IGM SER IA-ACNC: >750 U/ML — HIGH
EBV VCA IGM TITR FLD: NEGATIVE — SIGNIFICANT CHANGE UP
EGFR: 91 ML/MIN/1.73M2 — SIGNIFICANT CHANGE UP
EOSINOPHIL # BLD AUTO: 0.2 K/UL — SIGNIFICANT CHANGE UP (ref 0–0.5)
EOSINOPHIL NFR BLD AUTO: 3.1 % — SIGNIFICANT CHANGE UP (ref 0–6)
ERYTHROCYTE [SEDIMENTATION RATE] IN BLOOD: 37 MM/HR — HIGH (ref 0–20)
FOLATE SERPL-MCNC: 15 NG/ML — SIGNIFICANT CHANGE UP
GLUCOSE SERPL-MCNC: 92 MG/DL — SIGNIFICANT CHANGE UP (ref 70–99)
HAV IGM SER-ACNC: SIGNIFICANT CHANGE UP
HBV CORE IGM SER-ACNC: SIGNIFICANT CHANGE UP
HBV SURFACE AG SER-ACNC: SIGNIFICANT CHANGE UP
HCT VFR BLD CALC: 43.3 % — SIGNIFICANT CHANGE UP (ref 39–50)
HCV AB S/CO SERPL IA: 0.07 S/CO — SIGNIFICANT CHANGE UP (ref 0–0.99)
HCV AB SERPL-IMP: SIGNIFICANT CHANGE UP
HGB BLD-MCNC: 13.9 G/DL — SIGNIFICANT CHANGE UP (ref 13–17)
HIV 1+2 AB+HIV1 P24 AG SERPL QL IA: SIGNIFICANT CHANGE UP
HSV1 IGG SER-ACNC: 42.3 INDEX — HIGH
HSV1 IGG SERPL QL IA: POSITIVE
HSV2 IGG FLD-ACNC: 3.87 INDEX — HIGH
HSV2 IGG SERPL QL IA: POSITIVE
IMM GRANULOCYTES NFR BLD AUTO: 0.2 % — SIGNIFICANT CHANGE UP (ref 0–0.9)
LYMPHOCYTES # BLD AUTO: 1.38 K/UL — SIGNIFICANT CHANGE UP (ref 1–3.3)
LYMPHOCYTES # BLD AUTO: 21.4 % — SIGNIFICANT CHANGE UP (ref 13–44)
MAGNESIUM SERPL-MCNC: 2.2 MG/DL — SIGNIFICANT CHANGE UP (ref 1.6–2.6)
MCHC RBC-ENTMCNC: 31 PG — SIGNIFICANT CHANGE UP (ref 27–34)
MCHC RBC-ENTMCNC: 32.1 GM/DL — SIGNIFICANT CHANGE UP (ref 32–36)
MCV RBC AUTO: 96.4 FL — SIGNIFICANT CHANGE UP (ref 80–100)
MONOCYTES # BLD AUTO: 0.54 K/UL — SIGNIFICANT CHANGE UP (ref 0–0.9)
MONOCYTES NFR BLD AUTO: 8.4 % — SIGNIFICANT CHANGE UP (ref 2–14)
NEUTROPHILS # BLD AUTO: 4.31 K/UL — SIGNIFICANT CHANGE UP (ref 1.8–7.4)
NEUTROPHILS NFR BLD AUTO: 66.6 % — SIGNIFICANT CHANGE UP (ref 43–77)
NRBC # BLD: 0 /100 WBCS — SIGNIFICANT CHANGE UP (ref 0–0)
PHOSPHATE SERPL-MCNC: 4.7 MG/DL — HIGH (ref 2.5–4.5)
PLATELET # BLD AUTO: 260 K/UL — SIGNIFICANT CHANGE UP (ref 150–400)
POTASSIUM SERPL-MCNC: 3.8 MMOL/L — SIGNIFICANT CHANGE UP (ref 3.5–5.3)
POTASSIUM SERPL-SCNC: 3.8 MMOL/L — SIGNIFICANT CHANGE UP (ref 3.5–5.3)
PROT SERPL-MCNC: 6.8 G/DL — SIGNIFICANT CHANGE UP (ref 6–8.3)
RBC # BLD: 4.49 M/UL — SIGNIFICANT CHANGE UP (ref 4.2–5.8)
RBC # FLD: 13.6 % — SIGNIFICANT CHANGE UP (ref 10.3–14.5)
SODIUM SERPL-SCNC: 137 MMOL/L — SIGNIFICANT CHANGE UP (ref 135–145)
TSH SERPL-MCNC: 3.74 UIU/ML — SIGNIFICANT CHANGE UP (ref 0.27–4.2)
VIT B12 SERPL-MCNC: 808 PG/ML — SIGNIFICANT CHANGE UP (ref 232–1245)
WBC # BLD: 6.46 K/UL — SIGNIFICANT CHANGE UP (ref 3.8–10.5)
WBC # FLD AUTO: 6.46 K/UL — SIGNIFICANT CHANGE UP (ref 3.8–10.5)

## 2024-05-04 PROCEDURE — 99233 SBSQ HOSP IP/OBS HIGH 50: CPT | Mod: GC

## 2024-05-04 RX ORDER — ACETAMINOPHEN 500 MG
1000 TABLET ORAL ONCE
Refills: 0 | Status: COMPLETED | OUTPATIENT
Start: 2024-05-04 | End: 2024-05-04

## 2024-05-04 RX ORDER — FAMOTIDINE 10 MG/ML
40 INJECTION INTRAVENOUS
Refills: 0 | Status: DISCONTINUED | OUTPATIENT
Start: 2024-05-04 | End: 2024-05-10

## 2024-05-04 RX ORDER — ACETAMINOPHEN 500 MG
650 TABLET ORAL ONCE
Refills: 0 | Status: COMPLETED | OUTPATIENT
Start: 2024-05-04 | End: 2024-05-04

## 2024-05-04 RX ADMIN — ENOXAPARIN SODIUM 40 MILLIGRAM(S): 100 INJECTION SUBCUTANEOUS at 21:28

## 2024-05-04 RX ADMIN — Medication 81 MILLIGRAM(S): at 12:09

## 2024-05-04 RX ADMIN — ATORVASTATIN CALCIUM 40 MILLIGRAM(S): 80 TABLET, FILM COATED ORAL at 21:28

## 2024-05-04 RX ADMIN — FAMOTIDINE 20 MILLIGRAM(S): 10 INJECTION INTRAVENOUS at 05:00

## 2024-05-04 RX ADMIN — Medication 1000 MILLIGRAM(S): at 13:30

## 2024-05-04 RX ADMIN — IMMUNE GLOBULIN (HUMAN) 50 GRAM(S): 10 INJECTION INTRAVENOUS; SUBCUTANEOUS at 17:07

## 2024-05-04 RX ADMIN — FAMOTIDINE 20 MILLIGRAM(S): 10 INJECTION INTRAVENOUS at 17:16

## 2024-05-04 RX ADMIN — FAMOTIDINE 40 MILLIGRAM(S): 10 INJECTION INTRAVENOUS at 18:11

## 2024-05-04 RX ADMIN — Medication 25 MILLIGRAM(S): at 16:44

## 2024-05-04 RX ADMIN — Medication 650 MILLIGRAM(S): at 16:43

## 2024-05-04 RX ADMIN — DICLOFENAC SODIUM 2 GRAM(S): 30 GEL TOPICAL at 12:09

## 2024-05-04 RX ADMIN — Medication 400 MILLIGRAM(S): at 13:00

## 2024-05-04 RX ADMIN — Medication 1 TABLET(S): at 21:28

## 2024-05-04 NOTE — PROGRESS NOTE ADULT - ATTENDING COMMENTS
During my evaluation, patient's son, granddaughter and nurse were present and I appreciate that.  Patient seen and examined at bedside.  I agree with the findings and plan as documented in the Resident's note except below.    Continue IVIG day 2  Continue medical management, neuro- check and fall precaution.  GI and DVT prophylaxis.  I discussed the diagnosis and treatment plan with the patient.  All questions and concerns were addressed. The patient demonstrated good understanding of the treatment plan.  My cumulative time taking care of this patient is 55 minutes  If you have any further questions, please do not hesitate to contact our consult service.  Thank you for allowing us to participate in this patient care. During my evaluation, patient's son, granddaughter and nurse were present and I appreciate that.  Patient seen and examined at bedside. No new neurologic symptoms reported.     I agree with the findings and plan as documented in the Resident's note except below.    Mr. Harding is a 84 year old right handed man with PMHx of CAD s/p CABG, HTN, presenting with quadriparesis s/p GI infection (diarrhea). Outside hospital evaluated by Dr. 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 Currently no respiratory involvement and swallowing deficits. Will continue IVIG for 5 days. Patient will benefit inpatient rehab.     MRI brain did not show any acute pathology.   MRI C/T/L spine did not show any acute pathology but consistent with degenerative disease. Please see details report.     Continue IVIG day 2  Continue medical management, neuro- check and fall precaution.  GI and DVT prophylaxis.  I discussed the diagnosis and treatment plan with the patient.  All questions and concerns were addressed. The patient demonstrated good understanding of the treatment plan.  My cumulative time taking care of this patient is 55 minutes  If you have any further questions, please do not hesitate to contact our consult service.  Thank you for allowing us to participate in this patient care.

## 2024-05-04 NOTE — PROGRESS NOTE ADULT - SUBJECTIVE AND OBJECTIVE BOX
SUBJECTIVE/INTERVAL HISTORY:    Patient was found to have SpO2 88 overnight after being back from MRI. Placed on 2L NC with improvement. Currently with no complaints, on RA.     PAST MEDICAL & SURGICAL HISTORY:  CAD (coronary artery disease)      HTN (hypertension)      HLD (hyperlipidemia)      GERD (gastroesophageal reflux disease)      Unilateral primary osteoarthritis, right knee      S/P CABG x 4      History of colonoscopy      History of inguinal hernia repair        FAMILY HISTORY:  Family history of heart failure (Father)    Family history of Alzheimer's disease (Mother)        MEDICATIONS (HOME):  Home Medications:  aspirin 81 mg oral tablet: 1 tab(s) orally once a day (02 May 2024 20:52)  Lipitor 40 mg oral tablet: 1 tab(s) orally once a day (at bedtime) (02 May 2024 20:52)  Multiple Vitamins oral tablet: 1 tab(s) orally once a day (at bedtime) (02 May 2024 20:52)  Pepcid 40 mg oral tablet: 1 tab(s) orally 2 times a day (02 May 2024 20:52)  Zetia 10 mg oral tablet: 1 tab(s) orally once a day (at bedtime) (02 May 2024 20:52)    MEDICATIONS  (STANDING):  acetaminophen     Tablet .. 650 milliGRAM(s) Oral once  acetaminophen     Tablet .. 650 milliGRAM(s) Oral once  aspirin enteric coated 81 milliGRAM(s) Oral daily  atorvastatin 40 milliGRAM(s) Oral at bedtime  diclofenac sodium 1% Gel 2 Gram(s) Topical daily  diphenhydrAMINE Injectable 25 milliGRAM(s) IV Push daily  diphenhydrAMINE Injectable 25 milliGRAM(s) IV Push daily  enoxaparin Injectable 40 milliGRAM(s) SubCutaneous <User Schedule>  famotidine    Tablet 20 milliGRAM(s) Oral two times a day  immune   globulin 10% (GAMMAGARD) IVPB 30 Gram(s) IV Intermittent daily  multivitamin 1 Tablet(s) Oral at bedtime  polyethylene glycol 3350 17 Gram(s) Oral daily    MEDICATIONS  (PRN):    ALLERGIES/INTOLERANCES:  Allergies  penicillins (Rash)    Intolerances    VITALS & EXAMINATION:  Vital Signs Last 24 Hrs  T(C): 36.8 (04 May 2024 08:30), Max: 36.8 (04 May 2024 08:30)  T(F): 98.3 (04 May 2024 08:30), Max: 98.3 (04 May 2024 08:30)  HR: 78 (04 May 2024 08:30) (78 - 96)  BP: 107/73 (04 May 2024 08:30) (102/66 - 136/81)  BP(mean): --  RR: 18 (04 May 2024 08:30) (17 - 18)  SpO2: 98% (04 May 2024 08:30) (92% - 98%)    Parameters below as of 04 May 2024 08:30  Patient On (Oxygen Delivery Method): room air        General:  Neurological (>12):  MS: Awake, alert, oriented to person, place, situation, time. Normal affect. Follows all commands.    Language: Speech is clear, fluent     CNs: PERRLA (R = 4mm, L = 4mm). VFF. EOMI no nystagmus, no diplopia. V1-3 intact to LT, well developed masseter muscles b/l. No facial asymmetry b/l, full eye closure strength b/l. Hearing grossly normal (rubbing fingers) b/l. Symmetric palate elevation in midline. Gag reflex deferred. Shoulder shrug intact b/l. Tongue midline, normal movements, no atrophy.    Motor: Normal muscle bulk & tone. No noticeable tremor or seizure.               Deltoid	Biceps	Triceps	   R	3	3	3	2			  L	4-	4-	4-	3			    	H-Flex	H-Ext	H-ABd	H-ADd	K-Flex	K-Ext	D-Flex	P-Flex  R	4-	0	0	0	0	0	1	1 	   L	3	0	0	0	0	0	1	1	     Sensation: Intact to LT b/l throughout.     Reflexes:              Biceps(C5)       BR(C6)     Triceps(C7)               Patellar(L4)    Achilles(S1)    Plantar Resp  R	0	          0	             0		        0		    0		Down   L	0	          0	             0	        0		    0		Down     Coordination: unable to perform due to weakness     Gait:  unable to perform due to weakness     LABORATORY:  CBC                       13.9   6.46  )-----------( 260      ( 04 May 2024 06:25 )             43.3     Chem 05-04    137  |  100  |  16  ----------------------------<  92  3.8   |  23  |  0.73    Ca    8.6      04 May 2024 06:21  Phos  4.7     05-04  Mg     2.2     05-04    TPro  6.8  /  Alb  3.4  /  TBili  0.6  /  DBili  x   /  AST  29  /  ALT  20  /  AlkPhos  84  05-04    LFTs LIVER FUNCTIONS - ( 04 May 2024 06:21 )  Alb: 3.4 g/dL / Pro: 6.8 g/dL / ALK PHOS: 84 U/L / ALT: 20 U/L / AST: 29 U/L / GGT: x           Coagulopathy PT/INR - ( 03 May 2024 06:14 )   PT: 14.6 sec;   INR: 1.40 ratio         PTT - ( 02 May 2024 21:37 )  PTT:27.2 sec  Lipid Panel 05-02 Chol 119 LDL -- HDL 44 Trig 83  A1c   Cardiac enzymes     U/A Urinalysis Basic - ( 04 May 2024 06:21 )    Color: x / Appearance: x / SG: x / pH: x  Gluc: 92 mg/dL / Ketone: x  / Bili: x / Urobili: x   Blood: x / Protein: x / Nitrite: x   Leuk Esterase: x / RBC: x / WBC x   Sq Epi: x / Non Sq Epi: x / Bacteria: x        STUDIES & IMAGING:  MR Head w/wo IV Cont (05.03.24 @ 19:56)   1.  No evidence of acute infarct or midline shift.  2.  Chronic small vessel disease.    < from: MR Lumbar Spine w/wo IV Cont (05.03.24 @ 20:19) >  This exam is somewhat limited by motion.    Loss of the normal cervical lordosis is seen    Mildscoliosis is seen    The vertebral body height alignment and marrow signal appears normal.    Disc desiccation is seen throughout the cervical and upper thoracic spine   region.    C2-3: Bilateral hypertrophic facet joint changes. Moderate narrowing of   both neural foramen    C3-4: Bilateral hypertrophic facet joint changes. Moderate narrowing of   both neural foramen    C4-5: Disc bulge and bilateral hypertrophic joint changes moderate to   severe narrowing of the right neural foramen    C5-6: Disc bulge and bilateral hypertrophic facet joint changes. Mild   narrowing of the spinal canal. Moderate narrowing of both neural foramen    C6-7: Disc bulge and bilateral hypertrophic facet joint changes. Moderate   narrowing of the right neural foramen. Moderate to severe narrowing of   the left neural foramen    C7-T1: Mild disc bulge is seen without significant compromise of the   spinal canal or either neural foramen    The spinal cord demonstrates normal signal and caliber.    Evaluation ofthe soft tissues appear normal.    No abnormal areas of enhancement is seen.    Thoracic spine    This exam is somewhat limited motion.    The vertebral body height alignment appears normal    Hemangioma versus focal area of fat is seen involving theT8 vertebral   body    The vertebral body height appears maintained    Disc desiccation is seen involving the thoracic spine region    T5-6: Small nerve root sleeve cyst is seen on the right side.    T8-9: Small nerve root sleeve cyst is seen on the right side.    T10-11: Disc bulge and bilateral hypertrophic facet joint changes.   Moderate narrowing of both neural foramen    The spinal cord demonstrate normal signal and caliber.    Evaluation of the paraspinal soft tissues appear normal.    No abnormal areas of enhancement    Lumbar spine:    Reversal of the normal lumbar lordosis is seen.    L2 is slightly posteriorly displaced relative to L3. Grade 1   anterolisthesis is seen involving L4 on L5. These findings are likely the   result of chronic degenerative change    The vertebral body height appears maintained    Disc desiccation is seen involving the L1-2 through L4-5 level which is   secondary chronic degenerative    T12-L1: Disc bulge and bilateral hypertrophic facet joint changes.  Moderate narrowing of the right neural foramen and severe narrowing of   the left neural foramen.    L1-2: Disc bulge and bilateral hypertrophic facet joint changes. Mild   narrowing of the right neural foramen. Severe narrowing of left neural   foramen    L2-3: Disc bulge and bilateral hypertrophic facet changes. Mild narrowing   of the spinal canal. Moderate narrowing of both neural foramen    L3-4: Disc bulge and bilateral hypertrophic facet. Hypertrophic change is   seen involving the rightligamentum flavum. Moderate narrowing of the   spinal canal. Mild narrowing of both neural foramen    L4-5: Disc bulge and bilateral hypertrophic facet. Hypertrophic change   both ligamentum flavum. Moderate to severe narrowing of the spinal canal   is seen with compression of the thecal sac. Mild narrowing of both neural   foramen    L5-S1: Disc bulge and bilateral hypertrophic facet. Moderate narrowing of   the left neural foramen    The conus ends at L1 and appears normal    Elevation of the paraspinal soft tissues appear normal    Small renal cyst is seen on the left side.    Both SI joints appear intact.    IMPRESSION: Degenerative changes as described above.            Studies (EKG, EEG, EMG, etc):     Radiology (XR, CT, MR, U/S, TTE/BILLY):

## 2024-05-04 NOTE — PROGRESS NOTE ADULT - ASSESSMENT
ASSESSMENT   84M with h/o CAD s/p CABG, HTN, presenting with quadriparesis after 8 days of diarrhea. Initially admitted to [facility]. Gradually became even more week, at the time unable to lift legs but able to lift arms. Had multiple falls after being discharged from rehab. Recommended to go back to [place]. Evaluated by Dr. 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 Currently no respiratory involvement nor swallowing deficits.      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     PLAN   NEURO   #Generalized weakness likely 2/2 KATLYN   - MRI axis completed   - c/w IVIG x5d (5/3 -  - Premed with tylenol, benadryl  - LP  glucose, protein, cell count, acid fast cx, PCR panel, Lyme antibodies, VDRL titer, WNV antibodies, ACE, IgG index, paraneoplastic panel, MBP, OCB  - Serum studies - B1, B6, folate, B12, HIV, HTLV, copper, zinc, ESR, CRP, MELYSSA, ANCA, DsDNA, RF, TSH w/ reflex T4, SPEP, UPEP, Coags, ionized Ca, fibrinogen, ganglioside panel    - PT/OT     CARDIAC   #CAD  - c/w aspirin, statin      GI  #Consipation   - miralax  - MV  - Regular Diet     ETHICS   FC  PT/OT- Acute rehab   Lovenox 40      Patient to be seen by team and attending. Note finalized upon attending attestation.

## 2024-05-05 LAB
ALBUMIN SERPL ELPH-MCNC: 3.1 G/DL — LOW (ref 3.3–5)
ALP SERPL-CCNC: 75 U/L — SIGNIFICANT CHANGE UP (ref 40–120)
ALT FLD-CCNC: 19 U/L — SIGNIFICANT CHANGE UP (ref 10–45)
ANION GAP SERPL CALC-SCNC: 10 MMOL/L — SIGNIFICANT CHANGE UP (ref 5–17)
AST SERPL-CCNC: 26 U/L — SIGNIFICANT CHANGE UP (ref 10–40)
BASOPHILS # BLD AUTO: 0.03 K/UL — SIGNIFICANT CHANGE UP (ref 0–0.2)
BASOPHILS NFR BLD AUTO: 0.6 % — SIGNIFICANT CHANGE UP (ref 0–2)
BILIRUB SERPL-MCNC: 0.6 MG/DL — SIGNIFICANT CHANGE UP (ref 0.2–1.2)
BUN SERPL-MCNC: 15 MG/DL — SIGNIFICANT CHANGE UP (ref 7–23)
CALCIUM SERPL-MCNC: 8.5 MG/DL — SIGNIFICANT CHANGE UP (ref 8.4–10.5)
CHLORIDE SERPL-SCNC: 103 MMOL/L — SIGNIFICANT CHANGE UP (ref 96–108)
CO2 SERPL-SCNC: 25 MMOL/L — SIGNIFICANT CHANGE UP (ref 22–31)
CREAT SERPL-MCNC: 0.7 MG/DL — SIGNIFICANT CHANGE UP (ref 0.5–1.3)
EGFR: 92 ML/MIN/1.73M2 — SIGNIFICANT CHANGE UP
EOSINOPHIL # BLD AUTO: 0.26 K/UL — SIGNIFICANT CHANGE UP (ref 0–0.5)
EOSINOPHIL NFR BLD AUTO: 5.6 % — SIGNIFICANT CHANGE UP (ref 0–6)
GD1A GANGL IGG+IGM SER IA-ACNC: 6 IV — SIGNIFICANT CHANGE UP (ref 0–50)
GD1B GANGL IGG+IGM SER IA-ACNC: 26 IV — SIGNIFICANT CHANGE UP (ref 0–50)
GLUCOSE SERPL-MCNC: 89 MG/DL — SIGNIFICANT CHANGE UP (ref 70–99)
GM1 ASIALO IGG+IGM SER IA-ACNC: 23 IV — SIGNIFICANT CHANGE UP (ref 0–50)
GM1 GANGL IGG+IGM SER-ACNC: 9 IV — SIGNIFICANT CHANGE UP (ref 0–50)
GM2 GANGL IGG+IGM SER IA-ACNC: 9 IV — SIGNIFICANT CHANGE UP (ref 0–50)
GQ1B GANGL IGG+IGM SER IA-ACNC: 23 IV — SIGNIFICANT CHANGE UP (ref 0–50)
HCT VFR BLD CALC: 39.2 % — SIGNIFICANT CHANGE UP (ref 39–50)
HGB BLD-MCNC: 12.7 G/DL — LOW (ref 13–17)
IL2 SERPL-MCNC: 1588.7 PG/ML — HIGH (ref 175.3–858.2)
IMM GRANULOCYTES NFR BLD AUTO: 0.4 % — SIGNIFICANT CHANGE UP (ref 0–0.9)
LYMPHOCYTES # BLD AUTO: 1.39 K/UL — SIGNIFICANT CHANGE UP (ref 1–3.3)
LYMPHOCYTES # BLD AUTO: 30.1 % — SIGNIFICANT CHANGE UP (ref 13–44)
MAGNESIUM SERPL-MCNC: 2.2 MG/DL — SIGNIFICANT CHANGE UP (ref 1.6–2.6)
MCHC RBC-ENTMCNC: 31.1 PG — SIGNIFICANT CHANGE UP (ref 27–34)
MCHC RBC-ENTMCNC: 32.4 GM/DL — SIGNIFICANT CHANGE UP (ref 32–36)
MCV RBC AUTO: 96.1 FL — SIGNIFICANT CHANGE UP (ref 80–100)
MONOCYTES # BLD AUTO: 0.4 K/UL — SIGNIFICANT CHANGE UP (ref 0–0.9)
MONOCYTES NFR BLD AUTO: 8.7 % — SIGNIFICANT CHANGE UP (ref 2–14)
NEUTROPHILS # BLD AUTO: 2.52 K/UL — SIGNIFICANT CHANGE UP (ref 1.8–7.4)
NEUTROPHILS NFR BLD AUTO: 54.6 % — SIGNIFICANT CHANGE UP (ref 43–77)
NRBC # BLD: 0 /100 WBCS — SIGNIFICANT CHANGE UP (ref 0–0)
PHOSPHATE SERPL-MCNC: 4.1 MG/DL — SIGNIFICANT CHANGE UP (ref 2.5–4.5)
PLATELET # BLD AUTO: 238 K/UL — SIGNIFICANT CHANGE UP (ref 150–400)
POTASSIUM SERPL-MCNC: 3.5 MMOL/L — SIGNIFICANT CHANGE UP (ref 3.5–5.3)
POTASSIUM SERPL-SCNC: 3.5 MMOL/L — SIGNIFICANT CHANGE UP (ref 3.5–5.3)
PROT SERPL-MCNC: 6.9 G/DL — SIGNIFICANT CHANGE UP (ref 6–8.3)
RBC # BLD: 4.08 M/UL — LOW (ref 4.2–5.8)
RBC # FLD: 13.5 % — SIGNIFICANT CHANGE UP (ref 10.3–14.5)
SODIUM SERPL-SCNC: 138 MMOL/L — SIGNIFICANT CHANGE UP (ref 135–145)
WBC # BLD: 4.62 K/UL — SIGNIFICANT CHANGE UP (ref 3.8–10.5)
WBC # FLD AUTO: 4.62 K/UL — SIGNIFICANT CHANGE UP (ref 3.8–10.5)

## 2024-05-05 PROCEDURE — 99233 SBSQ HOSP IP/OBS HIGH 50: CPT | Mod: GC

## 2024-05-05 RX ORDER — ACETAMINOPHEN 500 MG
650 TABLET ORAL ONCE
Refills: 0 | Status: COMPLETED | OUTPATIENT
Start: 2024-05-05 | End: 2024-05-05

## 2024-05-05 RX ADMIN — Medication 650 MILLIGRAM(S): at 16:28

## 2024-05-05 RX ADMIN — DICLOFENAC SODIUM 2 GRAM(S): 30 GEL TOPICAL at 12:25

## 2024-05-05 RX ADMIN — Medication 1 TABLET(S): at 21:38

## 2024-05-05 RX ADMIN — ATORVASTATIN CALCIUM 40 MILLIGRAM(S): 80 TABLET, FILM COATED ORAL at 21:38

## 2024-05-05 RX ADMIN — Medication 650 MILLIGRAM(S): at 16:58

## 2024-05-05 RX ADMIN — FAMOTIDINE 40 MILLIGRAM(S): 10 INJECTION INTRAVENOUS at 17:21

## 2024-05-05 RX ADMIN — POLYETHYLENE GLYCOL 3350 17 GRAM(S): 17 POWDER, FOR SOLUTION ORAL at 12:25

## 2024-05-05 RX ADMIN — ENOXAPARIN SODIUM 40 MILLIGRAM(S): 100 INJECTION SUBCUTANEOUS at 21:40

## 2024-05-05 RX ADMIN — Medication 25 MILLIGRAM(S): at 16:31

## 2024-05-05 RX ADMIN — IMMUNE GLOBULIN (HUMAN) 50 GRAM(S): 10 INJECTION INTRAVENOUS; SUBCUTANEOUS at 17:20

## 2024-05-05 RX ADMIN — Medication 81 MILLIGRAM(S): at 12:25

## 2024-05-05 RX ADMIN — FAMOTIDINE 40 MILLIGRAM(S): 10 INJECTION INTRAVENOUS at 05:18

## 2024-05-05 NOTE — PROGRESS NOTE ADULT - SUBJECTIVE AND OBJECTIVE BOX
Neurology Progress Note    SUBJECTIVE/OBJECTIVE/INTERVAL EVENTS: Patient seen and examined at bedside w/ neuro attending and team.     INTERVAL HISTORY: No acute overnight events reported     REVIEW OF SYSTEMS: Otherwise denies fever, chills, headaches, vision changes, blurry vision, double vision, nausea, vomiting, hearing change, focal weakness, focal numbness, parasthesias, bowel/ bladder incontinence.  Few questions of a 10-system ROS was performed and is negative except for those items noted above and/or in the HPI.    VITALS & EXAMINATION:  Vital Signs Last 24 Hrs  T(C): 36.8 (05 May 2024 04:15), Max: 37.1 (04 May 2024 13:45)  T(F): 98.2 (05 May 2024 04:15), Max: 98.7 (04 May 2024 13:45)  HR: 82 (05 May 2024 04:15) (74 - 90)  BP: 106/69 (05 May 2024 04:15) (106/68 - 124/72)  BP(mean): --  RR: 17 (05 May 2024 04:15) (17 - 18)  SpO2: 94% (05 May 2024 04:15) (93% - 98%)    Parameters below as of 05 May 2024 04:15  Patient On (Oxygen Delivery Method): room air      General:  Neurological (>12):  MS: Awake, alert, oriented to person, place, situation, time. Normal affect. Follows all commands.    Language: Speech is clear, fluent     CNs: PERRL. VFF. EOMI no nystagmus, no diplopia. V1-3 intact to LT, well developed masseter muscles b/l. No facial asymmetry b/l, full eye closure strength b/l. Hearing grossly normal (rubbing fingers) b/l. Symmetric palate elevation in midline. Gag reflex deferred. Shoulder shrug intact b/l. Tongue midline, normal movements, no atrophy.    Motor: Normal muscle bulk & tone. No noticeable tremor or seizure.               Deltoid	Biceps	Triceps	   R	3	3	3	2			  L	4-	4-	4-	3			    	H-Flex H-ABd	H-ADd	K-Flex	K-Ext	D-Flex	P-Flex  R	4-	0	0	0	0	1	1 	   L	3	0	0	0	0	1	1	     Sensation: Intact to LT b/l throughout.     Reflexes: 0 throughout, downgoing toes b/l  Coordination: limited by weakness   Gait:  deferred for patient safety    LABORATORY:  CBC                       12.7   4.62  )-----------( 238      ( 05 May 2024 05:45 )             39.2     Chem 05-05    138  |  103  |  15  ----------------------------<  89  3.5   |  25  |  0.70    Ca    8.5      05 May 2024 05:45  Phos  4.1     05-05  Mg     2.2     05-05    TPro  6.9  /  Alb  3.1<L>  /  TBili  0.6  /  DBili  x   /  AST  26  /  ALT  19  /  AlkPhos  75  05-05    LFTs LIVER FUNCTIONS - ( 05 May 2024 05:45 )  Alb: 3.1 g/dL / Pro: 6.9 g/dL / ALK PHOS: 75 U/L / ALT: 19 U/L / AST: 26 U/L / GGT: x           Coagulopathy   Lipid Panel 05-02 Chol 119 LDL -- HDL 44 Trig 83  A1c   Cardiac enzymes     U/A Urinalysis Basic - ( 05 May 2024 05:45 )    Color: x / Appearance: x / SG: x / pH: x  Gluc: 89 mg/dL / Ketone: x  / Bili: x / Urobili: x   Blood: x / Protein: x / Nitrite: x   Leuk Esterase: x / RBC: x / WBC x   Sq Epi: x / Non Sq Epi: x / Bacteria: x      CSF  Immunological  Other    STUDIES & IMAGING: (EEG, CT, MR, U/S, TTE/BILLY):    MR Head w/wo IV Cont (05.03.24 @ 19:56)   1.  No evidence of acute infarct or midline shift.  2.  Chronic small vessel disease.    < from: MR Lumbar Spine w/wo IV Cont (05.03.24 @ 20:19) >  This exam is somewhat limited by motion.    Loss of the normal cervical lordosis is seen    Mildscoliosis is seen    The vertebral body height alignment and marrow signal appears normal.    Disc desiccation is seen throughout the cervical and upper thoracic spine   region.    C2-3: Bilateral hypertrophic facet joint changes. Moderate narrowing of   both neural foramen    C3-4: Bilateral hypertrophic facet joint changes. Moderate narrowing of   both neural foramen    C4-5: Disc bulge and bilateral hypertrophic joint changes moderate to   severe narrowing of the right neural foramen    C5-6: Disc bulge and bilateral hypertrophic facet joint changes. Mild   narrowing of the spinal canal. Moderate narrowing of both neural foramen    C6-7: Disc bulge and bilateral hypertrophic facet joint changes. Moderate   narrowing of the right neural foramen. Moderate to severe narrowing of   the left neural foramen    C7-T1: Mild disc bulge is seen without significant compromise of the   spinal canal or either neural foramen    The spinal cord demonstrates normal signal and caliber.    Evaluation ofthe soft tissues appear normal.    No abnormal areas of enhancement is seen.    Thoracic spine    This exam is somewhat limited motion.    The vertebral body height alignment appears normal    Hemangioma versus focal area of fat is seen involving theT8 vertebral   body    The vertebral body height appears maintained    Disc desiccation is seen involving the thoracic spine region    T5-6: Small nerve root sleeve cyst is seen on the right side.    T8-9: Small nerve root sleeve cyst is seen on the right side.    T10-11: Disc bulge and bilateral hypertrophic facet joint changes.   Moderate narrowing of both neural foramen    The spinal cord demonstrate normal signal and caliber.    Evaluation of the paraspinal soft tissues appear normal.    No abnormal areas of enhancement    Lumbar spine:    Reversal of the normal lumbar lordosis is seen.    L2 is slightly posteriorly displaced relative to L3. Grade 1   anterolisthesis is seen involving L4 on L5. These findings are likely the   result of chronic degenerative change    The vertebral body height appears maintained    Disc desiccation is seen involving the L1-2 through L4-5 level which is   secondary chronic degenerative    T12-L1: Disc bulge and bilateral hypertrophic facet joint changes.  Moderate narrowing of the right neural foramen and severe narrowing of   the left neural foramen.    L1-2: Disc bulge and bilateral hypertrophic facet joint changes. Mild   narrowing of the right neural foramen. Severe narrowing of left neural   foramen    L2-3: Disc bulge and bilateral hypertrophic facet changes. Mild narrowing   of the spinal canal. Moderate narrowing of both neural foramen    L3-4: Disc bulge and bilateral hypertrophic facet. Hypertrophic change is   seen involving the rightligamentum flavum. Moderate narrowing of the   spinal canal. Mild narrowing of both neural foramen    L4-5: Disc bulge and bilateral hypertrophic facet. Hypertrophic change   both ligamentum flavum. Moderate to severe narrowing of the spinal canal   is seen with compression of the thecal sac. Mild narrowing of both neural   foramen    L5-S1: Disc bulge and bilateral hypertrophic facet. Moderate narrowing of   the left neural foramen    The conus ends at L1 and appears normal    Elevation of the paraspinal soft tissues appear normal    Small renal cyst is seen on the left side.    Both SI joints appear intact.    IMPRESSION: Degenerative changes as described above.            Studies (EKG, EEG, EMG, etc):     Radiology (XR, CT, MR, U/S, TTE/BILLY):   Neurology Progress Note    SUBJECTIVE/OBJECTIVE/INTERVAL EVENTS: Patient seen and examined at bedside w/ neuro attending and team.     INTERVAL HISTORY: No acute overnight events reported. Patient reports improvement in his symptoms    REVIEW OF SYSTEMS: Otherwise denies fever, chills, headaches, vision changes. Reports improvement in RLE strength. Reports he has urinary incontinence at night time as he is unable to move fast enough to get to bathroom, is now using a condom cath.    VITALS & EXAMINATION:  Vital Signs Last 24 Hrs  T(C): 36.8 (05 May 2024 04:15), Max: 37.1 (04 May 2024 13:45)  T(F): 98.2 (05 May 2024 04:15), Max: 98.7 (04 May 2024 13:45)  HR: 82 (05 May 2024 04:15) (74 - 90)  BP: 106/69 (05 May 2024 04:15) (106/68 - 124/72)  BP(mean): --  RR: 17 (05 May 2024 04:15) (17 - 18)  SpO2: 94% (05 May 2024 04:15) (93% - 98%)    Parameters below as of 05 May 2024 04:15  Patient On (Oxygen Delivery Method): room air      General:  Neurological (>12):  MS: Awake, alert, oriented to person, place, situation, time. Normal affect. Follows all commands.    Language: Speech is clear, fluent     CNs: PERRL. VFF. EOMI overall no nystagmus, no diplopia. V1-3 intact to LT,. No facial asymmetry b/l, full eye closure strength b/l. Hearing grossly normal (rubbing fingers) b/l. Symmetric palate elevation in midline. Gag reflex deferred. Shoulder shrug intact b/l. Tongue midline, normal movements, no atrophy.    Motor:  Patient able to lift both arms AG, but has difficulty sustaining them up, worse on R arm              Deltoid	Biceps	Triceps	   R	3	3	3	2			  L	4-	4-	4-	3			    	H-Flex  K-Flex	K-Ext	D-Flex	P-Flex  R	4-	2	2	2	2	 	   L	3	0	0	0	0		     Sensation: Intact to LT b/l throughout.     Reflexes: 0 throughout, downgoing toes b/l  Coordination: limited by weakness   Gait:  deferred for patient safety    LABORATORY:  CBC                       12.7   4.62  )-----------( 238      ( 05 May 2024 05:45 )             39.2     Chem 05-05    138  |  103  |  15  ----------------------------<  89  3.5   |  25  |  0.70    Ca    8.5      05 May 2024 05:45  Phos  4.1     05-05  Mg     2.2     05-05    TPro  6.9  /  Alb  3.1<L>  /  TBili  0.6  /  DBili  x   /  AST  26  /  ALT  19  /  AlkPhos  75  05-05    LFTs LIVER FUNCTIONS - ( 05 May 2024 05:45 )  Alb: 3.1 g/dL / Pro: 6.9 g/dL / ALK PHOS: 75 U/L / ALT: 19 U/L / AST: 26 U/L / GGT: x           Coagulopathy   Lipid Panel 05-02 Chol 119 LDL -- HDL 44 Trig 83  A1c   Cardiac enzymes     U/A Urinalysis Basic - ( 05 May 2024 05:45 )    Color: x / Appearance: x / SG: x / pH: x  Gluc: 89 mg/dL / Ketone: x  / Bili: x / Urobili: x   Blood: x / Protein: x / Nitrite: x   Leuk Esterase: x / RBC: x / WBC x   Sq Epi: x / Non Sq Epi: x / Bacteria: x      CSF  Immunological  Other    STUDIES & IMAGING: (EEG, CT, MR, U/S, TTE/BILLY):    MR Head w/wo IV Cont (05.03.24 @ 19:56)   1.  No evidence of acute infarct or midline shift.  2.  Chronic small vessel disease.    < from: MR Lumbar Spine w/wo IV Cont (05.03.24 @ 20:19) >  This exam is somewhat limited by motion.    Loss of the normal cervical lordosis is seen    Mildscoliosis is seen    The vertebral body height alignment and marrow signal appears normal.    Disc desiccation is seen throughout the cervical and upper thoracic spine   region.    C2-3: Bilateral hypertrophic facet joint changes. Moderate narrowing of   both neural foramen    C3-4: Bilateral hypertrophic facet joint changes. Moderate narrowing of   both neural foramen    C4-5: Disc bulge and bilateral hypertrophic joint changes moderate to   severe narrowing of the right neural foramen    C5-6: Disc bulge and bilateral hypertrophic facet joint changes. Mild   narrowing of the spinal canal. Moderate narrowing of both neural foramen    C6-7: Disc bulge and bilateral hypertrophic facet joint changes. Moderate   narrowing of the right neural foramen. Moderate to severe narrowing of   the left neural foramen    C7-T1: Mild disc bulge is seen without significant compromise of the   spinal canal or either neural foramen    The spinal cord demonstrates normal signal and caliber.    Evaluation ofthe soft tissues appear normal.    No abnormal areas of enhancement is seen.    Thoracic spine    This exam is somewhat limited motion.    The vertebral body height alignment appears normal    Hemangioma versus focal area of fat is seen involving theT8 vertebral   body    The vertebral body height appears maintained    Disc desiccation is seen involving the thoracic spine region    T5-6: Small nerve root sleeve cyst is seen on the right side.    T8-9: Small nerve root sleeve cyst is seen on the right side.    T10-11: Disc bulge and bilateral hypertrophic facet joint changes.   Moderate narrowing of both neural foramen    The spinal cord demonstrate normal signal and caliber.    Evaluation of the paraspinal soft tissues appear normal.    No abnormal areas of enhancement    Lumbar spine:    Reversal of the normal lumbar lordosis is seen.    L2 is slightly posteriorly displaced relative to L3. Grade 1   anterolisthesis is seen involving L4 on L5. These findings are likely the   result of chronic degenerative change    The vertebral body height appears maintained    Disc desiccation is seen involving the L1-2 through L4-5 level which is   secondary chronic degenerative    T12-L1: Disc bulge and bilateral hypertrophic facet joint changes.  Moderate narrowing of the right neural foramen and severe narrowing of   the left neural foramen.    L1-2: Disc bulge and bilateral hypertrophic facet joint changes. Mild   narrowing of the right neural foramen. Severe narrowing of left neural   foramen    L2-3: Disc bulge and bilateral hypertrophic facet changes. Mild narrowing   of the spinal canal. Moderate narrowing of both neural foramen    L3-4: Disc bulge and bilateral hypertrophic facet. Hypertrophic change is   seen involving the rightligamentum flavum. Moderate narrowing of the   spinal canal. Mild narrowing of both neural foramen    L4-5: Disc bulge and bilateral hypertrophic facet. Hypertrophic change   both ligamentum flavum. Moderate to severe narrowing of the spinal canal   is seen with compression of the thecal sac. Mild narrowing of both neural   foramen    L5-S1: Disc bulge and bilateral hypertrophic facet. Moderate narrowing of   the left neural foramen    The conus ends at L1 and appears normal    Elevation of the paraspinal soft tissues appear normal    Small renal cyst is seen on the left side.    Both SI joints appear intact.    IMPRESSION: Degenerative changes as described above.            Studies (EKG, EEG, EMG, etc):     Radiology (XR, CT, MR, U/S, TTE/BILLY):

## 2024-05-05 NOTE — PROGRESS NOTE ADULT - ATTENDING COMMENTS
Patient seen and examined at bedside. Patient reported feeling better and energetic. Also he able to move all four extremities antigravity. No new neurologic symptoms reported.     I agree with the findings and plan as documented in the Resident's note except below.    Mr. Harding is a 84 year old right handed man with PMHx of CAD s/p CABG, HTN, presenting with quadriparesis s/p GI infection (diarrhea). Evaluated by Dr. 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 Currently no respiratory involvement nor swallowing deficits.  Today clinically he is making recovery, antigravity all four extremities and still absent reflexes. Will continue IVIG for 5 day. Patient will benefit inpatient rehab.     Lyme neg. EBV virus capsid IgG and nuclear antigen positive, early antigen positive, IgM negative, HSV 1 and 2 Ab positive    CSF, glucose 64, Protein 77    MRI brain did not show any acute pathology.   MRI C/T/L spine did not show any acute pathology but consistent with degenerative disease. Please see details report.     Continue IVIG for 5 days. Today day 3.  He will benefit from ID evaluation for above results positive.   Continue medical management, neuro- check and fall precaution.  GI and DVT prophylaxis.  I discussed the diagnosis and treatment plan with the patient.  All questions and concerns were addressed. The patient demonstrated good understanding of the treatment plan.  My cumulative time taking care of this patient is 55 minutes  If you have any further questions, please do not hesitate to contact our consult service.  Thank you for allowing us to participate in this patient care. Patient seen and examined at bedside. Patient reported feeling better and energetic. Also he able to move all four extremities antigravity. No new neurologic symptoms reported.     I agree with the findings and plan as documented in the Resident's note except below.    Mr. Harding is a 84 year old right handed man with PMHx of CAD s/p CABG, HTN, presenting with quadriparesis s/p GI infection (diarrhea). Outside hospital evaluated by Dr. 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 Today clinically he is making recovery, antigravity all four extremities and still absent reflexes. Will continue IVIG for 5 days. Patient will benefit inpatient rehab.     Lyme neg. EBV virus capsid IgG and nuclear antigen positive, early antigen positive, IgM negative, HSV 1 and 2 Ab positive    CSF, glucose 64, Protein 77    MRI brain did not show any acute pathology.   MRI C/T/L spine did not show any acute pathology but consistent with degenerative disease. Please see details report.     Continue IVIG for 5 days. Today day 3.  He will benefit from ID evaluation for above results positive.   Continue medical management, neuro- check and fall precaution.  GI and DVT prophylaxis.  I discussed the diagnosis and treatment plan with the patient.  All questions and concerns were addressed. The patient demonstrated good understanding of the treatment plan.  My cumulative time taking care of this patient is 55 minutes  If you have any further questions, please do not hesitate to contact our consult service.  Thank you for allowing us to participate in this patient care.

## 2024-05-05 NOTE — PROGRESS NOTE ADULT - ASSESSMENT
84M with h/o CAD s/p CABG, HTN, presenting with quadriparesis after 8 days of diarrhea. Initially admitted to [facility]. Gradually became even more week, at the time unable to lift legs but able to lift arms. Had multiple falls after being discharged from rehab. Recommended to go back to [place]. Evaluated by Dr. 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 Currently no respiratory involvement nor swallowing deficits.      Lyme neg. 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     Plan:  Generalized weakness likely 2/2 KATLYN   - MRI axis completed   - c/w IVIG x5d (5/3 - 5/7), due for dose #3/5 today 5/5  - Premed with tylenol, benadryl  - LP  glucose, protein, cell count, acid fast cx, PCR panel, Lyme antibodies, VDRL titer, WNV antibodies, ACE, IgG index, paraneoplastic panel, MBP, OCB  - Serum studies - B1, B6, folate, B12, HIV, HTLV, copper, zinc, ESR, CRP, MELYSSA, ANCA, DsDNA, RF, TSH w/ reflex T4, SPEP, UPEP, Coags, ionized Ca, fibrinogen, ganglioside panel    - PT/OT     CAD  - c/w aspirin 81mg, statin 40mg    Constipation   - Miralax 17g daily    Dispo: Acute rehab   DVT ppx: Lovenox 40  Regular Diet     Patient seen by team and attending on AM rounds. Note finalized upon attending attestation.      84M with h/o CAD s/p CABG, HTN, presenting with quadriparesis after 8 days of diarrhea. Initially admitted to [facility]. Gradually became even more week, at the time unable to lift legs but able to lift arms. Had multiple falls after being discharged from rehab. Recommended to go back to [place]. Evaluated by Dr. 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 Currently no respiratory involvement nor swallowing deficits.      Lyme neg. 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     Plan:  Generalized weakness likely 2/2 KATLYN   - MRI axis completed   - c/w IVIG x5d (5/3 - 5/7), due for dose #3/5 today 5/5  - Premed with tylenol, benadryl  - LP  glucose 64, protein 77, cell count, acid fast cx ruchi, PCR panel neg, Lyme antibodies, VDRL titer, WNV antibodies, ACE, IgG index, paraneoplastic panel, MBP, OCB  - Serum studies - B1, B6, folate 15.5, B12 808, HIV neg, HTLV, copper, zinc, ESR 37, CRP, MELYSSA, ANCA, DsDNA <1, RF <10, TSH 3.74 w/ reflex T4 1.3, SPEP, UPEP, Coags, ionized Ca 1.19, fibrinogen, ganglioside panel wnl   - PT/OT     CAD  - c/w aspirin 81mg, statin 40mg    Constipation   - Miralax 17g daily    Dispo: Acute rehab   DVT ppx: Lovenox 40  Regular Diet     Patient seen on AM rounds. Note finalized upon attending attestation.

## 2024-05-06 LAB
% ALBUMIN: 49.6 % — SIGNIFICANT CHANGE UP
% ALPHA 1: 5.7 % — SIGNIFICANT CHANGE UP
% ALPHA 2: 16.6 % — SIGNIFICANT CHANGE UP
% BETA: 12.9 % — SIGNIFICANT CHANGE UP
% GAMMA: 15.2 % — SIGNIFICANT CHANGE UP
ACE SERPL-CCNC: 51 U/L — SIGNIFICANT CHANGE UP (ref 14–82)
ALBUMIN CSF-MCNC: 60 MG/DL — HIGH (ref 14–25)
ALBUMIN SERPL ELPH-MCNC: 2.9 G/DL — LOW (ref 3.3–5)
ALBUMIN SERPL ELPH-MCNC: 3 G/DL — LOW (ref 3.6–5.5)
ALBUMIN SERPL ELPH-MCNC: 3307 MG/DL — LOW (ref 3500–5200)
ALBUMIN/GLOB SERPL ELPH: 1 RATIO — SIGNIFICANT CHANGE UP
ALP SERPL-CCNC: 70 U/L — SIGNIFICANT CHANGE UP (ref 40–120)
ALPHA1 GLOB SERPL ELPH-MCNC: 0.3 G/DL — SIGNIFICANT CHANGE UP (ref 0.1–0.4)
ALPHA2 GLOB SERPL ELPH-MCNC: 1 G/DL — SIGNIFICANT CHANGE UP (ref 0.5–1)
ALT FLD-CCNC: 20 U/L — SIGNIFICANT CHANGE UP (ref 10–45)
ANA TITR SER: NEGATIVE — SIGNIFICANT CHANGE UP
ANA TITR SER: NEGATIVE — SIGNIFICANT CHANGE UP
ANION GAP SERPL CALC-SCNC: 13 MMOL/L — SIGNIFICANT CHANGE UP (ref 5–17)
AST SERPL-CCNC: 26 U/L — SIGNIFICANT CHANGE UP (ref 10–40)
AUTO DIFF PNL BLD: NEGATIVE — SIGNIFICANT CHANGE UP
B-GLOBULIN SERPL ELPH-MCNC: 0.8 G/DL — SIGNIFICANT CHANGE UP (ref 0.5–1)
BASOPHILS # BLD AUTO: 0.02 K/UL — SIGNIFICANT CHANGE UP (ref 0–0.2)
BASOPHILS NFR BLD AUTO: 0.5 % — SIGNIFICANT CHANGE UP (ref 0–2)
BILIRUB SERPL-MCNC: 0.6 MG/DL — SIGNIFICANT CHANGE UP (ref 0.2–1.2)
BUN SERPL-MCNC: 15 MG/DL — SIGNIFICANT CHANGE UP (ref 7–23)
C-ANCA SER-ACNC: NEGATIVE — SIGNIFICANT CHANGE UP
CALCIUM SERPL-MCNC: 8.4 MG/DL — SIGNIFICANT CHANGE UP (ref 8.4–10.5)
CHLORIDE SERPL-SCNC: 104 MMOL/L — SIGNIFICANT CHANGE UP (ref 96–108)
CO2 SERPL-SCNC: 24 MMOL/L — SIGNIFICANT CHANGE UP (ref 22–31)
CREAT SERPL-MCNC: 0.69 MG/DL — SIGNIFICANT CHANGE UP (ref 0.5–1.3)
EBV PCR: SIGNIFICANT CHANGE UP IU/ML
EGFR: 92 ML/MIN/1.73M2 — SIGNIFICANT CHANGE UP
EOSINOPHIL # BLD AUTO: 0.09 K/UL — SIGNIFICANT CHANGE UP (ref 0–0.5)
EOSINOPHIL NFR BLD AUTO: 2.1 % — SIGNIFICANT CHANGE UP (ref 0–6)
GAMMA GLOBULIN: 0.9 G/DL — SIGNIFICANT CHANGE UP (ref 0.6–1.6)
GLUCOSE SERPL-MCNC: 80 MG/DL — SIGNIFICANT CHANGE UP (ref 70–99)
HCT VFR BLD CALC: 42.1 % — SIGNIFICANT CHANGE UP (ref 39–50)
HGB BLD-MCNC: 13.7 G/DL — SIGNIFICANT CHANGE UP (ref 13–17)
HTLV I+II AB PATRN SER RIPA-IMP: NEGATIVE — SIGNIFICANT CHANGE UP
IGA FLD-MCNC: 244 MG/DL — SIGNIFICANT CHANGE UP (ref 84–499)
IGG CSF-MCNC: 9.5 MG/DL — HIGH
IGG FLD-MCNC: 915 MG/DL — SIGNIFICANT CHANGE UP (ref 610–1660)
IGG FLD-MCNC: 919 MG/DL — SIGNIFICANT CHANGE UP (ref 610–1660)
IGG SYNTH RATE SER+CSF CALC-MRATE: 7.8 MG/DAY — SIGNIFICANT CHANGE UP
IGG/ALB CLEAR SER+CSF-RTO: 0.6 — SIGNIFICANT CHANGE UP
IGG/ALB CSF: 0.16 RATIO — SIGNIFICANT CHANGE UP
IGG/ALB SER: 0.28 RATIO — SIGNIFICANT CHANGE UP
IGM SERPL-MCNC: 63 MG/DL — SIGNIFICANT CHANGE UP (ref 35–242)
IMM GRANULOCYTES NFR BLD AUTO: 0.5 % — SIGNIFICANT CHANGE UP (ref 0–0.9)
INTERPRETATION SERPL IFE-IMP: SIGNIFICANT CHANGE UP
KAPPA LC SER QL IFE: 3.51 MG/DL — HIGH (ref 0.33–1.94)
KAPPA/LAMBDA FREE LIGHT CHAIN RATIO, SERUM: 1.48 RATIO — SIGNIFICANT CHANGE UP (ref 0.26–1.65)
LAMBDA LC SER QL IFE: 2.37 MG/DL — SIGNIFICANT CHANGE UP (ref 0.57–2.63)
LYMPHOCYTES # BLD AUTO: 1.04 K/UL — SIGNIFICANT CHANGE UP (ref 1–3.3)
LYMPHOCYTES # BLD AUTO: 24.5 % — SIGNIFICANT CHANGE UP (ref 13–44)
MAGNESIUM SERPL-MCNC: 2.2 MG/DL — SIGNIFICANT CHANGE UP (ref 1.6–2.6)
MCHC RBC-ENTMCNC: 32.2 PG — SIGNIFICANT CHANGE UP (ref 27–34)
MCHC RBC-ENTMCNC: 32.5 GM/DL — SIGNIFICANT CHANGE UP (ref 32–36)
MCV RBC AUTO: 99.1 FL — SIGNIFICANT CHANGE UP (ref 80–100)
MONOCYTES # BLD AUTO: 0.37 K/UL — SIGNIFICANT CHANGE UP (ref 0–0.9)
MONOCYTES NFR BLD AUTO: 8.7 % — SIGNIFICANT CHANGE UP (ref 2–14)
NEUTROPHILS # BLD AUTO: 2.71 K/UL — SIGNIFICANT CHANGE UP (ref 1.8–7.4)
NEUTROPHILS NFR BLD AUTO: 63.7 % — SIGNIFICANT CHANGE UP (ref 43–77)
NRBC # BLD: 0 /100 WBCS — SIGNIFICANT CHANGE UP (ref 0–0)
P-ANCA SER-ACNC: NEGATIVE — SIGNIFICANT CHANGE UP
PHOSPHATE SERPL-MCNC: 3.6 MG/DL — SIGNIFICANT CHANGE UP (ref 2.5–4.5)
PLATELET # BLD AUTO: 209 K/UL — SIGNIFICANT CHANGE UP (ref 150–400)
POTASSIUM SERPL-MCNC: 3.6 MMOL/L — SIGNIFICANT CHANGE UP (ref 3.5–5.3)
POTASSIUM SERPL-SCNC: 3.6 MMOL/L — SIGNIFICANT CHANGE UP (ref 3.5–5.3)
PROT PATTERN SERPL ELPH-IMP: SIGNIFICANT CHANGE UP
PROT SERPL-MCNC: 6 G/DL — SIGNIFICANT CHANGE UP (ref 6–8.3)
PROT SERPL-MCNC: 7.1 G/DL — SIGNIFICANT CHANGE UP (ref 6–8.3)
RBC # BLD: 4.25 M/UL — SIGNIFICANT CHANGE UP (ref 4.2–5.8)
RBC # FLD: 13.8 % — SIGNIFICANT CHANGE UP (ref 10.3–14.5)
SODIUM SERPL-SCNC: 141 MMOL/L — SIGNIFICANT CHANGE UP (ref 135–145)
TM INTERPRETATION: SIGNIFICANT CHANGE UP
WBC # BLD: 4.25 K/UL — SIGNIFICANT CHANGE UP (ref 3.8–10.5)
WBC # FLD AUTO: 4.25 K/UL — SIGNIFICANT CHANGE UP (ref 3.8–10.5)

## 2024-05-06 PROCEDURE — 99233 SBSQ HOSP IP/OBS HIGH 50: CPT

## 2024-05-06 PROCEDURE — 99222 1ST HOSP IP/OBS MODERATE 55: CPT

## 2024-05-06 RX ORDER — ACETAMINOPHEN 500 MG
650 TABLET ORAL ONCE
Refills: 0 | Status: COMPLETED | OUTPATIENT
Start: 2024-05-06 | End: 2024-05-06

## 2024-05-06 RX ADMIN — Medication 25 MILLIGRAM(S): at 16:32

## 2024-05-06 RX ADMIN — Medication 81 MILLIGRAM(S): at 11:31

## 2024-05-06 RX ADMIN — ENOXAPARIN SODIUM 40 MILLIGRAM(S): 100 INJECTION SUBCUTANEOUS at 21:15

## 2024-05-06 RX ADMIN — IMMUNE GLOBULIN (HUMAN) 50 GRAM(S): 10 INJECTION INTRAVENOUS; SUBCUTANEOUS at 17:11

## 2024-05-06 RX ADMIN — Medication 650 MILLIGRAM(S): at 17:02

## 2024-05-06 RX ADMIN — FAMOTIDINE 40 MILLIGRAM(S): 10 INJECTION INTRAVENOUS at 05:02

## 2024-05-06 RX ADMIN — POLYETHYLENE GLYCOL 3350 17 GRAM(S): 17 POWDER, FOR SOLUTION ORAL at 11:31

## 2024-05-06 RX ADMIN — FAMOTIDINE 40 MILLIGRAM(S): 10 INJECTION INTRAVENOUS at 17:20

## 2024-05-06 RX ADMIN — Medication 650 MILLIGRAM(S): at 16:32

## 2024-05-06 RX ADMIN — DICLOFENAC SODIUM 2 GRAM(S): 30 GEL TOPICAL at 11:31

## 2024-05-06 RX ADMIN — Medication 1 TABLET(S): at 21:15

## 2024-05-06 RX ADMIN — ATORVASTATIN CALCIUM 40 MILLIGRAM(S): 80 TABLET, FILM COATED ORAL at 21:15

## 2024-05-06 NOTE — CONSULT NOTE ADULT - SUBJECTIVE AND OBJECTIVE BOX
HPI:  83M R-handed PMHx CAD s/p bypass x4 and HLD presenting as a transfer from [facility]. Patient reports to me that he had a "stomach virus" in March 2024, that lasted for 8 days.  Patient reports that he lost a significant amount of weight during this time.  He reports that he felt "wobbly."  Patient reports that he had a fall following his infection.  Patient also reports that 6 months prior he had experienced 3-4 falls after a right knee arthroplasty.  He reports that he had 2 falls on the day that he came home.  Frequently needing to call 911 to have police come lift him up and home.  Patient reports that after his most recent fall, the  that came to lift him up at home, suggested that he go to the hospital to be evaluated.  Patient went to [facility] at the end of March 2024.  He reports that he was admitted to a cardiac unit because his blood pressure was elevated and he had elevated cardiac enzymes.  Patient reports that he spent 4 days in the hospital, before being discharged to rehab.  He reports that he was told that he would be seen by her neurologist at rehab.  However he reports that during the first 3 weeks he was in rehab, he did not see a neurologist.  Patient was eventually referred by his son's friend, who himself is a neurologist, to Yvette Liu MD.  Dr. Liu gabriel "17 tubes of blood."  She also told patient that she wanted to obtain MRIs.  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  Patient tells me that his legs are weaker than his arms.  His right arm is weaker than his left arm.  Patient reports contractures in his fingers in both upper extremities. He reports complete loss of DTRs. Denies sensory symptoms. (02 May 2024 20:45)    Patient was admitted on 5/2, seen today. Feels strength is improved after IvIG. He had recent right TKR, did therapy before surgery, home therapy, then outpatient, was ambulating without AD, able to go up/down stairs, stand from seated position, no falls. After viral illness in March, he noticed weakness, falls at home requiring assist from police, was discharged from [facility] to [facility], required min assist with transfers/gait on PT evaluation 3/22/24 at . Had outpatient Neuro work up while at [facility] including EMG testing showing KATLYN. Today, no numbness, feels he can lift right leg more than on admission.     REVIEW OF SYSTEMS  Denies chest pain, SOB, N/V, F/C, abdominal pain     VITALS  T(C): 37 (05-06-24 @ 08:41), Max: 37.1 (05-05-24 @ 13:49)  HR: 74 (05-06-24 @ 08:41) (74 - 84)  BP: 122/68 (05-06-24 @ 08:41) (102/66 - 125/84)  RR: 18 (05-06-24 @ 08:41) (18 - 18)  SpO2: 95% (05-06-24 @ 08:41) (93% - 95%)  Wt(kg): --    PAST MEDICAL & SURGICAL HISTORY  CAD (coronary artery disease)    S/P CABG x 1    HTN (hypertension)    HLD (hyperlipidemia)    Unilateral primary osteoarthritis, left knee    GERD (gastroesophageal reflux disease)    Unilateral primary osteoarthritis, right knee    S/P CABG x 4    History of colonoscopy    History of inguinal hernia repair        FUNCTIONAL HISTORY  Lives with wife, izzy, admitted from [facility]  Independent AMB and ADLs prior to March    CURRENT FUNCTIONAL STATUS  PT  5/3  bed mobility max assist x 1-2  transfers max assist x 2    OT 5/3  bed mobility max assist x 2  transfers deferred     RECENT LABS/IMAGING  CBC Full  -  ( 06 May 2024 07:23 )  WBC Count : 4.25 K/uL  RBC Count : 4.25 M/uL  Hemoglobin : 13.7 g/dL  Hematocrit : 42.1 %  Platelet Count - Automated : 209 K/uL  Mean Cell Volume : 99.1 fl  Mean Cell Hemoglobin : 32.2 pg  Mean Cell Hemoglobin Concentration : 32.5 gm/dL  Auto Neutrophil # : 2.71 K/uL  Auto Lymphocyte # : 1.04 K/uL  Auto Monocyte # : 0.37 K/uL  Auto Eosinophil # : 0.09 K/uL  Auto Basophil # : 0.02 K/uL  Auto Neutrophil % : 63.7 %  Auto Lymphocyte % : 24.5 %  Auto Monocyte % : 8.7 %  Auto Eosinophil % : 2.1 %  Auto Basophil % : 0.5 %    05-06    141  |  104  |  15  ----------------------------<  80  3.6   |  24  |  0.69    Ca    8.4      06 May 2024 07:23  Phos  3.6     05-06  Mg     2.2     05-06    TPro  7.1  /  Alb  2.9<L>  /  TBili  0.6  /  DBili  x   /  AST  26  /  ALT  20  /  AlkPhos  70  05-06    Urinalysis Basic - ( 06 May 2024 07:23 )    Color: x / Appearance: x / SG: x / pH: x  Gluc: 80 mg/dL / Ketone: x  / Bili: x / Urobili: x   Blood: x / Protein: x / Nitrite: x   Leuk Esterase: x / RBC: x / WBC x   Sq Epi: x / Non Sq Epi: x / Bacteria: x    < from: MR Lumbar Spine w/wo IV Cont (05.03.24 @ 20:19) >    T12-L1: Disc bulge and bilateral hypertrophic facet joint changes.  Moderate narrowing of the right neural foramen and severe narrowing of   the left neural foramen.    L1-2: Disc bulge and bilateral hypertrophic facet joint changes. Mild   narrowing of the right neural foramen. Severe narrowing of left neural   foramen    L2-3: Disc bulge and bilateral hypertrophic facet changes. Mild narrowing   of the spinal canal. Moderate narrowing of both neural foramen    L3-4: Disc bulge and bilateral hypertrophic facet. Hypertrophic change is   seen involving the rightligamentum flavum. Moderate narrowing of the   spinal canal. Mild narrowing of both neural foramen    L4-5: Disc bulge and bilateral hypertrophic facet. Hypertrophic change   both ligamentum flavum. Moderate to severe narrowing of the spinal canal   is seen with compression of the thecal sac. Mild narrowing of both neural   foramen    L5-S1: Disc bulge and bilateral hypertrophic facet. Moderate narrowing of   the left neural foramen    The conus ends at L1 and appears normal    Elevation of the paraspinal soft tissues appear normal    Small renal cyst is seen on the left side.    Both SI joints appear intact.    IMPRESSION: Degenerative changes as described above.      < end of copied text >      ALLERGIES  penicillins (Rash)      MEDICATIONS   acetaminophen     Tablet .. 650 milliGRAM(s) Oral once  artificial  tears Solution 1 Drop(s) Both EYES daily PRN  artificial tears (preservative free) Ophthalmic Solution 1 Drop(s) Both EYES daily PRN  aspirin enteric coated 81 milliGRAM(s) Oral daily  atorvastatin 40 milliGRAM(s) Oral at bedtime  diclofenac sodium 1% Gel 2 Gram(s) Topical daily  diphenhydrAMINE Injectable 25 milliGRAM(s) IV Push daily  diphenhydrAMINE Injectable 25 milliGRAM(s) IV Push daily  enoxaparin Injectable 40 milliGRAM(s) SubCutaneous <User Schedule>  famotidine    Tablet 40 milliGRAM(s) Oral two times a day  immune   globulin 10% (GAMMAGARD) IVPB 30 Gram(s) IV Intermittent daily  multivitamin 1 Tablet(s) Oral at bedtime  polyethylene glycol 3350 17 Gram(s) Oral daily      ----------------------------------------------------------------------------------------  PHYSICAL EXAM  Constitutional - NAD, Comfortable, in bed   Chest - Breathing comfortably on room air   Cardiovascular - S1S2   Abdomen - Soft   Extremities -  b/l UE splints   Neurologic Exam -    follows commands                 Cognitive - Awake, Alert, AAO to self, place, date, year, situation     Communication - Fluent, No dysarthria       Motor -                     LEFT    UE - ShAB 3/5, EF 3/5, EE 3/5, WE 2/5,  2/5                    RIGHT UE - ShAB 3/5, EF 3/5, EE 3/5, WE 2/5,  2/5                    LEFT    LE - HF 3/5, KE 3/5, DF 0/5, PF 0/5                    RIGHT LE - HF 3/5, KE 4/5, DF 0/5, PF 0/5        Sensory - Intact to LT     Psychiatric - Mood stable, Affect WNL  ----------------------------------------------------------------------------------------  ASSESSMENT/PLAN  84yMale h/o CAD, s/p CABG, HTN with functional deficits after KATLYN post viral illness   admitted from [facility], neuro workup as outpatient, MRI with degenerative changes  plan for IVIG  x 5 doses, some improvement noted by patient   bowel regimen, condom cath  Pain - Tylenol  DVT PPX - SCDs lovenox   Rehab - Will continue to follow for ongoing rehab needs and recommendations.    Recommend ACUTE inpatient rehabilitation for the functional deficits consisting of 3 hours of therapy/day  x 2-4 weeks depending on progress at rehabilitation facility, 24 hour RN/daily PMR physician for comorbid medical management. Patient will be able to tolerate 3 hours a day.   d/w

## 2024-05-06 NOTE — PROGRESS NOTE ADULT - SUBJECTIVE AND OBJECTIVE BOX
Neurology Progress Note    Hospital course:  83M R-handed PMHx CAD s/p bypass x4 and HLD presenting as a transfer from [facility]. Presented with a 'stomach virus' in 3/2024, reported significant weight loss, sensation of 'wobbling,' with repeated falls. Had seen Dr. Yvette Liu in the outpatient setting who reported he had KATLYN. Patient obtained an MRII of the whole axis w/w/o contrast, starting IVIG (D1 5/3). Pending LP with neurorads.    SUBJECTIVE/OBJECTIVE/INTERVAL EVENTS: Patient seen and examined at bedside w/ neuro attending and team.     INTERVAL HISTORY: NAEO    REVIEW OF SYSTEMS: Few questions of a 10-system ROS was performed and is negative except for those items noted above and/or in the HPI.    VITALS & EXAMINATION:  Vital Signs Last 24 Hrs  T(C): 36.8 (06 May 2024 04:15), Max: 37.1 (05 May 2024 13:49)  T(F): 98.3 (06 May 2024 04:15), Max: 98.7 (05 May 2024 13:49)  HR: 79 (06 May 2024 04:15) (76 - 84)  BP: 106/67 (06 May 2024 04:15) (101/68 - 125/84)  BP(mean): --  RR: 18 (06 May 2024 04:15) (18 - 18)  SpO2: 95% (06 May 2024 04:15) (93% - 95%)    Parameters below as of 06 May 2024 04:15  Patient On (Oxygen Delivery Method): room air    General: incomplete  Neurological (>12):  MS: Awake, alert, oriented to person, place, situation, time. Normal affect. Follows all commands.  Language: Speech is clear, fluent   CNs: PERRL. VFF. EOMI overall no nystagmus, no diplopia. V1-3 intact to LT,. No facial asymmetry b/l, full eye closure strength b/l. Hearing grossly normal (rubbing fingers) b/l. Symmetric palate elevation in midline. Gag reflex deferred. Shoulder shrug intact b/l. Tongue midline, normal movements, no atrophy.  Motor:  Patient able to lift both arms AG, but has difficulty sustaining them up, worse on R arm              Deltoid	Biceps	Triceps	   R	3	3	3	2			  L	4-	4-	4-	3			    	H-Flex  K-Flex	K-Ext	D-Flex	P-Flex  R	4-	2	2	2	2	 	   L	3	0	0	0	0		     Sensation: Intact to LT b/l throughout.     Reflexes: 0 throughout, downgoing toes b/l  Coordination: limited by weakness   Gait:  deferred for patient safety      LABORATORY:  CBC                       12.7   4.62  )-----------( 238      ( 05 May 2024 05:45 )             39.2     Chem 05-05    138  |  103  |  15  ----------------------------<  89  3.5   |  25  |  0.70    Ca    8.5      05 May 2024 05:45  Phos  4.1     05-05  Mg     2.2     05-05    TPro  6.9  /  Alb  3.1<L>  /  TBili  0.6  /  DBili  x   /  AST  26  /  ALT  19  /  AlkPhos  75  05-05    LFTs LIVER FUNCTIONS - ( 05 May 2024 05:45 )  Alb: 3.1 g/dL / Pro: 6.9 g/dL / ALK PHOS: 75 U/L / ALT: 19 U/L / AST: 26 U/L / GGT: x           Coagulopathy   Lipid Panel 05-02 Chol 119 LDL -- HDL 44 Trig 83  A1c   Cardiac enzymes     U/A Urinalysis Basic - ( 05 May 2024 05:45 )    Color: x / Appearance: x / SG: x / pH: x  Gluc: 89 mg/dL / Ketone: x  / Bili: x / Urobili: x   Blood: x / Protein: x / Nitrite: x   Leuk Esterase: x / RBC: x / WBC x   Sq Epi: x / Non Sq Epi: x / Bacteria: x      CSF  Immunological  Other    STUDIES & IMAGING: (EEG, CT, MR, U/S, TTE/BILLY): Neurology Progress Note    Hospital course:  83M R-handed PMHx CAD s/p bypass x4 and HLD presenting as a transfer from [facility]. Presented with a 'stomach virus' in 3/2024, reported significant weight loss, sensation of 'wobbling,' with repeated falls. Had seen Dr. Yvette Liu in the outpatient setting who reported he had KATLYN. Patient obtained an MRII of the whole axis w/w/o contrast, starting IVIG (D1 5/3). Pending LP with neurorads.    SUBJECTIVE/OBJECTIVE/INTERVAL EVENTS: Patient seen and examined at bedside w/ neuro attending and team. Reports tendency to close hands started after this constellation of symptoms.     INTERVAL HISTORY: NAEO    REVIEW OF SYSTEMS: Few questions of a 10-system ROS was performed and is negative except for those items noted above and/or in the HPI.    VITALS & EXAMINATION:  Vital Signs Last 24 Hrs  T(C): 36.8 (06 May 2024 04:15), Max: 37.1 (05 May 2024 13:49)  T(F): 98.3 (06 May 2024 04:15), Max: 98.7 (05 May 2024 13:49)  HR: 79 (06 May 2024 04:15) (76 - 84)  BP: 106/67 (06 May 2024 04:15) (101/68 - 125/84)  BP(mean): --  RR: 18 (06 May 2024 04:15) (18 - 18)  SpO2: 95% (06 May 2024 04:15) (93% - 95%)    Parameters below as of 06 May 2024 04:15  Patient On (Oxygen Delivery Method): room air    General: incomplete  Neurological (>12):  MS: Awake, alert, oriented to person, place, situation, time. Normal affect. Follows all commands.  Language: Speech is clear, fluent   CNs: PERRL. VFF. EOMI overall no nystagmus, no diplopia. V1-3 intact to LT,. No facial asymmetry b/l, full eye closure strength b/l. Hearing grossly normal (rubbing fingers) b/l. Symmetric palate elevation in midline. Gag reflex deferred. Shoulder shrug intact b/l. Tongue midline, normal movements, no atrophy.  Motor:  Patient able to lift both arms AG, but has difficulty sustaining them up, worse on R arm              Deltoid	Biceps	Triceps	   R	3	3	3	2			  L	4-	4-	4-	3			    	H-Flex  K-Flex	K-Ext	D-Flex	P-Flex  R	4	2	2	2	2	 	   L	3	0	0	0	0		     Sensation: Intact to LT b/l throughout.   Reflexes: 0 throughout, downgoing toes b/l  Coordination: limited by weakness   Gait:  deferred for patient safety      LABORATORY:  CBC                       12.7   4.62  )-----------( 238      ( 05 May 2024 05:45 )             39.2     Chem 05-05    138  |  103  |  15  ----------------------------<  89  3.5   |  25  |  0.70    Ca    8.5      05 May 2024 05:45  Phos  4.1     05-05  Mg     2.2     05-05    TPro  6.9  /  Alb  3.1<L>  /  TBili  0.6  /  DBili  x   /  AST  26  /  ALT  19  /  AlkPhos  75  05-05    LFTs LIVER FUNCTIONS - ( 05 May 2024 05:45 )  Alb: 3.1 g/dL / Pro: 6.9 g/dL / ALK PHOS: 75 U/L / ALT: 19 U/L / AST: 26 U/L / GGT: x           Coagulopathy   Lipid Panel 05-02 Chol 119 LDL -- HDL 44 Trig 83  A1c   Cardiac enzymes     U/A Urinalysis Basic - ( 05 May 2024 05:45 )    Color: x / Appearance: x / SG: x / pH: x  Gluc: 89 mg/dL / Ketone: x  / Bili: x / Urobili: x   Blood: x / Protein: x / Nitrite: x   Leuk Esterase: x / RBC: x / WBC x   Sq Epi: x / Non Sq Epi: x / Bacteria: x      CSF  Immunological  Other    STUDIES & IMAGING: (EEG, CT, MR, U/S, TTE/BILLY): Neurology Progress Note    Hospital course:  83M R-handed PMHx CAD s/p bypass x4 and HLD presenting as a transfer from [facility]. Presented with a 'stomach virus' in 3/2024, reported significant weight loss, sensation of 'wobbling,' with repeated falls. Had seen Dr. Yvette Liu in the outpatient setting who reported he had KATLYN. Patient obtained an MRII of the whole axis w/w/o contrast, starting IVIG (D1 5/3). Pending LP with neurorads.    SUBJECTIVE/OBJECTIVE/INTERVAL EVENTS: Patient seen and examined at bedside w/ neuro attending and team. Reports tendency to close hands started after this constellation of symptoms. Reports dry eye.    INTERVAL HISTORY: NAEO    REVIEW OF SYSTEMS: Few questions of a 10-system ROS was performed and is negative except for those items noted above and/or in the HPI.    VITALS & EXAMINATION:  Vital Signs Last 24 Hrs  T(C): 36.8 (06 May 2024 04:15), Max: 37.1 (05 May 2024 13:49)  T(F): 98.3 (06 May 2024 04:15), Max: 98.7 (05 May 2024 13:49)  HR: 79 (06 May 2024 04:15) (76 - 84)  BP: 106/67 (06 May 2024 04:15) (101/68 - 125/84)  BP(mean): --  RR: 18 (06 May 2024 04:15) (18 - 18)  SpO2: 95% (06 May 2024 04:15) (93% - 95%)    Parameters below as of 06 May 2024 04:15  Patient On (Oxygen Delivery Method): room air    General: incomplete  Neurological (>12):  MS: Awake, alert, oriented to person, place, situation, time. Normal affect. Follows all commands.  Language: Speech is clear, fluent   CNs: PERRL. VFF. EOMI overall no nystagmus, no diplopia. V1-3 intact to LT,. No facial asymmetry b/l, full eye closure strength b/l. Hearing grossly normal (rubbing fingers) b/l. Symmetric palate elevation in midline. Gag reflex deferred. Shoulder shrug intact b/l. Tongue midline, normal movements, no atrophy.  Motor:  Patient able to lift both arms AG, but has difficulty sustaining them up, worse on R arm              Deltoid	Biceps	Triceps	   R	3	3	3	2			  L	4-	4-	4-	3			    	H-Flex  K-Flex	K-Ext	D-Flex	P-Flex  R	4	2	2	2	2	 	   L	3	0	0	0	0		     Sensation: Intact to LT b/l throughout.   Reflexes: 0 throughout, downgoing toes b/l  Coordination: limited by weakness   Gait:  deferred for patient safety      LABORATORY:  CBC                       12.7   4.62  )-----------( 238      ( 05 May 2024 05:45 )             39.2     Chem 05-05    138  |  103  |  15  ----------------------------<  89  3.5   |  25  |  0.70    Ca    8.5      05 May 2024 05:45  Phos  4.1     05-05  Mg     2.2     05-05    TPro  6.9  /  Alb  3.1<L>  /  TBili  0.6  /  DBili  x   /  AST  26  /  ALT  19  /  AlkPhos  75  05-05    LFTs LIVER FUNCTIONS - ( 05 May 2024 05:45 )  Alb: 3.1 g/dL / Pro: 6.9 g/dL / ALK PHOS: 75 U/L / ALT: 19 U/L / AST: 26 U/L / GGT: x           Coagulopathy   Lipid Panel 05-02 Chol 119 LDL -- HDL 44 Trig 83  A1c   Cardiac enzymes     U/A Urinalysis Basic - ( 05 May 2024 05:45 )    Color: x / Appearance: x / SG: x / pH: x  Gluc: 89 mg/dL / Ketone: x  / Bili: x / Urobili: x   Blood: x / Protein: x / Nitrite: x   Leuk Esterase: x / RBC: x / WBC x   Sq Epi: x / Non Sq Epi: x / Bacteria: x      CSF  Immunological  Other    STUDIES & IMAGING: (EEG, CT, MR, U/S, TTE/BILLY): Neurology Progress Note    Hospital course:  83M R-handed PMHx CAD s/p bypass x4 and HLD presenting as a transfer from [facility]. Presented with a 'stomach virus' in 3/2024, reported significant weight loss, sensation of 'wobbling,' with repeated falls. Had seen Dr. Yvette Liu in the outpatient setting who reported he had KATLYN. Patient obtained an MRI of the whole axis w/w/o contrast, started IVIG (D1 5/3). Seen by physical therapy and recommended for acute rehab. Seen by OT for splint evaluation.     SUBJECTIVE/OBJECTIVE/INTERVAL EVENTS: Patient seen and examined at bedside w/ neuro attending and team. Reports tendency to close hands started after this constellation of symptoms. Reports dry eye. States symptoms improving.    INTERVAL HISTORY: NAEO    REVIEW OF SYSTEMS: Few questions of a 10-system ROS was performed and is negative except for those items noted above and/or in the HPI.    VITALS & EXAMINATION:  Vital Signs Last 24 Hrs  T(C): 36.8 (06 May 2024 04:15), Max: 37.1 (05 May 2024 13:49)  T(F): 98.3 (06 May 2024 04:15), Max: 98.7 (05 May 2024 13:49)  HR: 79 (06 May 2024 04:15) (76 - 84)  BP: 106/67 (06 May 2024 04:15) (101/68 - 125/84)  BP(mean): --  RR: 18 (06 May 2024 04:15) (18 - 18)  SpO2: 95% (06 May 2024 04:15) (93% - 95%)    Parameters below as of 06 May 2024 04:15  Patient On (Oxygen Delivery Method): room air    General:   Neurological (>12):  MS: Awake, alert, oriented to person, place, situation, time. Normal affect. Follows all commands.  Language: Speech is clear, fluent   CNs: PERRL. VFF. EOMI overall no nystagmus, no diplopia. V1-3 intact to LT,. No facial asymmetry b/l, full eye closure strength b/l. Hearing grossly normal (rubbing fingers) b/l. Symmetric palate elevation in midline. Gag reflex deferred. Shoulder shrug intact b/l. Tongue midline, normal movements, no atrophy.  Motor:  Patient able to lift both arms AG, but has difficulty sustaining them up, worse on R arm              Deltoid	Biceps	Triceps	   R	4-	3	3	2			  L	4-	3	3	2			    	H-Flex  K-Flex	       D-Flex	P-Flex  R	4	3		3	3	 	   L	4-	3		0	0		     Sensation: Intact to LT b/l throughout.   Reflexes: 0 throughout, downgoing toes b/l  Coordination: limited by weakness   Gait:  deferred for patient safety      LABORATORY:  CBC                       12.7   4.62  )-----------( 238      ( 05 May 2024 05:45 )             39.2     Chem 05-05    138  |  103  |  15  ----------------------------<  89  3.5   |  25  |  0.70    Ca    8.5      05 May 2024 05:45  Phos  4.1     05-05  Mg     2.2     05-05    TPro  6.9  /  Alb  3.1<L>  /  TBili  0.6  /  DBili  x   /  AST  26  /  ALT  19  /  AlkPhos  75  05-05    LFTs LIVER FUNCTIONS - ( 05 May 2024 05:45 )  Alb: 3.1 g/dL / Pro: 6.9 g/dL / ALK PHOS: 75 U/L / ALT: 19 U/L / AST: 26 U/L / GGT: x           Coagulopathy   Lipid Panel 05-02 Chol 119 LDL -- HDL 44 Trig 83  A1c   Cardiac enzymes     U/A Urinalysis Basic - ( 05 May 2024 05:45 )    Color: x / Appearance: x / SG: x / pH: x  Gluc: 89 mg/dL / Ketone: x  / Bili: x / Urobili: x   Blood: x / Protein: x / Nitrite: x   Leuk Esterase: x / RBC: x / WBC x   Sq Epi: x / Non Sq Epi: x / Bacteria: x      CSF  Immunological  Other    STUDIES & IMAGING: (EEG, CT, MR, U/S, TTE/BILLY): Neurology Progress Note    Hospital course:  83M R-handed PMHx CAD s/p bypass x4 and HLD presenting as a transfer [facility]. Presented with a 'stomach virus' in 3/2024, reported significant weight loss, sensation of 'wobbling,' with repeated falls. Had seen Dr. Yvette Liu in the outpatient setting who reported he had KATLYN. Patient obtained an MRI of the whole axis w/w/o contrast, started IVIG (D1 5/3). Seen by physical therapy and recommended for acute rehab. Seen by OT for splint evaluation.     SUBJECTIVE/OBJECTIVE/INTERVAL EVENTS: Patient seen and examined at bedside w/ neuro attending and team. Reports tendency to close hands started after this constellation of symptoms. Reports dry eye. States symptoms improving.    INTERVAL HISTORY: NAEO    REVIEW OF SYSTEMS: Few questions of a 10-system ROS was performed and is negative except for those items noted above and/or in the HPI.    VITALS & EXAMINATION:  Vital Signs Last 24 Hrs  T(C): 36.8 (06 May 2024 04:15), Max: 37.1 (05 May 2024 13:49)  T(F): 98.3 (06 May 2024 04:15), Max: 98.7 (05 May 2024 13:49)  HR: 79 (06 May 2024 04:15) (76 - 84)  BP: 106/67 (06 May 2024 04:15) (101/68 - 125/84)  BP(mean): --  RR: 18 (06 May 2024 04:15) (18 - 18)  SpO2: 95% (06 May 2024 04:15) (93% - 95%)    Parameters below as of 06 May 2024 04:15  Patient On (Oxygen Delivery Method): room air    General:   Neurological (>12):  MS: Awake, alert, oriented to person, place, situation, time. Normal affect. Follows all commands.  Language: Speech is clear, fluent   CNs: PERRL. VFF. EOMI overall no nystagmus, no diplopia. V1-3 intact to LT,. No facial asymmetry b/l, full eye closure strength b/l. Hearing grossly normal (rubbing fingers) b/l. Symmetric palate elevation in midline. Gag reflex deferred. Shoulder shrug intact b/l. Tongue midline, normal movements, no atrophy.  Motor:               Deltoid	Biceps	Triceps	   R	4-	3	3	2			  L	4-	3	3	2			    	H-Flex  K-Flex  D-Flex	P-Flex  R	4	3	3	3	 	   L	4-	3	0	0		     Sensation: Intact to LT b/l throughout.   Reflexes: 0 throughout, downgoing toes b/l  Coordination: limited by weakness   Gait:  deferred for patient safety      LABORATORY:  CBC                       12.7   4.62  )-----------( 238      ( 05 May 2024 05:45 )             39.2     Chem 05-05    138  |  103  |  15  ----------------------------<  89  3.5   |  25  |  0.70    Ca    8.5      05 May 2024 05:45  Phos  4.1     05-05  Mg     2.2     05-05    TPro  6.9  /  Alb  3.1<L>  /  TBili  0.6  /  DBili  x   /  AST  26  /  ALT  19  /  AlkPhos  75  05-05    LFTs LIVER FUNCTIONS - ( 05 May 2024 05:45 )  Alb: 3.1 g/dL / Pro: 6.9 g/dL / ALK PHOS: 75 U/L / ALT: 19 U/L / AST: 26 U/L / GGT: x           Coagulopathy   Lipid Panel 05-02 Chol 119 LDL -- HDL 44 Trig 83  A1c   Cardiac enzymes     U/A Urinalysis Basic - ( 05 May 2024 05:45 )    Color: x / Appearance: x / SG: x / pH: x  Gluc: 89 mg/dL / Ketone: x  / Bili: x / Urobili: x   Blood: x / Protein: x / Nitrite: x   Leuk Esterase: x / RBC: x / WBC x   Sq Epi: x / Non Sq Epi: x / Bacteria: x      CSF  Immunological  Other    STUDIES & IMAGING: (EEG, CT, MR, U/S, TTE/BILLY):

## 2024-05-06 NOTE — CONSULT NOTE ADULT - SUBJECTIVE AND OBJECTIVE BOX
Patient is a 84y old  Male who presents with a chief complaint of proximal weakness after viral illness, suspect GBS (03 May 2024 16:13)      HPI:  83M R-handed PMHx CAD s/p bypass x4 and HLD presenting [facility] a [facility]. Patient reports to me that he had a "stomach virus" in March 2024, that lasted for 8 days.  Patient reports that he lost a significant amount of weight during this time.  He reports that he felt "wobbly."  Patient reports that he had a fall following his infection.  Patient also reports that 6 months prior he had experienced 3-4 falls after a right knee arthroplasty.  He reports that he had 2 falls on the day that he came home.  Frequently needing to call 911 to have police come lift him up and home.  Patient reports that after his most recent fall, the  that came to lift him up at home, suggested that he go to the hospital to be evaluated.  Patient went to [facility] at the end of March 2024.  He reports that he was admitted to a cardiac unit because his blood pressure was elevated and he had elevated cardiac enzymes.  Patient reports that he spent 4 days in the hospital, before being discharged to rehab.  He reports that he was told that he would be seen by her neurologist at rehab.  However he reports that during the first 3 weeks he was in rehab, he did not see a neurologist.  Patient was eventually referred by his son's friend, who himself is a neurologist, to Yvette Liu MD.  Dr. Liu gabriel "17 tubes of blood."  She also told patient that she wanted to obtain MRIs.  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  Patient tells me that his legs are weaker than his arms.  His right arm is weaker than his left arm.  Patient reports contractures in his fingers in both upper extremities. He reports complete loss of DTRs. Denies sensory symptoms. (02 May 2024 20:45)    S/p IR-guided LP on 5/3/24. CSF MEP neg. CSF gluc 64, prot 77, nuc 1, neut 3%, lymp 35%, and mono 62%.   HSV-1 and 2 serum IgG positive. EBV VCA IgG, EBNA, and EA positive. EBV VCA IgM neg.      prior hospital charts reviewed [X]  primary team notes reviewed [X]  other consultant notes reviewed [X]    PAST MEDICAL & SURGICAL HISTORY:  CAD (coronary artery disease)      HTN (hypertension)      HLD (hyperlipidemia)      GERD (gastroesophageal reflux disease)      Unilateral primary osteoarthritis, right knee      S/P CABG x 4      History of colonoscopy      History of inguinal hernia repair          Allergies  penicillins (Rash)        ANTIMICROBIALS:      OTHER MEDS: MEDICATIONS  (STANDING):  acetaminophen     Tablet .. 650 once  aspirin enteric coated 81 daily  atorvastatin 40 at bedtime  diphenhydrAMINE Injectable 25 daily  diphenhydrAMINE Injectable 25 daily  enoxaparin Injectable 40 <User Schedule>  famotidine    Tablet 40 two times a day  immune   globulin 10% (GAMMAGARD) IVPB 30 daily  polyethylene glycol 3350 17 daily      SOCIAL HISTORY:   hx smoking  non-smoker    FAMILY HISTORY:  Family history of heart failure (Father)    Family history of Alzheimer's disease (Mother)        REVIEW OF SYSTEMS  [  ] ROS unobtainable because:    [  ] All other systems negative except as noted below:	    Constitutional:  [ ] fever [ ] chills  [ ] weight loss  [ ] weakness  Skin:  [ ] rash [ ] phlebitis	  Eyes: [ ] icterus [ ] pain  [ ] discharge	  ENMT: [ ] sore throat  [ ] thrush [ ] ulcers [ ] exudates  Respiratory: [ ] dyspnea [ ] hemoptysis [ ] cough [ ] sputum	  Cardiovascular:  [ ] chest pain [ ] palpitations [ ] edema	  Gastrointestinal:  [ ] nausea [ ] vomiting [ ] diarrhea [ ] constipation [ ] pain	  Genitourinary:  [ ] dysuria [ ] frequency [ ] hematuria [ ] discharge [ ] flank pain  [ ] incontinence  Musculoskeletal:  [ ] myalgias [ ] arthralgias [ ] arthritis  [ ] back pain  Neurological:  [ ] headache [ ] seizures  [ ] confusion/altered mental status  Psychiatric:  [ ] anxiety [ ] depression	  Hematology/Lymphatics:  [ ] lymphadenopathy  Endocrine:  [ ] adrenal [ ] thyroid  Allergic/Immunologic:	 [ ] transplant [ ] seasonal    Vital Signs Last 24 Hrs  T(F): 98.3 (05-06-24 @ 04:15), Max: 98.7 (05-04-24 @ 13:45)    Vital Signs Last 24 Hrs  HR: 79 (05-06-24 @ 04:15) (76 - 84)  BP: 106/67 (05-06-24 @ 04:15) (102/66 - 125/84)  RR: 18 (05-06-24 @ 04:15)  SpO2: 95% (05-06-24 @ 04:15) (93% - 95%)  Wt(kg): --    PHYSICAL EXAM:  Constitutional: non-toxic, no distress  HEAD/EYES: anicteric, no conjunctival injection  ENT:  supple, no thrush  Cardiovascular:   normal S1, S2, no murmur, no edema  Respiratory:  clear BS bilaterally, no wheezes, no rales  GI:  soft, non-tender, normal bowel sounds  :  no tong, no CVA tenderness  Musculoskeletal:  no synovitis, normal ROM  Neurologic: awake and alert, normal strength, no focal findings  Skin:  no rash, no erythema, no phlebitis  Heme/Onc: no lymphadenopathy   Psychiatric:  awake, alert, appropriate mood                                13.7   4.25  )-----------( 209      ( 06 May 2024 07:23 )             42.1       05-06    141  |  104  |  15  ----------------------------<  80  3.6   |  24  |  0.69    Ca    8.4      06 May 2024 07:23  Phos  3.6     05-06  Mg     2.2     05-06    TPro  7.1  /  Alb  2.9<L>  /  TBili  0.6  /  DBili  x   /  AST  26  /  ALT  20  /  AlkPhos  70  05-06    Cryptococcal Antigen - CSF (05.03.24 @ 11:17)   Cryptococcal Antigen - CSF: Negative    CSF PCR Panel (05.03.24 @ 11:07)   CSF PCR Result: NotDetec: The meningitis/encephalitis (ME) panel (CSFPCR) is a PCR based assay that   screens for: Escherichia coli; Haemophilus influenzae; Listeria   monocytogenes; Neisseria meningitidis; Streptococcus agalactiae;   Streptococcus pneumoniae; CMV; Enterovirus; HSV-1; HSV-2; Human   herpesvirus 6; Parechovirus; VZV and Cryptococcus. Result should be   interpreted in context of clinical presentation, imaging and other lab   tests. Positive predictive value may be lower in patients with normal CSF   chemistry and cell count.  Escherichia coli K1: NotDetec  Haemophilus influenzae: NotDetec  Listeria monocytogenes: NotDetec  Neisseria meningitidis (encapsulated): NotDetec  Streptococcus agalactiae: NotDete  Streptococcus pneumoniae: NotDete  Cytomegalovirus: NotDete  Enterovirus: NotDete  Herpes simplex virus 1: NotDete  Herpes simplex virus 2: NotDete  Human Herpesvirus 6: NotDete  Human parechovirus: NotDete  Varicella zoster virus: NotDete  Cryptococcus neoformans/gattii: NotDetec    Culture - CSF with Gram Stain . (05.03.24 @ 11:00)   Gram Stain:   No polymorphonuclear cells seen   No organisms seen   by cytocentrifuge  Specimen Source: .CSF CSF  Culture Results:   No growth to date    Protein, CSF (05.03.24 @ 10:55)   Protein, CSF: 77 mg/dL    Glucose, CSF (05.03.24 @ 10:55)   Glucose, CSF: 64 mg/dL      Cerebrospinal Fluid Cell Count-1 (05.03.24 @ 10:55)   Tube Type: Tube 3  CSF Appearance: Clear  CSF Lymphocytes: 35 %  CSF Monocytes/Macrophages: 62 %  CSF Neutrophils: 3: Differential is based on 65 cells counted after cytocentrifugation. %  Appearance Spun: Colorless  RBC Count - Spinal Fluid: 0 /uL  Total Nucleated Cell Count, CSF: 1 /uL  CSF Color: No Color    Urinalysis Basic - ( 06 May 2024 07:23 )    Color: x / Appearance: x / SG: x / pH: x  Gluc: 80 mg/dL / Ketone: x  / Bili: x / Urobili: x   Blood: x / Protein: x / Nitrite: x   Leuk Esterase: x / RBC: x / WBC x   Sq Epi: x / Non Sq Epi: x / Bacteria: x        MICROBIOLOGY:        Culture - Fungal, CSF (collected 05-03-24 @ 11:01)  Source: .CSF CSF  Preliminary Report (05-04-24 @ 23:03):    Culture is being performed. Fungal cultures are held for 4 weeks.    Culture - Acid Fast - CSF (collected 05-03-24 @ 11:01)  Source: .CSF CSF  Preliminary Report (05-04-24 @ 23:08):    Culture is being performed.    Culture - CSF with Gram Stain (collected 05-03-24 @ 11:00)  Source: .CSF CSF  Gram Stain (05-03-24 @ 18:05):    No polymorphonuclear cells seen    No organisms seen    by cytocentrifuge  Preliminary Report (05-04-24 @ 10:09):    No growth to date                    RADIOLOGY:  imaging below personally reviewed  < from: MR Thoracic Spine w/wo IV Cont (05.03.24 @ 20:20) >    ACC: [id number] EXAM:  MR SPINE THORACIC WAW IC   ORDERED BY: CHRIS PATE     PROCEDURE DATE:  05/03/2024          INTERPRETATION:  Clinical indication: Quadriparesis.    MRI of the cervical thoracic and lumbar spine was performed sagittal T1   and T2 and STIR sequences. Axial T1 and T2-weighted sequences were   performed as well. Axial gradient echo sequence was performed through the   cervical spine. Coronal T1 was performed through the lumbar spine. The   patient was injected with approximately 8 cc gadavist IV and sagittal   T1-weighted sequence was performed with fat suppression. Axial   T1-weighted sequence was performed    Cervical spine:    This exam is somewhat limited by motion.    Loss of the normal cervical lordosis is seen    Mild scoliosis is seen    The vertebral body height alignment and marrow signal appears normal.    Disc desiccation is seen throughout the cervical and upper thoracic spine   region.    C2-3: Bilateral hypertrophic facet joint changes. Moderate narrowing of   both neural foramen    C3-4: Bilateral hypertrophic facet joint changes. Moderate narrowing of   both neural foramen    C4-5: Disc bulge and bilateral hypertrophic joint changes moderate to   severe narrowing of the right neural foramen    C5-6: Disc bulge and bilateral hypertrophic facet joint changes. Mild   narrowing of the spinal canal. Moderate narrowing of both neural foramen    C6-7: Disc bulge and bilateral hypertrophic facet joint changes. Moderate   narrowing of the right neural foramen. Moderate to severe narrowing of   the left neural foramen    C7-T1: Mild disc bulge is seen without significant compromise of the   spinal canal or either neural foramen    The spinal cord demonstrates normal signal and caliber.    Evaluation of the soft tissues appear normal.    No abnormal areas of enhancement is seen.    Thoracic spine    This exam is somewhat limited motion.    The vertebral body height alignment appears normal    Hemangioma versus focal area of fat is seen involving the T8 vertebral   body    The vertebral body height appears maintained    Disc desiccation is seen involving the thoracic spine region    T5-6: Small nerve root sleeve cyst is seen on the right side.    T8-9: Small nerve root sleeve cyst is seen on the right side.    T10-11: Disc bulge and bilateral hypertrophic facet joint changes.   Moderate narrowing of both neural foramen    The spinal cord demonstrate normal signal and caliber.    Evaluation of the paraspinal soft tissues appear normal.    Noabnormal areas of enhancement    Lumbar spine:    Reversal of the normal lumbar lordosis is seen.    L2 is slightly posteriorly displaced relative to L3. Grade 1   anterolisthesis is seen involving L4 on L5. These findings are likely the   result of chronic degenerative change    The vertebral body height appears maintained    Disc desiccation is seen involving the L1-2 through L4-5 level which is   secondary chronic degenerative    T12-L1: Disc bulge and bilateral hypertrophic facet joint changes.   Moderate narrowing of the right neural foramen and severe narrowing of   the left neural foramen.    L1-2: Disc bulge and bilateral hypertrophic facet joint changes. Mild   narrowing of the right neural foramen. Severe narrowing of left neural   foramen    L2-3: Disc bulge and bilateral hypertrophic facet changes. Mild narrowing   of the spinal canal. Moderate narrowing of both neural foramen    L3-4: Disc bulge and bilateral hypertrophic facet. Hypertrophic change is   seen involving the right ligamentum flavum. Moderate narrowing of the   spinal canal. Mild narrowing of both neural foramen    L4-5: Disc bulge and bilateral hypertrophic facet. Hypertrophic change   both ligamentum flavum. Moderate to severe narrowing of the spinal canal   is seen with compression of the thecal sac. Mild narrowing of both neural   foramen    L5-S1: Disc bulge and bilateral hypertrophic facet. Moderate narrowing of   the left neural foramen    The conus ends at L1 and appears normal    Elevation of theparaspinal soft tissues appear normal    Small renal cyst is seen on the left side.    Both SI joints appear intact.    IMPRESSION: Degenerative changes as described above.                  --- End of Report ---            TALON BRISCOE MD; Attending Radiologist  This document has been electronically signed. May  4 2024  9:52AM    < end of copied text >   Patient is a 84y old  Male who presents with a chief complaint of proximal weakness after viral illness, suspect GBS (03 May 2024 16:13)      HPI:  83M R-handed PMHx CAD s/p bypass x4 and HLD [facility]. Patient reports to me that he had a "stomach virus" in March 2024, that lasted for 8 days.  Patient reports that he lost a significant amount of weight during this time.  He reports that he felt "wobbly."  Patient reports that he had a fall following his infection.  Patient also reports that 6 months prior he had experienced 3-4 falls after a right knee arthroplasty.  He reports that he had 2 falls on the day that he came home.  Frequently needing to call 911 to have police come lift him up and home.  Patient reports that after his most recent fall, the  that came to lift him up at home, suggested that he go to the hospital to be evaluated.  Patient went to [facility] at the end of March 2024.  He reports that he was admitted to a cardiac unit because his blood pressure was elevated and he had elevated cardiac enzymes.  Patient reports that he spent 4 days in the hospital, before being discharged to rehab.  He reports that he was told that he would be seen by her neurologist at rehab.  However he reports that during the first 3 weeks he was in rehab, he did not see a neurologist.  Patient was eventually referred by his son's friend, who himself is a neurologist, to Yvette Liu MD.  Dr. Liu gabriel "17 tubes of blood."  She also told patient that she wanted to obtain MRIs.  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  Patient tells me that his legs are weaker than his arms.  His right arm is weaker than his left arm.  Patient reports contractures in his fingers in both upper extremities. He reports complete loss of DTRs. Denies sensory symptoms. (02 May 2024 20:45)    S/p IR-guided LP on 5/3/24. CSF MEP neg. CSF gluc 64, prot 77, nuc 1, neut 3%, lymp 35%, and mono 62%.   HSV-1 and 2 serum IgG positive. EBV VCA IgG, EBNA, and EA positive. EBV VCA IgM neg.    Patient was seen and examined at bedside. Denies fever, chills, SOB, diarrhea, or cough. Reports his neuro deficit has been improving with IVIG infusion.    prior hospital charts reviewed [X]  primary team notes reviewed [X]  other consultant notes reviewed [X]    PAST MEDICAL & SURGICAL HISTORY:  CAD (coronary artery disease)      HTN (hypertension)      HLD (hyperlipidemia)      GERD (gastroesophageal reflux disease)      Unilateral primary osteoarthritis, right knee      S/P CABG x 4      History of colonoscopy      History of inguinal hernia repair          Allergies  penicillins (Rash)        ANTIMICROBIALS:      OTHER MEDS: MEDICATIONS  (STANDING):  acetaminophen     Tablet .. 650 once  aspirin enteric coated 81 daily  atorvastatin 40 at bedtime  diphenhydrAMINE Injectable 25 daily  diphenhydrAMINE Injectable 25 daily  enoxaparin Injectable 40 <User Schedule>  famotidine    Tablet 40 two times a day  immune   globulin 10% (GAMMAGARD) IVPB 30 daily  polyethylene glycol 3350 17 daily      SOCIAL HISTORY:   hx smoking  non-smoker    FAMILY HISTORY:  Family history of heart failure (Father)    Family history of Alzheimer's disease (Mother)        REVIEW OF SYSTEMS  [  ] ROS unobtainable because:    [X] All other systems negative except as noted below:	    Constitutional:  [ ] fever [ ] chills  [ ] weight loss  [ ] weakness  Skin:  [ ] rash [ ] phlebitis	  Eyes: [ ] icterus [ ] pain  [ ] discharge	  ENMT: [ ] sore throat  [ ] thrush [ ] ulcers [ ] exudates  Respiratory: [ ] dyspnea [ ] hemoptysis [ ] cough [ ] sputum	  Cardiovascular:  [ ] chest pain [ ] palpitations [ ] edema	  Gastrointestinal:  [ ] nausea [ ] vomiting [ ] diarrhea [ ] constipation [ ] pain	  Genitourinary:  [ ] dysuria [ ] frequency [ ] hematuria [ ] discharge [ ] flank pain  [ ] incontinence  Musculoskeletal:  [ ] myalgias [ ] arthralgias [ ] arthritis  [ ] back pain  Neurological:  [ ] headache [ ] seizures  [ ] confusion/altered mental status [X] limb weakness  Psychiatric:  [ ] anxiety [ ] depression	  Hematology/Lymphatics:  [ ] lymphadenopathy  Endocrine:  [ ] adrenal [ ] thyroid  Allergic/Immunologic:	 [ ] transplant [ ] seasonal    Vital Signs Last 24 Hrs  T(F): 98.3 (05-06-24 @ 04:15), Max: 98.7 (05-04-24 @ 13:45)    Vital Signs Last 24 Hrs  HR: 79 (05-06-24 @ 04:15) (76 - 84)  BP: 106/67 (05-06-24 @ 04:15) (102/66 - 125/84)  RR: 18 (05-06-24 @ 04:15)  SpO2: 95% (05-06-24 @ 04:15) (93% - 95%)  Wt(kg): --    PHYSICAL EXAM:  Constitutional: non-toxic, no distress  HEAD/EYES: anicteric, no conjunctival injection  ENT:  supple, no thrush  Cardiovascular:   normal S1, S2, no murmur, RRR  Respiratory:  clear BS bilaterally, no wheezes, no rales  GI:  soft, non-tender  :  no tong  Musculoskeletal:  no BLE edema  Neurologic: awake and oriented x3; BUE and BLE flexion and extension +3/5 symmetric.  Skin:  no rash, no erythema, no phlebitis  Heme/Onc: no lymphadenopathy   Psychiatric:  awake, alert, appropriate mood                                13.7   4.25  )-----------( 209      ( 06 May 2024 07:23 )             42.1       05-06    141  |  104  |  15  ----------------------------<  80  3.6   |  24  |  0.69    Ca    8.4      06 May 2024 07:23  Phos  3.6     05-06  Mg     2.2     05-06    TPro  7.1  /  Alb  2.9<L>  /  TBili  0.6  /  DBili  x   /  AST  26  /  ALT  20  /  AlkPhos  70  05-06    Cryptococcal Antigen - CSF (05.03.24 @ 11:17)   Cryptococcal Antigen - CSF: Negative    CSF PCR Panel (05.03.24 @ 11:07)   CSF PCR Result: NotDetec: The meningitis/encephalitis (ME) panel (CSFPCR) is a PCR based assay that   screens for: Escherichia coli; Haemophilus influenzae; Listeria   monocytogenes; Neisseria meningitidis; Streptococcus agalactiae;   Streptococcus pneumoniae; CMV; Enterovirus; HSV-1; HSV-2; Human   herpesvirus 6; Parechovirus; VZV and Cryptococcus. Result should be   interpreted in context of clinical presentation, imaging and other lab   tests. Positive predictive value may be lower in patients with normal CSF   chemistry and cell count.  Escherichia coli K1: NotDetec  Haemophilus influenzae: NotDetec  Listeria monocytogenes: NotDetec  Neisseria meningitidis (encapsulated): NotDetec  Streptococcus agalactiae: NotDetec  Streptococcus pneumoniae: NotDetec  Cytomegalovirus: NotDetec  Enterovirus: NotDetec  Herpes simplex virus 1: NotDetec  Herpes simplex virus 2: NotDetec  Human Herpesvirus 6: NotDetec  Human parechovirus: NotDetec  Varicella zoster virus: NotDetec  Cryptococcus neoformans/gattii: NotDetec    Culture - CSF with Gram Stain . (05.03.24 @ 11:00)   Gram Stain:   No polymorphonuclear cells seen   No organisms seen   by cytocentrifuge  Specimen Source: .CSF CSF  Culture Results:   No growth to date    Protein, CSF (05.03.24 @ 10:55)   Protein, CSF: 77 mg/dL    Glucose, CSF (05.03.24 @ 10:55)   Glucose, CSF: 64 mg/dL      Cerebrospinal Fluid Cell Count-1 (05.03.24 @ 10:55)   Tube Type: Tube 3  CSF Appearance: Clear  CSF Lymphocytes: 35 %  CSF Monocytes/Macrophages: 62 %  CSF Neutrophils: 3: Differential is based on 65 cells counted after cytocentrifugation. %  Appearance Spun: Colorless  RBC Count - Spinal Fluid: 0 /uL  Total Nucleated Cell Count, CSF: 1 /uL  CSF Color: No Color    Urinalysis Basic - ( 06 May 2024 07:23 )    Color: x / Appearance: x / SG: x / pH: x  Gluc: 80 mg/dL / Ketone: x  / Bili: x / Urobili: x   Blood: x / Protein: x / Nitrite: x   Leuk Esterase: x / RBC: x / WBC x   Sq Epi: x / Non Sq Epi: x / Bacteria: x        MICROBIOLOGY:        Culture - Fungal, CSF (collected 05-03-24 @ 11:01)  Source: .CSF CSF  Preliminary Report (05-04-24 @ 23:03):    Culture is being performed. Fungal cultures are held for 4 weeks.    Culture - Acid Fast - CSF (collected 05-03-24 @ 11:01)  Source: .CSF CSF  Preliminary Report (05-04-24 @ 23:08):    Culture is being performed.    Culture - CSF with Gram Stain (collected 05-03-24 @ 11:00)  Source: .CSF CSF  Gram Stain (05-03-24 @ 18:05):    No polymorphonuclear cells seen    No organisms seen    by cytocentrifuge  Preliminary Report (05-04-24 @ 10:09):    No growth to date                    RADIOLOGY:  imaging below personally reviewed  < from: MR Thoracic Spine w/wo IV Cont (05.03.24 @ 20:20) >    ACC: [id number] EXAM:  MR SPINE THORACIC WAW IC   ORDERED BY: CHRIS PATE     PROCEDURE DATE:  05/03/2024          INTERPRETATION:  Clinical indication: Quadriparesis.    MRI of the cervical thoracic and lumbar spine was performed sagittal T1   and T2 and STIR sequences. Axial T1 and T2-weighted sequences were   performed as well. Axial gradient echo sequence was performed through the   cervical spine. Coronal T1 was performed through the lumbar spine. The   patient was injected with approximately 8 cc gadavist IV and sagittal   T1-weighted sequence was performed with fat suppression. Axial   T1-weighted sequence was performed    Cervical spine:    This exam is somewhat limited by motion.    Loss of the normal cervical lordosis is seen    Mild scoliosis is seen    The vertebral body height alignment and marrow signal appears normal.    Disc desiccation is seen throughout the cervical and upper thoracic spine   region.    C2-3: Bilateral hypertrophic facet joint changes. Moderate narrowing of   both neural foramen    C3-4: Bilateral hypertrophic facet joint changes. Moderate narrowing of   both neural foramen    C4-5: Disc bulge and bilateral hypertrophic joint changes moderate to   severe narrowing of the right neural foramen    C5-6: Disc bulge and bilateral hypertrophic facet joint changes. Mild   narrowing of the spinal canal. Moderate narrowing of both neural foramen    C6-7: Disc bulge and bilateral hypertrophic facet joint changes. Moderate   narrowing of the right neural foramen. Moderate to severe narrowing of   the left neural foramen    C7-T1: Mild disc bulge is seen without significant compromise of the   spinal canal or either neural foramen    The spinal cord demonstrates normal signal and caliber.    Evaluation of the soft tissues appear normal.    No abnormal areas of enhancement is seen.    Thoracic spine    This exam is somewhat limited motion.    The vertebral body height alignment appears normal    Hemangioma versus focal area of fat is seen involving the T8 vertebral   body    The vertebral body height appears maintained    Disc desiccation is seen involving the thoracic spine region    T5-6: Small nerve root sleeve cyst is seen on the right side.    T8-9: Small nerve root sleeve cyst is seen on the right side.    T10-11: Disc bulge and bilateral hypertrophic facet joint changes.   Moderate narrowing of both neural foramen    The spinal cord demonstrate normal signal and caliber.    Evaluation of the paraspinal soft tissues appear normal.    Noabnormal areas of enhancement    Lumbar spine:    Reversal of the normal lumbar lordosis is seen.    L2 is slightly posteriorly displaced relative to L3. Grade 1   anterolisthesis is seen involving L4 on L5. These findings are likely the   result of chronic degenerative change    The vertebral body height appears maintained    Disc desiccation is seen involving the L1-2 through L4-5 level which is   secondary chronic degenerative    T12-L1: Disc bulge and bilateral hypertrophic facet joint changes.   Moderate narrowing of the right neural foramen and severe narrowing of   the left neural foramen.    L1-2: Disc bulge and bilateral hypertrophic facet joint changes. Mild   narrowing of the right neural foramen. Severe narrowing of left neural   foramen    L2-3: Disc bulge and bilateral hypertrophic facet changes. Mild narrowing   of the spinal canal. Moderate narrowing of both neural foramen    L3-4: Disc bulge and bilateral hypertrophic facet. Hypertrophic change is   seen involving the right ligamentum flavum. Moderate narrowing of the   spinal canal. Mild narrowing of both neural foramen    L4-5: Disc bulge and bilateral hypertrophic facet. Hypertrophic change   both ligamentum flavum. Moderate to severe narrowing of the spinal canal   is seen with compression of the thecal sac. Mild narrowing of both neural   foramen    L5-S1: Disc bulge and bilateral hypertrophic facet. Moderate narrowing of   the left neural foramen    The conus ends at L1 and appears normal    Elevation of theparaspinal soft tissues appear normal    Small renal cyst is seen on the left side.    Both SI joints appear intact.    IMPRESSION: Degenerative changes as described above.                  --- End of Report ---            TALON BRISCOE MD; Attending Radiologist  This document has been electronically signed. May  4 2024  9:52AM    < end of copied text >

## 2024-05-06 NOTE — PROGRESS NOTE ADULT - ATTENDING COMMENTS
I have examined the pt at bedside.  The risks and the benefits of the proposed diagnostic/therapeutic approach were thoroughly discussed.   I agree with the above plan and have modified it where necessary.    85yo RH WM, CAD s/p CABG, HTN, presenting with quadriparesis after 8 days of diarrhea.   Due to initial admission to OSH and rehab, diagnostic and therapeutic approach has been delayed.  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  Currently no respiratory involvement nor swallowing deficits.    CSF with alb/cyt dissociation.  MRI neuraxis is benign.  S/p IVIG 3/5 days, with marginal improvement, he can now lift 4x off the bed. R side seems better.  Mild flexion of fingers, likely due to weakness of hand and wrist extensors, will use splint (OT).    Impression:   -GBS/KATLYN      Plan:   -continue with IVIG  -PT/OT - hands splints and hygiene please    Rest of management as described in resident's note.

## 2024-05-06 NOTE — PROGRESS NOTE ADULT - ASSESSMENT
84M with h/o CAD s/p CABG, HTN, presenting with quadriparesis after 8 days of diarrhea. Initially admitted to [facility]. Gradually became even more week, at the time unable to lift legs but able to lift arms. Had multiple falls after being discharged from rehab. Recommended to go back to [place]. Evaluated by Dr. 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 Currently no respiratory involvement nor swallowing deficits.      Lyme neg. 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     Plan: incomplete  Generalized weakness likely 2/2 KATLYN   - MRI axis completed   - c/w IVIG x5d (5/3 - 5/7), due for dose #3/5 today 5/5  - Premed with tylenol, benadryl  - LP  glucose 64, protein 77, cell count, acid fast cx ruchi, PCR panel neg, Lyme antibodies, VDRL titer, WNV antibodies, ACE, IgG index, paraneoplastic panel, MBP, OCB  - Serum studies - B1, B6, folate 15.5, B12 808, HIV neg, HTLV, copper, zinc, ESR 37, CRP, MELYSSA, ANCA, DsDNA <1, RF <10, TSH 3.74 w/ reflex T4 1.3, SPEP, UPEP, Coags, ionized Ca 1.19, fibrinogen, ganglioside panel wnl   - PT/OT     CAD  - c/w aspirin 81mg, statin 40mg    Constipation   - Miralax 17g daily    Dispo: Acute rehab   DVT ppx: Lovenox 40  Regular Diet    84M with h/o CAD s/p CABG, HTN, presenting with quadriparesis after 8 days of diarrhea. Initially admitted to [facility]. Gradually became even more week, at the time unable to lift legs but able to lift arms. Had multiple falls after being discharged from rehab. Recommended to go back to [place]. Evaluated by Dr. 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 Currently no respiratory involvement nor swallowing deficits.      Lyme neg. 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     Plan: incomplete  Generalized weakness likely 2/2 KATLYN   - MRI axis completed   - c/w IVIG x5d (5/3 - 5/7), due for dose #3/5 today 5/5  - Premed with tylenol, benadryl  - LP  glucose 64, protein 77, cell count, acid fast cx ruchi, PCR panel neg, Lyme antibodies, VDRL titer, WNV antibodies, ACE, IgG index, paraneoplastic panel, MBP, OCB  - Serum studies - B1, B6, folate 15.5, B12 808, HIV neg, HTLV, copper, zinc, ESR 37, CRP, MELYSSA, ANCA, DsDNA <1, RF <10, TSH 3.74 w/ reflex T4 1.3, SPEP, UPEP, Coags, ionized Ca 1.19, fibrinogen, ganglioside panel wnl   - PT/OT - -splint      CAD  - c/w aspirin 81mg, statin 40mg    Constipation   - Miralax 17g daily      Dispo: Acute rehab   DVT ppx: Lovenox 40  Regular Diet    84M with h/o CAD s/p CABG, HTN, presenting with quadriparesis after 8 days of diarrhea. Initially admitted to [facility]. Gradually became even more week, at the time unable to lift legs but able to lift arms. Had multiple falls after being discharged from rehab. Recommended to go back to [place]. Evaluated by Dr. 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 Currently no respiratory involvement nor swallowing deficits.      Lyme neg. 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     Plan: incomplete  Generalized weakness likely 2/2 KATLYN   - MRI axis completed   - c/w IVIG x5d (5/3 - 5/7), due for dose #4/5 today  - Premed with tylenol, benadryl  - LP  glucose 64, protein 77, cell count, acid fast cx ruchi, PCR panel neg, Lyme antibodies, VDRL titer, WNV antibodies, ACE, IgG index, paraneoplastic panel, MBP, OCB  - Serum studies - B1, B6, folate 15.5, B12 808, HIV neg, HTLV, copper, zinc, ESR 37, CRP, MELYSSA, ANCA, DsDNA <1, RF <10, TSH 3.74 w/ reflex T4 1.3, SPEP, UPEP, Coags, ionized Ca 1.19, fibrinogen, ganglioside panel wnl   - PT/OT - -splint evaluation,     CAD  - c/w aspirin 81mg, statin 40mg    Constipation   - Miralax 17g daily    Dry eye  -artificial tears + refresh eye drops prn    Dispo: Acute rehab   DVT ppx: Lovenox 40  Regular Diet    84M with h/o CAD s/p CABG, HTN, presenting with quadriparesis after 8 days of diarrhea. Initially admitted to [facility]. Gradually became even more week, at the time unable to lift legs but able to lift arms. Had multiple falls after being discharged from rehab. Recommended to go back to [place]. Evaluated by Dr. 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 Currently no respiratory involvement nor swallowing deficits.      Lyme neg. 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     Plan:   Generalized weakness likely 2/2 KATLYN   - MRI axis completed   - c/w IVIG x5d (5/3 - 5/7), due for dose #4/5 today  - Premed with tylenol, benadryl  - LP  glucose 64, protein 77, cell count, acid fast cx ruchi, PCR panel neg, Lyme antibodies, VDRL titer, WNV antibodies, ACE, IgG index, paraneoplastic panel, MBP, OCB  - Serum studies - B1, B6, folate 15.5, B12 808, HIV neg, HTLV, copper, zinc, ESR 37, CRP (4), MELYSSA, ANCA, DsDNA <1, RF <10, TSH 3.74 w/ reflex T4 1.3, SPEP, UPEP, Coags, ionized Ca 1.19, fibrinogen, ganglioside panel wnl   - PT/OT; splints + splint evaluation,     CAD  - c/w aspirin 81mg, statin 40mg    Constipation   - Miralax 17g daily    Dry eye  -artificial tears + refresh eye drops prn    Dispo: Acute rehab   DVT ppx: Lovenox 40  Regular Diet

## 2024-05-07 ENCOUNTER — TRANSCRIPTION ENCOUNTER (OUTPATIENT)
Age: 84
End: 2024-05-07

## 2024-05-07 LAB
ANION GAP SERPL CALC-SCNC: 11 MMOL/L — SIGNIFICANT CHANGE UP (ref 5–17)
APPEARANCE UR: ABNORMAL
BACTERIA # UR AUTO: ABNORMAL /HPF
BASOPHILS # BLD AUTO: 0 K/UL — SIGNIFICANT CHANGE UP (ref 0–0.2)
BASOPHILS NFR BLD AUTO: 0 % — SIGNIFICANT CHANGE UP (ref 0–2)
BILIRUB UR-MCNC: NEGATIVE — SIGNIFICANT CHANGE UP
BUN SERPL-MCNC: 14 MG/DL — SIGNIFICANT CHANGE UP (ref 7–23)
CALCIUM SERPL-MCNC: 8.2 MG/DL — LOW (ref 8.4–10.5)
CAST: 1 /LPF — SIGNIFICANT CHANGE UP (ref 0–4)
CHLORIDE SERPL-SCNC: 101 MMOL/L — SIGNIFICANT CHANGE UP (ref 96–108)
CO2 SERPL-SCNC: 24 MMOL/L — SIGNIFICANT CHANGE UP (ref 22–31)
COLOR SPEC: YELLOW — SIGNIFICANT CHANGE UP
COPPER SERPL-MCNC: 111 UG/DL — SIGNIFICANT CHANGE UP (ref 69–132)
COPPER SERPL-MCNC: 123 UG/DL — SIGNIFICANT CHANGE UP (ref 69–132)
CREAT SERPL-MCNC: 0.63 MG/DL — SIGNIFICANT CHANGE UP (ref 0.5–1.3)
DIFF PNL FLD: NEGATIVE — SIGNIFICANT CHANGE UP
EGFR: 95 ML/MIN/1.73M2 — SIGNIFICANT CHANGE UP
EOSINOPHIL # BLD AUTO: 0 K/UL — SIGNIFICANT CHANGE UP (ref 0–0.5)
EOSINOPHIL NFR BLD AUTO: 0 % — SIGNIFICANT CHANGE UP (ref 0–6)
FLUAV AG NPH QL: SIGNIFICANT CHANGE UP
FLUBV AG NPH QL: SIGNIFICANT CHANGE UP
GAMMA INTERFERON BACKGROUND BLD IA-ACNC: 0.04 IU/ML — SIGNIFICANT CHANGE UP
GD1A GANGL IGG+IGM SER IA-ACNC: 6 IV — SIGNIFICANT CHANGE UP (ref 0–50)
GD1B GANGL IGG+IGM SER IA-ACNC: 28 IV — SIGNIFICANT CHANGE UP (ref 0–50)
GLUCOSE SERPL-MCNC: 124 MG/DL — HIGH (ref 70–99)
GLUCOSE UR QL: NEGATIVE MG/DL — SIGNIFICANT CHANGE UP
GM1 ASIALO IGG+IGM SER IA-ACNC: 23 IV — SIGNIFICANT CHANGE UP (ref 0–50)
GM1 GANGL IGG+IGM SER-ACNC: 10 IV — SIGNIFICANT CHANGE UP (ref 0–50)
GM2 GANGL IGG+IGM SER IA-ACNC: 9 IV — SIGNIFICANT CHANGE UP (ref 0–50)
GQ1B GANGL IGG+IGM SER IA-ACNC: 9 IV — SIGNIFICANT CHANGE UP (ref 0–50)
GRAM STN FLD: ABNORMAL
HCT VFR BLD CALC: 40.9 % — SIGNIFICANT CHANGE UP (ref 39–50)
HGB BLD-MCNC: 13.3 G/DL — SIGNIFICANT CHANGE UP (ref 13–17)
KETONES UR-MCNC: NEGATIVE MG/DL — SIGNIFICANT CHANGE UP
LEUKOCYTE ESTERASE UR-ACNC: NEGATIVE — SIGNIFICANT CHANGE UP
LYMPHOCYTES # BLD AUTO: 0.28 K/UL — LOW (ref 1–3.3)
LYMPHOCYTES # BLD AUTO: 3.5 % — LOW (ref 13–44)
M TB IFN-G BLD-IMP: NEGATIVE — SIGNIFICANT CHANGE UP
M TB IFN-G CD4+ BCKGRND COR BLD-ACNC: 0 IU/ML — SIGNIFICANT CHANGE UP
M TB IFN-G CD4+CD8+ BCKGRND COR BLD-ACNC: 0 IU/ML — SIGNIFICANT CHANGE UP
MANUAL SMEAR VERIFICATION: SIGNIFICANT CHANGE UP
MCHC RBC-ENTMCNC: 31.1 PG — SIGNIFICANT CHANGE UP (ref 27–34)
MCHC RBC-ENTMCNC: 32.5 GM/DL — SIGNIFICANT CHANGE UP (ref 32–36)
MCV RBC AUTO: 95.6 FL — SIGNIFICANT CHANGE UP (ref 80–100)
MONOCYTES # BLD AUTO: 0.41 K/UL — SIGNIFICANT CHANGE UP (ref 0–0.9)
MONOCYTES NFR BLD AUTO: 5.2 % — SIGNIFICANT CHANGE UP (ref 2–14)
NEUTROPHILS # BLD AUTO: 7.25 K/UL — SIGNIFICANT CHANGE UP (ref 1.8–7.4)
NEUTROPHILS NFR BLD AUTO: 91.3 % — HIGH (ref 43–77)
NITRITE UR-MCNC: POSITIVE
PH UR: 8.5 (ref 5–8)
PLAT MORPH BLD: NORMAL — SIGNIFICANT CHANGE UP
PLATELET # BLD AUTO: 247 K/UL — SIGNIFICANT CHANGE UP (ref 150–400)
POTASSIUM SERPL-MCNC: 4.1 MMOL/L — SIGNIFICANT CHANGE UP (ref 3.5–5.3)
POTASSIUM SERPL-SCNC: 4.1 MMOL/L — SIGNIFICANT CHANGE UP (ref 3.5–5.3)
PROT UR-MCNC: SIGNIFICANT CHANGE UP MG/DL
QUANT TB PLUS MITOGEN MINUS NIL: 3.2 IU/ML — SIGNIFICANT CHANGE UP
RBC # BLD: 4.28 M/UL — SIGNIFICANT CHANGE UP (ref 4.2–5.8)
RBC # FLD: 13.5 % — SIGNIFICANT CHANGE UP (ref 10.3–14.5)
RBC BLD AUTO: SIGNIFICANT CHANGE UP
RBC CASTS # UR COMP ASSIST: 4 /HPF — SIGNIFICANT CHANGE UP (ref 0–4)
RSV RNA NPH QL NAA+NON-PROBE: SIGNIFICANT CHANGE UP
SARS-COV-2 RNA SPEC QL NAA+PROBE: SIGNIFICANT CHANGE UP
SODIUM SERPL-SCNC: 136 MMOL/L — SIGNIFICANT CHANGE UP (ref 135–145)
SP GR SPEC: 1.02 — SIGNIFICANT CHANGE UP (ref 1–1.03)
SPECIMEN SOURCE: SIGNIFICANT CHANGE UP
SQUAMOUS # UR AUTO: 0 /HPF — SIGNIFICANT CHANGE UP (ref 0–5)
UROBILINOGEN FLD QL: 1 MG/DL — SIGNIFICANT CHANGE UP (ref 0.2–1)
WBC # BLD: 7.94 K/UL — SIGNIFICANT CHANGE UP (ref 3.8–10.5)
WBC # FLD AUTO: 7.94 K/UL — SIGNIFICANT CHANGE UP (ref 3.8–10.5)
WBC UR QL: 3 /HPF — SIGNIFICANT CHANGE UP (ref 0–5)
ZINC SERPL-MCNC: 67 UG/DL — SIGNIFICANT CHANGE UP (ref 44–115)

## 2024-05-07 PROCEDURE — 99232 SBSQ HOSP IP/OBS MODERATE 35: CPT

## 2024-05-07 PROCEDURE — 71045 X-RAY EXAM CHEST 1 VIEW: CPT | Mod: 26

## 2024-05-07 PROCEDURE — 99233 SBSQ HOSP IP/OBS HIGH 50: CPT

## 2024-05-07 PROCEDURE — 93010 ELECTROCARDIOGRAM REPORT: CPT

## 2024-05-07 RX ORDER — ACETAMINOPHEN 500 MG
1000 TABLET ORAL ONCE
Refills: 0 | Status: COMPLETED | OUTPATIENT
Start: 2024-05-07 | End: 2024-05-07

## 2024-05-07 RX ADMIN — ATORVASTATIN CALCIUM 40 MILLIGRAM(S): 80 TABLET, FILM COATED ORAL at 21:12

## 2024-05-07 RX ADMIN — DICLOFENAC SODIUM 2 GRAM(S): 30 GEL TOPICAL at 11:05

## 2024-05-07 RX ADMIN — Medication 400 MILLIGRAM(S): at 02:40

## 2024-05-07 RX ADMIN — Medication 81 MILLIGRAM(S): at 11:05

## 2024-05-07 RX ADMIN — FAMOTIDINE 40 MILLIGRAM(S): 10 INJECTION INTRAVENOUS at 16:20

## 2024-05-07 RX ADMIN — Medication 1000 MILLIGRAM(S): at 03:25

## 2024-05-07 RX ADMIN — Medication 25 MILLIGRAM(S): at 12:08

## 2024-05-07 RX ADMIN — IMMUNE GLOBULIN (HUMAN) 50 GRAM(S): 10 INJECTION INTRAVENOUS; SUBCUTANEOUS at 12:26

## 2024-05-07 RX ADMIN — ENOXAPARIN SODIUM 40 MILLIGRAM(S): 100 INJECTION SUBCUTANEOUS at 21:12

## 2024-05-07 RX ADMIN — Medication 1 TABLET(S): at 21:12

## 2024-05-07 RX ADMIN — FAMOTIDINE 40 MILLIGRAM(S): 10 INJECTION INTRAVENOUS at 05:01

## 2024-05-07 NOTE — DISCHARGE NOTE PROVIDER - DETAILS OF MALNUTRITION DIAGNOSIS/DIAGNOSES
This patient has been assessed with a concern for Malnutrition and was treated during this hospitalization for the following Nutrition diagnosis/diagnoses:     -  05/09/2024: Severe protein-calorie malnutrition

## 2024-05-07 NOTE — PROGRESS NOTE ADULT - SUBJECTIVE AND OBJECTIVE BOX
Neurology Progress Note    Hospital course:  83M R-handed PMHx CAD s/p bypass x4 and HLD [facility]. Presented with a 'stomach virus' in 3/2024, reported significant weight loss, sensation of 'wobbling,' with repeated falls. Had seen Dr. Yvette Liu in the outpatient setting who reported he had KATLYN. Patient obtained an MRI of the whole axis w/w/o contrast, started IVIG (D1 5/3). Seen by physical therapy and recommended for acute rehab. Seen by OT for splint evaluation. On  overnight developed low grade temperature, with tachycardia, infectious workup pending.    SUBJECTIVE/OBJECTIVE/INTERVAL EVENTS: Patient seen and examined at bedside w/ neuro attending and team.     INTERVAL HISTORY: Sustained tachycardia in 120s and 100.2 temp noted at 2:30 am (IV Tylenol and fever work-up ordered). In AM: WBC wnl, elevated neutrophils. UA cloudy, numerous bacteria, positive nitrites. RVP negative.     REVIEW OF SYSTEMS: Few questions of a 10-system ROS was performed and is negative except for those items noted above and/or in the HPI.    VITALS & EXAMINATION:  Vital Signs Last 24 Hrs  T(C): 37.1 (07 May 2024 04:29), Max: 37.9 (07 May 2024 02:25)  T(F): 98.7 (07 May 2024 04:29), Max: 100.2 (07 May 2024 02:25)  HR: 116 (07 May 2024 04:29) (74 - 125)  BP: 106/72 (07 May 2024 04:29) (106/72 - 134/83)  BP(mean): --  RR: 18 (07 May 2024 04:29) (18 - 18)  SpO2: 93% (07 May 2024 04:29) (93% - 95%)      General: incomplete  Neurological (>12):  MS: Awake, alert, oriented to person, place, situation, time. Normal affect. Follows all commands.  Language: Speech is clear, fluent   CNs: PERRL. VFF. EOMI overall no nystagmus, no diplopia. V1-3 intact to LT,. No facial asymmetry b/l, full eye closure strength b/l. Hearing grossly normal (rubbing fingers) b/l. Symmetric palate elevation in midline. Gag reflex deferred. Shoulder shrug intact b/l. Tongue midline, normal movements, no atrophy.  Motor:               Deltoid	Biceps	Triceps	   R	4-	3	3	2			  L	4-	3	3	2			    	H-Flex  K-Flex  D-Flex	P-Flex  R	4	3	3	3	 	   L	4-	3	0	0		     Sensation: Intact to LT b/l throughout.   Reflexes: 0 throughout, downgoing toes b/l  Coordination: limited by weakness   Gait:  deferred for patient safety            Parameters below as of 07 May 2024 04:29  Patient On (Oxygen Delivery Method): room air      LABORATORY:  CBC                       13.3   7.94  )-----------( 247      ( 07 May 2024 01:20 )             40.9     Chem 05-    136  |  101  |  14  ----------------------------<  124<H>  4.1   |  24  |  0.63    Ca    8.2<L>      07 May 2024 01:20  Phos  3.6     05-06  Mg     2.2     05-06    TPro  7.1  /  Alb  2.9<L>  /  TBili  0.6  /  DBili  x   /  AST  26  /  ALT  20  /  AlkPhos  70  05-06    LFTs LIVER FUNCTIONS - ( 06 May 2024 07:23 )  Alb: 2.9 g/dL / Pro: 7.1 g/dL / ALK PHOS: 70 U/L / ALT: 20 U/L / AST: 26 U/L / GGT: x           Coagulopathy   Lipid Panel - Chol 119 LDL -- HDL 44 Trig 83  A1c   Cardiac enzymes     U/A Urinalysis Basic - ( 07 May 2024 03:25 )    Color: Yellow / Appearance: Cloudy / S.019 / pH: x  Gluc: x / Ketone: Negative mg/dL  / Bili: Negative / Urobili: 1.0 mg/dL   Blood: x / Protein: Trace mg/dL / Nitrite: Positive   Leuk Esterase: Negative / RBC: 4 /HPF / WBC 3 /HPF   Sq Epi: x / Non Sq Epi: 0 /HPF / Bacteria: Too Numerous to count /HPF      CSF  Immunological  Other    STUDIES & IMAGING: (EEG, CT, MR, U/S, TTE/BILLY): Neurology Progress Note    Hospital course:  83M R-handed PMHx CAD s/p bypass x4 and HLD [facility] Presented with a 'stomach virus' in 3/2024, reported significant weight loss, sensation of 'wobbling,' with repeated falls. Had seen Dr. Yvette Liu in the outpatient setting who reported he had KATLYN. Patient obtained an MRI of the whole axis w/w/o contrast, started IVIG (D1 5/3). Seen by physical therapy and recommended for acute rehab. Seen by OT for splint evaluation. On  overnight developed low grade temperature, with tachycardia, infectious workup pending.    SUBJECTIVE/OBJECTIVE/INTERVAL EVENTS: Patient seen and examined at bedside w/ neuro attending and team. States yesterday was not good. Reports still using splint.    INTERVAL HISTORY: Sustained tachycardia in 120s and 100.2 temp noted at 2:30 am (IV Tylenol and fever work-up ordered). In AM: WBC wnl, elevated neutrophils. UA cloudy, numerous bacteria, positive nitrites. RVP negative.     REVIEW OF SYSTEMS: Few questions of a 10-system ROS was performed and is negative except for those items noted above and/or in the HPI.    VITALS & EXAMINATION:  Vital Signs Last 24 Hrs  T(C): 37.1 (07 May 2024 04:29), Max: 37.9 (07 May 2024 02:25)  T(F): 98.7 (07 May 2024 04:29), Max: 100.2 (07 May 2024 02:25)  HR: 116 (07 May 2024 04:29) (74 - 125)  BP: 106/72 (07 May 2024 04:29) (106/72 - 134/83)  BP(mean): --  RR: 18 (07 May 2024 04:29) (18 - 18)  SpO2: 93% (07 May 2024 04:29) (93% - 95%)      General: incomplete  Neurological (>12):  MS: Awake, alert, oriented to person, place, situation, time. Normal affect. Follows all commands.  Language: Speech is clear, fluent   CNs: PERRL. VFF. EOMI overall no nystagmus, no diplopia. V1-3 intact to LT,. No facial asymmetry b/l, full eye closure strength b/l. Hearing grossly normal (rubbing fingers) b/l. Symmetric palate elevation in midline. Gag reflex deferred. Shoulder shrug intact b/l. Tongue midline, normal movements, no atrophy.  Motor:               Deltoid	Biceps	Triceps	   R	4-	3	3	2			  L	4-	3	3	2			    	H-Flex  K-Flex  D-Flex	P-Flex  R	4	3	3	3	 	   L	4-	3	0	0		     Sensation: Intact to LT b/l throughout.   Reflexes: 0 throughout, downgoing toes b/l  Coordination: limited by weakness   Gait:  deferred for patient safety            Parameters below as of 07 May 2024 04:29  Patient On (Oxygen Delivery Method): room air      LABORATORY:  CBC                       13.3   7.94  )-----------( 247      ( 07 May 2024 01:20 )             40.9     Chem 05-07    136  |  101  |  14  ----------------------------<  124<H>  4.1   |  24  |  0.63    Ca    8.2<L>      07 May 2024 01:20  Phos  3.6     05-06  Mg     2.2     05-06    TPro  7.1  /  Alb  2.9<L>  /  TBili  0.6  /  DBili  x   /  AST  26  /  ALT  20  /  AlkPhos  70  05-06    LFTs LIVER FUNCTIONS - ( 06 May 2024 07:23 )  Alb: 2.9 g/dL / Pro: 7.1 g/dL / ALK PHOS: 70 U/L / ALT: 20 U/L / AST: 26 U/L / GGT: x           Coagulopathy   Lipid Panel - Chol 119 LDL -- HDL 44 Trig 83  A1c   Cardiac enzymes     U/A Urinalysis Basic - ( 07 May 2024 03:25 )    Color: Yellow / Appearance: Cloudy / S.019 / pH: x  Gluc: x / Ketone: Negative mg/dL  / Bili: Negative / Urobili: 1.0 mg/dL   Blood: x / Protein: Trace mg/dL / Nitrite: Positive   Leuk Esterase: Negative / RBC: 4 /HPF / WBC 3 /HPF   Sq Epi: x / Non Sq Epi: 0 /HPF / Bacteria: Too Numerous to count /HPF      CSF  Immunological  Other    STUDIES & IMAGING: (EEG, CT, MR, U/S, TTE/BILLY): Neurology Progress Note    Hospital course:  83M R-handed PMHx CAD s/p bypass x4 and HLD [facility]. Presented with a 'stomach virus' in 3/2024, reported significant weight loss, sensation of 'wobbling,' with repeated falls. Had seen Dr. Yvette Liu in the outpatient setting who reported he had KATLYN. Patient obtained an MRI of the whole axis w/w/o contrast, started IVIG (D1 5/3). Seen by physical therapy and recommended for acute rehab. Seen by OT for splint evaluation. On  overnight developed low grade temperature, with tachycardia, infectious workup pending.    SUBJECTIVE/OBJECTIVE/INTERVAL EVENTS: Patient seen and examined at bedside w/ neuro attending and team. States yesterday was not good. Reports still using splint. Denies  symptoms.     INTERVAL HISTORY: Sustained tachycardia in 120s and 100.2 temp noted at 2:30 am (IV Tylenol and fever work-up ordered). In AM: WBC wnl, elevated neutrophils. UA cloudy, numerous bacteria, positive nitrites. RVP negative. CXR pending read. Blood cultures pending.     REVIEW OF SYSTEMS: Few questions of a 10-system ROS was performed and is negative except for those items noted above and/or in the HPI.    VITALS & EXAMINATION:  Vital Signs Last 24 Hrs  T(C): 37.1 (07 May 2024 04:29), Max: 37.9 (07 May 2024 02:25)  T(F): 98.7 (07 May 2024 04:29), Max: 100.2 (07 May 2024 02:25)  HR: 116 (07 May 2024 04:29) (74 - 125)  BP: 106/72 (07 May 2024 04:29) (106/72 - 134/83)  BP(mean): --  RR: 18 (07 May 2024 04:29) (18 - 18)  SpO2: 93% (07 May 2024 04:29) (93% - 95%)      General: incomplete  Neurological (>12):  MS: Awake, alert, oriented to person, place, situation, time. Normal affect. Follows all commands.  Language: Speech is clear, fluent   CNs: PERRL. VFF. EOMI overall no nystagmus, no diplopia. V1-3 intact to LT,. No facial asymmetry b/l, full eye closure strength b/l. Hearing grossly normal (rubbing fingers) b/l. Symmetric palate elevation in midline. Gag reflex deferred. Shoulder shrug intact b/l. Tongue midline, normal movements, no atrophy.  Motor:               Deltoid	Biceps	Triceps	   R	4-	3	3	2			  L	4-	3	3	2			    	H-Flex  K-Flex  D-Flex	P-Flex  R	4	3	3	3	 	   L	4-	3	0	0		   Sensation: Intact to LT b/l throughout.   Reflexes: 0 throughout, downgoing toes b/l  Coordination: limited by weakness   Gait:  deferred for patient safety            Parameters below as of 07 May 2024 04:29  Patient On (Oxygen Delivery Method): room air      LABORATORY:  CBC                       13.3   7.94  )-----------( 247      ( 07 May 2024 01:20 )             40.9     Chem 05-07    136  |  101  |  14  ----------------------------<  124<H>  4.1   |  24  |  0.63    Ca    8.2<L>      07 May 2024 01:20  Phos  3.6     05-06  Mg     2.2     05-06    TPro  7.1  /  Alb  2.9<L>  /  TBili  0.6  /  DBili  x   /  AST  26  /  ALT  20  /  AlkPhos  70  05-06    LFTs LIVER FUNCTIONS - ( 06 May 2024 07:23 )  Alb: 2.9 g/dL / Pro: 7.1 g/dL / ALK PHOS: 70 U/L / ALT: 20 U/L / AST: 26 U/L / GGT: x           Coagulopathy   Lipid Panel - Chol 119 LDL -- HDL 44 Trig 83  A1c   Cardiac enzymes     U/A Urinalysis Basic - ( 07 May 2024 03:25 )    Color: Yellow / Appearance: Cloudy / S.019 / pH: x  Gluc: x / Ketone: Negative mg/dL  / Bili: Negative / Urobili: 1.0 mg/dL   Blood: x / Protein: Trace mg/dL / Nitrite: Positive   Leuk Esterase: Negative / RBC: 4 /HPF / WBC 3 /HPF   Sq Epi: x / Non Sq Epi: 0 /HPF / Bacteria: Too Numerous to count /HPF      CSF  Immunological  Other    STUDIES & IMAGING: (EEG, CT, MR, U/S, TTE/BILLY): Neurology Progress Note    Hospital course:  83M R-handed PMHx CAD s/p bypass x4 and HLD [facility]. Presented with a 'stomach virus' in 3/2024, reported significant weight loss, sensation of 'wobbling,' with repeated falls. Had seen Dr. Yvette Liu in the outpatient setting who reported he had KATLYN. Patient obtained an MRI of the whole axis w/w/o contrast, started IVIG (D1 5/3). Seen by physical therapy and recommended for acute rehab. Seen by OT for splint evaluation. On  overnight developed low grade temperature, with tachycardia, infectious workup pending.    SUBJECTIVE/OBJECTIVE/INTERVAL EVENTS: Patient seen and examined at bedside w/ neuro attending and team. States yesterday was not good. Reports still using splint. Denies  symptoms.     INTERVAL HISTORY: Sustained tachycardia in 120s and 100.2 temp noted at 2:30 am (IV Tylenol and fever work-up ordered). In AM: WBC wnl, elevated neutrophils. UA cloudy, numerous bacteria, positive nitrites. RVP negative. CXR pending read. Blood cultures pending.     REVIEW OF SYSTEMS: Few questions of a 10-system ROS was performed and is negative except for those items noted above and/or in the HPI.    VITALS & EXAMINATION:  Vital Signs Last 24 Hrs  T(C): 37.1 (07 May 2024 04:29), Max: 37.9 (07 May 2024 02:25)  T(F): 98.7 (07 May 2024 04:29), Max: 100.2 (07 May 2024 02:25)  HR: 116 (07 May 2024 04:29) (74 - 125)  BP: 106/72 (07 May 2024 04:29) (106/72 - 134/83)  BP(mean): --  RR: 18 (07 May 2024 04:29) (18 - 18)  SpO2: 93% (07 May 2024 04:29) (93% - 95%)      General:  Neurological (>12):  MS: Awake, alert, oriented to person, place, situation, time. Normal affect. Follows all commands.  Language: Speech is clear, fluent   CNs: PERRL. VFF. EOMI overall no nystagmus, no diplopia. V1-3 intact to LT,. No facial asymmetry b/l, full eye closure strength b/l. Hearing grossly normal (rubbing fingers) b/l. Symmetric palate elevation in midline. Gag reflex deferred. Shoulder shrug intact b/l. Tongue midline, normal movements, no atrophy.  Motor:               Deltoid	Biceps	Triceps	   R	4-	3	3	2			  L	4-	3	3	2			    	H-Flex  K-Flex  D-Flex	P-Flex  R	4	3	3	3	 	   L	4-	3	0	0		   Sensation: Intact to LT b/l throughout.   Reflexes: 0 throughout, downgoing toes b/l  Coordination: limited by weakness   Gait:  deferred for patient safety            Parameters below as of 07 May 2024 04:29  Patient On (Oxygen Delivery Method): room air      LABORATORY:  CBC                       13.3   7.94  )-----------( 247      ( 07 May 2024 01:20 )             40.9     Chem 05-07    136  |  101  |  14  ----------------------------<  124<H>  4.1   |  24  |  0.63    Ca    8.2<L>      07 May 2024 01:20  Phos  3.6     05-06  Mg     2.2     05-06    TPro  7.1  /  Alb  2.9<L>  /  TBili  0.6  /  DBili  x   /  AST  26  /  ALT  20  /  AlkPhos  70  05-06    LFTs LIVER FUNCTIONS - ( 06 May 2024 07:23 )  Alb: 2.9 g/dL / Pro: 7.1 g/dL / ALK PHOS: 70 U/L / ALT: 20 U/L / AST: 26 U/L / GGT: x           Coagulopathy   Lipid Panel - Chol 119 LDL -- HDL 44 Trig 83  A1c   Cardiac enzymes     U/A Urinalysis Basic - ( 07 May 2024 03:25 )    Color: Yellow / Appearance: Cloudy / S.019 / pH: x  Gluc: x / Ketone: Negative mg/dL  / Bili: Negative / Urobili: 1.0 mg/dL   Blood: x / Protein: Trace mg/dL / Nitrite: Positive   Leuk Esterase: Negative / RBC: 4 /HPF / WBC 3 /HPF   Sq Epi: x / Non Sq Epi: 0 /HPF / Bacteria: Too Numerous to count /HPF      CSF  Immunological  Other    STUDIES & IMAGING: (EEG, CT, MR, U/S, TTE/BILLY):

## 2024-05-07 NOTE — PROGRESS NOTE ADULT - ATTENDING COMMENTS
I have examined the pt at bedside.  The risks and the benefits of the proposed diagnostic/therapeutic approach were thoroughly discussed.   I agree with the above plan and have modified it where necessary.    85yo RH WM, CAD s/p CABG, HTN, presenting with quadriparesis after 8 days of diarrhea.   Due to initial admission to OSH and rehab, diagnostic and therapeutic approach has been delayed.  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  Currently no respiratory involvement nor swallowing deficits.    CSF with alb/cyt dissociation.  MRI neuraxis is benign.  S/p IVIG 4/5 days, with marginal improvement, he can now lift 4x off the bed. R side seems better.  Mild flexion of fingers, likely due to weakness of hand and wrist extensors, will use splint (OT) - s/p using splints overnight, L hand is softer and can be opened better; R hand still a bit stiffer; can elevate RLE>LLE off the bed.  Remains in good mood and optimistic, eager to go to rehab, prefers [facility] - discussed with .  Possible UTI, so far off ABX, will follow ECx.    Impression:   -GBS/KATLYN      Plan:   -continue with IVIG  -PT/OT - hands splints and hygiene.    Rest of management as described in resident's note.

## 2024-05-07 NOTE — DISCHARGE NOTE PROVIDER - NSDCMRMEDTOKEN_GEN_ALL_CORE_FT
aspirin 81 mg oral tablet: 1 tab(s) orally once a day  Lipitor 40 mg oral tablet: 1 tab(s) orally once a day (at bedtime)  Multiple Vitamins oral tablet: 1 tab(s) orally once a day (at bedtime)  Pepcid 40 mg oral tablet: 1 tab(s) orally 2 times a day  Zetia 10 mg oral tablet: 1 tab(s) orally once a day (at bedtime)   aspirin 81 mg oral tablet: 1 tab(s) orally once a day  Lipitor 40 mg oral tablet: 1 tab(s) orally once a day (at bedtime)  Multiple Vitamins oral tablet: 1 tab(s) orally once a day (at bedtime)  ocular lubricant ophthalmic solution: 1 drop(s) to each affected eye once a day As needed Dry Eyes  ocular lubricant ophthalmic solution: 1 drop(s) to each affected eye 2 times a day  Pepcid 40 mg oral tablet: 1 tab(s) orally 2 times a day  Zetia 10 mg oral tablet: 1 tab(s) orally once a day (at bedtime)   aspirin 81 mg oral tablet: 1 tab(s) orally once a day  Bactrim  mg-160 mg oral tablet: 1 tab(s) orally 2 times a day take twice a day 5/11 to 5/17  enoxaparin: 80 milligram(s) subcutaneous 2 times a day  Lipitor 40 mg oral tablet: 1 tab(s) orally once a day (at bedtime)  Multiple Vitamins oral tablet: 1 tab(s) orally once a day (at bedtime)  ocular lubricant ophthalmic solution: 1 drop(s) to each affected eye once a day As needed Dry Eyes  ocular lubricant ophthalmic solution: 1 drop(s) to each affected eye 2 times a day  Pepcid 40 mg oral tablet: 1 tab(s) orally 2 times a day  Zetia 10 mg oral tablet: 1 tab(s) orally once a day (at bedtime)

## 2024-05-07 NOTE — DISCHARGE NOTE PROVIDER - NSFOLLOWUPCLINICS_GEN_ALL_ED_FT
[facility]rology  Urology  [address], 3rd & [address]th [place] [address]  Phone: [phone]  Fax:      [facility]  Urology  [address] [place] [id number]  Phone: [phone]  Fax:     Cardiology at [facility]  Cardiology  [address] [place] [address]  Phone: [phone]  Fax:

## 2024-05-07 NOTE — DISCHARGE NOTE PROVIDER - CARE PROVIDER_API CALL
[facility]  Neurology  [address] [place] [address]  Phone: [phone]  Fax: [phone]  Established Patient  Follow Up Time:

## 2024-05-07 NOTE — PROGRESS NOTE ADULT - ASSESSMENT
83-yo M w/ PMH of CAD s/p bypass, C diff, and recent "stomach virus" w/ diarrhea, followed by BUE/BLE weakness, being treated for KATLYN/GBS. ID has been consulted for abnormal lab values.    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 EBV VCA IgG, EBNA, and EA positive. EBV VCA IgM neg. CSF MEP neg. Labs indicate past HSV infection (no CSF involvement per MEP). EBV serology could suggest recent infection vs reactivation. Note that HSV serology lacks clinical value. HSV 1 and 2 neg on MEP, so CSF involvement is unlikely. Possibly patient had exposure to HSV 1 and 2 at some point of his life. Acyclovir has theoretical activity against EBV, however its clinical value is dubious and literature is sparse.    5/7: Called back for low-grade temperature overnight (100.2). IVIG is a blood product, and post-infusion fever is not uncommon. Would hold off antibiotics and perform a fever workup. No dysuria or suprapubic tenderness - would not treat asymptomatic bacteriuria.    #EBV serology positive  #HSV serology positive  #KATLYN/GBS  #Limb weakness  #Low grade fever    Recommendations:  - KATLYN/GBS management per neurology. No objection to IVIG use from ID standpoint.  - Would not recommend using antimicrobials at this time.  - PT/OT and rehab per primary.  - Chest x-ray.    Plan discussed with primary team house staff.  Thank you for this consult.     Contreras Garcia MD, PhD  Attending Physician  Division of Infectious Diseases  Department of Medicine    Please contact through MS Teams message.  Office: [phone] (after 5 PM or weekend)

## 2024-05-07 NOTE — DISCHARGE NOTE PROVIDER - HOSPITAL COURSE
Hospital course:  83M R-handed PMHx CAD s/p bypass x4 and HLD presenting as a transfer from [facility]. Presented with a 'stomach virus' in 3/2024, reported significant weight loss, sensation of 'wobbling,' with repeated falls. Had seen Dr. Yvette Liu in the outpatient setting who reported he had KATLYN. Patient obtained an MRI of the whole axis w/w/o contrast, started IVIG (D1 5/3). Seen by physical therapy and recommended for acute rehab. Seen by OT for splint evaluation. On 5/7 overnight developed low grade temperature, with tachycardia, infectious workup pending. Started on incentive spirometry. Completed course of IVIG on 5/___/24.    On day of discharge, patient is medically stable. Planned for [facility].     CSF studies still pending and to be followed up outpatient:    Workup obtained:  - LP  glucose 64, protein 77, cell count 1, acid fast cx ruchi, PCR panel neg, Lyme antibodies, VDRL titer, WNV antibodies, ACE, IgG index, paraneoplastic panel, MBP, OCB  - Serum studies - B1, B6, folate 15.5, B12 808, HIV neg, HTLV, copper, zinc, ESR 37, CRP (4), MELYSSA (-), ANCA (-), DsDNA <1, RF <10, TSH 3.74 w/ reflex T4 1.3, SPEP, UPEP, Coags, ionized Ca 1.19, fibrinogen (451), ganglioside panel wnl     Imaging:  MRi brain w/w/o IV contrast 5/3/24  Study is motion degraded.  No diffusion restriction to suggest acute infarct. Few foci of increased   T2/FLAIR signal in the deep and periventricular white matter, compatible   with chronic small vessel disease. Parenchymal volume loss resulting in a   ex vacuo dilatation appearance of the bilateral ventricles The visualized   extra axial spaces and basal cisterns are within normal limits. No   midline shift or mass effect present. No abnormal enhancement within the   brain parenchyma.  The craniocervical junction is within normal limits. The pituitary is   unremarkable. The major intracranial vessels demonstrate the expected   signal void related to vascular flow. The paranasal sinuses are well   aerated. The mastoid air cells are well aerated. The visualized orbits   are status post cataract surgery.  IMPRESSION:  1.  No evidence of acute infarct or midline shift.  2.  Chronic small vessel disease.    MRI C/T/L spine w/w/o IV contrast 5/3/24  IMPRESSION: Degenerative changes/         Hospital course:  83M R-handed PMHx CAD s/p bypass x4 and HLD [facility]. Presented with a 'stomach virus' in 3/2024, reported significant weight loss, sensation of 'wobbling,' with repeated falls. Had seen Dr. Yvette Liu in the outpatient setting who reported he had KATLYN. Patient obtained an MRI of the whole axis w/w/o contrast, started IVIG (D1 5/3). Seen by physical therapy and recommended for acute rehab. Seen by OT for splint evaluation. On 5/7 overnight developed low grade temperature, with tachycardia, infectious workup pending + ID team consulted. 5/8 obtained CTCAP, showing PE in R upper lobe, b/l dopplers showing RLE DVT. Started on full dose lovenox. Also noted to have afib on tele. Medicine team consulted. TTE 5/9 EF 75% no regional wall abnormalities, apical variant hypertrophic cardiomyopathy. CT urogram ordered for bladder wall thickening.     On day of discharge, patient is medically stable. Planned for [facility].   Will need to continue antibiotics (continue ceftriaxone 1g IV Q24H while inpatient; can transition to Bactrim DS 2 tab Q12H for oral transition)    Workup obtained:-   LP  glucose 64, protein 77, cell count 1, acid fast cx ruchi, PCR panel neg, Lyme antibodies, VDRL titer, WNV antibodies, ACE, IgG index, paraneoplastic panel, MBP, OCB  - Serum studies - B1, B6, folate 15.5, B12 808, HIV neg, HTLV (negative),  copper, (123) zinc (67), ESR 37, CRP (4), MELYSSA (-), ANCA (-), DsDNA <1, RF <10, TSH 3.74 w/ reflex T4 1.3, SPEP, UPEP, Coags, ionized Ca 1.19, fibrinogen (451), ganglioside panel wnl     Workup pending:  -    Imaging:  MRi brain w/w/o IV contrast 5/3/24  Study is motion degraded.  No diffusion restriction to suggest acute infarct. Few foci of increased   T2/FLAIR signal in the deep and periventricular white matter, compatible   with chronic small vessel disease. Parenchymal volume loss resulting in a   ex vacuo dilatation appearance of the bilateral ventricles The visualized   extra axial spaces and basal cisterns are within normal limits. No   midline shift or mass effect present. No abnormal enhancement within the   brain parenchyma.  The craniocervical junction is within normal limits. The pituitary is   unremarkable. The major intracranial vessels demonstrate the expected   signal void related to vascular flow. The paranasal sinuses are well   aerated. The mastoid air cells are well aerated. The visualized orbits   are status post cataract surgery.  IMPRESSION:  1.  No evidence of acute infarct or midline shift.  2.  Chronic small vessel disease.    MRI C/T/L spine w/w/o IV contrast 5/3/24  IMPRESSION: Degenerative changes.    < from: CT Angio Chest PE Protocol w/ IV Cont (05.08.24 @ 11:00) >  Embolus within the right upper lobe pulmonary artery. No evidence of   right heart strain.  Circumferential wall thickening of the bladder. Otherwise, no acute   finding within abdomen/pelvis.    < from: [facility] Duplex Lower Ext Vein Scan, Bilat (05.08.24 @ 10:35) >  There is acute below the knee DVT affecting a right soleal vein.    < from: Xray Chest 1 View- PORTABLE-Urgent (Xray Chest 1 View- PORTABLE-Urgent .) (05.07.24 @ 04:45) >  Low lung volumes with hazy opacities in the bilateral lung bases. May   represent pleural effusion with atelectasis or superimposed infection.                 Hospital course:  83M R-handed PMHx CAD s/p bypass x4 and HLD presenting as a transfer [facility]. Presented with a 'stomach virus' in 3/2024, reported significant weight loss, sensation of 'wobbling,' with repeated falls. Had seen Dr. Yvette Liu in the outpatient setting who reported he had KATLYN. Patient obtained an MRI of the whole axis w/w/o contrast, started IVIG (D1 5/3). Seen by physical therapy and recommended for acute rehab. Seen by OT for splint evaluation. On 5/7 overnight developed low grade temperature, with tachycardia, infectious workup pending + ID team consulted. 5/8 obtained CTCAP, showing PE in R upper lobe, b/l dopplers showing RLE DVT. Started on full dose lovenox. Also noted to have afib on tele. Medicine team consulted. TTE 5/9 EF 75% no regional wall abnormalities, apical variant hypertrophic cardiomyopathy, recommended for cardiac MRI. CT urogram ordered for bladder wall thickening.       On day of discharge, patient is medically stable. Planned for [facility].   Will need to continue antibiotics (continue ceftriaxone 1g IV Q24H while inpatient; can transition to Bactrim DS 2 tab Q12H for oral transition)    Workup obtained:-   LP  glucose 64, protein 77, cell count 1, acid fast cx ruchi, PCR panel neg, Lyme antibodies, VDRL titer, WNV antibodies, ACE, IgG index, paraneoplastic panel, MBP, OCB  - Serum studies - B1, B6, folate 15.5, B12 808, HIV neg, HTLV (negative),  copper, (123) zinc (67), ESR 37, CRP (4), MELYSSA (-), ANCA (-), DsDNA <1, RF <10, TSH 3.74 w/ reflex T4 1.3, SPEP, UPEP, Coags, ionized Ca 1.19, fibrinogen (451), ganglioside panel wnl     Workup pending:  -    Patient will require close neurology + urology outpatient follow up, as well as follow up with his PCP regarding his hospital presentation and continuation of anticoagulation for provoked PE w/ DVT likely 2/2 stasis.    Imaging:  MRi brain w/w/o IV contrast 5/3/24  Study is motion degraded.  No diffusion restriction to suggest acute infarct. Few foci of increased   T2/FLAIR signal in the deep and periventricular white matter, compatible   with chronic small vessel disease. Parenchymal volume loss resulting in a   ex vacuo dilatation appearance of the bilateral ventricles The visualized   extra axial spaces and basal cisterns are within normal limits. No   midline shift or mass effect present. No abnormal enhancement within the   brain parenchyma.  The craniocervical junction is within normal limits. The pituitary is   unremarkable. The major intracranial vessels demonstrate the expected   signal void related to vascular flow. The paranasal sinuses are well   aerated. The mastoid air cells are well aerated. The visualized orbits   are status post cataract surgery.  IMPRESSION:  1.  No evidence of acute infarct or midline shift.  2.  Chronic small vessel disease.    MRI C/T/L spine w/w/o IV contrast 5/3/24  IMPRESSION: Degenerative changes.    < from: CT Angio Chest PE Protocol w/ IV Cont (05.08.24 @ 11:00) >  Embolus within the right upper lobe pulmonary artery. No evidence of   right heart strain.  Circumferential wall thickening of the bladder. Otherwise, no acute   finding within abdomen/pelvis.    < from: VA Duplex Lower Ext Vein Scan, Bilat (05.08.24 @ 10:35) >  There is acute below the knee DVT affecting a right soleal vein.    < from: Xray Chest 1 View- PORTABLE-Urgent (Xray Chest 1 View- PORTABLE-Urgent .) (05.07.24 @ 04:45) >  Low lung volumes with hazy opacities in the bilateral lung bases. May   represent pleural effusion with atelectasis or superimposed infection.                 Hospital course:  83M R-handed PMHx CAD s/p bypass x4 and HLD [facility] Presented with a 'stomach virus' in 3/2024, reported significant weight loss, sensation of 'wobbling,' with repeated falls. Had seen Dr. Yvette Liu in the outpatient setting who reported he had KATLYN. Patient obtained an MRI of the whole axis w/w/o contrast, started IVIG (D1 5/3). Seen by physical therapy and recommended for acute rehab. Seen by OT for splint evaluation. On 5/7 overnight developed low grade temperature, with tachycardia, infectious workup pending + ID team consulted. 5/8 obtained CTCAP, showing PE in R upper lobe, b/l dopplers showing RLE DVT. Started on full dose lovenox. Also noted to have afib on tele. Medicine team consulted. TTE 5/9 EF 75% no regional wall abnormalities, apical variant hypertrophic cardiomyopathy, recommended for cardiac MRI. CT urogram ordered for bladder wall thickening.       On day of discharge, patient is medically stable. Planned for [facility].   Will need to continue antibiotics (continue ceftriaxone 1g IV Q24H while inpatient; can transition to Bactrim DS 2 tab Q12H for oral transition)    Workup obtained:-   LP  glucose 64, protein 77, cell count 1, acid fast cx ruchi, PCR panel neg, Lyme antibodies, VDRL titer, WNV antibodies, ACE, IgG index, paraneoplastic panel, MBP, OCB  - Serum studies - B1, B6, folate 15.5, B12 808, HIV neg, HTLV (negative),  copper, (123) zinc (67), ESR 37, CRP (4), MELYSSA (-), ANCA (-), DsDNA <1, RF <10, TSH 3.74 w/ reflex T4 1.3, SPEP, UPEP, Coags, ionized Ca 1.19, fibrinogen (451), ganglioside panel wnl     Workup pending:  -    Patient will require close neurology + urology + cardiology outpatient follow up, as well as follow up with his PCP regarding his hospital presentation and continuation of anticoagulation for provoked PE w/ DVT likely 2/2 stasis.    Imaging:  MRi brain w/w/o IV contrast 5/3/24  Study is motion degraded.  No diffusion restriction to suggest acute infarct. Few foci of increased   T2/FLAIR signal in the deep and periventricular white matter, compatible   with chronic small vessel disease. Parenchymal volume loss resulting in a   ex vacuo dilatation appearance of the bilateral ventricles The visualized   extra axial spaces and basal cisterns are within normal limits. No   midline shift or mass effect present. No abnormal enhancement within the   brain parenchyma.  The craniocervical junction is within normal limits. The pituitary is   unremarkable. The major intracranial vessels demonstrate the expected   signal void related to vascular flow. The paranasal sinuses are well   aerated. The mastoid air cells are well aerated. The visualized orbits   are status post cataract surgery.  IMPRESSION:  1.  No evidence of acute infarct or midline shift.  2.  Chronic small vessel disease.    MRI C/T/L spine w/w/o IV contrast 5/3/24  IMPRESSION: Degenerative changes.    < from: CT Angio Chest PE Protocol w/ IV Cont (05.08.24 @ 11:00) >  Embolus within the right upper lobe pulmonary artery. No evidence of   right heart strain.  Circumferential wall thickening of the bladder. Otherwise, no acute   finding within abdomen/pelvis.    < from: VA Duplex Lower Ext Vein Scan, Bilat (05.08.24 @ 10:35) >  There is acute below the knee DVT affecting a right soleal vein.    < from: Xray Chest 1 View- PORTABLE-Urgent (Xray Chest 1 View- PORTABLE-Urgent .) (05.07.24 @ 04:45) >  Low lung volumes with hazy opacities in the bilateral lung bases. May   represent pleural effusion with atelectasis or superimposed infection.                 Hospital course:  83M R-handed PMHx CAD s/p bypass x4 and HLD [facility]. Presented with a 'stomach virus' in 3/2024, reported significant weight loss, sensation of 'wobbling,' with repeated falls. Had seen Dr. Yvette Liu in the outpatient setting, who diagnosed him with KATLYN. Patient obtained an MRI of the whole axis w/w/o contrast, started IVIG (D1 5/3) x 5 doses, with some improvement. Seen by physical therapy and recommended for acute rehab. Seen by OT for splint evaluation.     On 5/7 overnight developed low grade temperature, with tachycardia, infectious workup pending + ID team consulted.   5/8 obtained CTCAP, showing PE in R upper lobe, b/l dopplers showing RLE DVT. Started on full dose lovenox. CT urogram ordered for bladder wall thickening.   atient was also noted to have afib on tele. Medicine team consulted. TTE 5/9 EF 75% no regional wall abnormalities, apical variant hypertrophic cardiomyopathy, recommended for cardiac MRI.    Imaging:  MRI brain w/w/o IV contrast 5/3/24  Study is motion degraded.  No diffusion restriction to suggest acute infarct. Few foci of increased T2/FLAIR signal in the deep and periventricular white matter, compatible with chronic small vessel disease. Parenchymal volume loss resulting in a ex vacuo dilatation appearance of the bilateral ventricles The visualized extra axial spaces and basal cisterns are within normal limits. No midline shift or mass effect present. No abnormal enhancement within the brain parenchyma. The craniocervical junction is within normal limits. The pituitary is unremarkable. The major intracranial vessels demonstrate the expected signal void related to vascular flow. The paranasal sinuses are well aerated. The mastoid air cells are well aerated. The visualized orbits are status post cataract surgery.  IMPRESSION:  1.  No evidence of acute infarct or midline shift.  2.  Chronic small vessel disease.    MRI C/T/L spine w/w/o IV contrast 5/3/24  IMPRESSION: Degenerative changes.    CT Angio Chest PE Protocol w/ IV Cont (05.08.24 @ 11:00)   Embolus within the right upper lobe pulmonary artery. No evidence of right heart strain. Circumferential wall thickening of the bladder. Otherwise, no acute finding within abdomen/pelvis.    VA Duplex Lower Ext Vein Scan, Bilat (05.08.24 @ 10:35)   There is acute below the knee DVT affecting a right soleal vein.    Xray Chest 1 View- PORTABLE-Urgent (Xray Chest 1 View- PORTABLE-Urgent .) (05.07.24 @ 04:45)   Low lung volumes with hazy opacities in the bilateral lung bases. May represent pleural effusion with atelectasis or superimposed infection.    On day of discharge, patient is medically stable for acute rehab.  Will need to continue antibiotics (continue ceftriaxone 1g IV Q24H while inpatient; can transition to PO Bactrim DS 2 tab Q12H at discharge to rehab.)    Workup obtained:-   LP  glucose 64, protein 77, cell count 1, acid fast cx ruchi, PCR panel neg, Lyme antibodies, VDRL titer, WNV antibodies, ACE, IgG index, paraneoplastic panel, MBP, OCB  - Serum studies - B1, B6, folate 15.5, B12 808, HIV neg, HTLV (negative),  copper, (123) zinc (67), ESR 37, CRP (4), MELYSSA (-), ANCA (-), DsDNA <1, RF <10, TSH 3.74 w/ reflex T4 1.3, SPEP, UPEP, Coags, ionized Ca 1.19, fibrinogen (451), ganglioside panel wnl     Workup pending:  [] CT urogram   [] Cardiac MRI  [] EMG, NCV  [] Transthyretin lab    Follow up with:  [] Neurology Dr. Liu  [] Cardiology  [] Urology  [] PCP     Patient will require close neurology + urology + cardiology outpatient follow up, as well as follow up with his PCP regarding his hospital presentation and continuation of anticoagulation for provoked PE w/ DVT in the setting of immobility.

## 2024-05-07 NOTE — PROGRESS NOTE ADULT - ASSESSMENT
Assessment  84M with h/o CAD s/p CABG, HTN, presenting with quadriparesis after 8 days of diarrhea. Initially admitted to [facility]. Gradually became even more week, at the time unable to lift legs but able to lift arms. Had multiple falls after being discharged from rehab. Recommended to go back to [place]. Evaluated by Dr. 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 Currently no respiratory involvement nor swallowing deficits.    Lyme neg. 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     Plan: incomplete  Generalized weakness likely 2/2 KATLYN   - MRI axis completed   - c/w IVIG x5d (5/3 - 5/7), due for dose #5/5 today  - Premed with tylenol, benadryl  - LP  glucose 64, protein 77, cell count 1, acid fast cx ruchi, PCR panel neg, Lyme antibodies, VDRL titer, WNV antibodies, ACE, IgG index, paraneoplastic panel, MBP, OCB  - Serum studies - B1, B6, folate 15.5, B12 808, HIV neg, HTLV, copper, zinc, ESR 37, CRP (4), MELYSSA (-), ANCA (-), DsDNA <1, RF <10, TSH 3.74 w/ reflex T4 1.3, SPEP, UPEP, Coags, ionized Ca 1.19, fibrinogen (451), ganglioside panel wnl   - PT/OT; splints + splint evaluation    CAD  - c/w aspirin 81mg, statin 40mg    Constipation   - Miralax 17g daily    Dry eye  -artificial tears + refresh eye drops prn    Fever + tachycardia  -infectious workup pending  -ID previously consulted, will reach out    Dispo: Acute rehab   DVT ppx: Lovenox 40  Regular Diet  Assessment  84M with h/o CAD s/p CABG, HTN, presenting with quadriparesis after 8 days of diarrhea. Initially admitted to [facility]. Gradually became even more week, at the time unable to lift legs but able to lift arms. Had multiple falls after being discharged from rehab. Recommended to go back to [place]. Evaluated by Dr. 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 Currently no respiratory involvement nor swallowing deficits.    Lyme neg. 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     Plan: incomplete  Generalized weakness likely 2/2 KATLYN   - MRI axis completed   - c/w IVIG x5d (5/3 - 5/7), due for dose #5/5 today  - Premed with tylenol, benadryl  - LP  glucose 64, protein 77, cell count 1, acid fast cx ruchi, PCR panel neg, Lyme antibodies, VDRL titer, WNV antibodies, ACE, IgG index, paraneoplastic panel, MBP, OCB  - Serum studies - B1, B6, folate 15.5, B12 808, HIV neg, HTLV, copper, zinc, ESR 37, CRP (4), MELYSSA (-), ANCA (-), DsDNA <1, RF <10, TSH 3.74 w/ reflex T4 1.3, SPEP, UPEP, Coags, ionized Ca 1.19, fibrinogen (451), ganglioside panel wnl   - PT/OT; splints + splint evaluation    CAD  - c/w aspirin 81mg, statin 40mg    Constipation   - Miralax 17g daily    Dry eye  -artificial tears + refresh eye drops prn    Fever + tachycardia  -infectious workup pending - f/u BCx, CXR  -ID previously consulted, appreciate insight - hold off on abx if asymptomatic for now, will monitor    Pulm  -NIF/VC 1x - follow up  -incentive spirometry started    Dispo: Acute rehab   DVT ppx: Lovenox 40  Regular Diet  Assessment  84M with h/o CAD s/p CABG, HTN, presenting with quadriparesis after 8 days of diarrhea. Initially admitted to [facility]. Gradually became even more week, at the time unable to lift legs but able to lift arms. Had multiple falls after being discharged from rehab. Recommended to go back to [place]. Evaluated by Dr. 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 Currently no respiratory involvement nor swallowing deficits.    Lyme neg. 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     Plan:  Generalized weakness likely 2/2 KATLYN   - MRI axis completed   - c/w IVIG x5d (5/3 - 5/7), due for dose #5/5 today  - Premed with tylenol, benadryl  - LP  glucose 64, protein 77, cell count 1, acid fast cx ruchi, PCR panel neg, Lyme antibodies, VDRL titer, WNV antibodies, ACE, IgG index, paraneoplastic panel, MBP, OCB  - Serum studies - B1, B6, folate 15.5, B12 808, HIV neg, HTLV, copper, zinc, ESR 37, CRP (4), MELYSSA (-), ANCA (-), DsDNA <1, RF <10, TSH 3.74 w/ reflex T4 1.3, SPEP, UPEP, Coags, ionized Ca 1.19, fibrinogen (451), ganglioside panel wnl   - PT/OT; splints + splint evaluation    CAD  - c/w aspirin 81mg, statin 40mg    Constipation   - Miralax 17g daily    Dry eye  -artificial tears + refresh eye drops prn    Fever + tachycardia  -infectious workup pending - f/u BCx, CXR  -ID previously consulted, appreciate insight - hold off on abx if asymptomatic for now, will monitor and follow up workup    Pulm  -NIF/VC 1x - follow up  -incentive spirometry started    Dispo: Acute rehab   DVT ppx: Lovenox 40  Regular Diet

## 2024-05-07 NOTE — PROGRESS NOTE ADULT - SUBJECTIVE AND OBJECTIVE BOX
Follow Up:    Low grade fever    Interval History/ROS:  Tmax 100.2 ON after IVIG infusion. Remained tachycardic. RSV/Flu/COVID swab neg. Patient was seen and examined at bedside. Denies fever, chills, dysuria, or diarrhea.    Allergies  penicillins (Rash)        ANTIMICROBIALS:      OTHER MEDS:  MEDICATIONS  (STANDING):  acetaminophen     Tablet .. 650 once  aspirin enteric coated 81 daily  atorvastatin 40 at bedtime  diphenhydrAMINE Injectable 25 daily  diphenhydrAMINE Injectable 25 daily  enoxaparin Injectable 40 <User Schedule>  famotidine    Tablet 40 two times a day  immune   globulin 10% (GAMMAGARD) IVPB 30 daily  polyethylene glycol 3350 17 daily      Vital Signs Last 24 Hrs  T(C): 36.9 (07 May 2024 08:46), Max: 37.9 (07 May 2024 02:25)  T(F): 98.4 (07 May 2024 08:46), Max: 100.2 (07 May 2024 02:25)  HR: 111 (07 May 2024 08:46) (81 - 125)  BP: 115/75 (07 May 2024 08:46) (106/72 - 134/83)  BP(mean): --  RR: 18 (07 May 2024 08:46) (18 - 18)  SpO2: 97% (07 May 2024 08:46) (93% - 97%)    Parameters below as of 07 May 2024 08:46  Patient On (Oxygen Delivery Method): room air      PHYSICAL EXAM:  Constitutional: non-toxic, no distress  HEAD/EYES: anicteric, no conjunctival injection  Cardiovascular:   normal S1, S2, no murmur, RRR  Respiratory:  clear BS bilaterally, no wheezes, no rales  Musculoskeletal:  no BLE edema  : No suprapubic tenderness  Neurologic: awake and oriented x3; BUE and BLE flexion and extension +3/5 symmetric.  Skin:  no rash, no erythema, no phlebitis  Psychiatric:  awake, alert, appropriate mood                                13.3   7.94  )-----------( 247      ( 07 May 2024 01:20 )             40.9       05-07    136  |  101  |  14  ----------------------------<  124<H>  4.1   |  24  |  0.63    Ca    8.2<L>      07 May 2024 01:20  Phos  3.6     05-  Mg     2.2     05-06    TPro  7.1  /  Alb  2.9<L>  /  TBili  0.6  /  DBili  x   /  AST  26  /  ALT  20  /  AlkPhos  70  05-      Urinalysis Basic - ( 07 May 2024 03:25 )    Color: Yellow / Appearance: Cloudy / S.019 / pH: x  Gluc: x / Ketone: Negative mg/dL  / Bili: Negative / Urobili: 1.0 mg/dL   Blood: x / Protein: Trace mg/dL / Nitrite: Positive   Leuk Esterase: Negative / RBC: 4 /HPF / WBC 3 /HPF   Sq Epi: x / Non Sq Epi: 0 /HPF / Bacteria: Too Numerous to count /HPF    Cryptococcal Antigen - CSF (24 @ 11:17)   Cryptococcal Antigen - CSF: Negative    CSF PCR Panel (24 @ 11:07)   CSF PCR Result: NotDetec: The meningitis/encephalitis (ME) panel (CSFPCR) is a PCR based assay that   screens for: Escherichia coli; Haemophilus influenzae; Listeria   monocytogenes; Neisseria meningitidis; Streptococcus agalactiae;   Streptococcus pneumoniae; CMV; Enterovirus; HSV-1; HSV-2; Human   herpesvirus 6; Parechovirus; VZV and Cryptococcus. Result should be   interpreted in context of clinical presentation, imaging and other lab   tests. Positive predictive value may be lower in patients with normal CSF   chemistry and cell count.  Escherichia coli K1: NotDetec  Haemophilus influenzae: NotDetec  Listeria monocytogenes: NotDetec  Neisseria meningitidis (encapsulated): NotDetec  Streptococcus agalactiae: NotDetec  Streptococcus pneumoniae: NotDetec  Cytomegalovirus: NotDetec  Enterovirus: NotDetec  Herpes simplex virus 1: NotDetec  Herpes simplex virus 2: NotDetec  Human Herpesvirus 6: NotDetec  Human parechovirus: NotDetec  Varicella zoster virus: NotDetec  Cryptococcus neoformans/gattii: NotDetec    Culture - CSF with Gram Stain . (24 @ 11:00)   Gram Stain:   No polymorphonuclear cells seen   No organisms seen   by cytocentrifuge  Specimen Source: .CSF CSF  Culture Results:   No growth to date    Protein, CSF (24 @ 10:55)   Protein, CSF: 77 mg/dL    Glucose, CSF (24 @ 10:55)   Glucose, CSF: 64 mg/dL      Cerebrospinal Fluid Cell Count-1 (24 @ 10:55)   Tube Type: Tube 3  CSF Appearance: Clear  CSF Lymphocytes: 35 %  CSF Monocytes/Macrophages: 62 %  CSF Neutrophils: 3: Differential is based on 65 cells counted after cytocentrifugation. %  Appearance Spun: Colorless  RBC Count - Spinal Fluid: 0 /uL  Total Nucleated Cell Count, CSF: 1 /uL  CSF Color: No Color      MICROBIOLOGY:  v  .CSF CSF  24   Culture is being performed.  --  --      .CSF CSF  24   No growth to date  --    No polymorphonuclear cells seen  No organisms seen  by cytocentrifuge          EBV PCR: NotDetec IU/mL ( @ 11:17)        RADIOLOGY:  Imaging below independently reviewed.  < from: MR Thoracic Spine w/wo IV Cont (24 @ 20:20) >    ACC: [id number] EXAM:  MR SPINE THORACIC WAW IC   ORDERED BY: CHRIS PATE     PROCEDURE DATE:  2024          INTERPRETATION:  Clinical indication: Quadriparesis.    MRI of the cervical thoracic and lumbar spine was performed sagittal T1   and T2 and STIR sequences. Axial T1 and T2-weighted sequences were   performed as well. Axial gradient echo sequence was performed through the   cervical spine. Coronal T1 was performed through the lumbar spine. The   patient was injected with approximately 8 cc gadavist IV and sagittal   T1-weighted sequence was performed with fat suppression. Axial   T1-weighted sequence was performed    Cervical spine:    This exam is somewhat limited by motion.    Loss of the normal cervical lordosis is seen    Mild scoliosis is seen    The vertebral body height alignment and marrow signal appears normal.    Disc desiccation is seen throughout the cervical and upper thoracic spine   region.    C2-3: Bilateral hypertrophic facet joint changes. Moderate narrowing of   both neural foramen    C3-4: Bilateral hypertrophic facet joint changes. Moderate narrowing of   both neural foramen    C4-5: Disc bulge and bilateral hypertrophic joint changes moderate to   severe narrowing of the right neural foramen    C5-6: Disc bulge and bilateral hypertrophic facet joint changes. Mild   narrowing of the spinal canal. Moderate narrowing of both neural foramen    C6-7: Disc bulge and bilateral hypertrophic facet joint changes. Moderate   narrowing of the right neural foramen. Moderate to severe narrowing of   the left neural foramen    C7-T1: Mild disc bulge is seen without significant compromise of the   spinal canal or either neural foramen    The spinal cord demonstrates normal signal and caliber.    Evaluation of the soft tissues appear normal.    No abnormal areas of enhancement is seen.    Thoracic spine    This exam is somewhat limited motion.    The vertebral body height alignment appears normal    Hemangioma versus focal area of fat is seen involving the T8 vertebral   body    The vertebral body height appears maintained    Disc desiccation is seen involving the thoracic spine region    T5-6: Small nerve root sleeve cyst is seen on the right side.    T8-9: Small nerve root sleeve cyst is seen on the right side.    T10-11: Disc bulge and bilateral hypertrophic facet joint changes.   Moderate narrowing of both neural foramen    The spinal cord demonstrate normal signal and caliber.    Evaluation of the paraspinal soft tissues appear normal.    Noabnormal areas of enhancement    Lumbar spine:    Reversal of the normal lumbar lordosis is seen.    L2 is slightly posteriorly displaced relative to L3. Grade 1   anterolisthesis is seen involving L4 on L5. These findings are likely the   result of chronic degenerative change    The vertebral body height appears maintained    Disc desiccation is seen involving the L1-2 through L4-5 level which is   secondary chronic degenerative    T12-L1: Disc bulge and bilateral hypertrophic facet joint changes.   Moderate narrowing of the right neural foramen and severe narrowing of   the left neural foramen.    L1-2: Disc bulge and bilateral hypertrophic facet joint changes. Mild   narrowing of the right neural foramen. Severe narrowing of left neural   foramen    L2-3: Disc bulge and bilateral hypertrophic facet changes. Mild narrowing   of the spinal canal. Moderate narrowing of both neural foramen    L3-4: Disc bulge and bilateral hypertrophic facet. Hypertrophic change is   seen involving the right ligamentum flavum. Moderate narrowing of the   spinal canal. Mild narrowing of both neural foramen    L4-5: Disc bulge and bilateral hypertrophic facet. Hypertrophic change   both ligamentum flavum. Moderate to severe narrowing of the spinal canal   is seen with compression of the thecal sac. Mild narrowing of both neural   foramen    L5-S1: Disc bulge and bilateral hypertrophic facet. Moderate narrowing of   the left neural foramen    The conus ends at L1 and appears normal    Elevation of theparaspinal soft tissues appear normal    Small renal cyst is seen on the left side.    Both SI joints appear intact.    IMPRESSION: Degenerative changes as described above.                              --- End of Report ---            TALON BRISCOE MD; Attending Radiologist  This document has been electronically signed. May  4 2024  9:52AM    < end of copied text >

## 2024-05-07 NOTE — DISCHARGE NOTE PROVIDER - NSDCCPCAREPLAN_GEN_ALL_CORE_FT
PRINCIPAL DISCHARGE DIAGNOSIS  Diagnosis: Axonal neuropathy  Assessment and Plan of Treatment: You presented to the hospital with diffuse weakness, and a prior stomach virus. You were also seen in t  outpatient setting by your neurologist, Dr. Liu. You obtained an MRI of your brain, and your entire spine which showed degenerative changes. 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 You were started on intravenous immunoglobulin for a 5 day course. You were also seen by physical and occupational therapy, given splints for your hands, and recommended for rehab. You were also seen by the infectious disease team, and monitored off antibiotics.  On day of discharge, you are medically stable.  Please take the following medications on discharge:  __________  Please follow up with Dr. Liu on discharge.       PRINCIPAL DISCHARGE DIAGNOSIS  Diagnosis: Axonal neuropathy  Assessment and Plan of Treatment: You presented to the hospital with diffuse weakness, and a prior stomach virus. You were also seen in t  outpatient setting by your neurologist, Dr. Liu. You obtained an MRI of your brain, and your entire spine which showed degenerative changes. 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 You were started on intravenous immunoglobulin for a 5 day course. You were also seen by physical and occupational therapy, given splints for your hands, and recommended for rehab. You were also seen by the infectious disease team, and monitored off antibiotics.  On day of discharge, you are medically stable.  Please take the following medications on discharge:  __________  Please follow up with Dr. Liu in the outpatient neurology clinic on discharge. [phone]  Please follow up with outpatient cardiology on discharge.   [phone]  Please follow up with outpatient urology on discharge.  [phone]  Please call the above numbers to schedule appointments.       PRINCIPAL DISCHARGE DIAGNOSIS  Diagnosis: Axonal neuropathy  Assessment and Plan of Treatment: You presented to the hospital with diffuse weakness, and a prior stomach virus. You were also seen in the outpatient setting by your neurologist, Dr. Liu. You obtained an MRI of your brain, and your entire spine which showed degenerative changes. 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 You were started on intravenous immunoglobulin for a 5 day course. You were also seen by physical and occupational therapy, given splints for your hands, and recommended for rehab. You were also seen by the infectious disease team, and monitored off antibiotics.  On day of discharge, you are medically stable.  Please take the following medications on discharge:  __________  Please follow up with Dr. Liu in the outpatient neurology clinic on discharge. [phone]  Please follow up with outpatient cardiology on discharge.   [phone]  Please follow up with outpatient urology on discharge.  [phone]  Please call the above numbers to schedule appointments.       PRINCIPAL DISCHARGE DIAGNOSIS  Diagnosis: Axonal neuropathy  Assessment and Plan of Treatment: You presented to the hospital with diffuse weakness, and a prior stomach virus. You were also seen in the outpatient setting by your neurologist, Dr. Liu. You obtained an MRI of your brain, and your entire spine which showed degenerative changes. 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 You were started on intravenous immunoglobulin for a 5 day course. You were also seen by physical and occupational therapy, given splints for your hands, and recommended for rehab. You were also seen by the infectious disease team, and monitored off antibiotics.  On day of discharge, you are medically stable for rehab.  [] Please follow up with neurology, cardiology, urology, and your PCP after discharge from the hospital. You were being treated for KATLYN with IVIG. You were given splints for your hands. You were noted to have a fever, and blood and urine cultures were sent. You had afib on telemetry monitoring, so echo was done, and outpatient cardiac MRI was recommended. You were treated for UTI with ceftriaxone in the hospital; please continue to take Bactrim DS twice a day until 5/17. You were found to have a PE and a DVT. You were placed on Lovenox 80mg twice a day for 3 to 6 months. Please follow up with your PCP for this.   Please follow up with Dr. Liu in the outpatient neurology clinic on discharge. [phone]  Please follow up with outpatient cardiology on discharge.   [phone]  Please follow up with outpatient urology on discharge.  [phone]  Please call the above numbers to schedule appointments.

## 2024-05-08 LAB
ANION GAP SERPL CALC-SCNC: 11 MMOL/L — SIGNIFICANT CHANGE UP (ref 5–17)
BUN SERPL-MCNC: 17 MG/DL — SIGNIFICANT CHANGE UP (ref 7–23)
CALCIUM SERPL-MCNC: 8.4 MG/DL — SIGNIFICANT CHANGE UP (ref 8.4–10.5)
CHLORIDE SERPL-SCNC: 100 MMOL/L — SIGNIFICANT CHANGE UP (ref 96–108)
CO2 SERPL-SCNC: 22 MMOL/L — SIGNIFICANT CHANGE UP (ref 22–31)
CREAT SERPL-MCNC: 0.63 MG/DL — SIGNIFICANT CHANGE UP (ref 0.5–1.3)
CULTURE RESULTS: SIGNIFICANT CHANGE UP
EGFR: 95 ML/MIN/1.73M2 — SIGNIFICANT CHANGE UP
GLUCOSE SERPL-MCNC: 89 MG/DL — SIGNIFICANT CHANGE UP (ref 70–99)
HCT VFR BLD CALC: 38 % — LOW (ref 39–50)
HGB BLD-MCNC: 12.8 G/DL — LOW (ref 13–17)
MCHC RBC-ENTMCNC: 31.5 PG — SIGNIFICANT CHANGE UP (ref 27–34)
MCHC RBC-ENTMCNC: 33.7 GM/DL — SIGNIFICANT CHANGE UP (ref 32–36)
MCV RBC AUTO: 93.6 FL — SIGNIFICANT CHANGE UP (ref 80–100)
METHOD TYPE: SIGNIFICANT CHANGE UP
NRBC # BLD: 0 /100 WBCS — SIGNIFICANT CHANGE UP (ref 0–0)
PLATELET # BLD AUTO: 206 K/UL — SIGNIFICANT CHANGE UP (ref 150–400)
POTASSIUM SERPL-MCNC: 3.8 MMOL/L — SIGNIFICANT CHANGE UP (ref 3.5–5.3)
POTASSIUM SERPL-SCNC: 3.8 MMOL/L — SIGNIFICANT CHANGE UP (ref 3.5–5.3)
PROTEUS SP DNA BLD POS QL NAA+NON-PROBE: SIGNIFICANT CHANGE UP
RBC # BLD: 4.06 M/UL — LOW (ref 4.2–5.8)
RBC # FLD: 14.2 % — SIGNIFICANT CHANGE UP (ref 10.3–14.5)
SODIUM SERPL-SCNC: 133 MMOL/L — LOW (ref 135–145)
SPECIMEN SOURCE: SIGNIFICANT CHANGE UP
WBC # BLD: 4.27 K/UL — SIGNIFICANT CHANGE UP (ref 3.8–10.5)
WBC # FLD AUTO: 4.27 K/UL — SIGNIFICANT CHANGE UP (ref 3.8–10.5)

## 2024-05-08 PROCEDURE — 71275 CT ANGIOGRAPHY CHEST: CPT | Mod: 26

## 2024-05-08 PROCEDURE — 74177 CT ABD & PELVIS W/CONTRAST: CPT | Mod: 26

## 2024-05-08 PROCEDURE — 93010 ELECTROCARDIOGRAM REPORT: CPT

## 2024-05-08 PROCEDURE — 99233 SBSQ HOSP IP/OBS HIGH 50: CPT

## 2024-05-08 PROCEDURE — 93970 EXTREMITY STUDY: CPT | Mod: 26

## 2024-05-08 PROCEDURE — 99232 SBSQ HOSP IP/OBS MODERATE 35: CPT

## 2024-05-08 RX ORDER — CEFTRIAXONE 500 MG/1
1000 INJECTION, POWDER, FOR SOLUTION INTRAMUSCULAR; INTRAVENOUS EVERY 24 HOURS
Refills: 0 | Status: DISCONTINUED | OUTPATIENT
Start: 2024-05-08 | End: 2024-05-10

## 2024-05-08 RX ORDER — ENOXAPARIN SODIUM 100 MG/ML
80 INJECTION SUBCUTANEOUS EVERY 12 HOURS
Refills: 0 | Status: DISCONTINUED | OUTPATIENT
Start: 2024-05-08 | End: 2024-05-10

## 2024-05-08 RX ADMIN — POLYETHYLENE GLYCOL 3350 17 GRAM(S): 17 POWDER, FOR SOLUTION ORAL at 11:40

## 2024-05-08 RX ADMIN — DICLOFENAC SODIUM 2 GRAM(S): 30 GEL TOPICAL at 11:43

## 2024-05-08 RX ADMIN — FAMOTIDINE 40 MILLIGRAM(S): 10 INJECTION INTRAVENOUS at 17:33

## 2024-05-08 RX ADMIN — FAMOTIDINE 40 MILLIGRAM(S): 10 INJECTION INTRAVENOUS at 06:16

## 2024-05-08 RX ADMIN — ATORVASTATIN CALCIUM 40 MILLIGRAM(S): 80 TABLET, FILM COATED ORAL at 21:32

## 2024-05-08 RX ADMIN — ENOXAPARIN SODIUM 80 MILLIGRAM(S): 100 INJECTION SUBCUTANEOUS at 17:34

## 2024-05-08 RX ADMIN — CEFTRIAXONE 100 MILLIGRAM(S): 500 INJECTION, POWDER, FOR SOLUTION INTRAMUSCULAR; INTRAVENOUS at 02:03

## 2024-05-08 RX ADMIN — Medication 81 MILLIGRAM(S): at 11:40

## 2024-05-08 RX ADMIN — Medication 1 TABLET(S): at 21:32

## 2024-05-08 NOTE — PROVIDER CONTACT NOTE (OTHER) - ACTION/TREATMENT ORDERED:
Provider Molly Davenport ordered blood cultures, flu panel, UA, cbc, bmp, IV tylenol & chest x ray. PA also came to bedside to assess pt.
MD Marcano made aware, EKG ordered, completed, in chart, will start patient on lopressor tomorrow

## 2024-05-08 NOTE — PROGRESS NOTE ADULT - SUBJECTIVE AND OBJECTIVE BOX
Follow Up:    Proteus bacteremia    Interval History/ROS:  Tmax 99.7. Patient was seen and examined at bedside. Denies fever, dysuria, or diarrhea. +constipation. Patient declined MARION.    Allergies  penicillins (Rash)        ANTIMICROBIALS:  cefTRIAXone   IVPB 1000 every 24 hours      OTHER MEDS:  MEDICATIONS  (STANDING):  acetaminophen     Tablet .. 650 once  aspirin enteric coated 81 daily  atorvastatin 40 at bedtime  diphenhydrAMINE Injectable 25 daily  enoxaparin Injectable 80 every 12 hours  famotidine    Tablet 40 two times a day  polyethylene glycol 3350 17 daily      Vital Signs Last 24 Hrs  T(C): 37.5 (08 May 2024 13:18), Max: 37.6 (08 May 2024 05:27)  T(F): 99.5 (08 May 2024 13:18), Max: 99.7 (08 May 2024 05:27)  HR: 74 (08 May 2024 13:18) (74 - 108)  BP: 123/87 (08 May 2024 13:18) (100/65 - 123/87)  BP(mean): --  RR: 18 (08 May 2024 13:18) (16 - 18)  SpO2: 91% (08 May 2024 13:18) (91% - 98%)    Parameters below as of 08 May 2024 05:27  Patient On (Oxygen Delivery Method): room air            PHYSICAL EXAM:  Constitutional: non-toxic, no distress  HEAD/EYES: anicteric, no conjunctival injection  Cardiovascular:   normal S1, S2, no murmur, RRR  Respiratory:  clear BS bilaterally, no wheezes, no rales  Musculoskeletal:  no BLE edema  : No suprapubic tenderness; patient declined MARION for prostate examination  Neurologic: awake and oriented x3; BUE and BLE flexion and extension +3/5 symmetric.  Skin:  no rash, no erythema, no phlebitis  Psychiatric:  awake, alert, appropriate mood                                  12.8   4.27  )-----------( 206      ( 08 May 2024 07:15 )             38.0       05-08    133<L>  |  100  |  17  ----------------------------<  89  3.8   |  22  |  0.63    Ca    8.4      08 May 2024 07:18        Urinalysis Basic - ( 08 May 2024 07:18 )    Color: x / Appearance: x / SG: x / pH: x  Gluc: 89 mg/dL / Ketone: x  / Bili: x / Urobili: x   Blood: x / Protein: x / Nitrite: x   Leuk Esterase: x / RBC: x / WBC x   Sq Epi: x / Non Sq Epi: x / Bacteria: x        MICROBIOLOGY:  v  .Blood Blood  05-07-24   No growth at 24 hours  --  Blood Culture PCR      .CSF CSF  05-03-24   Culture is being performed.  --  --      .CSF CSF  05-03-24   No growth at 5 days  --    No polymorphonuclear cells seen  No organisms seen  by cytocentrifuge          EBV PCR: NotDetec IU/mL (05-03 @ 11:17)        RADIOLOGY:  Imaging below independently reviewed.  < from: CT Abdomen and Pelvis w/ IV Cont (05.08.24 @ 11:24) >    ACC: [id number] EXAM:  CT ABDOMEN AND PELVIS IC   ORDERED BY:  NASRA DOE     ACC: [id number] EXAM:  CT ANGIO CHEST PULM ART WAWI   ORDERED BY:    NASRA DOE     PROCEDURE DATE:  05/08/2024          INTERPRETATION:  CLINICAL INFORMATION: Patient on IVIG with low-grade   temperature and brief episode of hypoxia, evaluate for PE    COMPARISON: None.    CONTRAST/COMPLICATIONS:  IV Contrast: Omnipaque 350 (accession [id number]), IV contrast documented   in unlinked concurrent exam (accession [id number])  90 cc administered   10   cc discarded  Oral Contrast: NONE  Complications: None reported at time of study completion    PROCEDURE:  CT Angiography of the Chest was performed followed by portal venous phase   imaging of the Abdomen and Pelvis.  Sagittal and coronal reformats were performed as well as 3D (MIP)   reconstructions.    FINDINGS:  CHEST:  LUNGS, PLEURA AND LARGE AIRWAYS: Patent central airways. The right   hemidiaphragm is elevated. Compressive atelectasis of right middle lobe   andpartial right lower lobe. The lungs are otherwise clear.  VESSELS: Embolus within the right upper lobe pulmonary artery. No   evidence of right heart strain.  HEART: Left ventricular hypertrophy. No pericardial effusion. CABG.  MEDIASTINUM AND LUCI: No lymphadenopathy. Mediastinal surgical clips  CHEST WALL AND LOWER NECK: Sternotomy wires    ABDOMEN AND PELVIS:  LIVER: Right hepatic lobe subcentimeter hypodense focus, too small to   characterize.  BILE DUCTS: Normal caliber.  GALLBLADDER: Cholelithiasis.  SPLEEN: Within normal limits.  PANCREAS: Within normal limits.  ADRENALS: Within normal limits.  KIDNEYS/URETERS: Left renal subcentimeter hypodense foci, too small to   characterize. No hydronephrosis bilaterally.    BLADDER: Circumferential wall thickening.  REPRODUCTIVE ORGANS: Prostate is heterogenous and enlarged.    BOWEL: No bowel obstruction. Appendix is not visualized  PERITONEUM: No ascites.  VESSELS: Atherosclerotic changes.  RETROPERITONEUM/LYMPH NODES: No lymphadenopathy.  ABDOMINAL WALL: Within normal limits.  BONES: Healed sternotomy.    IMPRESSION:  Embolus within the right upper lobe pulmonary artery. No evidence of   right heart strain.    Circumferential wall thickening of the bladder. Otherwise, no acute   finding within abdomen/pelvis.    Findings were discussed with Dr. ADOLFO Doe 5/8/2024 11:23 AM by Dr. Mat Lund with read back confirmation.    --- End of Report ---           MAT CRAMER MD; Resident Radiologist  This document has been electronically signed.  ARVIND YOUNG MD; Attending Radiologist  This document has been electronically signed. May  8 2024  1:23PM    < end of copied text >

## 2024-05-08 NOTE — PROGRESS NOTE ADULT - ASSESSMENT
Assessment  84M with h/o CAD s/p CABG, HTN, presenting with quadriparesis after 8 days of diarrhea. Initially admitted to [facility]. Gradually became even more week, at the time unable to lift legs but able to lift arms. Had multiple falls after being discharged from rehab. Recommended to go back to [place]. Evaluated by Dr. 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 Currently no respiratory involvement nor swallowing deficits.    Lyme neg. 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     Plan:  Generalized weakness likely 2/2 KATLYN   - MRI axis completed   - c/w IVIG x5d (5/3 - 5/7), due for dose #5/5 today  - Premed with tylenol, benadryl  - LP  glucose 64, protein 77, cell count 1, acid fast cx ruchi, PCR panel neg, Lyme antibodies, VDRL titer, WNV antibodies, ACE, IgG index, paraneoplastic panel, MBP, OCB  - Serum studies - B1, B6, folate 15.5, B12 808, HIV neg, HTLV (negative),  copper, (123) zinc (67), ESR 37, CRP (4), MELYSSA (-), ANCA (-), DsDNA <1, RF <10, TSH 3.74 w/ reflex T4 1.3, SPEP, UPEP, Coags, ionized Ca 1.19, fibrinogen (451), ganglioside panel wnl   - PT/OT; splints + splint evaluation    CAD  - c/w aspirin 81mg, statin 40mg    Constipation   - Miralax 17g daily    Dry eye  -artificial tears + refresh eye drops prn    Fever + tachycardia  -infectious workup pending - f/u BCx, CXR  -ID previously consulted, appreciate insight - hold off on abx if asymptomatic for now, will monitor and follow up workup; will touch base in AM regarding blood culture findings + CXR ?procalcitonin    Pulm  -NIF/VC 1x - follow up  -incentive spirometry started  -CTPE ordered, f/u    Dispo: Acute rehab   DVT ppx: Lovenox 40  Regular Diet  Assessment  84M with h/o CAD s/p CABG, HTN, presenting with quadriparesis after 8 days of diarrhea. Initially admitted to [facility]. Gradually became even more week, at the time unable to lift legs but able to lift arms. Had multiple falls after being discharged from rehab. Recommended to go back to [place]. Evaluated by Dr. 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 Currently no respiratory involvement nor swallowing deficits.    Lyme neg. 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     Plan: incomplete  Generalized weakness likely 2/2 KATLYN   - MRI axis completed   - c/w IVIG x5d (5/3 - 5/7), due for dose #5/5 today  - Premed with tylenol, benadryl  - LP  glucose 64, protein 77, cell count 1, acid fast cx ruchi, PCR panel neg, Lyme antibodies, VDRL titer, WNV antibodies, ACE, IgG index, paraneoplastic panel, MBP, OCB  - Serum studies - B1, B6, folate 15.5, B12 808, HIV neg, HTLV (negative),  copper, (123) zinc (67), ESR 37, CRP (4), MELYSSA (-), ANCA (-), DsDNA <1, RF <10, TSH 3.74 w/ reflex T4 1.3, SPEP, UPEP, Coags, ionized Ca 1.19, fibrinogen (451), ganglioside panel wnl   - PT/OT; splints + splint evaluation    CAD  - c/w aspirin 81mg, statin 40mg    Constipation   - Miralax 17g daily    Dry eye  -artificial tears + refresh eye drops prn    Fever + tachycardia  -infectious workup pending - f/u BCx, CXR  -gram negative leander blood culture 1x; repeat blood cultures 2x 5/8  -CTAP w/ IV contrast ordered, f/u  -CTPE as below  -ID previously consulted, appreciate insight     Pulm  -NIF/VC 1x - follow up  -incentive spirometry started  -CTPE ordered, f/u    Dispo: Acute rehab   DVT ppx: Lovenox 40  Regular Diet  Assessment  84M with h/o CAD s/p CABG, HTN, presenting with quadriparesis after 8 days of diarrhea. Initially admitted to [facility]. Gradually became even more week, at the time unable to lift legs but able to lift arms. Had multiple falls after being discharged from rehab. Recommended to go back to [place]. Evaluated by Dr. 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 Currently no respiratory involvement nor swallowing deficits.  Lyme neg. EBV virus capsid IgG and nuclear antigen positive, early antigen positive, IgM negative, HSV 1 and 2 Ab positive. 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   (2) Fever of unclear origin, blood culture 1x gram negative  (3) RUE PE + RLE DVT, likely 2/2 immobility    Plan:   Generalized weakness likely 2/2 KATLYN   - MRI axis completed   - c/w IVIG x5d (5/3 - 5/7), due for dose #5/5 today  - Premed with tylenol, benadryl  - LP  glucose 64, protein 77, cell count 1, acid fast cx ruchi, PCR panel neg, Lyme antibodies, VDRL titer, WNV antibodies, ACE, IgG index, paraneoplastic panel, MBP, OCB  - Serum studies - B1, B6, folate 15.5, B12 808, HIV neg, HTLV (negative),  copper, (123) zinc (67), ESR 37, CRP (4), MELYSSA (-), ANCA (-), DsDNA <1, RF <10, TSH 3.74 w/ reflex T4 1.3, SPEP, UPEP, Coags, ionized Ca 1.19, fibrinogen (451), ganglioside panel wnl   - PT/OT; splints + splint evaluation    CAD  - c/w aspirin 81mg, statin 40mg    Constipation   - Miralax 17g daily    Dry eye  -artificial tears + refresh eye drops prn    Fever + tachycardia  -infectious workup pending - f/u BCx, CXR  -gram negative leander blood culture 1x; repeat blood cultures 2x 5/8  -CTAP w/ IV contrast ordered, f/u  -CTPE as below  -ID previously consulted, appreciate insight     Pulm  -NIF/VC 1x - follow up  -incentive spirometry started  -CTPE ordered, f/u finalized read    Dispo: Acute rehab   DVT ppx: Lovenox -> switch to lovenox for ease  Regular Diet  Assessment  84M with h/o CAD s/p CABG, HTN, presenting with quadriparesis after 8 days of diarrhea. Initially admitted to [facility]. Gradually became even more week, at the time unable to lift legs but able to lift arms. Had multiple falls after being discharged from rehab. Recommended to go back to [place]. Evaluated by Dr. 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 Currently no respiratory involvement nor swallowing deficits.  Lyme neg. EBV virus capsid IgG and nuclear antigen positive, early antigen positive, IgM negative, HSV 1 and 2 Ab positive. 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   (2) Fever of unclear origin, blood culture 1x gram negative, further workup pending  (3) RUE PE + RLE DVT, likely 2/2 immobility    Plan:   Generalized weakness likely 2/2 KATLYN   - MRI axis completed   - c/w IVIG x5d (5/3 - 5/7), due for dose #5/5 today  - Premed with tylenol, benadryl  - LP  glucose 64, protein 77, cell count 1, acid fast cx ruchi, PCR panel neg, Lyme antibodies, VDRL titer, WNV antibodies, ACE, IgG index, paraneoplastic panel, MBP, OCB  - Serum studies - B1, B6, folate 15.5, B12 808, HIV neg, HTLV (negative),  copper, (123) zinc (67), ESR 37, CRP (4), MELYSSA (-), ANCA (-), DsDNA <1, RF <10, TSH 3.74 w/ reflex T4 1.3, SPEP, UPEP, Coags, ionized Ca 1.19, fibrinogen (451), ganglioside panel wnl   - PT/OT; splints + splint evaluation    CAD  - c/w aspirin 81mg, statin 40mg    Constipation   - Miralax 17g daily    Dry eye  -artificial tears + refresh eye drops prn    Fever + tachycardia  -infectious workup pending - f/u BCx, CXR  -gram negative leander blood culture 1x; repeat blood cultures 2x 5/8  -CTAP w/ IV contrast ordered, f/u  -CTPE as below  -ID previously consulted, appreciate insight     Pulm  -NIF/VC 1x - follow up  -incentive spirometry started  -CTPE ordered, f/u finalized read per rads discussion R lobe PE without heart strain, RLE DVT    Dispo: Acute rehab   DVT ppx: Lovenox -> switched to full dose on 5/8/24 80 BID  Regular Diet  Assessment  84M with h/o CAD s/p CABG, HTN, presenting with quadriparesis after 8 days of diarrhea. Initially admitted to [facility]. Gradually became even more week, at the time unable to lift legs but able to lift arms. Had multiple falls after being discharged from rehab. Recommended to go back to [place]. Evaluated by Dr. 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 Currently no respiratory involvement nor swallowing deficits.  Lyme neg. EBV virus capsid IgG and nuclear antigen positive, early antigen positive, IgM negative, HSV 1 and 2 Ab positive. 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   (2) Fever of unclear origin, blood culture 1x gram negative, further workup pending  (3) RUE PE + RLE DVT, likely 2/2 immobility    Plan:   Generalized weakness likely 2/2 KATLYN   - MRI axis completed   - c/w IVIG x5d (5/3 - 5/7), due for dose #5/5 today  - Premed with tylenol, benadryl  - LP  glucose 64, protein 77, cell count 1, acid fast cx ruchi, PCR panel neg, Lyme antibodies, VDRL titer, WNV antibodies, ACE, IgG index, paraneoplastic panel, MBP, OCB  - Serum studies - B1, B6, folate 15.5, B12 808, HIV neg, HTLV (negative),  copper, (123) zinc (67), ESR 37, CRP (4), MELYSSA (-), ANCA (-), DsDNA <1, RF <10, TSH 3.74 w/ reflex T4 1.3, SPEP, UPEP, Coags, ionized Ca 1.19, fibrinogen (451), ganglioside panel wnl   - PT/OT; splints + splint evaluation    CAD  - c/w aspirin 81mg, statin 40mg    Constipation   - Miralax 17g daily    Dry eye  -artificial tears + refresh eye drops prn    Fever + tachycardia  -infectious workup pending - BCx (1 with gram-), CXR (c/f atelectasis vs infxn)  --pending 1bloodcx, 2 repeat bx on 5/8  --pending CTAP + CTPE reads  --pending Ucx  -ID consulted, appreciate insight     Pulm  -NIF/VC 1x - follow up  -incentive spirometry started  -CTPE ordered, f/u finalized read per rads discussion R lobe PE without heart strain, RLE DVT, started on lovenox full dose    Dispo: Acute rehab   DVT ppx: Lovenox -> switched to full dose on 5/8/24 80 BID  Regular Diet

## 2024-05-08 NOTE — CONSULT NOTE ADULT - SUBJECTIVE AND OBJECTIVE BOX
Patient is a 82 Y/O Male R-handed PMHx CAD s/p bypass x4 and HLD presenting as a transfer from [facility]. Presented with a 'stomach virus' in 3/2024, reported significant weight loss, sensation of 'wobbling,' with repeated falls. Had seen Dr. Yvette Liu in the outpatient setting who reported he had KATLYN. Patient obtained an MRI of the whole axis w/w/o contrast, started IVIG (D1 5/3).     Subjective: Patient seen and examined. No new events except as noted.   doing okay    REVIEW OF SYSTEMS:    CONSTITUTIONAL: No weakness, fevers or chills  EYES/ENT: No visual changes;  No vertigo or throat pain   NECK: No pain or stiffness  RESPIRATORY: No cough, wheezing, hemoptysis; No shortness of breath  CARDIOVASCULAR: No chest pain or palpitations  GASTROINTESTINAL: No abdominal or epigastric pain. No nausea, vomiting, or hematemesis; No diarrhea or constipation. No melena or hematochezia.  GENITOURINARY: No dysuria, frequency or hematuria  NEUROLOGICAL: No numbness or weakness  SKIN: No itching, burning, rashes, or lesions   All other review of systems is negative unless indicated above.    MEDICATIONS:  MEDICATIONS  (STANDING):  acetaminophen     Tablet .. 650 milliGRAM(s) Oral once  aspirin enteric coated 81 milliGRAM(s) Oral daily  atorvastatin 40 milliGRAM(s) Oral at bedtime  cefTRIAXone   IVPB 1000 milliGRAM(s) IV Intermittent every 24 hours  diclofenac sodium 1% Gel 2 Gram(s) Topical daily  diphenhydrAMINE Injectable 25 milliGRAM(s) IV Push daily  enoxaparin Injectable 80 milliGRAM(s) SubCutaneous every 12 hours  famotidine    Tablet 40 milliGRAM(s) Oral two times a day  multivitamin 1 Tablet(s) Oral at bedtime  polyethylene glycol 3350 17 Gram(s) Oral daily      PHYSICAL EXAM:  T(C): 37.1 (05-08-24 @ 20:39), Max: 37.6 (05-08-24 @ 05:27)  HR: 105 (05-08-24 @ 20:39) (74 - 105)  BP: 101/69 (05-08-24 @ 20:39) (100/65 - 123/87)  RR: 18 (05-08-24 @ 20:39) (18 - 18)  SpO2: 95% (05-08-24 @ 20:39) (91% - 95%)  Wt(kg): --  I&O's Summary    07 May 2024 07:01  -  08 May 2024 07:00  --------------------------------------------------------  IN: 0 mL / OUT: 500 mL / NET: -500 mL    08 May 2024 07:01  -  08 May 2024 23:15  --------------------------------------------------------  IN: 0 mL / OUT: 700 mL / NET: -700 mL          Appearance: Normal	  HEENT:  PERRLA   Lymphatic: No lymphadenopathy   Cardiovascular: Normal S1 S2, no JVD  Respiratory: normal effort , clear  Gastrointestinal:  Soft, Non-tender  Skin: No rashes,  warm to touch  Psychiatry:  Mood & affect appropriate  Musculuskeletal: improved movement in LE and UE     recent labs, Imaging and EKGs personally reviewed     05-07-24 @ 07:01  -  05-08-24 @ 07:00  --------------------------------------------------------  IN: 0 mL / OUT: 500 mL / NET: -500 mL    05-08-24 @ 07:01  -  05-08-24 @ 23:15  --------------------------------------------------------  IN: 0 mL / OUT: 700 mL / NET: -700 mL                          12.8   4.27  )-----------( 206      ( 08 May 2024 07:15 )             38.0               05-08    133<L>  |  100  |  17  ----------------------------<  89  3.8   |  22  |  0.63    Ca    8.4      08 May 2024 07:18                     Urinalysis Basic - ( 08 May 2024 07:18 )    Color: x / Appearance: x / SG: x / pH: x  Gluc: 89 mg/dL / Ketone: x  / Bili: x / Urobili: x   Blood: x / Protein: x / Nitrite: x   Leuk Esterase: x / RBC: x / WBC x   Sq Epi: x / Non Sq Epi: x / Bacteria: x

## 2024-05-08 NOTE — PROGRESS NOTE ADULT - ASSESSMENT
83-yo M w/ PMH of CAD s/p bypass, C diff, and recent "stomach virus" w/ diarrhea, followed by BUE/BLE weakness, being treated for KATLYN/GBS. ID has been consulted for abnormal lab values.    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 EBV VCA IgG, EBNA, and EA positive. EBV VCA IgM neg. CSF MEP neg. Labs indicate past HSV infection (no CSF involvement per MEP). EBV serology could suggest recent infection vs reactivation. Note that HSV serology lacks clinical value. HSV 1 and 2 neg on MEP, so CSF involvement is unlikely. Possibly patient had exposure to HSV 1 and 2 at some point of his life. Acyclovir has theoretical activity against EBV, however its clinical value is dubious and literature is sparse.    5/7: Called back for low-grade temperature overnight (100.2). IVIG is a blood product, and post-infusion fever is not uncommon. Would hold off antibiotics and perform a fever workup. No dysuria or suprapubic tenderness - would not treat asymptomatic bacteriuria.    5/8: Proteus from 5/7 BCx. Started on ceftriaxone 1g IV Q24H. MARION declined per patient. No dysuria or perineal discomfort noted per patient. Of note, CTAP revealed enlarged prostate and bladder wall thickening, suggestive of urinary source in conjunction with UA.    #Proteus bacteremia  #EBV serology positive  #HSV serology positive  #KATLYN/GBS  #Limb weakness  #Low grade fever  #PE/DVT    Recommendations:  - KATLYN/GBS management per neurology. No objection to IVIG use from ID standpoint.  - F/u BCx and UCx   - PT/OT and rehab per primary.  - AC for PE per primary team    Plan discussed with primary team house staff.  Thank you for this consult.     Contreras Garcia MD, PhD  Attending Physician  Division of Infectious Diseases  Department of Medicine    Please contact through MS Teams message.  Office: [phone] (after 5 PM or weekend)       83-yo M w/ PMH of CAD s/p bypass, C diff, and recent "stomach virus" w/ diarrhea, followed by BUE/BLE weakness, being treated for KATLYN/GBS. ID has been consulted for abnormal lab values.    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 EBV VCA IgG, EBNA, and EA positive. EBV VCA IgM neg. CSF MEP neg. Labs indicate past HSV infection (no CSF involvement per MEP). EBV serology could suggest recent infection vs reactivation. Note that HSV serology lacks clinical value. HSV 1 and 2 neg on MEP, so CSF involvement is unlikely. Possibly patient had exposure to HSV 1 and 2 at some point of his life. Acyclovir has theoretical activity against EBV, however its clinical value is dubious and literature is sparse.    5/7: Called back for low-grade temperature overnight (100.2). IVIG is a blood product, and post-infusion fever is not uncommon. Would hold off antibiotics and perform a fever workup. No dysuria or suprapubic tenderness - would not treat asymptomatic bacteriuria.    5/8: Proteus from 5/7 BCx. Started on ceftriaxone 1g IV Q24H. MARION declined per patient. No dysuria or perineal discomfort noted per patient. Of note, CTAP revealed enlarged prostate and bladder wall thickening, suggestive of urinary source in conjunction with UA.    #Proteus bacteremia  #UTI  #Enlarged prostate  #EBV and HSV serologies positive  #KATLYN/GBS  #Limb weakness  #Low grade fever  #PE/DVT    Recommendations:  - KATLYN/GBS management per neurology. No objection to IVIG use from ID standpoint.  - F/u BCx and UCx   - PT/OT and rehab per primary.  - AC for PE per primary team  - Consider Uro consult for urinary blockage management and initiation of care.    Plan discussed with primary team house staff.  Thank you for this consult.     Contreras Garcia MD, PhD  Attending Physician  Division of Infectious Diseases  Department of Medicine    Please contact through MS Teams message.  Office: [phone] (after 5 PM or weekend)

## 2024-05-08 NOTE — PROGRESS NOTE ADULT - SUBJECTIVE AND OBJECTIVE BOX
Neurology Progress Note    Hospital course:  83M R-handed PMHx CAD s/p bypass x4 and HLD presenting as a transfer [facility]. Presented with a 'stomach virus' in 3/2024, reported significant weight loss, sensation of 'wobbling,' with repeated falls. Had seen Dr. Yvette Liu in the outpatient setting who reported he had KATLYN. Patient obtained an MRI of the whole axis w/w/o contrast, started IVIG (D1 5/3). Seen by physical therapy and recommended for acute rehab. Seen by OT for splint evaluation. On  overnight developed low grade temperature, with tachycardia, infectious workup pending.    SUBJECTIVE/OBJECTIVE/INTERVAL EVENTS: Patient seen and examined at bedside w/ neuro attending and team.     INTERVAL HISTORY: Overnight blood culture x1 - contacted ID on call. Ceftriaxone started    REVIEW OF SYSTEMS: Few questions of a 10-system ROS was performed and is negative except for those items noted above and/or in the HPI.    VITALS & EXAMINATION:  Vital Signs Last 24 Hrs  T(C): 37.6 (08 May 2024 05:27), Max: 37.6 (08 May 2024 05:27)  T(F): 99.7 (08 May 2024 05:27), Max: 99.7 (08 May 2024 05:27)  HR: 83 (08 May 2024 05:27) (83 - 115)  BP: 103/67 (08 May 2024 05:27) (100/65 - 120/68)  BP(mean): --  RR: 18 (08 May 2024 05:27) (16 - 18)  SpO2: 95% (08 May 2024 05:27) (94% - 98%)    Parameters below as of 08 May 2024 05:27  Patient On (Oxygen Delivery Method): room air    General:  Neurological (>12):  MS: Awake, alert, oriented to person, place, situation, time. Normal affect. Follows all commands.  Language: Speech is clear, fluent   CNs: PERRL. VFF. EOMI overall no nystagmus, no diplopia. V1-3 intact to LT,. No facial asymmetry b/l, full eye closure strength b/l. Hearing grossly normal (rubbing fingers) b/l. Symmetric palate elevation in midline. Gag reflex deferred. Shoulder shrug intact b/l. Tongue midline, normal movements, no atrophy.  Motor:               Deltoid	Biceps	Triceps	   R	4-	3	3	2			  L	4-	3	3	2			    	H-Flex  K-Flex  D-Flex	P-Flex  R	4	3	3	3	 	   L	4-	3	0	0		   Sensation: Intact to LT b/l throughout.   Reflexes: 0 throughout, downgoing toes b/l  Coordination: limited by weakness   Gait:  deferred for patient safety      LABORATORY:  CBC                       13.3   7.94  )-----------( 247      ( 07 May 2024 01:20 )             40.9     Chem 05-07    136  |  101  |  14  ----------------------------<  124<H>  4.1   |  24  |  0.63    Ca    8.2<L>      07 May 2024 01:20  Phos  3.6     05-06  Mg     2.2     05-06    TPro  7.1  /  Alb  2.9<L>  /  TBili  0.6  /  DBili  x   /  AST  26  /  ALT  20  /  AlkPhos  70  05-06    LFTs LIVER FUNCTIONS - ( 06 May 2024 07:23 )  Alb: 2.9 g/dL / Pro: 7.1 g/dL / ALK PHOS: 70 U/L / ALT: 20 U/L / AST: 26 U/L / GGT: x           Coagulopathy   Lipid Panel - Chol 119 LDL -- HDL 44 Trig 83  A1c   Cardiac enzymes     U/A Urinalysis Basic - ( 07 May 2024 03:25 )    Color: Yellow / Appearance: Cloudy / S.019 / pH: x  Gluc: x / Ketone: Negative mg/dL  / Bili: Negative / Urobili: 1.0 mg/dL   Blood: x / Protein: Trace mg/dL / Nitrite: Positive   Leuk Esterase: Negative / RBC: 4 /HPF / WBC 3 /HPF   Sq Epi: x / Non Sq Epi: 0 /HPF / Bacteria: Too Numerous to count /HPF    STUDIES & IMAGING: (EEG, CT, MR, U/S, TTE/BILLY): Neurology Progress Note    Hospital course:  83M R-handed PMHx CAD s/p bypass x4 and HLD [facility]. Presented with a 'stomach virus' in 3/2024, reported significant weight loss, sensation of 'wobbling,' with repeated falls. Had seen Dr. Yvette Liu in the outpatient setting who reported he had KATLYN. Patient obtained an MRI of the whole axis w/w/o contrast, started IVIG (D1 5/3). Seen by physical therapy and recommended for acute rehab. Seen by OT for splint evaluation. On  overnight developed low grade temperature, with tachycardia, infectious workup pending.    SUBJECTIVE/OBJECTIVE/INTERVAL EVENTS: Patient seen and examined at bedside w/ neuro attending and team.     INTERVAL HISTORY: Overnight blood culture x1 gram negative rods; contacted ID ovn. Ceftriaxone started.    REVIEW OF SYSTEMS: Few questions of a 10-system ROS was performed and is negative except for those items noted above and/or in the HPI.    VITALS & EXAMINATION:  Vital Signs Last 24 Hrs  T(C): 37.6 (08 May 2024 05:27), Max: 37.6 (08 May 2024 05:27)  T(F): 99.7 (08 May 2024 05:27), Max: 99.7 (08 May 2024 05:27)  HR: 83 (08 May 2024 05:27) (83 - 115)  BP: 103/67 (08 May 2024 05:27) (100/65 - 120/68)  BP(mean): --  RR: 18 (08 May 2024 05:27) (16 - 18)  SpO2: 95% (08 May 2024 05:27) (94% - 98%)    Parameters below as of 08 May 2024 05:27  Patient On (Oxygen Delivery Method): room air    General:  Neurological (>12):  MS: Awake, alert, oriented to person, place, situation, time. Normal affect. Follows all commands.  Language: Speech is clear, fluent   CNs: PERRL. VFF. EOMI overall no nystagmus, no diplopia. V1-3 intact to LT,. No facial asymmetry b/l, full eye closure strength b/l. Hearing grossly normal (rubbing fingers) b/l. Symmetric palate elevation in midline. Gag reflex deferred. Shoulder shrug intact b/l. Tongue midline, normal movements, no atrophy.  Motor:               Deltoid	Biceps	Triceps	   R	4-	3	3	2			  L	4-	3	3	2			    	H-Flex  K-Flex  D-Flex	P-Flex  R	4	3	3	3	 	   L	4-	3	0	0		   Sensation: Intact to LT b/l throughout.   Reflexes: 0 throughout, downgoing toes b/l  Coordination: limited by weakness   Gait:  deferred for patient safety      LABORATORY:  CBC                       13.3   7.94  )-----------( 247      ( 07 May 2024 01:20 )             40.9     Chem -    136  |  101  |  14  ----------------------------<  124<H>  4.1   |  24  |  0.63    Ca    8.2<L>      07 May 2024 01:20  Phos  3.6     05-06  Mg     2.2     05-06    TPro  7.1  /  Alb  2.9<L>  /  TBili  0.6  /  DBili  x   /  AST  26  /  ALT  20  /  AlkPhos  70  05-06    LFTs LIVER FUNCTIONS - ( 06 May 2024 07:23 )  Alb: 2.9 g/dL / Pro: 7.1 g/dL / ALK PHOS: 70 U/L / ALT: 20 U/L / AST: 26 U/L / GGT: x           Coagulopathy   Lipid Panel - Chol 119 LDL -- HDL 44 Trig 83  A1c   Cardiac enzymes     U/A Urinalysis Basic - ( 07 May 2024 03:25 )    Color: Yellow / Appearance: Cloudy / S.019 / pH: x  Gluc: x / Ketone: Negative mg/dL  / Bili: Negative / Urobili: 1.0 mg/dL   Blood: x / Protein: Trace mg/dL / Nitrite: Positive   Leuk Esterase: Negative / RBC: 4 /HPF / WBC 3 /HPF   Sq Epi: x / Non Sq Epi: 0 /HPF / Bacteria: Too Numerous to count /HPF    STUDIES & IMAGING: (EEG, CT, MR, U/S, TTE/BILLY):

## 2024-05-08 NOTE — PROGRESS NOTE ADULT - SUBJECTIVE AND OBJECTIVE BOX
no new complaints     REVIEW OF SYSTEMS  Constitutional - No fever,  No fatigue  HEENT - No vertigo, No neck pain  Neurological - No headaches, No memory loss  Psychiatric - No depression, No anxiety    FUNCTIONAL PROGRESS  PT 5/7  bed mobility mod assist x 2  scooting EOB x mod assist x 2      VITALS  T(C): 37.5 (05-08-24 @ 13:18), Max: 37.6 (05-08-24 @ 05:27)  HR: 74 (05-08-24 @ 13:18) (74 - 108)  BP: 123/87 (05-08-24 @ 13:18) (100/65 - 123/87)  RR: 18 (05-08-24 @ 13:18) (16 - 18)  SpO2: 91% (05-08-24 @ 13:18) (91% - 98%)  Wt(kg): --    MEDICATIONS   acetaminophen     Tablet .. 650 milliGRAM(s) once  artificial  tears Solution 1 Drop(s) daily PRN  artificial tears (preservative free) Ophthalmic Solution 1 Drop(s) daily PRN  aspirin enteric coated 81 milliGRAM(s) daily  atorvastatin 40 milliGRAM(s) at bedtime  cefTRIAXone   IVPB 1000 milliGRAM(s) every 24 hours  diclofenac sodium 1% Gel 2 Gram(s) daily  diphenhydrAMINE Injectable 25 milliGRAM(s) daily  enoxaparin Injectable 80 milliGRAM(s) every 12 hours  famotidine    Tablet 40 milliGRAM(s) two times a day  multivitamin 1 Tablet(s) at bedtime  polyethylene glycol 3350 17 Gram(s) daily      RECENT LABS - Reviewed                        12.8   4.27  )-----------( 206      ( 08 May 2024 07:15 )             38.0     05-08    133<L>  |  100  |  17  ----------------------------<  89  3.8   |  22  |  0.63    Ca    8.4      08 May 2024 07:18        Urinalysis Basic - ( 08 May 2024 07:18 )    Color: x / Appearance: x / SG: x / pH: x  Gluc: 89 mg/dL / Ketone: x  / Bili: x / Urobili: x   Blood: x / Protein: x / Nitrite: x   Leuk Esterase: x / RBC: x / WBC x   Sq Epi: x / Non Sq Epi: x / Bacteria: x      < from: CT Angio Chest PE Protocol w/ IV Cont (05.08.24 @ 11:00) >    IMPRESSION:  Embolus within the right upper lobe pulmonary artery. No evidence of   right heart strain.    Circumferential wall thickening of the bladder. Otherwise, no acute   finding within abdomen/pelvis.      < end of copied text >    < from: [facility] Duplex Lower Ext Vein Scan, Bilat (05.08.24 @ 10:35) >    IMPRESSION:    There is acute below the knee DVT affecting a right soleal vein.      < end of copied text >    ----------------------------------------------------------------------------------------  PHYSICAL EXAM  Constitutional - NAD, Comfortable, in bed   Chest - Breathing comfortably on room air   Cardiovascular - S1S2   Abdomen - Soft   Extremities -  b/l UE splints   Neurologic Exam -    follows commands                 Cognitive - Awake, Alert, AAO to self, place, date, year, situation     Communication - Fluent, No dysarthria       Motor -                     LEFT    UE - ShAB 3/5, EF 3/5, EE 3/5, WE 2/5,  2/5                    RIGHT UE - ShAB 3/5, EF 3/5, EE 3/5, WE 2/5,  2/5                    LEFT    LE - HF 3/5, KE 3/5, DF 0/5, PF 0/5                    RIGHT LE - HF 3/5, KE 4/5, DF 0/5, PF 0/5        Sensory - Intact to LT     Psychiatric - Mood stable, Affect WNL  ----------------------------------------------------------------------------------------  ASSESSMENT/PLAN  84yMale h/o CAD, s/p CABG, HTN with functional deficits after KATLYN post viral illness   admitted from [facility], neuro workup as outpatient, MRI with degenerative changes  plan for IVIG  x 5 doses  +PE on CTA/Rt LE DVT, lovenox   bowel regimen, condom cath  Pain - Tylenol  Rehab - Will continue to follow for ongoing rehab needs and recommendations.    Recommend ACUTE inpatient rehabilitation for the functional deficits consisting of 3 hours of therapy/day  x 2-4 weeks depending on progress at rehabilitation facility, 24 hour RN/daily PMR physician for comorbid medical management. Patient will be able to tolerate 3 hours a day.   d/w

## 2024-05-08 NOTE — CONSULT NOTE ADULT - ASSESSMENT
Patient is a 82 Y/O Male R-handed PMHx CAD s/p bypass x4 and HLD presenting as a transfer from [facility]. Presented with a 'stomach virus' in 3/2024, reported significant weight loss, sensation of 'wobbling,' with repeated falls. Had seen Dr. Yvette Liu in the outpatient setting who reported he had KATLYN. Patient obtained an MRI of the whole axis w/w/o contrast, started IVIG (D1 5/3).     # KATLYN  care per neuro   IVIG  fall precautions  PT   monitor clinically     # Bacteremia  per ID   [place] CT   ct chest, Abd/Pelv   repeat Cx     # Acute LE DVT and PE   placed on lovenox   check echo   monito rhgb level     # New onset a fib   Check echo   AC , lovenox  rate control  Tele monitoring   Ishcmeic W/U after resolution of neuro symptoms     # CAD S/P BYPAss  ASA and statin  BP control     # HLD   lipid panel  statin     DVT an dGI PPX     discussed with patient and wife at the  bedside 
83-yo M w/ PMH of CAD s/p bypass, C diff, and recent "stomach virus" w/ diarrhea, followed by BUE/BLE weakness, being treated for KATLYN/GBS. ID has been consulted for abnormal lab values.    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 EBV VCA IgG, EBNA, and EA positive. EBV VCA IgM neg. CSF MEP neg. Labs indicate past HSV infection (no CSF involvement per MEP). EBV serology could suggest recent infection vs reactivation. Note that HSV serology lacks clinical value. HSV 1 and 2 neg on MEP, so CSF involvement is unlikely. Possibly patient had exposure to HSV 1 and 2 at some point of his life. Acyclovir has theoretical activity against EBV, however its clinical value is dubious and literature is sparse.    #EBV serology positive  #HSV serology positive  #KATLYN/GBS  #Limb weakness    Recommendations:  - KATLYN/GBS management per neurology. No objection to IVIG use from ID standpoint.  - Would not recommend using antimicrobials at this time.  - PT/OT and rehab per primary.    Plan discussed with primary team house staff.  Thank you for this consult. Inpatient ID consult team will discontinue active follow-up for this patient.    Further changes in lab values, imaging studies, or clinical status will not be known to ID inpatient consultants unless specifically communicated by primary team.    Contreras Garcia MD, PhD  Attending Physician  Division of Infectious Diseases  Department of Medicine    Please contact through MS Teams message.  Office: [phone] (after 5 PM or weekend)

## 2024-05-08 NOTE — PROVIDER CONTACT NOTE (OTHER) - ASSESSMENT
Patient has no complaints of pain, neurologically the same
pt remains neurologically at baseline, pt denies any chest pain and denies shortness of breath,  blood pressure: 134/83.  HR:125  temp: 100.2  O2: 94%

## 2024-05-08 NOTE — PROGRESS NOTE ADULT - ATTENDING COMMENTS
I have examined the pt at bedside.  The risks and the benefits of the proposed diagnostic/therapeutic approach were thoroughly discussed.   I agree with the above plan and have modified it where necessary.    85yo RH WM, CAD s/p CABG, HTN, presenting with quadriparesis after 8 days of diarrhea.   Due to initial admission to OSH and rehab, diagnostic and therapeutic approach has been delayed.  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  Currently no respiratory involvement nor swallowing deficits.    CSF with alb/cyt dissociation.  MRI neuraxis is benign.  S/p IVIG 4/5 days, with marginal improvement, he can now lift 4x off the bed. R side seems better.  Mild flexion of fingers, likely due to weakness of hand and wrist extensors, will use splint (OT) - s/p using splints overnight, L hand is softer and can be opened better; R hand still a bit stiffer; can elevate RLE>LLE off the bed.  Remains in good mood and optimistic, eager to go to rehab, prefers [facility] - discussed with .  Possible UTI, on ABX for GNR in Blood Cx.  On CTPE, and US of LE, new DVT RLE and R lung PE. Will be started on A/C.    Impression:   -GBS/KATLYN  -DVT/PE  -UTI/septicemia      Plan:   -continue with IVIG  -PT/OT - hands splints and hygiene.    Rest of management as described in resident's note.

## 2024-05-09 ENCOUNTER — RESULT REVIEW (OUTPATIENT)
Age: 84
End: 2024-05-09

## 2024-05-09 LAB
-  AMPICILLIN/SULBACTAM: SIGNIFICANT CHANGE UP
-  AMPICILLIN: SIGNIFICANT CHANGE UP
-  AZTREONAM: SIGNIFICANT CHANGE UP
-  CEFAZOLIN: SIGNIFICANT CHANGE UP
-  CEFEPIME: SIGNIFICANT CHANGE UP
-  CEFOXITIN: SIGNIFICANT CHANGE UP
-  CEFTRIAXONE: SIGNIFICANT CHANGE UP
-  CIPROFLOXACIN: SIGNIFICANT CHANGE UP
-  ERTAPENEM: SIGNIFICANT CHANGE UP
-  GENTAMICIN: SIGNIFICANT CHANGE UP
-  LEVOFLOXACIN: SIGNIFICANT CHANGE UP
-  MEROPENEM: SIGNIFICANT CHANGE UP
-  PIPERACILLIN/TAZOBACTAM: SIGNIFICANT CHANGE UP
-  TOBRAMYCIN: SIGNIFICANT CHANGE UP
-  TRIMETHOPRIM/SULFAMETHOXAZOLE: SIGNIFICANT CHANGE UP
A-TOCOPHEROL VIT E SERPL-MCNC: 13.1 MG/L — SIGNIFICANT CHANGE UP (ref 9–29)
AMPHIPHYSIN AB TITR CSF: NEGATIVE — SIGNIFICANT CHANGE UP
ANION GAP SERPL CALC-SCNC: 12 MMOL/L — SIGNIFICANT CHANGE UP (ref 5–17)
AQP4 H2O CHANNEL AB SERPL IA-ACNC: NEGATIVE — SIGNIFICANT CHANGE UP
B BURGDOR DNA SPEC QL NAA+PROBE: NEGATIVE — SIGNIFICANT CHANGE UP
BETA+GAMMA TOCOPHEROL SERPL-MCNC: 0.5 MG/L — SIGNIFICANT CHANGE UP (ref 0.5–4.9)
BUN SERPL-MCNC: 20 MG/DL — SIGNIFICANT CHANGE UP (ref 7–23)
CALCIUM SERPL-MCNC: 8.5 MG/DL — SIGNIFICANT CHANGE UP (ref 8.4–10.5)
CHLORIDE SERPL-SCNC: 100 MMOL/L — SIGNIFICANT CHANGE UP (ref 96–108)
CO2 SERPL-SCNC: 23 MMOL/L — SIGNIFICANT CHANGE UP (ref 22–31)
CREAT SERPL-MCNC: 0.69 MG/DL — SIGNIFICANT CHANGE UP (ref 0.5–1.3)
CULTURE RESULTS: ABNORMAL
CV2 IGG TITR CSF: NEGATIVE — SIGNIFICANT CHANGE UP
EGFR: 92 ML/MIN/1.73M2 — SIGNIFICANT CHANGE UP
GLIAL NUC TYPE 1 AB TITR CSF: NEGATIVE — SIGNIFICANT CHANGE UP
GLUCOSE BLDC GLUCOMTR-MCNC: 127 MG/DL — HIGH (ref 70–99)
GLUCOSE SERPL-MCNC: 87 MG/DL — SIGNIFICANT CHANGE UP (ref 70–99)
H CAPSUL AG CSF IA-MCNC: SIGNIFICANT CHANGE UP
HCT VFR BLD CALC: 38.3 % — LOW (ref 39–50)
HGB BLD-MCNC: 13 G/DL — SIGNIFICANT CHANGE UP (ref 13–17)
HU1 AB TITR CSF IF: NEGATIVE — SIGNIFICANT CHANGE UP
HU2 AB TITR CSF IF: NEGATIVE — SIGNIFICANT CHANGE UP
HU3 AB TITR CSF: NEGATIVE — SIGNIFICANT CHANGE UP
IFA NOTES: SIGNIFICANT CHANGE UP
LYME IGG LINE BLOT INTERP.: NEGATIVE — SIGNIFICANT CHANGE UP
LYME IGM LINE BLOT INTERP.: NEGATIVE — SIGNIFICANT CHANGE UP
MBP CSF-MCNC: 2.7 NG/ML — SIGNIFICANT CHANGE UP (ref 0–6)
MCHC RBC-ENTMCNC: 32.2 PG — SIGNIFICANT CHANGE UP (ref 27–34)
MCHC RBC-ENTMCNC: 33.9 GM/DL — SIGNIFICANT CHANGE UP (ref 32–36)
MCV RBC AUTO: 94.8 FL — SIGNIFICANT CHANGE UP (ref 80–100)
METHOD TYPE: SIGNIFICANT CHANGE UP
MOG AB CSF QL CBA IFA: NEGATIVE — SIGNIFICANT CHANGE UP
MOG AB SER QL CBA IFA: NEGATIVE — SIGNIFICANT CHANGE UP
NRBC # BLD: 0 /100 WBCS — SIGNIFICANT CHANGE UP (ref 0–0)
ORGANISM # SPEC MICROSCOPIC CNT: ABNORMAL
P18 AB. IGG: SIGNIFICANT CHANGE UP
P23 AB. IGG: SIGNIFICANT CHANGE UP
P23 AB. IGM: SIGNIFICANT CHANGE UP
P28 AB. IGG: SIGNIFICANT CHANGE UP
P30 AB. IGG: SIGNIFICANT CHANGE UP
P39 AB. IGG: SIGNIFICANT CHANGE UP
P39 AB. IGM: SIGNIFICANT CHANGE UP
P41 AB. IGG: SIGNIFICANT CHANGE UP
P41 AB. IGM: SIGNIFICANT CHANGE UP
P45 AB. IGG: SIGNIFICANT CHANGE UP
P58 AB. IGG: SIGNIFICANT CHANGE UP
P66 AB. IGG: SIGNIFICANT CHANGE UP
P93 AB. IGG: SIGNIFICANT CHANGE UP
PARANEOPLASTIC INTERPRETATION, CSF: SIGNIFICANT CHANGE UP
PCA-TR AB TITR CSF: NEGATIVE — SIGNIFICANT CHANGE UP
PLATELET # BLD AUTO: 193 K/UL — SIGNIFICANT CHANGE UP (ref 150–400)
POTASSIUM SERPL-MCNC: 3.7 MMOL/L — SIGNIFICANT CHANGE UP (ref 3.5–5.3)
POTASSIUM SERPL-SCNC: 3.7 MMOL/L — SIGNIFICANT CHANGE UP (ref 3.5–5.3)
PURKINJE CELL CYTOPLASMIC AB TYPE 2: NEGATIVE — SIGNIFICANT CHANGE UP
PURKINJE CELLS AB TITR CSF IF: NEGATIVE — SIGNIFICANT CHANGE UP
PYRIDOXAL PHOS SERPL-MCNC: 16 UG/L — SIGNIFICANT CHANGE UP (ref 3.4–65.2)
PYRIDOXAL PHOS SERPL-MCNC: 20.3 UG/L — SIGNIFICANT CHANGE UP (ref 3.4–65.2)
RBC # BLD: 4.04 M/UL — LOW (ref 4.2–5.8)
RBC # FLD: 14.5 % — SIGNIFICANT CHANGE UP (ref 10.3–14.5)
SODIUM SERPL-SCNC: 135 MMOL/L — SIGNIFICANT CHANGE UP (ref 135–145)
SPECIMEN SOURCE: SIGNIFICANT CHANGE UP
VIT B1 SERPL-MCNC: 166.5 NMOL/L — SIGNIFICANT CHANGE UP (ref 66.5–200)
VIT B1 SERPL-MCNC: 170.6 NMOL/L — SIGNIFICANT CHANGE UP (ref 66.5–200)
WBC # BLD: 4.57 K/UL — SIGNIFICANT CHANGE UP (ref 3.8–10.5)
WBC # FLD AUTO: 4.57 K/UL — SIGNIFICANT CHANGE UP (ref 3.8–10.5)

## 2024-05-09 PROCEDURE — 93308 TTE F-UP OR LMTD: CPT | Mod: 26

## 2024-05-09 PROCEDURE — 99233 SBSQ HOSP IP/OBS HIGH 50: CPT

## 2024-05-09 PROCEDURE — 93321 DOPPLER ECHO F-UP/LMTD STD: CPT | Mod: 26

## 2024-05-09 PROCEDURE — 93325 DOPPLER ECHO COLOR FLOW MAPG: CPT | Mod: 26

## 2024-05-09 PROCEDURE — 99232 SBSQ HOSP IP/OBS MODERATE 35: CPT

## 2024-05-09 RX ADMIN — FAMOTIDINE 40 MILLIGRAM(S): 10 INJECTION INTRAVENOUS at 05:05

## 2024-05-09 RX ADMIN — DICLOFENAC SODIUM 2 GRAM(S): 30 GEL TOPICAL at 11:20

## 2024-05-09 RX ADMIN — Medication 1 DROP(S): at 17:11

## 2024-05-09 RX ADMIN — Medication 1 TABLET(S): at 21:04

## 2024-05-09 RX ADMIN — Medication 81 MILLIGRAM(S): at 11:18

## 2024-05-09 RX ADMIN — ENOXAPARIN SODIUM 80 MILLIGRAM(S): 100 INJECTION SUBCUTANEOUS at 17:10

## 2024-05-09 RX ADMIN — ATORVASTATIN CALCIUM 40 MILLIGRAM(S): 80 TABLET, FILM COATED ORAL at 21:04

## 2024-05-09 RX ADMIN — CEFTRIAXONE 100 MILLIGRAM(S): 500 INJECTION, POWDER, FOR SOLUTION INTRAMUSCULAR; INTRAVENOUS at 01:55

## 2024-05-09 RX ADMIN — FAMOTIDINE 40 MILLIGRAM(S): 10 INJECTION INTRAVENOUS at 17:11

## 2024-05-09 RX ADMIN — ENOXAPARIN SODIUM 80 MILLIGRAM(S): 100 INJECTION SUBCUTANEOUS at 05:05

## 2024-05-09 NOTE — DIETITIAN INITIAL EVALUATION ADULT - PERTINENT LABORATORY DATA
05-09    135  |  100  |  20  ----------------------------<  87  3.7   |  23  |  0.69    Ca    8.5      09 May 2024 06:53    A1C with Estimated Average Glucose Result: 5.7 % (05-02-24 @ 21:36)  A1C with Estimated Average Glucose Result: 5.8 % (10-02-23 @ 17:35)

## 2024-05-09 NOTE — PROGRESS NOTE ADULT - ASSESSMENT
Patient is a 84 Y/O Male R-handed PMHx CAD s/p bypass x4 and HLD presenting as a transfer from [facility]. Presented with a 'stomach virus' in 3/2024, reported significant weight loss, sensation of 'wobbling,' with repeated falls. Had seen Dr. Yvette Liu in the outpatient setting who reported he had KATLYN. Patient obtained an MRI of the whole axis w/w/o contrast, started IVIG (D1 5/3).     # KATLYN  - IVIG  - PT  - Monitor clinially  - Fall, Seizure and Aspiration precautions     # Bacteremia  - 5/7 BCx 1 of 2 w/ Gram neg Rods, Proteus mirabilis --> On Rocephin, sensitive. ABX per ID   - 5/8 BCx 1 w/ NGTD; F/u final   - Pan CT   - CT chest, Abd/Pelv --> W/ bladder wall thickening. Check CT urogram. Monitor for CHAD, hydrate as tolerated   - Repeat cultures   - Trend CBC, temp curve, VS and patient closely     # Acute LE DVT and PE   - Placed on lovenox. Monitor H/H. Hgb closely   - TTE w/ ED of 75%, hyperdynamic, no WMA, Apical variant hypertrophic cardiomyopathy --> Recommended for cardiac MR  - Monitor on tele  - Monitor hemodynamics   - Monitor O2 saturation; Supplement to maintain> 90%   - Cardio eval suggested. Defer Cardiac MR per cardio     # New onset a fib   - TTE as above --> Recommended for Cardiac MR   - AC , lovenox  - Rate control  - Tele monitoring   - Ischemic W/U after resolution of neuro symptoms     # CAD S/P Bypass  - ASA and statin  - BP control   - Monitor on tele    # HLD   - Check lipid panel  - Statin     DVT and GI PPX     Discussed with Attending

## 2024-05-09 NOTE — PROGRESS NOTE ADULT - ASSESSMENT
83-yo M w/ PMH of CAD s/p bypass, C diff, and recent "stomach virus" w/ diarrhea, followed by BUE/BLE weakness, being treated for KATLYN/GBS. ID has been consulted for abnormal lab values.    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 EBV VCA IgG, EBNA, and EA positive. EBV VCA IgM neg. CSF MEP neg. Labs indicate past HSV infection (no CSF involvement per MEP). EBV serology could suggest recent infection vs reactivation. Note that HSV serology lacks clinical value. HSV 1 and 2 neg on MEP, so CSF involvement is unlikely. Possibly patient had exposure to HSV 1 and 2 at some point of his life. Acyclovir has theoretical activity against EBV, however its clinical value is dubious and literature is sparse.    5/7: Called back for low-grade temperature overnight (100.2). IVIG is a blood product, and post-infusion fever is not uncommon. Would hold off antibiotics and perform a fever workup. No dysuria or suprapubic tenderness - would not treat asymptomatic bacteriuria.    5/8: Proteus from 5/7 BCx. Started on ceftriaxone 1g IV Q24H. MARION declined per patient. No dysuria or perineal discomfort noted per patient. Of note, CTAP revealed enlarged prostate and bladder wall thickening, suggestive of urinary source in conjunction with UA.    5/9: Proteus 5/7 BCx w/ grossly susceptible. 5/8 BCx NGTD.    #Proteus bacteremia  #UTI  #Enlarged prostate  #EBV and HSV serologies positive  #KATLYN/GBS  #Limb weakness  #Low grade fever  #PE/DVT    Recommendations:  - F/u BCx and UCx   - PT/OT and rehab per primary.  - AC for PE per primary team  - Outpatient urology follow-up  - Continue ceftriaxone 1g IV Q24H while inpatient. Can transition to Bactrim DS 2 tab Q12H for oral transition  - Antibiotic end date 5/17/24.    Plan discussed with primary team house staff.  Thank you for this consult.     Contreras Garcia MD, PhD  Attending Physician  Division of Infectious Diseases  Department of Medicine    Please contact through MS Teams message.  Office: [phone] (after 5 PM or weekend)

## 2024-05-09 NOTE — DIETITIAN INITIAL EVALUATION ADULT - NSFNSADHERENCEPTAFT_GEN_A_CORE
Pt denies following a therapeutic diet PTA. HbA1c 5.7% (5/2) indicating pre-DM. Pt not taking any DM meds PTA.

## 2024-05-09 NOTE — DIETITIAN INITIAL EVALUATION ADULT - PERTINENT MEDS FT
MEDICATIONS  (STANDING):  acetaminophen     Tablet .. 650 milliGRAM(s) Oral once  artificial tears (preservative free) Ophthalmic Solution 1 Drop(s) Both EYES two times a day  aspirin enteric coated 81 milliGRAM(s) Oral daily  atorvastatin 40 milliGRAM(s) Oral at bedtime  cefTRIAXone   IVPB 1000 milliGRAM(s) IV Intermittent every 24 hours  diclofenac sodium 1% Gel 2 Gram(s) Topical daily  diphenhydrAMINE Injectable 25 milliGRAM(s) IV Push daily  enoxaparin Injectable 80 milliGRAM(s) SubCutaneous every 12 hours  famotidine    Tablet 40 milliGRAM(s) Oral two times a day  multivitamin 1 Tablet(s) Oral at bedtime  polyethylene glycol 3350 17 Gram(s) Oral daily    MEDICATIONS  (PRN):  artificial  tears Solution 1 Drop(s) Both EYES daily PRN Dry Eyes

## 2024-05-09 NOTE — DIETITIAN INITIAL EVALUATION ADULT - EDUCATION DIETARY MODIFICATIONS
adequate protein energy intake of meals + oral nutrition supplements/(1) partially meets; needs review/practice/verbalization

## 2024-05-09 NOTE — PROGRESS NOTE ADULT - NS ATTEND AMEND GEN_ALL_CORE FT
Pt care and plan discussed and reviewed with PA. Plan as outlined above edited by me to reflect our discussion. Advanced care planning/advanced directives discussed with patient/family. DNR status including forceful chest compressions to attempt to restart the heart, ventilator support/artificial breathing, electric shock, artificial nutrition, health care proxy, Molst form all discussed with pt. Fifty seven minutes of total time dedicated to this patient visit today including preparing to see the patient (eg. review of tests), obtaining and/or reviewing separately obtained history, obtaining/reviewing vitals, performing a medically appropriate examination and/or evaluation, counseling and educating the patient/family/caregiver, reviewing previous notes and test results, and procedures, communicating with other health professionals (when not separately reported), and documenting clinical information in the electronic health record.

## 2024-05-09 NOTE — DIETITIAN INITIAL EVALUATION ADULT - ORAL INTAKE PTA/DIET HISTORY
Chart reviewed, events noted. Pt reports terrific appetite/PO intake PTA. Reports decrease in PO intake while dealing with norovirus and diarrhea x 2 months ago and in turn endorses 30 pound wt loss. Pt was also in and out of rehab which likely impacted PO intake (doesn't love institutional food). Denies any issues chewing/swallowing. NKFA. Dislikes bananas.

## 2024-05-09 NOTE — DIETITIAN INITIAL EVALUATION ADULT - ADD RECOMMEND
1) continue regular diet w/feeding assistance  2) increase ensure plus high protein to 2x/day  3) continue MVI daily  4) Malnutrition sticker placed, RD to follow-up as per protocol   5) Monitor PO intake, weight, labs, skin, GI status, diet

## 2024-05-09 NOTE — DIETITIAN INITIAL EVALUATION ADULT - NS FNS DIET ORDER
Diet, Regular:   Supplement Feeding Modality:  Oral  Ensure Enlive Cans or Servings Per Day:  1       Frequency:  Daily (05-04-24 @ 11:59) [Active]

## 2024-05-09 NOTE — DIETITIAN INITIAL EVALUATION ADULT - ENERGY INTAKE
Fair (50-75%) Pt reports he has no appetite since coming to the hospital (x 7 days), but he is trying to eat. Needs assistance eating 2/2 weakness in hands and arms. Drinking 1 Ensure/day (sometimes 2 if wife brings them from home) and also eating keto protein bar from daughter (10 g Pro, 1 g Sugar per bar). States he ate soup and grilled cheese at lunch today. No specific food preferences offered. Provided Pt with menu and instructions on how to place orders to maximize satisfaction. Stressed importance of adequate meal intake before supplements and Pt receptive. Happy about wt loss, but is aware that it was an unhealthy way to lose wt. Notes he feels like he has been able to regain some of the wt he lost. Labs reviewed, WNL.

## 2024-05-09 NOTE — PROGRESS NOTE ADULT - SUBJECTIVE AND OBJECTIVE BOX
Follow Up:    Bacteremia    Interval History/ROS:  VSS ON. Proteus bacteremia generally susceptible. Patient was seen and examined at bedside. Graciela fever or SOB. No dysuria. No pelvic pain.    Allergies  penicillins (Rash)        ANTIMICROBIALS:  cefTRIAXone   IVPB 1000 every 24 hours      OTHER MEDS:  MEDICATIONS  (STANDING):  acetaminophen     Tablet .. 650 once  aspirin enteric coated 81 daily  atorvastatin 40 at bedtime  diphenhydrAMINE Injectable 25 daily  enoxaparin Injectable 80 every 12 hours  famotidine    Tablet 40 two times a day  polyethylene glycol 3350 17 daily      Vital Signs Last 24 Hrs  T(C): 36.4 (09 May 2024 13:29), Max: 37.1 (08 May 2024 20:39)  T(F): 97.6 (09 May 2024 13:29), Max: 98.7 (08 May 2024 20:39)  HR: 89 (09 May 2024 13:29) (86 - 105)  BP: 135/85 (09 May 2024 13:29) (101/69 - 135/85)  BP(mean): --  RR: 18 (09 May 2024 13:29) (18 - 18)  SpO2: 93% (09 May 2024 13:29) (93% - 95%)    Parameters below as of 09 May 2024 05:05  Patient On (Oxygen Delivery Method): room air      PHYSICAL EXAM:  Constitutional: non-toxic, no distress  HEAD/EYES: anicteric, no conjunctival injection  Cardiovascular:   normal S1, S2, no murmur, RRR  Respiratory:  clear BS bilaterally, no wheezes, no rales  Musculoskeletal:  no BLE edema  : No suprapubic tenderness; patient declined MARION for prostate examination  Neurologic: awake and oriented x3; BUE and BLE flexion and extension +3/5 symmetric.  Skin:  no rash, no erythema, no phlebitis  Psychiatric:  awake, alert, appropriate mood                              13.0   4.57  )-----------( 193      ( 09 May 2024 06:50 )             38.3       05-09    135  |  100  |  20  ----------------------------<  87  3.7   |  23  |  0.69    Ca    8.5      09 May 2024 06:53        Urinalysis Basic - ( 09 May 2024 06:53 )    Color: x / Appearance: x / SG: x / pH: x  Gluc: 87 mg/dL / Ketone: x  / Bili: x / Urobili: x   Blood: x / Protein: x / Nitrite: x   Leuk Esterase: x / RBC: x / WBC x   Sq Epi: x / Non Sq Epi: x / Bacteria: x        MICROBIOLOGY:  v  .Blood Blood  05-08-24   No growth at 24 hours  --  --      .Blood Blood  05-07-24   No growth at 48 Hours  --  Blood Culture PCR  Proteus mirabilis      .CSF CSF  05-03-24   Culture is being performed.  --  --      .CSF CSF  05-03-24   No growth at 5 days  --    No polymorphonuclear cells seen  No organisms seen  by cytocentrifuge          EBV PCR: NotDetec IU/mL (05-03 @ 11:17)        RADIOLOGY:  Imaging below independently reviewed.  < from: CT Abdomen and Pelvis w/ IV Cont (05.08.24 @ 11:24) >    ACC: [id number] EXAM:  CT ABDOMEN AND PELVIS IC   ORDERED BY:  NASRA DOE     ACC: [id number] EXAM:  CT ANGIO CHEST PULM ART [facility]   ORDERED BY:    NASRA DOE     PROCEDURE DATE:  05/08/2024          INTERPRETATION:  CLINICAL INFORMATION: Patient on IVIG with low-grade   temperature and brief episode of hypoxia, evaluate for PE    COMPARISON: None.    CONTRAST/COMPLICATIONS:  IV Contrast: Omnipaque 350 (accession [id number]), IV contrast documented   in unlinked concurrent exam (accession [id number])  90 cc administered   10   cc discarded  Oral Contrast: NONE  Complications: None reported at time of study completion    PROCEDURE:  CT Angiography of the Chest was performed followed by portal venous phase   imaging of the Abdomen and Pelvis.  Sagittal and coronal reformats were performed as well as 3D (MIP)   reconstructions.    FINDINGS:  CHEST:  LUNGS, PLEURA AND LARGE AIRWAYS: Patent central airways. The right   hemidiaphragm is elevated. Compressive atelectasis of right middle lobe   andpartial right lower lobe. The lungs are otherwise clear.  VESSELS: Embolus within the right upper lobe pulmonary artery. No   evidence of right heart strain.  HEART: Left ventricular hypertrophy. No pericardial effusion. CABG.  MEDIASTINUM AND LUCI: No lymphadenopathy. Mediastinal surgical clips  CHEST WALL AND LOWER NECK: Sternotomy wires    ABDOMEN AND PELVIS:  LIVER: Right hepatic lobe subcentimeter hypodense focus, too small to   characterize.  BILE DUCTS: Normal caliber.  GALLBLADDER: Cholelithiasis.  SPLEEN: Within normal limits.  PANCREAS: Within normal limits.  ADRENALS: Within normal limits.  KIDNEYS/URETERS: Left renal subcentimeter hypodense foci, too small to   characterize. No hydronephrosis bilaterally.    BLADDER: Circumferential wall thickening.  REPRODUCTIVE ORGANS: Prostate is heterogenous and enlarged.    BOWEL: No bowel obstruction. Appendix is not visualized  PERITONEUM: No ascites.  VESSELS: Atherosclerotic changes.  RETROPERITONEUM/LYMPH NODES: No lymphadenopathy.  ABDOMINAL WALL: Within normal limits.  BONES: Healed sternotomy.    IMPRESSION:  Embolus within the right upper lobe pulmonary artery. No evidence of   right heart strain.    Circumferential wall thickening of the bladder. Otherwise, no acute   finding within abdomen/pelvis.    Findings were discussed with Dr. ADOLFO Doe 5/8/2024 11:23 AM by Dr. Mat Lund with read back confirmation.    --- End of Report ---           MAT CRAMER MD; Resident Radiologist  This document has been electronically signed.  ARVIND YOUNG MD; Attending Radiologist  This document has been electronically signed. May  8 2024  1:23PM    < end of copied text >

## 2024-05-09 NOTE — PROGRESS NOTE ADULT - ASSESSMENT
Assessment  84M with h/o CAD s/p CABG, HTN, presenting with quadriparesis after 8 days of diarrhea. Initially admitted to [facility]. Gradually became even more week, at the time unable to lift legs but able to lift arms. Had multiple falls after being discharged from rehab. Recommended to go back to [place]. Evaluated by Dr. 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 Currently no respiratory involvement nor swallowing deficits.  Lyme neg. EBV virus capsid IgG and nuclear antigen positive, early antigen positive, IgM negative, HSV 1 and 2 Ab positive. 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   - MRI axis completed   - c/w IVIG x5d (5/3 - 5/7), completed course with premedications  - LP  glucose 64, protein 77, cell count 1, acid fast cx ruchi, PCR panel neg, Lyme antibodies, VDRL titer, WNV antibodies, ACE, IgG index, paraneoplastic panel, MBP, OCB  - Serum studies - B1, B6, folate 15.5, B12 808, HIV neg, HTLV (negative),  copper, (123) zinc (67), ESR 37, CRP (4), MELYSSA (-), ANCA (-), DsDNA <1, RF <10, TSH 3.74 w/ reflex T4 1.3, SPEP, UPEP, Coags, ionized Ca 1.19, fibrinogen (451), ganglioside panel wnl   - PT/OT; splints + splint evaluation -> pending re-eval today 5/9    #CAD  - c/w aspirin 81mg, statin 40mg    #Constipation   - Miralax 17g daily    #Dry eye  -artificial tears + refresh eye drops prn    #Fever  -infectious workup pending - BCx (1 with gram-), CXR (c/f atelectasis vs infxn)  --pending 1bloodcx, 2 repeat bx on 5/8  --CTAP + CTPE done  --pending Ucx  -ID consulted, appreciate insight     #New onset afib   on tele noted 5/8  -f/u echo  -lovenox full dose  -lopressor 12.5 BID hold for HR below 60  -tele monitoring    #Pulm  -NIF/VC 1x - follow up (poor effort); repeat 5/9  -incentive spirometry started  -CTPE as above    Dispo: Acute rehab   DVT ppx: Lovenox -> switched to full dose on 5/8/24 80 BID  Regular Diet; nutrition consulted given poor PO intake Assessment  84M with h/o CAD s/p CABG, HTN, presenting with quadriparesis after 8 days of diarrhea. Initially admitted to [facility]. Gradually became even more week, at the time unable to lift legs but able to lift arms. Had multiple falls after being discharged from rehab. Recommended to go back to [place]. Evaluated by Dr. 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 Currently no respiratory involvement nor swallowing deficits.  Lyme neg. EBV virus capsid IgG and nuclear antigen positive, early antigen positive, IgM negative, HSV 1 and 2 Ab positive. 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   - MRI axis completed   - c/w IVIG x5d (5/3 - 5/7), completed course with premedications  - LP  glucose 64, protein 77, cell count 1, acid fast cx ruchi, PCR panel neg, Lyme antibodies, VDRL titer, WNV antibodies, ACE, IgG index, paraneoplastic panel, MBP, OCB  - Serum studies - B1, B6, folate 15.5, B12 808, HIV neg, HTLV (negative),  copper, (123) zinc (67), ESR 37, CRP (4), MELYSSA (-), ANCA (-), DsDNA <1, RF <10, TSH 3.74 w/ reflex T4 1.3, SPEP, UPEP, Coags, ionized Ca 1.19, fibrinogen (451), ganglioside panel wnl   - PT/OT; splints + splint evaluation -> pending re-eval today 5/9    #CAD  - c/w aspirin 81mg, statin 40mg    #Constipation   - Miralax 17g daily    #Dry eye  -artificial tears + refresh eye drops prn    #Fever   #?prostatitis  -infectious workup pending - BCx (1 with gram-), CXR (c/f atelectasis vs infxn)  --pending 1bloodcx, 2 repeat bx on 5/8  --CTAP + CTPE done  --pending Ucx  -urology team contacted, no need for consult unless imaging shows abscess in prostate and can continue abx if concern for prostatitis  -ID consulted, appreciate insight     #New onset afib   on tele noted 5/8  -f/u echo  -lovenox full dose  -lopressor 12.5 BID hold for HR below 60  -tele monitoring    #Pulm  -NIF/VC 1x - follow up (poor effort); repeat 5/9  -incentive spirometry started  -CTPE as above    Dispo: Acute rehab   DVT ppx: Lovenox -> switched to full dose on 5/8/24 80 BID  Regular Diet; nutrition consulted given poor PO intake Assessment  84M with h/o CAD s/p CABG, HTN, presenting with quadriparesis after 8 days of diarrhea. Initially admitted to [facility]. Gradually became even more week, at the time unable to lift legs but able to lift arms. Had multiple falls after being discharged from rehab. Recommended to go back to [place]. Evaluated by Dr. 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 Currently no respiratory involvement nor swallowing deficits.  Lyme neg. EBV virus capsid IgG and nuclear antigen positive, early antigen positive, IgM negative, HSV 1 and 2 Ab positive. 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   - MRI axis completed   - c/w IVIG x5d (5/3 - 5/7), completed course with premedications  - LP  glucose 64, protein 77, cell count 1, acid fast cx ruchi, PCR panel neg, Lyme antibodies, VDRL titer, WNV antibodies, ACE, IgG index, paraneoplastic panel, MBP, OCB  - Serum studies - B1, B6, folate 15.5, B12 808, HIV neg, HTLV (negative),  copper, (123) zinc (67), ESR 37, CRP (4), MELYSSA (-), ANCA (-), DsDNA <1, RF <10, TSH 3.74 w/ reflex T4 1.3, SPEP, UPEP, Coags, ionized Ca 1.19, fibrinogen (451), ganglioside panel wnl   - PT/OT; splints + splint evaluation -> pending re-eval today 5/9    #CAD  - c/w aspirin 81mg, statin 40mg    #Constipation   - Miralax 17g daily    #Dry eye  -artificial tears + refresh eye drops prn    #Fever   #?prostatitis  -infectious workup pending - BCx (1 with gram-), CXR (c/f atelectasis vs infxn)  --pending 1bloodcx, 2 repeat bx on 5/8  --CTAP + CTPE done  --pending Ucx  -urology team contacted, no need for consult unless imaging shows abscess in prostate and can continue abx if concern for prostatitis  -ID consulted, appreciate insight     #New onset afib   on tele noted 5/8  -f/u echo  -lovenox full dose  -tele monitoring    #Pulm  -NIF/VC 1x - follow up (poor effort); repeat 5/9  -incentive spirometry started  -CTPE as above    Dispo: Acute rehab   DVT ppx: Lovenox -> switched to full dose on 5/8/24 80 BID  Regular Diet; nutrition consulted given poor PO intake Assessment  84M with h/o CAD s/p CABG, HTN, presenting with quadriparesis after 8 days of diarrhea. Initially admitted to [facility]. Gradually became even more week, at the time unable to lift legs but able to lift arms. Had multiple falls after being discharged from rehab. Recommended to go back to [place]. Evaluated by Dr. 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 Currently no respiratory involvement nor swallowing deficits.  Lyme neg. EBV virus capsid IgG and nuclear antigen positive, early antigen positive, IgM negative, HSV 1 and 2 Ab positive. 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   - MRI axis completed   - c/w IVIG x5d (5/3 - 5/7), completed course with premedications  - LP  glucose 64, protein 77, cell count 1, acid fast cx ruchi, PCR panel neg, Lyme antibodies, VDRL titer, WNV antibodies, ACE, IgG index, paraneoplastic panel, MBP, OCB  - Serum studies - B1, B6, folate 15.5, B12 808, HIV neg, HTLV (negative),  copper, (123) zinc (67), ESR 37, CRP (4), MELYSSA (-), ANCA (-), DsDNA <1, RF <10, TSH 3.74 w/ reflex T4 1.3, SPEP, UPEP, Coags, ionized Ca 1.19, fibrinogen (451), ganglioside panel wnl   - PT/OT; splints + splint evaluation -> pending re-eval today 5/9    #CAD  - c/w aspirin 81mg, statin 40mg    #Constipation   - Miralax 17g daily    #Dry eye  -artificial tears + refresh eye drops prn    #Fever   #?prostatitis  -infectious workup pending - BCx (1 with gram-), CXR (c/f atelectasis vs infxn)  --pending 1bloodcx, 2 repeat bx on 5/8; f/u sensitivities, will call lab to see if can expedite sensitivities  --CTAP + CTPE done  --pending Ucx  -urology team contacted, no need for consult unless imaging shows abscess in prostate and can continue abx if concern for prostatitis  -ID consulted, appreciate insight     #New onset afib   on tele noted 5/8  -f/u echo  -lovenox full dose -> check duration  -tele monitoring  -rate control    #Pulm  -NIF/VC 1x - follow up (poor effort); repeat on 5/9  -incentive spirometry started  -CTPE as above    Dispo: Acute rehab   DVT ppx: Lovenox -> switched to full dose on 5/8/24 80 BID  Regular Diet; nutrition consulted given poor PO intake

## 2024-05-09 NOTE — DIETITIAN INITIAL EVALUATION ADULT - OTHER INFO
dosing wt: 86.2 kg (5/2)  bed wt obtained by RD: 89.5 kg (5/9); no other daily wts to assess  wt hx per Marilin HIRAFAEL: 90.7 kg (3/21), 97.5 kg (10/16/23), 95.3 kg (7/12/23) *11.6% wt loss x 7 months, not clinically significant  reported UBW: 100 kg

## 2024-05-09 NOTE — PROGRESS NOTE ADULT - SUBJECTIVE AND OBJECTIVE BOX
Neurology Progress Note    Hospital course:  83M R-handed PMHx CAD s/p bypass x4 and HLD presenting [facility] a [facility]. Presented with a 'stomach virus' in 3/2024, reported significant weight loss, sensation of 'wobbling,' with repeated falls. Had seen Dr. Yvette Liu in the outpatient setting who reported he had KATLYN. Patient obtained an MRI of the whole axis w/w/o contrast, started IVIG (D1 5/3). Seen by physical therapy and recommended for acute rehab. Seen by OT for splint evaluation. On 5/7 overnight developed low grade temperature, with tachycardia, infectious workup pending. 5/8 obtained CTCAP, showing PE in R upper lobe, b/l dopplers showing RLE DVT. Started on full dose lovenox. Also noted to have afib on tele. Medicine team consulted.    SUBJECTIVE/OBJECTIVE/INTERVAL EVENTS: Patient seen and examined at bedside w/ neuro attending and team.     INTERVAL HISTORY: No events overnight.    REVIEW OF SYSTEMS: Few questions of a 10-system ROS was performed and is negative except for those items noted above and/or in the HPI.    VITALS & EXAMINATION:  Vital Signs Last 24 Hrs  T(C): 36.6 (09 May 2024 05:05), Max: 37.5 (08 May 2024 13:18)  T(F): 97.8 (09 May 2024 05:05), Max: 99.5 (08 May 2024 13:18)  HR: 86 (09 May 2024 05:05) (74 - 105)  BP: 111/72 (09 May 2024 05:05) (101/69 - 123/87)  BP(mean): --  RR: 18 (09 May 2024 05:05) (18 - 18)  SpO2: 94% (09 May 2024 05:05) (91% - 95%)    Parameters below as of 09 May 2024 05:05  Patient On (Oxygen Delivery Method): room air    General:  Neurological (>12):  MS: Awake, alert, oriented to person, place, situation, time. Normal affect. Follows all commands.  Language: Speech is clear, fluent   CNs: PERRL. VFF. EOMI overall no nystagmus, no diplopia. V1-3 intact to LT,. No facial asymmetry b/l, full eye closure strength b/l. Hearing grossly normal (rubbing fingers) b/l. Symmetric palate elevation in midline. Gag reflex deferred. Shoulder shrug intact b/l. Tongue midline, normal movements, no atrophy.  Motor:               Deltoid	Biceps	Triceps	   R	4-	3	3	2			  L	4-	3	3	2			  	H-Flex  K-Flex  D-Flex	P-Flex  R	4	3	3	3	 	   L	4-	3	0	0		   Sensation: Intact to LT b/l throughout.   Reflexes: 0 throughout, downgoing toes b/l  Coordination: limited by weakness   Gait:  deferred for patient safety    LABORATORY:  CBC                       13.0   4.57  )-----------( 193      ( 09 May 2024 06:50 )             38.3     Chem 05-08    133<L>  |  100  |  17  ----------------------------<  89  3.8   |  22  |  0.63    Ca    8.4      08 May 2024 07:18    U/A Urinalysis Basic - ( 08 May 2024 07:18 )  Color: x / Appearance: x / SG: x / pH: x  Gluc: 89 mg/dL / Ketone: x  / Bili: x / Urobili: x   Blood: x / Protein: x / Nitrite: x   Leuk Esterase: x / RBC: x / WBC x   Sq Epi: x / Non Sq Epi: x / Bacteria: x    STUDIES & IMAGING: (EEG, CT, MR, U/S, TTE/BILLY):  < from: CT Abdomen and Pelvis w/ IV Cont (05.08.24 @ 11:24) >  Embolus within the right upper lobe pulmonary artery. No evidence of   right heart strain.    Circumferential wall thickening of the bladder. Otherwise, no acute   finding within abdomen/pelvis.      < from: [facility] Duplex Lower Ext Vein Scan, Bilat (05.08.24 @ 10:35)   There is acute below the knee DVT affecting a right soleal vein.   Neurology Progress Note    Hospital course:  83M R-handed PMHx CAD s/p bypass x4 and HLD [facility]. Presented with a 'stomach virus' in 3/2024, reported significant weight loss, sensation of 'wobbling,' with repeated falls. Had seen Dr. Yvette Liu in the outpatient setting who reported he had KATLYN. Patient obtained an MRI of the whole axis w/w/o contrast, started IVIG (D1 5/3). Seen by physical therapy and recommended for acute rehab. Seen by OT for splint evaluation. On 5/7 overnight developed low grade temperature, with tachycardia, infectious workup pending + ID team consulted. 5/8 obtained CTCAP, showing PE in R upper lobe, b/l dopplers showing RLE DVT. Started on full dose lovenox. Also noted to have afib on tele. Medicine team consulted.    SUBJECTIVE/OBJECTIVE/INTERVAL EVENTS: Patient seen and examined at bedside w/ neuro attending and team.     INTERVAL HISTORY: No events overnight.    REVIEW OF SYSTEMS: Few questions of a 10-system ROS was performed and is negative except for those items noted above and/or in the HPI.    VITALS & EXAMINATION:  Vital Signs Last 24 Hrs  T(C): 36.6 (09 May 2024 05:05), Max: 37.5 (08 May 2024 13:18)  T(F): 97.8 (09 May 2024 05:05), Max: 99.5 (08 May 2024 13:18)  HR: 86 (09 May 2024 05:05) (74 - 105)  BP: 111/72 (09 May 2024 05:05) (101/69 - 123/87)  BP(mean): --  RR: 18 (09 May 2024 05:05) (18 - 18)  SpO2: 94% (09 May 2024 05:05) (91% - 95%)    Parameters below as of 09 May 2024 05:05  Patient On (Oxygen Delivery Method): room air    General:  Neurological (>12):  MS: Awake, alert, oriented to person, place, situation, time. Normal affect. Follows all commands.  Language: Speech is clear, fluent   CNs: PERRL. VFF. EOMI overall no nystagmus, no diplopia. V1-3 intact to LT,. No facial asymmetry b/l, full eye closure strength b/l. Hearing grossly normal (rubbing fingers) b/l. Symmetric palate elevation in midline. Gag reflex deferred. Shoulder shrug intact b/l. Tongue midline, normal movements, no atrophy.  Motor:               Deltoid	Biceps	Triceps	   R	4-	3	3	2			  L	4-	3	3	2			  	H-Flex  K-Flex  D-Flex	P-Flex  R	4	3	3	3	 	   L	4-	3	0	0		   Sensation: Intact to LT b/l throughout.   Reflexes: 0 throughout, downgoing toes b/l  Coordination: limited by weakness   Gait:  deferred for patient safety    LABORATORY:  CBC                       13.0   4.57  )-----------( 193      ( 09 May 2024 06:50 )             38.3     Chem 05-08    133<L>  |  100  |  17  ----------------------------<  89  3.8   |  22  |  0.63    Ca    8.4      08 May 2024 07:18    U/A Urinalysis Basic - ( 08 May 2024 07:18 )  Color: x / Appearance: x / SG: x / pH: x  Gluc: 89 mg/dL / Ketone: x  / Bili: x / Urobili: x   Blood: x / Protein: x / Nitrite: x   Leuk Esterase: x / RBC: x / WBC x   Sq Epi: x / Non Sq Epi: x / Bacteria: x    STUDIES & IMAGING: (EEG, CT, MR, U/S, TTE/BILLY):  < from: CT Abdomen and Pelvis w/ IV Cont (05.08.24 @ 11:24) >  Embolus within the right upper lobe pulmonary artery. No evidence of   right heart strain.    Circumferential wall thickening of the bladder. Otherwise, no acute   finding within abdomen/pelvis.      < from: VA Duplex Lower Ext Vein Scan, Bilat (05.08.24 @ 10:35)   There is acute below the knee DVT affecting a right soleal vein.   Neurology Progress Note    Hospital course:  83M R-handed PMHx CAD s/p bypass x4 and HLD [facility] Presented with a 'stomach virus' in 3/2024, reported significant weight loss, sensation of 'wobbling,' with repeated falls. Had seen Dr. Yvette Liu in the outpatient setting who reported he had KATLYN. Patient obtained an MRI of the whole axis w/w/o contrast, started IVIG (D1 5/3). Seen by physical therapy and recommended for acute rehab. Seen by OT for splint evaluation. On 5/7 overnight developed low grade temperature, with tachycardia, infectious workup pending + ID team consulted. 5/8 obtained CTCAP, showing PE in R upper lobe, b/l dopplers showing RLE DVT. Started on full dose lovenox. Also noted to have afib on tele. Medicine team consulted.    SUBJECTIVE/OBJECTIVE/INTERVAL EVENTS: Patient seen and examined at bedside w/ neuro attending and team. Talked with family over the phone at bedside.    INTERVAL HISTORY: No events overnight.    REVIEW OF SYSTEMS: Few questions of a 10-system ROS was performed and is negative except for those items noted above and/or in the HPI.    VITALS & EXAMINATION:  Vital Signs Last 24 Hrs  T(C): 36.6 (09 May 2024 05:05), Max: 37.5 (08 May 2024 13:18)  T(F): 97.8 (09 May 2024 05:05), Max: 99.5 (08 May 2024 13:18)  HR: 86 (09 May 2024 05:05) (74 - 105)  BP: 111/72 (09 May 2024 05:05) (101/69 - 123/87)  BP(mean): --  RR: 18 (09 May 2024 05:05) (18 - 18)  SpO2: 94% (09 May 2024 05:05) (91% - 95%)    Parameters below as of 09 May 2024 05:05  Patient On (Oxygen Delivery Method): room air    General:  Neurological (>12):  MS: Awake, alert, oriented to person, place, situation, time. Normal affect. Follows all commands.  Language: Speech is clear, fluent   CNs: PERRL. VFF. EOMI overall no nystagmus, no diplopia. V1-3 intact to LT,. No facial asymmetry b/l, full eye closure strength b/l. Hearing grossly normal (rubbing fingers) b/l. Symmetric palate elevation in midline. Gag reflex deferred. Shoulder shrug intact b/l. Tongue midline, normal movements, no atrophy.  Motor:               Deltoid	Biceps	Triceps	   R	4-	3	3	3			  L	4-	3	3	3			  	H-Flex  D-Flex	P-Flex  R	4	3	2	 	   L	4-	0	0		   Sensation: Intact to LT b/l throughout.   Reflexes: 0 throughout, downgoing toes b/l  Coordination: limited by weakness   Gait:  deferred for patient safety    LABORATORY:  CBC                       13.0   4.57  )-----------( 193      ( 09 May 2024 06:50 )             38.3     Chem 05-08    133<L>  |  100  |  17  ----------------------------<  89  3.8   |  22  |  0.63    Ca    8.4      08 May 2024 07:18    U/A Urinalysis Basic - ( 08 May 2024 07:18 )  Color: x / Appearance: x / SG: x / pH: x  Gluc: 89 mg/dL / Ketone: x  / Bili: x / Urobili: x   Blood: x / Protein: x / Nitrite: x   Leuk Esterase: x / RBC: x / WBC x   Sq Epi: x / Non Sq Epi: x / Bacteria: x    STUDIES & IMAGING: (EEG, CT, MR, U/S, TTE/BILLY):  < from: CT Abdomen and Pelvis w/ IV Cont (05.08.24 @ 11:24) >  Embolus within the right upper lobe pulmonary artery. No evidence of   right heart strain.    Circumferential wall thickening of the bladder. Otherwise, no acute   finding within abdomen/pelvis.      < from: VA Duplex Lower Ext Vein Scan, Bilat (05.08.24 @ 10:35)   There is acute below the knee DVT affecting a right soleal vein.   Principal Discharge DX:	Syncope  Secondary Diagnosis:	Torsion of appendix testis

## 2024-05-09 NOTE — PROGRESS NOTE ADULT - ATTENDING COMMENTS
I have examined the pt at bedside.  The risks and the benefits of the proposed diagnostic/therapeutic approach were thoroughly discussed.   I agree with the above plan and have modified it where necessary.    83yo RH WM, CAD s/p CABG, HTN, presenting with quadriparesis after 8 days of diarrhea.   Due to initial admission to OSH and rehab, diagnostic and therapeutic approach has been delayed.  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  Currently no respiratory involvement nor swallowing deficits.    CSF with alb/cyt dissociation.  MRI neuraxis is benign.  S/p IVIG 4/5 days, with more improvement, he can now lift 4x off the bed, higher, R side and UE better.  Using splints for hands has reduced pain and flexion, he can now move fingers better.  Remains in good mood and optimistic, eager to go to rehab, prefers [facility] - discussed with .  DVT-PE on A/C now.  Continues treatment for UTI, no fever today. Will follow ID recs.  One episode of Afib, will follow medicine recs.    Thorough discussion with pt, wife and son, who were on the phone.    Impression:   -GBS/KATLYN  -DVT/PE  -UTI/septicemia  -Afib.    Plan:   -continue with IVIG  -PT/OT - hands splints and hygiene.  Rest of management as described in resident's note.

## 2024-05-09 NOTE — PROGRESS NOTE ADULT - SUBJECTIVE AND OBJECTIVE BOX
Name of Patient : REHANA MEDINA  MRN: [id number]  Date of visit: 05-09-24 @ 17:13      Subjective: Patient seen and examined. No new events except as noted.   Patient seen earlier this AM. Lying down in bed  Denies chest pain, palpitations, shortness of breath or dyspnea    REVIEW OF SYSTEMS:    CONSTITUTIONAL: Generalized weakness  EYES/ENT: No visual changes;  No vertigo or throat pain   NECK: No pain or stiffness  RESPIRATORY: No cough, wheezing, hemoptysis; No shortness of breath  CARDIOVASCULAR: No chest pain or palpitations  GASTROINTESTINAL: No abdominal or epigastric pain. No nausea, vomiting, or hematemesis; No diarrhea or constipation. No melena or hematochezia.  GENITOURINARY: No dysuria, frequency or hematuria  NEUROLOGICAL: + Weakness  SKIN: No itching, burning, rashes, or lesions   All other review of systems is negative unless indicated above.    MEDICATIONS:  MEDICATIONS  (STANDING):  acetaminophen     Tablet .. 650 milliGRAM(s) Oral once  artificial tears (preservative free) Ophthalmic Solution 1 Drop(s) Both EYES two times a day  aspirin enteric coated 81 milliGRAM(s) Oral daily  atorvastatin 40 milliGRAM(s) Oral at bedtime  cefTRIAXone   IVPB 1000 milliGRAM(s) IV Intermittent every 24 hours  diclofenac sodium 1% Gel 2 Gram(s) Topical daily  diphenhydrAMINE Injectable 25 milliGRAM(s) IV Push daily  enoxaparin Injectable 80 milliGRAM(s) SubCutaneous every 12 hours  famotidine    Tablet 40 milliGRAM(s) Oral two times a day  multivitamin 1 Tablet(s) Oral at bedtime  polyethylene glycol 3350 17 Gram(s) Oral daily      PHYSICAL EXAM:  T(C): 36.4 (05-09-24 @ 13:29), Max: 37.1 (05-08-24 @ 20:39)  HR: 89 (05-09-24 @ 13:29) (86 - 105)  BP: 135/85 (05-09-24 @ 13:29) (101/69 - 135/85)  RR: 18 (05-09-24 @ 13:29) (18 - 18)  SpO2: 93% (05-09-24 @ 13:29) (93% - 95%)  Wt(kg): --  I&O's Summary    08 May 2024 07:01  -  09 May 2024 07:00  --------------------------------------------------------  IN: 0 mL / OUT: 900 mL / NET: -900 mL          Appearance: Awake, generalized weakness, lying down in bed 	  HEENT:  Eyes are open   Lymphatic: No lymphadenopathy grossly   Cardiovascular: Normal    Respiratory: normal effort , clear  Gastrointestinal:  Soft, Non-tender  Skin: No rashes,  warm to touch  Psychiatry:  Mood & affect appropriate  Musculoskeletal: No edema        05-08-24 @ 07:01  -  05-09-24 @ 07:00  --------------------------------------------------------  IN: 0 mL / OUT: 900 mL / NET: -900 mL                                13.0   4.57  )-----------( 193      ( 09 May 2024 06:50 )             38.3               05-09    135  |  100  |  20  ----------------------------<  87  3.7   |  23  |  0.69    Ca    8.5      09 May 2024 06:53                         Urinalysis Basic - ( 09 May 2024 06:53 )    Color: x / Appearance: x / SG: x / pH: x  Gluc: 87 mg/dL / Ketone: x  / Bili: x / Urobili: x   Blood: x / Protein: x / Nitrite: x   Leuk Esterase: x / RBC: x / WBC x   Sq Epi: x / Non Sq Epi: x / Bacteria: x        Culture - Blood (05.08.24 @ 13:24)   Specimen Source: .Blood Blood  Culture Results:   No growth at 24 hours    Culture - Blood (05.07.24 @ 03:51)   - Proteus species: Detec  Gram Stain:   Growth in aerobic bottle: Gram Negative Rods    - Ceftriaxone: S <=1    Specimen Source: .Blood Blood  Organism: Blood Culture PCR  Organism: Proteus mirabilis  Culture Results: Growth in aerobic bottle: Proteus mirabilis       Culture - Blood (05.07.24 @ 03:51)   Specimen Source: .Blood Blood  Culture Results:   No growth at 48 Hours      < from: TTE Limited W or WO Ultrasound Enhancing Agent (05.09.24 @ 14:33) >  CONCLUSIONS:      1. Left ventricular cavity is small. Left ventricular wall thickness is severely increased. Left ventricular systolic function is hyperdynamic with an ejection fraction visually estimated at >75 %.There are no regional wall motion abnormalities seen.   2. Normal right ventricular cavity size, with normal wall thickness, and probably normal systolic function.   3. No pericardial effusion seen.   4. Apical variant hypertrophic cardiomyopathy.    Suggest cardiac MRI>      LV cavity gradient was not assessed.      Images inadequate for cardiac strain.   5. No prior echocardiogram is available for comparison.   6. Technically difficult image quality.   7. There is no evidence of a left ventricular thrombus.   8. There is normal LV mass and concentric remodeling.    < end of copied text >

## 2024-05-10 ENCOUNTER — APPOINTMENT (OUTPATIENT)
Dept: MRI IMAGING | Facility: CLINIC | Age: 84
End: 2024-05-10

## 2024-05-10 ENCOUNTER — INPATIENT (INPATIENT)
Facility: HOSPITAL | Age: 84
LOS: 24 days | Discharge: HOME | DRG: 74 | End: 2024-06-04
Attending: PHYSICAL MEDICINE & REHABILITATION | Admitting: PHYSICAL MEDICINE & REHABILITATION
Payer: MEDICARE

## 2024-05-10 ENCOUNTER — TRANSCRIPTION ENCOUNTER (OUTPATIENT)
Age: 84
End: 2024-05-10

## 2024-05-10 VITALS
SYSTOLIC BLOOD PRESSURE: 129 MMHG | WEIGHT: 187.61 LBS | TEMPERATURE: 98 F | HEIGHT: 69 IN | HEART RATE: 96 BPM | DIASTOLIC BLOOD PRESSURE: 72 MMHG | RESPIRATION RATE: 17 BRPM | OXYGEN SATURATION: 94 %

## 2024-05-10 VITALS
SYSTOLIC BLOOD PRESSURE: 106 MMHG | RESPIRATION RATE: 18 BRPM | OXYGEN SATURATION: 93 % | HEART RATE: 94 BPM | DIASTOLIC BLOOD PRESSURE: 73 MMHG | TEMPERATURE: 98 F

## 2024-05-10 DIAGNOSIS — Z95.1 PRESENCE OF AORTOCORONARY BYPASS GRAFT: Chronic | ICD-10-CM

## 2024-05-10 DIAGNOSIS — Z98.890 OTHER SPECIFIED POSTPROCEDURAL STATES: Chronic | ICD-10-CM

## 2024-05-10 DIAGNOSIS — G57.90 UNSPECIFIED MONONEUROPATHY OF UNSPECIFIED LOWER LIMB: ICD-10-CM

## 2024-05-10 LAB
ANION GAP SERPL CALC-SCNC: 13 MMOL/L — SIGNIFICANT CHANGE UP (ref 5–17)
BUN SERPL-MCNC: 17 MG/DL — SIGNIFICANT CHANGE UP (ref 7–23)
CALCIUM SERPL-MCNC: 8.6 MG/DL — SIGNIFICANT CHANGE UP (ref 8.4–10.5)
CHLORIDE SERPL-SCNC: 102 MMOL/L — SIGNIFICANT CHANGE UP (ref 96–108)
CO2 SERPL-SCNC: 22 MMOL/L — SIGNIFICANT CHANGE UP (ref 22–31)
CREAT SERPL-MCNC: 0.62 MG/DL — SIGNIFICANT CHANGE UP (ref 0.5–1.3)
EGFR: 95 ML/MIN/1.73M2 — SIGNIFICANT CHANGE UP
GLUCOSE SERPL-MCNC: 88 MG/DL — SIGNIFICANT CHANGE UP (ref 70–99)
HCT VFR BLD CALC: 38.2 % — LOW (ref 39–50)
HGB BLD-MCNC: 13.1 G/DL — SIGNIFICANT CHANGE UP (ref 13–17)
MCHC RBC-ENTMCNC: 31.8 PG — SIGNIFICANT CHANGE UP (ref 27–34)
MCHC RBC-ENTMCNC: 34.3 GM/DL — SIGNIFICANT CHANGE UP (ref 32–36)
MCV RBC AUTO: 92.7 FL — SIGNIFICANT CHANGE UP (ref 80–100)
NICOTINAMIDE: 41 NG/ML — SIGNIFICANT CHANGE UP (ref 5.2–72.1)
NICOTINIC ACID: <5 NG/ML — SIGNIFICANT CHANGE UP (ref 0–5)
NRBC # BLD: 0 /100 WBCS — SIGNIFICANT CHANGE UP (ref 0–0)
PLATELET # BLD AUTO: 205 K/UL — SIGNIFICANT CHANGE UP (ref 150–400)
POTASSIUM SERPL-MCNC: 3.8 MMOL/L — SIGNIFICANT CHANGE UP (ref 3.5–5.3)
POTASSIUM SERPL-SCNC: 3.8 MMOL/L — SIGNIFICANT CHANGE UP (ref 3.5–5.3)
RBC # BLD: 4.12 M/UL — LOW (ref 4.2–5.8)
RBC # FLD: 14.2 % — SIGNIFICANT CHANGE UP (ref 10.3–14.5)
SODIUM SERPL-SCNC: 137 MMOL/L — SIGNIFICANT CHANGE UP (ref 135–145)
WBC # BLD: 5.56 K/UL — SIGNIFICANT CHANGE UP (ref 3.8–10.5)
WBC # FLD AUTO: 5.56 K/UL — SIGNIFICANT CHANGE UP (ref 3.8–10.5)

## 2024-05-10 PROCEDURE — 86255 FLUORESCENT ANTIBODY SCREEN: CPT

## 2024-05-10 PROCEDURE — 86036 ANCA SCREEN EACH ANTIBODY: CPT

## 2024-05-10 PROCEDURE — 85025 COMPLETE CBC W/AUTO DIFF WBC: CPT

## 2024-05-10 PROCEDURE — 97760 ORTHOTIC MGMT&TRAING 1ST ENC: CPT

## 2024-05-10 PROCEDURE — 83735 ASSAY OF MAGNESIUM: CPT

## 2024-05-10 PROCEDURE — 82525 ASSAY OF COPPER: CPT

## 2024-05-10 PROCEDURE — 97166 OT EVAL MOD COMPLEX 45 MIN: CPT

## 2024-05-10 PROCEDURE — 93970 EXTREMITY STUDY: CPT

## 2024-05-10 PROCEDURE — 86695 HERPES SIMPLEX TYPE 1 TEST: CPT

## 2024-05-10 PROCEDURE — 86053 AQAPRN-4 ANTB FLO CYTMTRY EA: CPT

## 2024-05-10 PROCEDURE — 86618 LYME DISEASE ANTIBODY: CPT

## 2024-05-10 PROCEDURE — 80053 COMPREHEN METABOLIC PANEL: CPT

## 2024-05-10 PROCEDURE — 83036 HEMOGLOBIN GLYCOSYLATED A1C: CPT

## 2024-05-10 PROCEDURE — 84425 ASSAY OF VITAMIN B-1: CPT

## 2024-05-10 PROCEDURE — 86696 HERPES SIMPLEX TYPE 2 TEST: CPT

## 2024-05-10 PROCEDURE — 86038 ANTINUCLEAR ANTIBODIES: CPT

## 2024-05-10 PROCEDURE — 86664 EPSTEIN-BARR NUCLEAR ANTIGEN: CPT

## 2024-05-10 PROCEDURE — 71275 CT ANGIOGRAPHY CHEST: CPT | Mod: MC

## 2024-05-10 PROCEDURE — 83516 IMMUNOASSAY NONANTIBODY: CPT

## 2024-05-10 PROCEDURE — 81001 URINALYSIS AUTO W/SCOPE: CPT

## 2024-05-10 PROCEDURE — 87186 SC STD MICRODIL/AGAR DIL: CPT

## 2024-05-10 PROCEDURE — 87483 CNS DNA AMP PROBE TYPE 12-25: CPT

## 2024-05-10 PROCEDURE — 87102 FUNGUS ISOLATION CULTURE: CPT

## 2024-05-10 PROCEDURE — 85610 PROTHROMBIN TIME: CPT

## 2024-05-10 PROCEDURE — 85027 COMPLETE CBC AUTOMATED: CPT

## 2024-05-10 PROCEDURE — 83873 ASSAY OF CSF PROTEIN: CPT

## 2024-05-10 PROCEDURE — 86362 MOG-IGG1 ANTB CBA EACH: CPT

## 2024-05-10 PROCEDURE — 89051 BODY FLUID CELL COUNT: CPT

## 2024-05-10 PROCEDURE — 84443 ASSAY THYROID STIM HORMONE: CPT

## 2024-05-10 PROCEDURE — 81404 MOPATH PROCEDURE LEVEL 5: CPT

## 2024-05-10 PROCEDURE — 85730 THROMBOPLASTIN TIME PARTIAL: CPT

## 2024-05-10 PROCEDURE — 97110 THERAPEUTIC EXERCISES: CPT

## 2024-05-10 PROCEDURE — 82164 ANGIOTENSIN I ENZYME TEST: CPT

## 2024-05-10 PROCEDURE — 88185 FLOWCYTOMETRY/TC ADD-ON: CPT

## 2024-05-10 PROCEDURE — 83615 LACTATE (LD) (LDH) ENZYME: CPT

## 2024-05-10 PROCEDURE — 70553 MRI BRAIN STEM W/O & W/DYE: CPT | Mod: MC

## 2024-05-10 PROCEDURE — 85384 FIBRINOGEN ACTIVITY: CPT

## 2024-05-10 PROCEDURE — 86431 RHEUMATOID FACTOR QUANT: CPT

## 2024-05-10 PROCEDURE — 87070 CULTURE OTHR SPECIMN AEROBIC: CPT

## 2024-05-10 PROCEDURE — 84446 ASSAY OF VITAMIN E: CPT

## 2024-05-10 PROCEDURE — 87116 MYCOBACTERIA CULTURE: CPT

## 2024-05-10 PROCEDURE — 99285 EMERGENCY DEPT VISIT HI MDM: CPT

## 2024-05-10 PROCEDURE — 62328 DX LMBR SPI PNXR W/FLUOR/CT: CPT

## 2024-05-10 PROCEDURE — 82607 VITAMIN B-12: CPT

## 2024-05-10 PROCEDURE — 84591 ASSAY OF NOS VITAMIN: CPT

## 2024-05-10 PROCEDURE — 84100 ASSAY OF PHOSPHORUS: CPT

## 2024-05-10 PROCEDURE — 99239 HOSP IP/OBS DSCHRG MGMT >30: CPT

## 2024-05-10 PROCEDURE — 87086 URINE CULTURE/COLONY COUNT: CPT

## 2024-05-10 PROCEDURE — 80048 BASIC METABOLIC PNL TOTAL CA: CPT

## 2024-05-10 PROCEDURE — 72156 MRI NECK SPINE W/O & W/DYE: CPT | Mod: MC

## 2024-05-10 PROCEDURE — 99232 SBSQ HOSP IP/OBS MODERATE 35: CPT

## 2024-05-10 PROCEDURE — 93325 DOPPLER ECHO COLOR FLOW MAPG: CPT

## 2024-05-10 PROCEDURE — 87077 CULTURE AEROBIC IDENTIFY: CPT

## 2024-05-10 PROCEDURE — 87389 HIV-1 AG W/HIV-1&-2 AB AG IA: CPT

## 2024-05-10 PROCEDURE — 82962 GLUCOSE BLOOD TEST: CPT

## 2024-05-10 PROCEDURE — 93005 ELECTROCARDIOGRAM TRACING: CPT

## 2024-05-10 PROCEDURE — 84630 ASSAY OF ZINC: CPT

## 2024-05-10 PROCEDURE — 86617 LYME DISEASE ANTIBODY: CPT

## 2024-05-10 PROCEDURE — 83520 IMMUNOASSAY QUANT NOS NONAB: CPT

## 2024-05-10 PROCEDURE — 97162 PT EVAL MOD COMPLEX 30 MIN: CPT

## 2024-05-10 PROCEDURE — 82945 GLUCOSE OTHER FLUID: CPT

## 2024-05-10 PROCEDURE — 86140 C-REACTIVE PROTEIN: CPT

## 2024-05-10 PROCEDURE — 84165 PROTEIN E-PHORESIS SERUM: CPT

## 2024-05-10 PROCEDURE — 87040 BLOOD CULTURE FOR BACTERIA: CPT

## 2024-05-10 PROCEDURE — 87385 HISTOPLASMA CAPSUL AG IA: CPT

## 2024-05-10 PROCEDURE — 84157 ASSAY OF PROTEIN OTHER: CPT

## 2024-05-10 PROCEDURE — 87799 DETECT AGENT NOS DNA QUANT: CPT

## 2024-05-10 PROCEDURE — 87150 DNA/RNA AMPLIFIED PROBE: CPT

## 2024-05-10 PROCEDURE — 84207 ASSAY OF VITAMIN B-6: CPT

## 2024-05-10 PROCEDURE — 86403 PARTICLE AGGLUT ANTBDY SCRN: CPT

## 2024-05-10 PROCEDURE — 72158 MRI LUMBAR SPINE W/O & W/DYE: CPT | Mod: MC

## 2024-05-10 PROCEDURE — 94150 VITAL CAPACITY TEST: CPT

## 2024-05-10 PROCEDURE — 87205 SMEAR GRAM STAIN: CPT

## 2024-05-10 PROCEDURE — 88184 FLOWCYTOMETRY/ TC 1 MARKER: CPT

## 2024-05-10 PROCEDURE — 86790 VIRUS ANTIBODY NOS: CPT

## 2024-05-10 PROCEDURE — 97530 THERAPEUTIC ACTIVITIES: CPT

## 2024-05-10 PROCEDURE — 82784 ASSAY IGA/IGD/IGG/IGM EACH: CPT

## 2024-05-10 PROCEDURE — 83916 OLIGOCLONAL BANDS: CPT

## 2024-05-10 PROCEDURE — 86788 WEST NILE VIRUS AB IGM: CPT

## 2024-05-10 PROCEDURE — 71045 X-RAY EXAM CHEST 1 VIEW: CPT

## 2024-05-10 PROCEDURE — 82746 ASSAY OF FOLIC ACID SERUM: CPT

## 2024-05-10 PROCEDURE — 84436 ASSAY OF TOTAL THYROXINE: CPT

## 2024-05-10 PROCEDURE — 93321 DOPPLER ECHO F-UP/LMTD STD: CPT

## 2024-05-10 PROCEDURE — A9585: CPT

## 2024-05-10 PROCEDURE — 86665 EPSTEIN-BARR CAPSID VCA: CPT

## 2024-05-10 PROCEDURE — 80061 LIPID PANEL: CPT

## 2024-05-10 PROCEDURE — 82042 OTHER SOURCE ALBUMIN QUAN EA: CPT

## 2024-05-10 PROCEDURE — 72157 MRI CHEST SPINE W/O & W/DYE: CPT | Mod: MC

## 2024-05-10 PROCEDURE — 86663 EPSTEIN-BARR ANTIBODY: CPT

## 2024-05-10 PROCEDURE — 84439 ASSAY OF FREE THYROXINE: CPT

## 2024-05-10 PROCEDURE — 74177 CT ABD & PELVIS W/CONTRAST: CPT | Mod: MC

## 2024-05-10 PROCEDURE — 36415 COLL VENOUS BLD VENIPUNCTURE: CPT

## 2024-05-10 PROCEDURE — 82040 ASSAY OF SERUM ALBUMIN: CPT

## 2024-05-10 PROCEDURE — 86789 WEST NILE VIRUS ANTIBODY: CPT

## 2024-05-10 PROCEDURE — C8924: CPT

## 2024-05-10 PROCEDURE — 82306 VITAMIN D 25 HYDROXY: CPT

## 2024-05-10 PROCEDURE — 80074 ACUTE HEPATITIS PANEL: CPT

## 2024-05-10 PROCEDURE — 82330 ASSAY OF CALCIUM: CPT

## 2024-05-10 PROCEDURE — 84155 ASSAY OF PROTEIN SERUM: CPT

## 2024-05-10 PROCEDURE — 87637 SARSCOV2&INF A&B&RSV AMP PRB: CPT

## 2024-05-10 PROCEDURE — 86480 TB TEST CELL IMMUN MEASURE: CPT

## 2024-05-10 PROCEDURE — 85652 RBC SED RATE AUTOMATED: CPT

## 2024-05-10 PROCEDURE — 86334 IMMUNOFIX E-PHORESIS SERUM: CPT

## 2024-05-10 PROCEDURE — 86225 DNA ANTIBODY NATIVE: CPT

## 2024-05-10 PROCEDURE — 87476 LYME DIS DNA AMP PROBE: CPT

## 2024-05-10 RX ORDER — PETROLATUM,WHITE
1 JELLY (GRAM) TOPICAL
Refills: 0 | Status: DISCONTINUED | OUTPATIENT
Start: 2024-05-10 | End: 2024-06-04

## 2024-05-10 RX ORDER — FAMOTIDINE 10 MG/ML
40 INJECTION INTRAVENOUS
Refills: 0 | Status: DISCONTINUED | OUTPATIENT
Start: 2024-05-10 | End: 2024-06-04

## 2024-05-10 RX ORDER — ENOXAPARIN SODIUM 100 MG/ML
90 INJECTION SUBCUTANEOUS EVERY 12 HOURS
Refills: 0 | Status: DISCONTINUED | OUTPATIENT
Start: 2024-05-10 | End: 2024-05-10

## 2024-05-10 RX ORDER — ENOXAPARIN SODIUM 100 MG/ML
80 INJECTION SUBCUTANEOUS
Qty: 0 | Refills: 0 | DISCHARGE
Start: 2024-05-10

## 2024-05-10 RX ORDER — ACETAMINOPHEN 500 MG
650 TABLET ORAL EVERY 6 HOURS
Refills: 0 | Status: DISCONTINUED | OUTPATIENT
Start: 2024-05-10 | End: 2024-06-04

## 2024-05-10 RX ORDER — ASPIRIN/CALCIUM CARB/MAGNESIUM 324 MG
81 TABLET ORAL DAILY
Refills: 0 | Status: DISCONTINUED | OUTPATIENT
Start: 2024-05-11 | End: 2024-06-04

## 2024-05-10 RX ORDER — CEFTRIAXONE 500 MG/1
1000 INJECTION, POWDER, FOR SOLUTION INTRAMUSCULAR; INTRAVENOUS ONCE
Refills: 0 | Status: COMPLETED | OUTPATIENT
Start: 2024-05-10 | End: 2024-05-10

## 2024-05-10 RX ORDER — ATORVASTATIN CALCIUM 80 MG/1
40 TABLET, FILM COATED ORAL AT BEDTIME
Refills: 0 | Status: DISCONTINUED | OUTPATIENT
Start: 2024-05-10 | End: 2024-06-04

## 2024-05-10 RX ORDER — LANOLIN ALCOHOL/MO/W.PET/CERES
3 CREAM (GRAM) TOPICAL AT BEDTIME
Refills: 0 | Status: DISCONTINUED | OUTPATIENT
Start: 2024-05-10 | End: 2024-06-04

## 2024-05-10 RX ORDER — POLYETHYLENE GLYCOL 3350 17 G/17G
17 POWDER, FOR SOLUTION ORAL
Refills: 0 | Status: DISCONTINUED | OUTPATIENT
Start: 2024-05-10 | End: 2024-06-04

## 2024-05-10 RX ORDER — ACETAMINOPHEN 500 MG
1000 TABLET ORAL ONCE
Refills: 0 | Status: COMPLETED | OUTPATIENT
Start: 2024-05-10 | End: 2024-05-10

## 2024-05-10 RX ORDER — NYSTATIN CREAM 100000 [USP'U]/G
1 CREAM TOPICAL
Refills: 0 | Status: DISCONTINUED | OUTPATIENT
Start: 2024-05-10 | End: 2024-06-04

## 2024-05-10 RX ORDER — ENOXAPARIN SODIUM 100 MG/ML
90 INJECTION SUBCUTANEOUS EVERY 12 HOURS
Refills: 0 | Status: DISCONTINUED | OUTPATIENT
Start: 2024-05-10 | End: 2024-05-13

## 2024-05-10 RX ORDER — SENNA PLUS 8.6 MG/1
2 TABLET ORAL AT BEDTIME
Refills: 0 | Status: DISCONTINUED | OUTPATIENT
Start: 2024-05-10 | End: 2024-05-21

## 2024-05-10 RX ADMIN — ENOXAPARIN SODIUM 90 MILLIGRAM(S): 100 INJECTION SUBCUTANEOUS at 17:07

## 2024-05-10 RX ADMIN — Medication 1 DROP(S): at 05:17

## 2024-05-10 RX ADMIN — CEFTRIAXONE 100 MILLIGRAM(S): 500 INJECTION, POWDER, FOR SOLUTION INTRAMUSCULAR; INTRAVENOUS at 14:36

## 2024-05-10 RX ADMIN — DICLOFENAC SODIUM 2 GRAM(S): 30 GEL TOPICAL at 11:26

## 2024-05-10 RX ADMIN — Medication 1000 MILLIGRAM(S): at 01:17

## 2024-05-10 RX ADMIN — FAMOTIDINE 40 MILLIGRAM(S): 10 INJECTION INTRAVENOUS at 05:17

## 2024-05-10 RX ADMIN — ENOXAPARIN SODIUM 80 MILLIGRAM(S): 100 INJECTION SUBCUTANEOUS at 05:17

## 2024-05-10 RX ADMIN — Medication 400 MILLIGRAM(S): at 17:04

## 2024-05-10 RX ADMIN — FAMOTIDINE 40 MILLIGRAM(S): 10 INJECTION INTRAVENOUS at 17:04

## 2024-05-10 RX ADMIN — CEFTRIAXONE 100 MILLIGRAM(S): 500 INJECTION, POWDER, FOR SOLUTION INTRAMUSCULAR; INTRAVENOUS at 01:34

## 2024-05-10 RX ADMIN — Medication 1 DROP(S): at 17:05

## 2024-05-10 RX ADMIN — Medication 1 TABLET(S): at 22:14

## 2024-05-10 RX ADMIN — Medication 81 MILLIGRAM(S): at 11:26

## 2024-05-10 RX ADMIN — ATORVASTATIN CALCIUM 40 MILLIGRAM(S): 80 TABLET, FILM COATED ORAL at 22:13

## 2024-05-10 NOTE — PROGRESS NOTE ADULT - ATTENDING COMMENTS
I have examined the pt at bedside.  The risks and the benefits of the proposed diagnostic/therapeutic approach were thoroughly discussed.   I agree with the above plan and have modified it where necessary.    85yo RH WM, CAD s/p CABG, HTN, presenting with quadriparesis after 8 days of diarrhea.   Due to initial admission to OSH and rehab, diagnostic and therapeutic approach has been delayed.  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  Currently no respiratory involvement nor swallowing deficits.    CSF with alb/cyt dissociation.  MRI neuraxis is benign.  S/p IVIG 4/5 days, with more improvement, he can now lift 4x off the bed, higher, R side and UE better - slow but consistent improvement, proximal better than distal, as expected.  Using splints for hands has reduced pain and flexion, he can now move fingers better.  Remains in good mood and optimistic, eager to go to rehab, prefers [facility] - discussed with .  DVT-PE on A/C now.  Continues treatment for UTI, no fever again today. Will follow ID recs.  One episode of Afib, will follow medicine recs. Per IM, will need more investigation on possible cardiomyopathy (see TTE report) - can be done outpatient.  Will test for TTR mutations, as TTR amyloidosis can be causative of hypertrophy, hyperkinetic cardiomyopathy and underlying neuropathy with susceptibility to GBS/KATLYN phenotype.  We should prioritize discharge to Acute Rehab.    Impression:   -GBS/KATLYN  -DVT/PE  -UTI/septicemia  -Afib/cardiomyopathy.    Plan:   -s/p IVIG, slow recovery  -PT/OT - hands splints and hygiene  -plan to dc to acute rehab soon  -TTR mutations.  Rest of management as described in resident's note.

## 2024-05-10 NOTE — PATIENT PROFILE ADULT - FALL HARM RISK - HARM RISK INTERVENTIONS
Assistance with ambulation/Assistance OOB with selected safe patient handling equipment/Communicate Risk of Fall with Harm to all staff/Reinforce activity limits and safety measures with patient and family/Tailored Fall Risk Interventions/Visual Cue: Yellow wristband and red socks/Bed in lowest position, wheels locked, appropriate side rails in place/Call bell, personal items and telephone in reach/Instruct patient to call for assistance before getting out of bed or chair/Non-slip footwear when patient is out of bed/[place] to call system/Physically safe environment - no spills, clutter or unnecessary equipment/Purposeful Proactive Rounding/Room/bathroom lighting operational, light cord in reach Assistance with ambulation/Assistance OOB with selected safe patient handling equipment/Communicate Risk of Fall with Harm to all staff/Discuss with provider need for PT consult/Monitor gait and stability/Provide patient with walking aids - walker, cane, crutches/Reinforce activity limits and safety measures with patient and family/Tailored Fall Risk Interventions/Visual Cue: Yellow wristband and red socks/Bed in lowest position, wheels locked, appropriate side rails in place/Call bell, personal items and telephone in reach/Instruct patient to call for assistance before getting out of bed or chair/Non-slip footwear when patient is out of bed/[place] to call system/Physically safe environment - no spills, clutter or unnecessary equipment/Purposeful Proactive Rounding/Room/bathroom lighting operational, light cord in reach

## 2024-05-10 NOTE — H&P ADULT - ATTENDING COMMENTS
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- good candidate for intensive rehab- will tolerate three hours rehab daily    MEDICATIONS  (STANDING):  artificial tears (preservative free) Ophthalmic Solution 1 Drop(s) Both EYES two times a day  aspirin enteric coated 81 milliGRAM(s) Oral daily  atorvastatin 40 milliGRAM(s) Oral at bedtime  enoxaparin Injectable 90 milliGRAM(s) SubCutaneous every 12 hours  famotidine    Tablet 40 milliGRAM(s) Oral two times a day  melatonin 3 milliGRAM(s) Oral at bedtime  multivitamin 1 Tablet(s) Oral at bedtime  nystatin Powder 1 Application(s) Topical two times a day  senna 2 Tablet(s) Oral at bedtime  trimethoprim  160 mG/sulfamethoxazole 800 mG 2 Tablet(s) Oral two times a day    MEDICATIONS  (PRN):  acetaminophen     Tablet .. 650 milliGRAM(s) Oral every 6 hours PRN Moderate Pain (4 - 6)  AQUAPHOR (petrolatum Ointment) 1 Application(s) Topical two times a day PRN dry skin  polyethylene glycol 3350 17 Gram(s) Oral two times a day PRN Constipation                  12.8   4.63  )-----------( 212      ( 11 May 2024 05:28 )             36.8     05-11    143  |  107  |  17  ----------------------------<  77  3.6   |  26  |  0.70    Ca    8.1<L>      11 May 2024 05:28    TPro  6.9  /  Alb  2.3<L>  /  TBili  0.7  /  DBili  x   /  AST  47<H>  /  ALT  36  /  AlkPhos  76  05-11    Vital Signs Last 24 Hrs  T(C): 37.4 (11 May 2024 09:00), Max: 37.4 (11 May 2024 09:00)  T(F): 99.3 (11 May 2024 09:00), Max: 99.3 (11 May 2024 09:00)  HR: 88 (11 May 2024 09:00) (88 - 96)  BP: 106/68 (11 May 2024 09:00) (106/68 - 129/72)  RR: 16 (11 May 2024 09:00) (16 - 18)  SpO2: 94% (11 May 2024 09:00) (93% - 94%)Rm air      Gen - NAD, Comfortable  HEENT - NCAT, EOMI, MMM  Neck - Supple, No limited ROM  Pulm - CTAB, No wheeze, No rhonchi, No crackles  Cardiovascular - RRR, S1S2, No murmurs  Abdomen - Soft, NT/ND, +BS  Extremities - No calf tenderness, 2+ pitting pedal edema b/l, non-pitting edema to right hand  Neuro-     Cognitive - AAOx3, follows commands     Communication - Fluent, No dysarthria     Motor -                     LEFT    UE - ShF3/5, EF 3+/5, EE 3+/5, WE 1/5,  1/5                    RIGHT UE - ShAB 2/5, EF 3/5, EE 3/5, WE 1/5,  1/5                    LEFT    LE - HF 2+/5, KE 4/5, DF 0/5, PF 0/5 4/5 hip add/abd zack                    RIGHT LE - HF 2+/5, KE 4+/5, DF 0/5, PF 0/5        Sensory - Intact to LT     Reflexes - Negative clonus b/l, Negative Cuevas b/l     Tone - MAS 1+ in right bicep  Psychiatric - Mood stable, Affect WNL  Skin: MAD b/l groin

## 2024-05-10 NOTE — DISCHARGE NOTE NURSING/CASE MANAGEMENT/SOCIAL WORK - PATIENT PORTAL LINK FT
You can access the FollowMyHealth Patient Portal offered by [facility] by registering at the following website: [email or web address]. By joining [facility]’s FollowMyHealth portal, you will also be able to view your health information using other applications (apps) compatible with our system.

## 2024-05-10 NOTE — CHART NOTE - NSCHARTNOTEFT_GEN_A_CORE
Interim events:  Patient discharged from [facility]. Reached out to [facility] to discuss patient's Bactrim as is presently noted on discharge documentation to be bactrim 1 DS BID -> contacted primary team at [facility] to discuss as updated ID recommendations on discharge are for bactrim 2 DS BID. Nurse on [facility] to convey writer's contact information to primary team; awaiting call-back from primary team.    Thank you. Interim events:  Patient discharged from [facility]. Reached out to [facility] to discuss patient's Bactrim as is presently noted on discharge documentation to be bactrim 1 DS BID -> contacted primary team at [facility] [place] to discuss as updated ID recommendations on discharge are for Bactrim DS 2 tab Q12H for oral transition with antibiotic end date 5/17/24. Staff on [facility] to convey writer's contact information to primary team; was able to reach Dr. Riggs (assistance greatly appreciated) at ; updated antibiotic recommendations conveyed to primary team at .    Thank you. Interim events:  Patient discharged from [facility]. Reached out to [facility] to discuss patient's Bactrim as is presently noted on discharge documentation to be bactrim 1 DS BID -> contacted primary team at [facility] [place] to discuss as updated ID recommendations on discharge are for Bactrim DS 2 tab Q12H for oral transition with antibiotic end date 5/17/24. Staff on [facility] to convey writer's contact information to primary team; was able to reach resident on primary team who will be taking on care of patient at ; updated antibiotic recommendations conveyed to primary team.    Thank you. Interim events:  Patient discharged from [facility]. Reached out to [facility] to discuss patient's Bactrim as is presently incorrectly noted on discharge documentation to be bactrim 1 DS BID.  Contacted primary team at [facility] [place] to discuss as updated ID recommendations on discharge are for Bactrim DS 2 tab Q12H for oral transition with antibiotic end date 5/17/24.     Staff on [facility] to convey writer's contact information to primary team; was able to reach resident on primary team who will be taking on care of patient at ; updated antibiotic recommendations successfully conveyed to primary team.  Thank you.

## 2024-05-10 NOTE — PROGRESS NOTE ADULT - ASSESSMENT
83-yo M w/ PMH of CAD s/p bypass, C diff, and recent "stomach virus" w/ diarrhea, followed by BUE/BLE weakness, being treated for KATLYN/GBS. ID has been consulted for abnormal lab values.    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 EBV VCA IgG, EBNA, and EA positive. EBV VCA IgM neg. CSF MEP neg. Labs indicate past HSV infection (no CSF involvement per MEP). EBV serology could suggest recent infection vs reactivation. Note that HSV serology lacks clinical value. HSV 1 and 2 neg on MEP, so CSF involvement is unlikely. Possibly patient had exposure to HSV 1 and 2 at some point of his life. Acyclovir has theoretical activity against EBV, however its clinical value is dubious and literature is sparse.    5/7: Called back for low-grade temperature overnight (100.2). IVIG is a blood product, and post-infusion fever is not uncommon. Would hold off antibiotics and perform a fever workup. No dysuria or suprapubic tenderness - would not treat asymptomatic bacteriuria.    5/8: Proteus from 5/7 BCx. Started on ceftriaxone 1g IV Q24H. MARION declined per patient. No dysuria or perineal discomfort noted per patient. Of note, CTAP revealed enlarged prostate and bladder wall thickening, suggestive of urinary source in conjunction with UA.    5/9: Proteus 5/7 BCx w/ grossly susceptible. 5/8 BCx NGTD.    #Proteus bacteremia  #UTI  #Enlarged prostate  #EBV and HSV serologies positive  #KATLYN/GBS  #Limb weakness  #Low grade fever  #PE/DVT    Recommendations:  - F/u BCx and UCx   - PT/OT and rehab per primary.  - AC for PE per primary team  - Outpatient urology follow-up  - Would continue with ceftriaxone 1g IV Q24H while inpatient. Can transition to Bactrim DS 2 tab Q12H for oral transition  - Antibiotic end date 5/17/24.  - No objection to discharge    Plan discussed with primary team house staff.  Thank you for this consult.     Contreras Garcia MD, PhD  Attending Physician  Division of Infectious Diseases  Department of Medicine    Please contact through MS Teams message.  Office: [phone] (after 5 PM or weekend)       84-yo M w/ PMH of CAD s/p bypass, C diff, and recent "stomach virus" w/ diarrhea, followed by BUE/BLE weakness, being treated for KATLYN/GBS. ID has been consulted for abnormal lab values.    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 EBV VCA IgG, EBNA, and EA positive. EBV VCA IgM neg. CSF MEP neg. Labs indicate past HSV infection (no CSF involvement per MEP). EBV serology could suggest recent infection vs reactivation. Note that HSV serology lacks clinical value. HSV 1 and 2 neg on MEP, so CSF involvement is unlikely. Possibly patient had exposure to HSV 1 and 2 at some point of his life. Acyclovir has theoretical activity against EBV, however its clinical value is dubious and literature is sparse.    5/7: Called back for low-grade temperature overnight (100.2). IVIG is a blood product, and post-infusion fever is not uncommon. Would hold off antibiotics and perform a fever workup. No dysuria or suprapubic tenderness - would not treat asymptomatic bacteriuria.    5/8: Proteus from 5/7 BCx. Started on ceftriaxone 1g IV Q24H. MARION declined per patient. No dysuria or perineal discomfort noted per patient. Of note, CTAP revealed enlarged prostate and bladder wall thickening, suggestive of urinary source in conjunction with UA.    5/9: Proteus 5/7 BCx w/ grossly susceptible. 5/8 BCx NGTD.    #Proteus bacteremia  #UTI  #Enlarged prostate  #EBV and HSV serologies positive  #KATLYN/GBS  #Limb weakness  #Low grade fever  #PE/DVT    Recommendations:  - F/u BCx and UCx   - PT/OT and rehab per primary.  - AC for PE per primary team  - Outpatient urology follow-up  - Would continue with ceftriaxone 1g IV Q24H while inpatient. Can transition to Bactrim DS 2 tab Q12H for oral transition  - Antibiotic end date 5/17/24.  - No objection to discharge    Plan discussed with primary team house staff.  Thank you for this consult.     Contreras Garcia MD, PhD  Attending Physician  Division of Infectious Diseases  Department of Medicine    Please contact through MS Teams message.  Office: [phone] (after 5 PM or weekend)       84-yo M w/ PMH of CAD s/p bypass, C diff, and recent "stomach virus" w/ diarrhea, followed by BUE/BLE weakness, being treated for KATLYN/GBS. ID has been consulted for abnormal lab values.    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 EBV VCA IgG, EBNA, and EA positive. EBV VCA IgM neg. CSF MEP neg. Labs indicate past HSV infection (no CSF involvement per MEP). EBV serology could suggest recent infection vs reactivation. Note that HSV serology lacks clinical value. HSV 1 and 2 neg on MEP, so CSF involvement is unlikely. Possibly patient had exposure to HSV 1 and 2 at some point of his life. Acyclovir has theoretical activity against EBV, however its clinical value is dubious and literature is sparse.    5/7: Called back for low-grade temperature overnight (100.2). IVIG is a blood product, and post-infusion fever is not uncommon. Would hold off antibiotics and perform a fever workup. No dysuria or suprapubic tenderness - would not treat asymptomatic bacteriuria.    5/8: Proteus from 5/7 BCx. Started on ceftriaxone 1g IV Q24H. MARION declined per patient. No dysuria or perineal discomfort noted per patient. Of note, CTAP revealed enlarged prostate and bladder wall thickening, suggestive of urinary source in conjunction with UA.    5/9: Proteus 5/7 BCx w/ grossly susceptible. 5/8 BCx NGTD.    #Proteus bacteremia  #UTI  #Enlarged prostate  #EBV and HSV serologies positive  #KATLYN/GBS  #Limb weakness  #Low grade fever  #PE/DVT    Recommendations:  - F/u BCx and UCx   - PT/OT and rehab per primary.  - AC for PE per primary team  - Outpatient urology follow-up  - Provide ceftriaxone 2g IV Q24H while inpatient. Can transition to Bactrim DS 2 tab Q12H for oral transition  - Antibiotic end date 5/17/24.  - No objection to discharge    Plan discussed with primary team house staff.  Thank you for this consult.     Contreras Garcia MD, PhD  Attending Physician  Division of Infectious Diseases  Department of Medicine    Please contact through MS Teams message.  Office: [phone] (after 5 PM or weekend)

## 2024-05-10 NOTE — H&P ADULT - NSHPLABSRESULTS_GEN_ALL_CORE
< from: CT Abdomen and Pelvis w/ IV Cont (05.08.24 @ 11:24) >    IMPRESSION:  Embolus within the right upper lobe pulmonary artery. No evidence of   right heart strain.    Circumferential wall thickening of the bladder. Otherwise, no acute   finding within abdomen/pelvis.    < end of copied text >    < from: VA Duplex Lower Ext Vein Scan, Bilat (05.08.24 @ 10:35) >    IMPRESSION:    There is acute below the knee DVT affecting a right soleal vein.< from: MR Head w/wo IV Cont (05.03.24 @ 19:56) >    IMPRESSION:    1.  No evidence of acute infarct or midline shift.  2.  Chronic small vessel disease.    < end of copied text >

## 2024-05-10 NOTE — PROGRESS NOTE ADULT - SUBJECTIVE AND OBJECTIVE BOX
Follow Up:    Proteus bacteremia    Interval History/ROS:  VSS ON. Patient was seen and examined at bedside. Denies fever, dysuria, or SOB.    Allergies  penicillins (Rash)        ANTIMICROBIALS:  cefTRIAXone   IVPB 1000 every 24 hours      OTHER MEDS:  MEDICATIONS  (STANDING):  acetaminophen     Tablet .. 650 once  aspirin enteric coated 81 daily  atorvastatin 40 at bedtime  diphenhydrAMINE Injectable 25 daily  enoxaparin Injectable 80 every 12 hours  famotidine    Tablet 40 two times a day  polyethylene glycol 3350 17 daily      Vital Signs Last 24 Hrs  T(C): 36.7 (10 May 2024 04:30), Max: 36.9 (09 May 2024 21:25)  T(F): 98.1 (10 May 2024 04:30), Max: 98.5 (09 May 2024 21:25)  HR: 81 (10 May 2024 04:30) (81 - 94)  BP: 116/77 (10 May 2024 04:30) (103/67 - 135/85)  BP(mean): --  RR: 18 (10 May 2024 04:30) (18 - 18)  SpO2: 93% (10 May 2024 04:30) (93% - 95%)    Parameters below as of 10 May 2024 04:30  Patient On (Oxygen Delivery Method): room air    PHYSICAL EXAM:  Constitutional: non-toxic, no distress  HEAD/EYES: anicteric, no conjunctival injection  Cardiovascular:   normal S1, S2, no murmur, RRR  Respiratory:  clear BS bilaterally, no wheezes, no rales  Musculoskeletal:  no BLE edema  : No suprapubic tenderness  Neurologic: awake and oriented x3; BUE and BLE flexion and extension +3/5 symmetric.  Skin:  no rash, no erythema, no phlebitis  Psychiatric:  awake, alert, appropriate mood                          13.1   5.56  )-----------( 205      ( 10 May 2024 07:17 )             38.2       05-10    137  |  102  |  17  ----------------------------<  88  3.8   |  22  |  0.62    Ca    8.6      10 May 2024 07:17        Urinalysis Basic - ( 10 May 2024 07:17 )    Color: x / Appearance: x / SG: x / pH: x  Gluc: 88 mg/dL / Ketone: x  / Bili: x / Urobili: x   Blood: x / Protein: x / Nitrite: x   Leuk Esterase: x / RBC: x / WBC x   Sq Epi: x / Non Sq Epi: x / Bacteria: x        MICROBIOLOGY:  v  Clean Catch Clean Catch (Midstream)  05-08-24   >100,000 CFU/ml Gram Negative Rods  --  --      .Blood Blood  05-08-24   No growth at 24 hours  --  --      .Blood Blood  05-07-24   No growth at 72 Hours  --  Blood Culture PCR  Proteus mirabilis      .CSF CSF  05-03-24   Culture is being performed.  --  --      .CSF CSF  05-03-24   No growth at 5 days  --    No polymorphonuclear cells seen  No organisms seen  by cytocentrifuge          EBV PCR: NotDetec IU/mL (05-03 @ 11:17)        RADIOLOGY:  Imaging below independently reviewed.  < from: CT Abdomen and Pelvis w/ IV Cont (05.08.24 @ 11:24) >    ACC: [id number] EXAM:  CT ABDOMEN AND PELVIS IC   ORDERED BY:  NASRA DOE     ACC: [id number] EXAM:  [facility]ST [facility]   ORDERED BY:    NASRA DOE     PROCEDURE DATE:  05/08/2024          INTERPRETATION:  CLINICAL INFORMATION: Patient on IVIG with low-grade   temperature and brief episode of hypoxia, evaluate for PE    COMPARISON: None.    CONTRAST/COMPLICATIONS:  IV Contrast: Omnipaque 350 (accession [id number]), IV contrast documented   in unlinked concurrent exam (accession [id number])  90 cc administered   10   cc discarded  Oral Contrast: NONE  Complications: None reported at time of study completion    PROCEDURE:  CT Angiography of the Chest was performed followed by portal venous phase   imaging of the Abdomen and Pelvis.  Sagittal and coronal reformats were performed as well as 3D (MIP)   reconstructions.    FINDINGS:  CHEST:  LUNGS, PLEURA AND LARGE AIRWAYS: Patent central airways. The right   hemidiaphragm is elevated. Compressive atelectasis of right middle lobe   andpartial right lower lobe. The lungs are otherwise clear.  VESSELS: Embolus within the right upper lobe pulmonary artery. No   evidence of right heart strain.  HEART: Left ventricular hypertrophy. No pericardial effusion. CABG.  MEDIASTINUM AND LUCI: No lymphadenopathy. Mediastinal surgical clips  CHEST WALL AND LOWER NECK: Sternotomy wires    ABDOMEN AND PELVIS:  LIVER: Right hepatic lobe subcentimeter hypodense focus, too small to   characterize.  BILE DUCTS: Normal caliber.  GALLBLADDER: Cholelithiasis.  SPLEEN: Within normal limits.  PANCREAS: Within normal limits.  ADRENALS: Within normal limits.  KIDNEYS/URETERS: Left renal subcentimeter hypodense foci, too small to   characterize. No hydronephrosis bilaterally.    BLADDER: Circumferential wall thickening.  REPRODUCTIVE ORGANS: Prostate is heterogenous and enlarged.    BOWEL: No bowel obstruction. Appendix is not visualized  PERITONEUM: No ascites.  VESSELS: Atherosclerotic changes.  RETROPERITONEUM/LYMPH NODES: No lymphadenopathy.  ABDOMINAL WALL: Within normal limits.  BONES: Healed sternotomy.    IMPRESSION:  Embolus within the right upper lobe pulmonary artery. No evidence of   right heart strain.    Circumferential wall thickening of the bladder. Otherwise, no acute   finding within abdomen/pelvis.    Findings were discussed with Dr. ADOLFO Doe 5/8/2024 11:23 AM by Dr. Mat Lund with read back confirmation.    --- End of Report ---           MAT CRAMER MD; Resident Radiologist  This document has been electronically signed.  ARVIND YOUNG MD; Attending Radiologist  This document has been electronically signed. May  8 2024  1:23PM    < end of copied text >

## 2024-05-10 NOTE — PROGRESS NOTE ADULT - SUBJECTIVE AND OBJECTIVE BOX
Name of Patient : REHANA MEDINA  MRN: [id number]  Date of visit: 05-10-24 @ 22:50      Subjective: Patient seen and examined. No new events except as noted.     REVIEW OF SYSTEMS:    CONSTITUTIONAL: No weakness, fevers or chills  EYES/ENT: No visual changes;  No vertigo or throat pain   NECK: No pain or stiffness  RESPIRATORY: No cough, wheezing, hemoptysis; No shortness of breath  CARDIOVASCULAR: No chest pain or palpitations  GASTROINTESTINAL: No abdominal or epigastric pain. No nausea, vomiting, or hematemesis; No diarrhea or constipation. No melena or hematochezia.  GENITOURINARY: No dysuria, frequency or hematuria  NEUROLOGICAL: No numbness or weakness  SKIN: No itching, burning, rashes, or lesions   All other review of systems is negative unless indicated above.    MEDICATIONS:  MEDICATIONS  (STANDING):  acetaminophen     Tablet .. 650 milliGRAM(s) Oral once  artificial tears (preservative free) Ophthalmic Solution 1 Drop(s) Both EYES two times a day  aspirin enteric coated 81 milliGRAM(s) Oral daily  atorvastatin 40 milliGRAM(s) Oral at bedtime  cefTRIAXone   IVPB 1000 milliGRAM(s) IV Intermittent every 24 hours  diclofenac sodium 1% Gel 2 Gram(s) Topical daily  diphenhydrAMINE Injectable 25 milliGRAM(s) IV Push daily  enoxaparin Injectable 90 milliGRAM(s) SubCutaneous every 12 hours  famotidine    Tablet 40 milliGRAM(s) Oral two times a day  multivitamin 1 Tablet(s) Oral at bedtime  polyethylene glycol 3350 17 Gram(s) Oral daily      PHYSICAL EXAM:  T(C): 36.7 (05-10-24 @ 21:51), Max: 36.7 (05-10-24 @ 00:10)  HR: 96 (05-10-24 @ 21:51) (81 - 98)  BP: 129/72 (05-10-24 @ 21:51) (106/73 - 129/72)  RR: 17 (05-10-24 @ 21:51) (17 - 18)  SpO2: 94% (05-10-24 @ 21:51) (92% - 94%)  Wt(kg): --  I&O's Summary    09 May 2024 07:01  -  10 May 2024 07:00  --------------------------------------------------------  IN: 0 mL / OUT: 600 mL / NET: -600 mL      Height (cm): 175.3 (05-10 @ 21:51)  Weight (kg): 85.1 (05-10 @ 21:51)  BMI (kg/m2): 27.7 (05-10 @ 21:51)  BSA (m2): 2.01 (05-10 @ 21:51)    Appearance: Normal	  HEENT:  PERRLA   Lymphatic: No lymphadenopathy   Cardiovascular: Normal S1 S2, no JVD  Respiratory: normal effort , clear  Gastrointestinal:  Soft, Non-tender  Skin: No rashes,  warm to touch  Psychiatry:  Mood & affect appropriate  Musculuskeletal: No edema    recent labs, Imaging and EKGs personally reviewed     05-09-24 @ 07:01  -  05-10-24 @ 07:00  --------------------------------------------------------  IN: 0 mL / OUT: 600 mL / NET: -600 mL                          13.1   5.56  )-----------( 205      ( 10 May 2024 07:17 )             38.2               05-10    137  |  102  |  17  ----------------------------<  88  3.8   |  22  |  0.62    Ca    8.6      10 May 2024 07:17                         Urinalysis Basic - ( 10 May 2024 07:17 )    Color: x / Appearance: x / SG: x / pH: x  Gluc: 88 mg/dL / Ketone: x  / Bili: x / Urobili: x   Blood: x / Protein: x / Nitrite: x   Leuk Esterase: x / RBC: x / WBC x   Sq Epi: x / Non Sq Epi: x / Bacteria: x

## 2024-05-10 NOTE — H&P ADULT - ASSESSMENT
Assessment/Plan:  REHANA MEDINA is a 84y with ****.  Hospital Course complicated by ***. Patient now admitted for a multidisciplinary rehab program. 05-10-24 @ 17:01        Comprehensive Multidisciplinary Rehab Program:  - Start comprehensive rehab program of PT/OT/SLP - 3 hours a day, 5 days a week. P&O as needed       HTN  -   - Monitor BP    HLD  - cont statin    T2DM  - Lantus  - Premeal  - SSI  - Monitor fingersticks    Pain  - Tylenol PRN  - Avoid sedating medications that may interfere with cognitive recovery    Mood / Cognition  - Neuropsychology consult  - [ ] Enhanced Supervision  [ ] Constant Observation    Sleep  - Maintain quiet hours and a low stim environment.   - Monitor sleep logs (for BIU)  - Melatonin PRN     GI / Bowel  - Senna qHS  - Miralax PRN Daily  - GI ppx: ***     / Bladder  - Currently patient voids:      [ ] independent      [ ] external collection device (condom cath)      [ ] Indwelling tong catheter      [ ] Intermittent catheterization  - Continue bladder scans Q8 hours with straight cath for >400cc.  - Toileting schedule every 4 hours    Skin / Pressure injury  - Skin assessment on admission performed [  ] :   - Monitor Incisions:    - Turn q2 hours in bed while awake, air mattress  - nursing to monitor skin qShift  - soft heel protectors  - skin barrier cream PRN    Diet/Dysphagia:  - Diet Consistency: ***  - Supplements: ***  - Aspiration Precautions  - SLP consult for swallow function evaluation and treatment  - Nutrition consult    DVT prophylaxis:   - *****  - SCDs  - Last Doppler on ***       Outpatient Follow-up:  ** Specialty and Name of physician      Code Status/Emergency Contact:      ---------------    Goals: Safe discharge to home  Estimated Length of Stay: 10-14 days  Rehab Potential: Good  Medical Prognosis: Good  Estimated Disposition: Home with home care      PRESCREEN COMPARISON:  I have reviewed the prescreen information and I have found no relevant changes between the preadmission screening and my post admission evaluation.    RATIONALE FOR INPATIENT ADMISSION: Patient demonstrates the following:  [X] Medically appropriate for rehabilitation admission  [X] Has attainable rehab goals with an appropriate initial discharge plan  [X]Has rehabilitation potential (expected to make a significant improvement within a reasonable period of time)  [X] Requires close medical management by a rehab physician, rehab nursing care, Hospitalist and comprehensive interdisciplinary team (including PT, OT and/or SLP, Prosthetics and Orthotics)     Assessment/Plan:  REHANA MEDINA is a 84y with CAD s/p CABG recent acute polyneuropathy diagnosis (march 2024).  Here after IVIG series c/b PE RUL and DVT RLE. Patient now admitted for a multidisciplinary rehab program. 05-10-24 @ 17:01    Motor Axon Neuropathy  Comprehensive Multidisciplinary Rehab Program:  - Start comprehensive rehab program of PT/OT- 3 hours a day, 5 days a week. P&O as needed   -initially began after viral illness in March 2024  -following with Neurologist Dr. Liu  -s/p IVIG ending on 5/8    CAD s/p CABG  AF  -continue aspirin and statin  -on therapeutic AC    PE  DVT  -on lovenox BID for AC  -RUL PE and RLE DVT    UTI  -sensitive proteus UTI  -bactrim BID until 5/17    Pain  - Tylenol PRN  - Avoid sedating medications that may interfere with cognitive recovery    Mood / Cognition  - Neuropsychology consult  - [ ] Enhanced Supervision  [ ] Constant Observation    Sleep  - Maintain quiet hours and a low stim environment.   - Monitor sleep logs (for BIU)  - Melatonin PRN     GI / Bowel  - Miralax daily  - GI ppx: famotidine     / Bladder  - Currently patient voids:      [ ] independent      [ ] external collection device (condom cath)      [ ] Indwelling tong catheter      [ ] Intermittent catheterization  - Continue bladder scans Q8 hours with straight cath for >400cc.  - Toileting schedule every 4 hours    Skin / Pressure injury  - Skin assessment on admission performed [  ] :   - Monitor Incisions:    - Turn q2 hours in bed while awake, air mattress  - nursing to monitor skin qShift  - soft heel protectors  - skin barrier cream PRN    Diet/Dysphagia:  - Diet Consistency: regular diet  - Aspiration Precautions  - SLP consult for swallow function evaluation and treatment  - Nutrition consult    DVT prophylaxis:   - has RLE DVT  -on full dose Lovenoxd  - Last Doppler on R soleal DVT on 5/8      Outpatient Follow-up:  Workup pending:  [] CT urogram   [] Cardiac MRI  [] EMG, NCV  [] Transthyretin lab    Follow up with:  [] Neurology Dr. Liu  [] Cardiology  [] Urology  [] PCP     Code Status/Emergency Contact:      ---------------    Goals: Safe discharge to home  Estimated Length of Stay: 10-14 days  Rehab Potential: Good  Medical Prognosis: Good  Estimated Disposition: Home with home care      PRESCREEN COMPARISON:  I have reviewed the prescreen information and I have found no relevant changes between the preadmission screening and my post admission evaluation.    RATIONALE FOR INPATIENT ADMISSION: Patient demonstrates the following:  [X] Medically appropriate for rehabilitation admission  [X] Has attainable rehab goals with an appropriate initial discharge plan  [X]Has rehabilitation potential (expected to make a significant improvement within a reasonable period of time)  [X] Requires close medical management by a rehab physician, rehab nursing care, Hospitalist and comprehensive interdisciplinary team (including PT, OT and/or SLP, Prosthetics and Orthotics)     Assessment/Plan:  REHANA MEDINA is a 84y with CAD s/p CABG recent acute polyneuropathy diagnosis (march 2024).  Here after IVIG series c/b PE RUL and DVT RLE. Patient now admitted for a multidisciplinary rehab program. 05-10-24 @ 17:01    # Motor Axon Neuropathy  Comprehensive Multidisciplinary Rehab Program:  - Start comprehensive rehab program of PT/OT- 3 hours a day, 5 days a week. P&O as needed   -initially began after viral illness in March 2024  -following with Neurologist Dr. Liu  -s/p IVIG ending on 5/8    # CAD s/p CABG  # AF  -continue aspirin and statin  -on therapeutic AC    # PE  # DVT  - continue lovenox 90 mg BID  - RUL PE and RLE DVT    # UTI  - sensitive proteus UTI  - bactrim 2 tab BID until 5/17    # Pain  - Tylenol PRN    Mood / Cognition  - Neuropsychology consult PRN    Sleep  - Maintain quiet hours and a low stim environment.   - Melatonin 3 mg QHS    GI / Bowel  - Miralax daily  - Senna QHS  - GI ppx: famotidine 40 mg BID     / Bladder  - Continue bladder scans Q8 hours with straight cath for >400cc  - Toileting schedule every 3 hours while awake    Skin / Pressure injury  - Skin assessment on admission performed: MAD to b/l groin  - Turn q2 hours in bed while awake, air mattress  - nursing to monitor skin qShift  - soft heel protectors  - skin barrier cream PRN    Diet/Dysphagia:  - Diet Consistency: regular diet  - Aspiration Precautions  - Nutrition consult    DVT prophylaxis:   - has RLE DVT  - on full dose Lovenox  - Last Doppler on R soleal DVT on 5/8      Outpatient Follow-up:  Workup pending:  [] CT urogram   [] Cardiac MRI  [] EMG, NCV  [] Transthyretin lab    Follow up with:  [] Neurology Dr. Liu  [] Cardiology  [] Urology  [] PCP     Code Status/Emergency Contact:      ---------------    Goals: Safe discharge to home  Estimated Length of Stay: 10-14 days  Rehab Potential: Good  Medical Prognosis: Good  Estimated Disposition: Home with home care      PRESCREEN COMPARISON:  I have reviewed the prescreen information and I have found no relevant changes between the preadmission screening and my post admission evaluation.    RATIONALE FOR INPATIENT ADMISSION: Patient demonstrates the following:  [X] Medically appropriate for rehabilitation admission  [X] Has attainable rehab goals with an appropriate initial discharge plan  [X]Has rehabilitation potential (expected to make a significant improvement within a reasonable period of time)  [X] Requires close medical management by a rehab physician, rehab nursing care, Hospitalist and comprehensive interdisciplinary team (including PT, OT and/or SLP, Prosthetics and Orthotics)

## 2024-05-10 NOTE — PATIENT PROFILE ADULT - FUNCTIONAL ASSESSMENT - DAILY ACTIVITY SECTION LABEL
April 15, 2020      Noelle Lala MD  [address]  [place] [address]           [facility] - Interventional Rad  [address]  [facility] [address]  Phone: [phone]          Patient: Saba Driver   MR Number: [id number]   YOB: 1957   Date of Visit: 4/15/2020       Dear Dr. Noelle Lala:    Thank you for referring Saba Driver to me for evaluation. Attached you will find relevant portions of my assessment and plan of care.    If you have questions, please do not hesitate to call me. I look forward to following Saba Driver along with you.    Sincerely,    Ellyn Saldana PA-C    Enclosure  CC:  No Recipients    If you would like to receive this communication electronically, please contact [email or web address] or [phone] to request more information on sw[facility] Link access.    For providers and/or their staff who would like to refer a patient to [facility], please contact us through our one-stop-shop provider referral line, [facility], at [phone].    If you feel you have received this communication in error or would no longer like to receive these types of communications, please e-mail [email or web address]         
April 15, 2020    Saba Bivarus  [address] [place] [address]             PRE-PROCEDURE INSTRUCTIONS    Your procedure with Interventional Radiology is scheduled for April 20th, 10:30am. Please arrive by 10:30am.    You must check-in and receive a wristband before going to your procedure. Your check-in location is front of hospital.    DO NOT take aspiring for 5 days before your procedure.    **Do not eat or drink anything between midnight and the time of your procedure. This includes gum, mints, and candy lemon drops.    **Do not smoke or drink alcoholic beverages 24 hours prior to your procedure.    **If you wear contact lenses, dentures, hearing aids, or glasses, bring a container to put them in during the procedure and give them to a family member for safekeeping.    **If you have been diagnosed with sleep apnea please bring your CPAP machine.    **If your doctor has scheduled you for an overnight stay, bring a small overnight bag with any personal items that you may need.    **Make arrangements in advance for transportation home by a responsible adult. It is not safe to drive a vehicle during the 24 hours following the procedure.    **All [facility] facilities and properties are tobacco free. Smoking is NOT allowed.    PLEASE NOTE: The procedure schedule has many variables which affect the time of your procedure. Family members should be available if your surgery time changes.    If you have any questions about these instructions call Interventional Radiology at [phone] Monday - Friday between 8:00am and 4:00pm or [phone] (ask for interventional radiology resident) for after hours.  2  
.

## 2024-05-10 NOTE — PATIENT PROFILE ADULT - FUNCTIONAL ASSESSMENT - BASIC MOBILITY 6.
1-calculated by average/Not able to assess (calculate score using [facility] averaging method)  1-calculated by average/Not able to assess (calculate score using [facility] averaging method)

## 2024-05-10 NOTE — PROGRESS NOTE ADULT - ASSESSMENT
Assessment  84M with h/o CAD s/p CABG, HTN, presenting with quadriparesis after 8 days of diarrhea. Initially admitted to [facility]. Gradually became even more week, at the time unable to lift legs but able to lift arms. Had multiple falls after being discharged from rehab. Recommended to go back to [place]. Evaluated by Dr. 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 Currently no respiratory involvement nor swallowing deficits.  Lyme neg. EBV virus capsid IgG and nuclear antigen positive, early antigen positive, IgM negative, HSV 1 and 2 Ab positive. 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   (2) Fever of unclear origin, blood culture 1x gram negative, further workup pending; enlarged prostate seen on CTCAP; potentially  origin, on ceftriaxone  (3) RUE PE + RLE DVT, likely 2/2 immobility started on full dose AC 5/8    Plan: incomplete  #Generalized weakness likely 2/2 KATLYN   - MRI axis completed   - c/w IVIG x5d (5/3 - 5/7), completed course with premedications  - LP  glucose 64, protein 77, cell count 1, acid fast cx ruchi, PCR panel neg, Lyme antibodies, VDRL titer, WNV antibodies, ACE, IgG index, paraneoplastic panel, MBP, OCB  - Serum studies - B1, B6, folate 15.5, B12 808, HIV neg, HTLV (negative),  copper, (123) zinc (67), ESR 37, CRP (4), MELYSSA (-), ANCA (-), DsDNA <1, RF <10, TSH 3.74 w/ reflex T4 1.3, SPEP, UPEP, Coags, ionized Ca 1.19, fibrinogen (451), ganglioside panel wnl   - PT/OT; splints + splint evaluation    #CAD  - c/w aspirin 81mg, statin 40mg    #Constipation   - Miralax 17g daily    #Dry eye  -artificial tears + refresh eye drops prn    #Fever   #?prostatitis  -infectious workup pending - BCx (1 with gram-), CXR (c/f atelectasis vs infxn)  --pending 1bloodcx, 2 repeat bx on 5/8; sensitive -> continue ceftriaxone 1g IV Q24H while inpatient; can transition to Bactrim DS 2 tab Q12H for oral transition  --CTAP + CTPE done  --Ucx 5/10 >100k gram negative rods  -urology team contacted, no need for consult unless imaging shows abscess in prostate and can continue abx if concern for prostatitis  -UT urogram ordered  -ID consulted, appreciate insight     #New onset afib   on tele noted 5/8  -f/u echo  -lovenox full dose -> at least 3-6 months, will require close outpt f/u  -tele monitoring  -rate control    #Pulm  -NIF/VC 1x - follow up (poor effort); repeat on 5/9  -incentive spirometry started  -CTPE as above    Dispo: Acute rehab   DVT ppx: Lovenox -> switched to full dose on 5/8/24 80 BID  Regular Diet; nutrition consulted given poor PO intake, ensure increased Assessment  84M with h/o CAD s/p CABG, HTN, presenting with quadriparesis after 8 days of diarrhea. Initially admitted to [facility]. Gradually became even more week, at the time unable to lift legs but able to lift arms. Had multiple falls after being discharged from rehab. Recommended to go back to [place]. Evaluated by Dr. Yvette Liu with EMG, suspected to have KATLNY. Currently no respiratory involvement nor swallowing deficits.  Lyme neg. EBV virus capsid IgG and nuclear antigen positive, early antigen positive, IgM negative, HSV 1 and 2 Ab positive. 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   (2) Fever of unclear origin, blood culture 1x gram negative, further workup pending; enlarged prostate seen on CTCAP; potentially  origin, on ceftriaxone  (3) RUE PE + RLE DVT, likely 2/2 immobility started on full dose AC 5/8    Plan: incomplete  #Generalized weakness likely 2/2 KATLYN   - MRI axis completed   - c/w IVIG x5d (5/3 - 5/7), completed course with premedications  - LP  glucose 64, protein 77, cell count 1, acid fast cx ruchi, PCR panel neg, Lyme antibodies, VDRL titer, WNV antibodies, ACE, IgG index, paraneoplastic panel, MBP, OCB  - Serum studies - B1, B6, folate 15.5, B12 808, HIV neg, HTLV (negative),  copper, (123) zinc (67), ESR 37, CRP (4), MELYSSA (-), ANCA (-), DsDNA <1, RF <10, TSH 3.74 w/ reflex T4 1.3, SPEP, UPEP, Coags, ionized Ca 1.19, fibrinogen (451), ganglioside panel wnl   - PT/OT; splints + splint evaluation    #CAD  - c/w aspirin 81mg, statin 40mg    #Constipation   - Miralax 17g daily    #Dry eye  -artificial tears + refresh eye drops prn    #Fever   #?prostatitis  -infectious workup pending - BCx (1 with gram-), CXR (c/f atelectasis vs infxn)  --pending 1bloodcx, 2 repeat bx on 5/8; sensitive -> continue ceftriaxone 1g IV Q24H while inpatient; can transition to Bactrim DS 2 tab Q12H for oral transition  --CTAP + CTPE done  --Ucx 5/10 >100k gram negative rods  -urology team contacted, no need for consult unless imaging shows abscess in prostate and can continue abx if concern for prostatitis  -UT urogram ordered -> if bed available, perform the CT urogram outpt  -ID consulted, appreciate insight     #New onset afib   on tele noted 5/8  -f/u echo -> TTE done -> cardiac MRI can be done in or outpt  -lovenox full dose -> at least 3-6 months, will require close outpt f/u  -tele monitoring  -rate control    #Pulm  -NIF/VC 1x - follow up (poor effort); repeat on 5/9  -incentive spirometry started  -CTPE as above    Dispo: Acute rehab   DVT ppx: Lovenox -> switched to full dose on 5/8/24 80 BID  Regular Diet; nutrition consulted given poor PO intake, ensure increased Assessment  84M with h/o CAD s/p CABG, HTN, presenting with quadriparesis after 8 days of diarrhea. Initially admitted to [facility]. Gradually became even more week, at the time unable to lift legs but able to lift arms. Had multiple falls after being discharged from rehab. Recommended to go back to [place]. Evaluated by Dr. 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 Currently no respiratory involvement nor swallowing deficits.  Lyme neg. EBV virus capsid IgG and nuclear antigen positive, early antigen positive, IgM negative, HSV 1 and 2 Ab positive. 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   (2) Fever of unclear origin, blood culture 1x gram negative, further workup pending; enlarged prostate seen on CTCAP; potentially  origin, on ceftriaxone  (3) RUE PE + RLE DVT, likely 2/2 immobility started on full dose AC 5/8    Plan:   #Generalized weakness likely 2/2 KATLYN   - MRI axis completed   - c/w IVIG x5d (5/3 - 5/7), completed course with premedications  - LP  glucose 64, protein 77, cell count 1, acid fast cx ruchi, PCR panel neg, Lyme antibodies, VDRL titer, WNV antibodies, ACE, IgG index, paraneoplastic panel, MBP, OCB  - Serum studies - B1, B6, folate 15.5, B12 808, HIV neg, HTLV (negative),  copper, (123) zinc (67), ESR 37, CRP (4), MELYSSA (-), ANCA (-), DsDNA <1, RF <10, TSH 3.74 w/ reflex T4 1.3, SPEP, UPEP, Coags, ionized Ca 1.19, fibrinogen (451), ganglioside panel wnl   - PT/OT; splints + splint evaluation    #CAD  - c/w aspirin 81mg, statin 40mg    #Constipation   - Miralax 17g daily    #Dry eye  -artificial tears + refresh eye drops prn    #Fever   #?prostatitis  -infectious workup pending - BCx (1 with gram-), CXR (c/f atelectasis vs infxn)  --pending 1bloodcx, 2 repeat bx on 5/8; sensitive -> continue ceftriaxone 1g IV Q24H while inpatient; can transition to Bactrim DS 2 tab Q12H for oral transition  --CTAP + CTPE done  --Ucx 5/10 >100k gram negative rods  -urology team contacted, no need for consult unless imaging shows abscess in prostate and can continue abx if concern for prostatitis  -UT urogram ordered -> if bed available, perform the CT urogram outpt  -ID consulted, appreciate insight     #New onset afib   on tele noted 5/8  -f/u echo -> TTE done -> cardiac MRI can be done in or outpt  -lovenox full dose -> at least 3-6 months, will require close outpt f/u  -tele monitoring  -rate control    #Pulm  -NIF/VC 1x - follow up (poor effort); repeat on 5/9  -incentive spirometry started  -CTPE as above    Dispo: Acute rehab   DVT ppx: Lovenox -> switched to full dose on 5/8/24 80 BID  Regular Diet; nutrition consulted given poor PO intake, ensure increased Assessment  84M with h/o CAD s/p CABG, HTN, presenting with quadriparesis after 8 days of diarrhea. Initially admitted to [facility]. Gradually became even more week, at the time unable to lift legs but able to lift arms. Had multiple falls after being discharged from rehab. Recommended to go back to [place]. Evaluated by Dr. 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 Currently no respiratory involvement nor swallowing deficits.  Lyme neg. EBV virus capsid IgG and nuclear antigen positive, early antigen positive, IgM negative, HSV 1 and 2 Ab positive. 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   (2) Fever of unclear origin, blood culture 1x gram negative, further workup pending; enlarged prostate seen on CTCAP; potentially  origin, on ceftriaxone  (3) RUE PE + RLE DVT, likely 2/2 immobility started on full dose AC 5/8    Plan:   #Generalized weakness likely 2/2 KATLYN   - MRI axis completed   - c/w IVIG x5d (5/3 - 5/7), completed course with premedications  - LP  glucose 64, protein 77, cell count 1, acid fast cx ruchi, PCR panel neg, Lyme antibodies, VDRL titer, WNV antibodies, ACE, IgG index, paraneoplastic panel, MBP, OCB  - Serum studies - B1, B6, folate 15.5, B12 808, HIV neg, HTLV (negative),  copper, (123) zinc (67), ESR 37, CRP (4), MELYSSA (-), ANCA (-), DsDNA <1, RF <10, TSH 3.74 w/ reflex T4 1.3, SPEP, UPEP, Coags, ionized Ca 1.19, fibrinogen (451), ganglioside panel wnl   - PT/OT; splints + splint evaluation    #CAD  - c/w aspirin 81mg, statin 40mg    #Constipation   - Miralax 17g daily    #Dry eye  -artificial tears + refresh eye drops prn    #Fever   #?prostatitis  -infectious workup pending - BCx (1 with gram-), CXR (c/f atelectasis vs infxn)  --pending 1bloodcx, 2 repeat bx on 5/8; sensitive -> continue ceftriaxone 1g IV Q24H while inpatient; can transition to Bactrim DS 2 tab Q12H for oral transition  --CTAP + CTPE done  --Ucx 5/10 >100k gram negative rods  -urology team contacted, no need for consult unless imaging shows abscess in prostate and can continue abx if concern for prostatitis  -UT urogram ordered -> if bed available, perform the CT urogram outpt as discussed with med team  -ID consulted, appreciate insight     #New onset afib   on tele noted 5/8  -f/u echo -> TTE done -> cardiac MRI can be done in or outpt as discussed with med team  -lovenox full dose -> at least 3-6 months, will require close outpt f/u  -tele monitoring  -rate control    #Pulm  -NIF/VC 1x - follow up (poor effort); repeat on 5/9  -incentive spirometry started  -CTPE as above    Dispo: Acute rehab   DVT ppx: Lovenox -> switched to full dose on 5/8/24 80 BID  Regular Diet; nutrition consulted given poor PO intake, ensure increased

## 2024-05-10 NOTE — PROGRESS NOTE ADULT - SUBJECTIVE AND OBJECTIVE BOX
Neurology Progress Note    HOSPITAL COURSE: 83M R-handed PMHx CAD s/p bypass x4 and HLD presenting as a transfer from [facility]. Presented with a 'stomach virus' in 3/2024, reported significant weight loss, sensation of 'wobbling,' with repeated falls. Had seen Dr. Yvette Liu in the outpatient setting who reported he had KATLYN. Patient obtained an MRI of the whole axis w/w/o contrast, started IVIG (D1 5/3). Seen by physical therapy and recommended for acute rehab. Seen by OT for splint evaluation. On 5/7 overnight developed low grade temperature, with tachycardia, infectious workup pending + ID team consulted. 5/8 obtained CTCAP, showing PE in R upper lobe, b/l dopplers showing RLE DVT. Started on full dose lovenox. Also noted to have afib on tele. Medicine team consulted. TTE 5/9 EF 75% no regional wall abnormalities, apical variant hypertrophic cardiomyopathy. CT urogram ordered for bladder wall thickening.     SUBJECTIVE/OBJECTIVE/INTERVAL EVENTS: Patient seen and examined at bedside w/ neuro attending and team.     INTERVAL HISTORY: No acute events overnight    REVIEW OF SYSTEMS: Few questions of a 10-system ROS was performed and is negative except for those items noted above and/or in the HPI.    VITALS & EXAMINATION:  Vital Signs Last 24 Hrs  T(C): 36.7 (10 May 2024 04:30), Max: 36.9 (09 May 2024 21:25)  T(F): 98.1 (10 May 2024 04:30), Max: 98.5 (09 May 2024 21:25)  HR: 81 (10 May 2024 04:30) (81 - 94)  BP: 116/77 (10 May 2024 04:30) (103/67 - 135/85)  BP(mean): --  RR: 18 (10 May 2024 04:30) (18 - 18)  SpO2: 93% (10 May 2024 04:30) (93% - 95%)    Parameters below as of 10 May 2024 04:30  Patient On (Oxygen Delivery Method): room air    General:  Neurological (>12):  MS: Awake, alert, oriented to person, place, situation, time. Normal affect. Follows all commands.  Language: Speech is clear, fluent   CNs: PERRL. VFF. EOMI overall no nystagmus, no diplopia. V1-3 intact to LT,. No facial asymmetry b/l, full eye closure strength b/l. Hearing grossly normal (rubbing fingers) b/l. Symmetric palate elevation in midline. Gag reflex deferred. Shoulder shrug intact b/l. Tongue midline, normal movements, no atrophy.  Motor:               Deltoid	Biceps	Triceps	   R	4-	3	3	3			  L	4-	3	3	3			  	H-Flex  D-Flex	P-Flex  R	4	3	2	 	   L	4-	0	0		   Sensation: Intact to LT b/l throughout.   Reflexes: 0 throughout, downgoing toes b/l  Coordination: limited by weakness   Gait:  deferred for patient safety      LABORATORY:  CBC                       13.0   4.57  )-----------( 193      ( 09 May 2024 06:50 )             38.3     Chem 05-09    135  |  100  |  20  ----------------------------<  87  3.7   |  23  |  0.69    Ca    8.5      09 May 2024 06:53      LFTs   Coagulopathy   Lipid Panel 05-02 Chol 119 LDL -- HDL 44 Trig 83  A1c   Cardiac enzymes     U/A Urinalysis Basic - ( 09 May 2024 06:53 )    Color: x / Appearance: x / SG: x / pH: x  Gluc: 87 mg/dL / Ketone: x  / Bili: x / Urobili: x   Blood: x / Protein: x / Nitrite: x   Leuk Esterase: x / RBC: x / WBC x   Sq Epi: x / Non Sq Epi: x / Bacteria: x      CSF  Immunological  Other    STUDIES & IMAGING: (EEG, CT, MR, U/S, TTE/BILLY):  < from: CT Abdomen and Pelvis w/ IV Cont (05.08.24 @ 11:24) >  Embolus within the right upper lobe pulmonary artery. No evidence of   right heart strain.    Circumferential wall thickening of the bladder. Otherwise, no acute   finding within abdomen/pelvis.    < from: [facility] Duplex Lower Ext Vein Scan, Bilat (05.08.24 @ 10:35)   There is acute below the knee DVT affecting a right soleal vein. Neurology Progress Note    HOSPITAL COURSE: 83M R-handed PMHx CAD s/p bypass x4 and HLD [facility]. Presented with a 'stomach virus' in 3/2024, reported significant weight loss, sensation of 'wobbling,' with repeated falls. Had seen Dr. Yvette Liu in the outpatient setting who reported he had KATLYN. Patient obtained an MRI of the whole axis w/w/o contrast, started IVIG (D1 5/3). Seen by physical therapy and recommended for acute rehab. Seen by OT for splint evaluation. On 5/7 overnight developed low grade temperature, with tachycardia, infectious workup pending + ID team consulted. 5/8 obtained CTCAP, showing PE in R upper lobe, b/l dopplers showing RLE DVT. Started on full dose lovenox. Also noted to have afib on tele. Medicine team consulted. TTE 5/9 EF 75% no regional wall abnormalities, apical variant hypertrophic cardiomyopathy. CT urogram ordered for bladder wall thickening, MRI cardiac ordered. Pending bed. Can be transitioned to PO medications on dc.    SUBJECTIVE/OBJECTIVE/INTERVAL EVENTS: Patient seen and examined at bedside w/ neuro attending and team.     INTERVAL HISTORY: No acute events overnight    REVIEW OF SYSTEMS: Few questions of a 10-system ROS was performed and is negative except for those items noted above and/or in the HPI.    VITALS & EXAMINATION:  Vital Signs Last 24 Hrs  T(C): 36.7 (10 May 2024 04:30), Max: 36.9 (09 May 2024 21:25)  T(F): 98.1 (10 May 2024 04:30), Max: 98.5 (09 May 2024 21:25)  HR: 81 (10 May 2024 04:30) (81 - 94)  BP: 116/77 (10 May 2024 04:30) (103/67 - 135/85)  BP(mean): --  RR: 18 (10 May 2024 04:30) (18 - 18)  SpO2: 93% (10 May 2024 04:30) (93% - 95%)    Parameters below as of 10 May 2024 04:30  Patient On (Oxygen Delivery Method): room air    General:  Neurological (>12):  MS: Awake, alert, oriented to person, place, situation, time. Normal affect. Follows all commands.  Language: Speech is clear, fluent   CNs: PERRL. VFF. EOMI overall no nystagmus, no diplopia. V1-3 intact to LT,. No facial asymmetry b/l, full eye closure strength b/l. Hearing grossly normal (rubbing fingers) b/l. Symmetric palate elevation in midline. Gag reflex deferred. Shoulder shrug intact b/l. Tongue midline, normal movements, no atrophy.  Motor:               Deltoid	Biceps	Triceps	   R	4-	3	3	3			  L	4-	3	3	3			  	H-Flex  D-Flex	P-Flex  R	4	3	2	 	   L	4-	0	0		   Sensation: Intact to LT b/l throughout.   Reflexes: 0 throughout, downgoing toes b/l  Coordination: limited by weakness   Gait:  deferred for patient safety      LABORATORY:  CBC                       13.0   4.57  )-----------( 193      ( 09 May 2024 06:50 )             38.3     Chem 05-09    135  |  100  |  20  ----------------------------<  87  3.7   |  23  |  0.69    Ca    8.5      09 May 2024 06:53      LFTs   Coagulopathy   Lipid Panel 05-02 Chol 119 LDL -- HDL 44 Trig 83  A1c   Cardiac enzymes     U/A Urinalysis Basic - ( 09 May 2024 06:53 )    Color: x / Appearance: x / SG: x / pH: x  Gluc: 87 mg/dL / Ketone: x  / Bili: x / Urobili: x   Blood: x / Protein: x / Nitrite: x   Leuk Esterase: x / RBC: x / WBC x   Sq Epi: x / Non Sq Epi: x / Bacteria: x      CSF  Immunological  Other    STUDIES & IMAGING: (EEG, CT, MR, U/S, TTE/BILLY):  < from: CT Abdomen and Pelvis w/ IV Cont (05.08.24 @ 11:24) >  Embolus within the right upper lobe pulmonary artery. No evidence of   right heart strain.    Circumferential wall thickening of the bladder. Otherwise, no acute   finding within abdomen/pelvis.    < from: VA Duplex Lower Ext Vein Scan, Bilat (05.08.24 @ 10:35)   There is acute below the knee DVT affecting a right soleal vein.

## 2024-05-10 NOTE — PROGRESS NOTE ADULT - PROVIDER SPECIALTY LIST ADULT
Infectious Disease
Neurology
Neurology
Infectious Disease
Internal Medicine
Internal Medicine
Neurology
Neurology
Rehab Medicine
Intervent Radiology
Neurology

## 2024-05-10 NOTE — PROGRESS NOTE ADULT - TIME BILLING
.Time
Review of records, tests, medical and neuro exam, discussion of plan.

## 2024-05-10 NOTE — PATIENT PROFILE ADULT - FALL HARM RISK - FALL HARM RISK
weakness, mx falls  takes baby asa qd/Coagulation/Other weakness, mx falls  takes baby asa qd/Coagulation

## 2024-05-10 NOTE — H&P ADULT - NSHPPHYSICALEXAM_GEN_ALL_CORE
Constitutional - NAD, Comfortable, in bed   Chest - Breathing comfortably on room air   Cardiovascular - S1S2   Abdomen - Soft   Extremities -  b/l UE splints   Neurologic Exam -    follows commands                 Cognitive - Awake, Alert, AAO to self, place, date, year, situation     Communication - Fluent, No dysarthria       Motor -                     LEFT    UE - ShAB 3/5, EF 3/5, EE 3/5, WE 2/5,  2/5                    RIGHT UE - ShAB 3/5, EF 3/5, EE 3/5, WE 2/5,  2/5                    LEFT    LE - HF 3/5, KE 3/5, DF 0/5, PF 0/5                    RIGHT LE - HF 3/5, KE 4/5, DF 0/5, PF 0/5        Sensory - Intact to LT     Psychiatric - Mood stable, Affect WNL Gen - NAD, Comfortable  HEENT - NCAT, EOMI, MMM  Neck - Supple, No limited ROM  Pulm - CTAB, No wheeze, No rhonchi, No crackles  Cardiovascular - RRR, S1S2, No murmurs  Abdomen - Soft, NT/ND, +BS  Extremities - No calf tenderness, 2+ pitting pedal edema b/l, non-pitting edema to right hand  Neuro-     Cognitive - AAOx3, follows commands     Communication - Fluent, No dysarthria     Motor -                     LEFT    UE - ShAB 2/5, EF 3/5, EE 2/5, WE 1/5,  1/5                    RIGHT UE - ShAB 2/5, EF 3+/5, EE 2/5, WE 1/5,  1/5                    LEFT    LE - HF 2/5, KE 3/5, DF 0/5, PF 0/5                    RIGHT LE - HF 2/5, KE 4/5, DF 0/5, PF 0/5        Sensory - Intact to LT     Reflexes - Negative clonus b/l, Negative Cuevas b/l     Tone - MAS 1+ in right bicep  Psychiatric - Mood stable, Affect WNL  Skin: MAD b/l groin Gen - NAD, Comfortable  HEENT - NCAT, EOMI, MMM  Neck - Supple, No limited ROM  Pulm - CTAB, No wheeze, No rhonchi, No crackles  Cardiovascular - RRR, S1S2, No murmurs  Abdomen - Soft, NT/ND, +BS  Extremities - No calf tenderness, 2+ pitting pedal edema b/l, non-pitting edema to right hand  Neuro-     Cognitive - AAOx3, follows commands     Communication - Fluent, No dysarthria     Motor -                     LEFT    UE - ShF3/5, EF 3+/5, EE 3+/5, WE 1/5,  1/5                    RIGHT UE - ShAB 2/5, EF 3/5, EE 3/5, WE 1/5,  1/5                    LEFT    LE - HF 2+/5, KE 4/5, DF 0/5, PF 0/5 4/5 hip add/abd hector                    RIGHT LE - HF 2+/5, KE 4+/5, DF 0/5, PF 0/5        Sensory - Intact to LT     Reflexes - Negative clonus b/l, Negative Cuevas b/l     Tone - MAS 1+ in right bicep  Psychiatric - Mood stable, Affect WNL  Skin: MAD b/l groin

## 2024-05-10 NOTE — H&P ADULT - HISTORY OF PRESENT ILLNESS
83M R-handed PMHx CAD s/p bypass x4 and HLD presented as a transfer from [facility]. Presented with a 'stomach virus' in 3/2024, reported significant weight loss, sensation of 'wobbling,' with repeated falls. Had seen Dr. 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 Patient obtained an MRI of the whole axis w/w/o contrast, started IVIG (D1 5/3) x 5 doses, with some improvement. Patient found to have RLE DVT and PE in the r upper lobe. Started on full dose lovenox. CT urogram demonstrated bladder wall thickening. ID consulted for low grade fever but workup negative. Seen by physical therapy and recommended for acute rehab and agreed upon by physiatry consult.       atient was also noted to have afib on tele. Medicine team consulted. TTE 5/9 EF 75% no regional wall abnormalities, apical variant hypertrophic cardiomyopathy, recommended for cardiac MRI.  Will need to continue antibiotics (continue ceftriaxone 1g IV Q24H while inpatient; can transition to PO Bactrim DS 2 tab Q12H at discharge to rehab.)    Workup obtained:-   LP  glucose 64, protein 77, cell count 1, acid fast cx ruchi, PCR panel neg, Lyme antibodies, VDRL titer, WNV antibodies, ACE, IgG index, paraneoplastic panel, MBP, OCB  - Serum studies - B1, B6, folate 15.5, B12 808, HIV neg, HTLV (negative),  copper, (123) zinc (67), ESR 37, CRP (4), MELYSSA (-), ANCA (-), DsDNA <1, RF <10, TSH 3.74 w/ reflex T4 1.3, SPEP, UPEP, Coags, ionized Ca 1.19, fibrinogen (451), ganglioside panel wnl     Workup pending:  [] CT urogram   [] Cardiac MRI  [] EMG, NCV  [] Transthyretin lab    Follow up with:  [] Neurology Dr. Liu  [] Cardiology  [] Urology  [] PCP     Patient will require close neurology + urology + cardiology outpatient follow up, as well as follow up with his PCP regarding his hospital presentation and continuation of anticoagulation for provoked PE w/ DVT in the setting of immobility.       83M R-handed PMHx CAD s/p bypass x4 and HLD presented as a transfer from [facility]. Presented with a 'stomach virus' in 3/2024, reported significant weight loss, sensation of 'wobbling,' with repeated falls. Had seen Dr. 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 Patient received a full body MRI demonstrating no acute changes of the spine or brain. Received 5 doses of IVIG ending on 5/8. Patient found to have a bactrim sensitive proteus UTI. Patient incidentially found to have RLE DVT and PE in the RUL. Now on full dose AC w/ BID lovenox.  Patient seen by physiatry and recommended for acute inpatient rehab. Discharged to [facility] on 5/10.         83M R-handed PMHx CAD s/p bypass x4 and HLD presented as a transfer from [facility]. Presented with a 'stomach virus' in 3/2024, reported significant weight loss, sensation of 'wobbling,' with repeated falls. Had seen Dr. 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 Patient received a full body MRI demonstrating no acute changes of the spine or brain. Received 5 doses of IVIG ending on 5/8. Patient found to have a bactrim sensitive proteus UTI. Patient incidentially found to have RLE DVT and PE in the RUL. Now on full dose AC w/ BID lovenox.  Patient seen by physiatry and recommended for acute inpatient rehab. Discharged to [facility] on 5/10.

## 2024-05-10 NOTE — PROGRESS NOTE ADULT - ASSESSMENT
Patient is a 82 Y/O Male R-handed PMHx CAD s/p bypass x4 and HLD presenting as a transfer from [facility]. Presented with a 'stomach virus' in 3/2024, reported significant weight loss, sensation of 'wobbling,' with repeated falls. Had seen Dr. Yvette Liu in the outpatient setting who reported he had KATLYN. Patient obtained an MRI of the whole axis w/w/o contrast, started IVIG (D1 5/3).     # KATLYN  - IVIG  - PT  - Monitor clinially  - Fall, Seizure and Aspiration precautions     # Bacteremia  - 5/7 BCx 1 of 2 w/ Gram neg Rods, Proteus mirabilis --> On Rocephin, sensitive. ABX per ID   - 5/8 BCx 1 w/ NGTD; F/u final   - Pan CT   - CT chest, Abd/Pelv --> W/ bladder wall thickening. Check CT urogram. Monitor for CHAD, hydrate as tolerated   - Repeat cultures   - Trend CBC, temp curve, VS and patient closely     # Acute LE DVT and PE   - Placed on lovenox. Monitor H/H. Hgb closely   - TTE w/ ED of 75%, hyperdynamic, no WMA, Apical variant hypertrophic cardiomyopathy --> Recommended for cardiac MR  - Monitor on tele  - Monitor hemodynamics   - Monitor O2 saturation; Supplement to maintain> 90%   - Cardio eval suggested. Defer Cardiac MR per cardio     # New onset a fib   - TTE as above --> Recommended for Cardiac MR   - AC , lovenox  - Rate control  - Tele monitoring   - Ischemic W/U after resolution of neuro symptoms     # CAD S/P Bypass  - ASA and statin  - BP control   - Monitor on tele    # HLD   - Check lipid panel  - Statin     DVT and GI PPX     Pt care and plan discussed and reviewed with PA. Plan as outlined above edited by me to reflect our discussion. Advanced care planning/advanced directives discussed with patient/family. DNR status including forceful chest compressions to attempt to restart the heart, ventilator support/artificial breathing, electric shock, artificial nutrition, health care proxy, Molst form all discussed with pt. Fifty seven minutes of total time dedicated to this patient visit today including preparing to see the patient (eg. review of tests), obtaining and/or reviewing separately obtained history, obtaining/reviewing vitals, performing a medically appropriate examination and/or evaluation, counseling and educating the patient/family/caregiver, reviewing previous notes and test results, and procedures, communicating with other health professionals (when not separately reported), and documenting clinical information in the electronic health record.

## 2024-05-10 NOTE — H&P ADULT - NSHPREVIEWOFSYSTEMS_GEN_ALL_CORE
REVIEW OF SYSTEMS  Constitutional: No fever, No Chills, No fatigue  HEENT: No visual disturbances, No difficulty hearing  Pulm: No cough,  No shortness of breath  Cardio: No chest pain, No palpitations  GI:  No abdominal pain, No nausea, No vomiting, No diarrhea, No constipation, +loss of appetite  : No dysuria, No frequency, No hematuria  Neuro: No headaches, No numbness, +No loss of strength  Skin: No itching, No rashes  MSK: No Neck pain, No back pain, +right wrist pain, + right hand swelling  Psych:  No depression, No anxiety REVIEW OF SYSTEMS  Constitutional: No fever, No Chills, No fatigue  HEENT: No visual disturbances, No difficulty hearing  Pulm: No cough,  No shortness of breath  Cardio: No chest pain, No palpitations  GI:  No abdominal pain, No nausea, No vomiting, No diarrhea, No constipation, +loss of appetite- last BM 5/10  : No dysuria, No frequency, No hematuria  Neuro: No headaches, No numbness, + loss of strength  Skin: No itching, No rashes  MSK: No Neck pain, No back pain, +right wrist pain, + right hand swelling  Psych:  No depression, No anxiety

## 2024-05-10 NOTE — H&P ADULT - NSHPSOCIALHISTORY_GEN_ALL_CORE
Smoking - Denied  EtOH - Denied   Drugs - Denied     Pt recently at [facility] using wheelchair and Petra lift due to with BUE/BLE weakness, limited digit ext of both upper extremities. Prior to march hospitalization pt lives with wife in a condo, private elevator access to apartment, currently elevator is being repaired, ~2 flight of stairs to apartment without use of elevator. Pt owns cane, RW. Prior to onset of symptoms, patient was independent in ADL/functional mobility.    Lives with wife in a condo, private elevator access to apartment, currently elevator is being repaired, ~15+ steps to apartment without use of elevator. Pt owns cane, RW. Since ~March, Patient required wheel chair and petra lift, unable to ambulate, needed assist with ADL. Prior to onset of symptoms, patient was independent in ADL/functional mobility.    CURRENT FUNCTIONAL STATUS  PT 5/7  bed mobility mod assist x 2  scooting EOB x mod assist x 2 Smoking - Denied  EtOH - Denied   Drugs - Denied     Pt recently at [facility] using wheelchair and Petra lift due to with BUE/BLE weakness, limited digit ext of both upper extremities. Prior to march hospitalization pt lives with wife in a condo, private elevator access to apartment, currently elevator is being repaired, ~2 flight of stairs to apartment without use of elevator. Pt owns cane, RW. Prior to onset of symptoms, patient was independent in ADL/functional mobility.    Lives with wife in a condo, private elevator access to apartment, ~15+ steps to apartment without use of elevator. Pt owns cane, RW. Since ~March, Patient required wheel chair and Petra lift, unable to ambulate, needed assist with ADL prior to hospitalization. Before his onset of symptoms, patient was independent in ADL/functional mobility. Has a walk in shower with shower chair.     CURRENT FUNCTIONAL STATUS  PT 5/7  bed mobility mod assist x 2  scooting EOB x mod assist x 2

## 2024-05-11 LAB
-  AMIKACIN: SIGNIFICANT CHANGE UP
-  AZTREONAM: SIGNIFICANT CHANGE UP
-  CEFEPIME: SIGNIFICANT CHANGE UP
-  CEFTAZIDIME: SIGNIFICANT CHANGE UP
-  CIPROFLOXACIN: SIGNIFICANT CHANGE UP
-  IMIPENEM: SIGNIFICANT CHANGE UP
-  LEVOFLOXACIN: SIGNIFICANT CHANGE UP
-  MEROPENEM: SIGNIFICANT CHANGE UP
-  PIPERACILLIN/TAZOBACTAM: SIGNIFICANT CHANGE UP
ALBUMIN SERPL ELPH-MCNC: 2.3 G/DL — LOW (ref 3.3–5)
ALP SERPL-CCNC: 76 U/L — SIGNIFICANT CHANGE UP (ref 40–120)
ALT FLD-CCNC: 36 U/L — SIGNIFICANT CHANGE UP (ref 10–45)
ANION GAP SERPL CALC-SCNC: 10 MMOL/L — SIGNIFICANT CHANGE UP (ref 5–17)
AST SERPL-CCNC: 47 U/L — HIGH (ref 10–40)
BASOPHILS # BLD AUTO: 0.02 K/UL — SIGNIFICANT CHANGE UP (ref 0–0.2)
BASOPHILS NFR BLD AUTO: 0.4 % — SIGNIFICANT CHANGE UP (ref 0–2)
BILIRUB SERPL-MCNC: 0.7 MG/DL — SIGNIFICANT CHANGE UP (ref 0.2–1.2)
BUN SERPL-MCNC: 17 MG/DL — SIGNIFICANT CHANGE UP (ref 7–23)
CALCIUM SERPL-MCNC: 8.1 MG/DL — LOW (ref 8.4–10.5)
CHLORIDE SERPL-SCNC: 107 MMOL/L — SIGNIFICANT CHANGE UP (ref 96–108)
CO2 SERPL-SCNC: 26 MMOL/L — SIGNIFICANT CHANGE UP (ref 22–31)
CREAT SERPL-MCNC: 0.7 MG/DL — SIGNIFICANT CHANGE UP (ref 0.5–1.3)
EGFR: 92 ML/MIN/1.73M2 — SIGNIFICANT CHANGE UP
EOSINOPHIL # BLD AUTO: 0.12 K/UL — SIGNIFICANT CHANGE UP (ref 0–0.5)
EOSINOPHIL NFR BLD AUTO: 2.6 % — SIGNIFICANT CHANGE UP (ref 0–6)
GLUCOSE SERPL-MCNC: 77 MG/DL — SIGNIFICANT CHANGE UP (ref 70–99)
HCT VFR BLD CALC: 36.8 % — LOW (ref 39–50)
HGB BLD-MCNC: 12.8 G/DL — LOW (ref 13–17)
IMM GRANULOCYTES NFR BLD AUTO: 0.2 % — SIGNIFICANT CHANGE UP (ref 0–0.9)
LYMPHOCYTES # BLD AUTO: 1.89 K/UL — SIGNIFICANT CHANGE UP (ref 1–3.3)
LYMPHOCYTES # BLD AUTO: 40.8 % — SIGNIFICANT CHANGE UP (ref 13–44)
MCHC RBC-ENTMCNC: 32.2 PG — SIGNIFICANT CHANGE UP (ref 27–34)
MCHC RBC-ENTMCNC: 34.8 GM/DL — SIGNIFICANT CHANGE UP (ref 32–36)
MCV RBC AUTO: 92.7 FL — SIGNIFICANT CHANGE UP (ref 80–100)
METHOD TYPE: SIGNIFICANT CHANGE UP
MONOCYTES # BLD AUTO: 0.47 K/UL — SIGNIFICANT CHANGE UP (ref 0–0.9)
MONOCYTES NFR BLD AUTO: 10.2 % — SIGNIFICANT CHANGE UP (ref 2–14)
NEUTROPHILS # BLD AUTO: 2.12 K/UL — SIGNIFICANT CHANGE UP (ref 1.8–7.4)
NEUTROPHILS NFR BLD AUTO: 45.8 % — SIGNIFICANT CHANGE UP (ref 43–77)
NRBC # BLD: 0 /100 WBCS — SIGNIFICANT CHANGE UP (ref 0–0)
PLATELET # BLD AUTO: 212 K/UL — SIGNIFICANT CHANGE UP (ref 150–400)
POTASSIUM SERPL-MCNC: 3.6 MMOL/L — SIGNIFICANT CHANGE UP (ref 3.5–5.3)
POTASSIUM SERPL-SCNC: 3.6 MMOL/L — SIGNIFICANT CHANGE UP (ref 3.5–5.3)
PROT SERPL-MCNC: 6.9 G/DL — SIGNIFICANT CHANGE UP (ref 6–8.3)
RBC # BLD: 3.97 M/UL — LOW (ref 4.2–5.8)
RBC # FLD: 14.1 % — SIGNIFICANT CHANGE UP (ref 10.3–14.5)
SARS-COV-2 RNA SPEC QL NAA+PROBE: SIGNIFICANT CHANGE UP
SODIUM SERPL-SCNC: 143 MMOL/L — SIGNIFICANT CHANGE UP (ref 135–145)
WBC # BLD: 4.63 K/UL — SIGNIFICANT CHANGE UP (ref 3.8–10.5)
WBC # FLD AUTO: 4.63 K/UL — SIGNIFICANT CHANGE UP (ref 3.8–10.5)

## 2024-05-11 PROCEDURE — 99223 1ST HOSP IP/OBS HIGH 75: CPT

## 2024-05-11 PROCEDURE — 99222 1ST HOSP IP/OBS MODERATE 55: CPT | Mod: GC

## 2024-05-11 RX ADMIN — Medication 1 TABLET(S): at 21:15

## 2024-05-11 RX ADMIN — NYSTATIN CREAM 1 APPLICATION(S): 100000 CREAM TOPICAL at 17:17

## 2024-05-11 RX ADMIN — Medication 1 DROP(S): at 05:32

## 2024-05-11 RX ADMIN — ATORVASTATIN CALCIUM 40 MILLIGRAM(S): 80 TABLET, FILM COATED ORAL at 21:15

## 2024-05-11 RX ADMIN — Medication 2 TABLET(S): at 17:08

## 2024-05-11 RX ADMIN — FAMOTIDINE 40 MILLIGRAM(S): 10 INJECTION INTRAVENOUS at 06:04

## 2024-05-11 RX ADMIN — Medication 1 DROP(S): at 17:17

## 2024-05-11 RX ADMIN — FAMOTIDINE 40 MILLIGRAM(S): 10 INJECTION INTRAVENOUS at 17:08

## 2024-05-11 RX ADMIN — Medication 81 MILLIGRAM(S): at 12:12

## 2024-05-11 RX ADMIN — SENNA PLUS 2 TABLET(S): 8.6 TABLET ORAL at 21:15

## 2024-05-11 RX ADMIN — ENOXAPARIN SODIUM 90 MILLIGRAM(S): 100 INJECTION SUBCUTANEOUS at 05:34

## 2024-05-11 RX ADMIN — Medication 2 TABLET(S): at 05:33

## 2024-05-11 RX ADMIN — NYSTATIN CREAM 1 APPLICATION(S): 100000 CREAM TOPICAL at 05:34

## 2024-05-11 RX ADMIN — ENOXAPARIN SODIUM 90 MILLIGRAM(S): 100 INJECTION SUBCUTANEOUS at 17:09

## 2024-05-11 NOTE — CONSULT NOTE ADULT - ASSESSMENT
84y with CAD s/p CABG recent acute polyneuropathy diagnosis (march 2024).  Here after IVIG series c/b PE RUL and DVT RLE. Patient now admitted for a multidisciplinary rehab program. 05-10-24 @ 17:01    # Motor Axon Neuropathy  -initially began after viral illness in March 2024  -following with Neurologist Dr. Liu  -s/p IVIG ending on 5/8  -Comprehensive  Rehab Program:    # CAD s/p CABG  # AF  -continue aspirin and statin  -on therapeutic AC    # PE  # DVT  - continue lovenox 90 mg BID  - RUL PE and RLE DVT    # UTI  - sensitive proteus UTI  - bactrim 2 tab BID until 5/17        - GI ppx: famotidine 40 mg BID

## 2024-05-11 NOTE — CONSULT NOTE ADULT - SUBJECTIVE AND OBJECTIVE BOX
Patient is a 84y old  Male who presents with a chief complaint of Mononeuropathy (10 May 2024 16:55)      HPI:  83M R-handed PMHx CAD s/p bypass x4 and HLD presented [facility] a [facility]. Presented with a 'stomach virus' in 3/2024, reported significant weight loss, sensation of 'wobbling,' with repeated falls. Had seen Dr. 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 Patient received a full body MRI demonstrating no acute changes of the spine or brain. Received 5 doses of IVIG ending on 5/8. Patient found to have a bactrim sensitive proteus UTI. Patient incidentially found to have RLE DVT and PE in the RUL. Now on full dose AC w/ BID lovenox.  Patient seen by physiatry and recommended for acute inpatient rehab. Discharged to [facility] on 5/10.   (10 May 2024 16:55)      PAST MEDICAL & SURGICAL HISTORY:  CAD (coronary artery disease)      HTN (hypertension)      HLD (hyperlipidemia)      GERD (gastroesophageal reflux disease)      Unilateral primary osteoarthritis, right knee      S/P CABG x 4      History of colonoscopy      History of inguinal hernia repair            Substance Use (street drugs): ( X ) never used  (  ) other:  Tobacco Usage:  ( X  ) never smoked   (   ) former smoker   (   ) current smoker  (     ) pack year  Alcohol Usage: Denies        Allergies    penicillins (Rash)    Intolerances        AQUAPHOR (petrolatum Ointment) 1 Application(s) Topical two times a day PRN  nystatin Powder 1 Application(s) Topical two times a day      REVIEW OF SYSTEMS:  Constitutional: No fever, No Chills, No fatigue  HEENT: No visual disturbances, No difficulty hearing  Pulm: No cough,  No shortness of breath  Cardio: No chest pain, No palpitations  GI:  No abdominal pain, No nausea, No vomiting, No diarrhea, No constipation, +loss of appetite  : No dysuria, No frequency, No hematuria  Neuro: No headaches, No numbness, +No loss of strength  Skin: No itching, No rashes  MSK: No Neck pain, No back pain, +right wrist pain, + right hand swelling  Psych:  No depression, No anxiety    T(C): 37.4 (05-11-24 @ 09:00), Max: 37.4 (05-11-24 @ 09:00)  HR: 88 (05-11-24 @ 09:00) (88 - 98)  BP: 106/68 (05-11-24 @ 09:00) (106/68 - 129/72)  RR: 16 (05-11-24 @ 09:00) (16 - 18)  SpO2: 94% (05-11-24 @ 09:00) (92% - 94%)  Wt(kg): --Vital Signs Last 24 Hrs  T(C): 37.4 (11 May 2024 09:00), Max: 37.4 (11 May 2024 09:00)  T(F): 99.3 (11 May 2024 09:00), Max: 99.3 (11 May 2024 09:00)  HR: 88 (11 May 2024 09:00) (88 - 98)  BP: 106/68 (11 May 2024 09:00) (106/68 - 129/72)  BP(mean): --  RR: 16 (11 May 2024 09:00) (16 - 18)  SpO2: 94% (11 May 2024 09:00) (92% - 94%)    Parameters below as of 11 May 2024 09:00  Patient On (Oxygen Delivery Method): room air        PHYSICAL EXAM:  GENERAL: NAD, well-groomed, well-developed  HEAD:  Atraumatic, Normocephalic  EYES: EOMI, conjunctiva and sclera clear  ENMT:  Moist mucous membranes  NECK: Supple, No JVD, Normal thyroid  NERVOUS SYSTEM:  Alert & Oriented X3, Good concentration  CHEST/LUNG: Clear to percussion bilaterally; No rales, rhonchi, wheezing  HEART: Regular rate and rhythm  ABDOMEN: Soft, Nontender, Nondistended; Bowel sounds present  EXTREMITIES: No clubbing, cyanosis. b/l edema      LABS:                        12.8   4.63  )-----------( 212      ( 11 May 2024 05:28 )             36.8     05-11    143  |  107  |  17  ----------------------------<  77  3.6   |  26  |  0.70    Ca    8.1<L>      11 May 2024 05:28    TPro  6.9  /  Alb  2.3<L>  /  TBili  0.7  /  DBili  x   /  AST  47<H>  /  ALT  36  /  AlkPhos  76  05-11      Urinalysis Basic - ( 11 May 2024 05:28 )    Color: x / Appearance: x / SG: x / pH: x  Gluc: 77 mg/dL / Ketone: x  / Bili: x / Urobili: x   Blood: x / Protein: x / Nitrite: x   Leuk Esterase: x / RBC: x / WBC x   Sq Epi: x / Non Sq Epi: x / Bacteria: x       CAPILLARY BLOOD GLUCOSE            Urinalysis Basic - ( 11 May 2024 05:28 )    Color: x / Appearance: x / SG: x / pH: x  Gluc: 77 mg/dL / Ketone: x  / Bili: x / Urobili: x   Blood: x / Protein: x / Nitrite: x   Leuk Esterase: x / RBC: x / WBC x   Sq Epi: x / Non Sq Epi: x / Bacteria: x        RADIOLOGY & ADDITIONAL TESTS:    < from: CT Abdomen and Pelvis w/ IV Cont (05.08.24 @ 11:24) >    IMPRESSION:  Embolus within the right upper lobe pulmonary artery. No evidence of   right heart strain.    Circumferential wall thickening of the bladder. Otherwise, no acute   finding within abdomen/pelvis.    < end of copied text >    < from: VA Duplex Lower Ext Vein Scan, Bilat (05.08.24 @ 10:35) >    IMPRESSION:    There is acute below the knee DVT affecting a right soleal vein.< from: MR Head w/wo IV Cont (05.03.24 @ 19:56) >    IMPRESSION:    1.  No evidence of acute infarct or midline shift.  2.  Chronic small vessel disease.      Consultant(s) Notes Reviewed:  [x ] YES  [ ] NO  Care Discussed with Consultants/Other Providers [ x] YES  [ ] NO

## 2024-05-12 LAB
CULTURE RESULTS: SIGNIFICANT CHANGE UP
SPECIMEN SOURCE: SIGNIFICANT CHANGE UP

## 2024-05-12 PROCEDURE — 99232 SBSQ HOSP IP/OBS MODERATE 35: CPT

## 2024-05-12 PROCEDURE — 99232 SBSQ HOSP IP/OBS MODERATE 35: CPT | Mod: GC

## 2024-05-12 RX ADMIN — ENOXAPARIN SODIUM 90 MILLIGRAM(S): 100 INJECTION SUBCUTANEOUS at 06:35

## 2024-05-12 RX ADMIN — Medication 2 TABLET(S): at 06:35

## 2024-05-12 RX ADMIN — SENNA PLUS 2 TABLET(S): 8.6 TABLET ORAL at 22:06

## 2024-05-12 RX ADMIN — POLYETHYLENE GLYCOL 3350 17 GRAM(S): 17 POWDER, FOR SOLUTION ORAL at 22:06

## 2024-05-12 RX ADMIN — Medication 81 MILLIGRAM(S): at 12:17

## 2024-05-12 RX ADMIN — Medication 1 TABLET(S): at 22:06

## 2024-05-12 RX ADMIN — NYSTATIN CREAM 1 APPLICATION(S): 100000 CREAM TOPICAL at 06:41

## 2024-05-12 RX ADMIN — FAMOTIDINE 40 MILLIGRAM(S): 10 INJECTION INTRAVENOUS at 06:34

## 2024-05-12 RX ADMIN — NYSTATIN CREAM 1 APPLICATION(S): 100000 CREAM TOPICAL at 19:40

## 2024-05-12 RX ADMIN — ATORVASTATIN CALCIUM 40 MILLIGRAM(S): 80 TABLET, FILM COATED ORAL at 22:06

## 2024-05-12 RX ADMIN — Medication 1 DROP(S): at 17:18

## 2024-05-12 RX ADMIN — Medication 2 TABLET(S): at 17:18

## 2024-05-12 RX ADMIN — FAMOTIDINE 40 MILLIGRAM(S): 10 INJECTION INTRAVENOUS at 17:17

## 2024-05-12 RX ADMIN — ENOXAPARIN SODIUM 90 MILLIGRAM(S): 100 INJECTION SUBCUTANEOUS at 17:21

## 2024-05-12 RX ADMIN — Medication 1 DROP(S): at 06:36

## 2024-05-12 NOTE — PROGRESS NOTE ADULT - SUBJECTIVE AND OBJECTIVE BOX
PROGRESS NOTE:     Patient is a 84y old  Male who presents with a chief complaint of Mononeuropathy (11 May 2024 12:41)          SUBJECTIVE & OBJECTIVE:   Pt seen and examined at bedside in AM    no overnight events.     REVIEW OF SYSTEMS: remaining ROS negative     PHYSICAL EXAM:  VITALS:  Vital Signs Last 24 Hrs  T(C): 36.6 (12 May 2024 08:32), Max: 36.6 (11 May 2024 20:04)  T(F): 97.9 (12 May 2024 08:32), Max: 97.9 (12 May 2024 08:32)  HR: 79 (12 May 2024 08:32) (79 - 81)  BP: 136/81 (12 May 2024 08:32) (103/66 - 136/81)  BP(mean): --  RR: 16 (12 May 2024 08:32) (16 - 16)  SpO2: 94% (12 May 2024 08:32) (94% - 94%)    Parameters below as of 12 May 2024 08:32  Patient On (Oxygen Delivery Method): room air          GENERAL: NAD,  no increased WOB  HEAD:  Atraumatic, Normocephalic  EYES: EOMI, conjunctiva and sclera clear  ENMT: Moist mucous membranes  NECK: Supple, No JVD  NERVOUS SYSTEM:  Alert & Oriented   CHEST/LUNG: Clear to auscultation bilaterally; No rales, rhonchi, wheezing  HEART: Regular rate and rhythm  ABDOMEN: Soft, Nontender, Nondistended; Bowel sounds present. tong in place  EXTREMITIES:  No clubbing, cyanosis, calf tenderness or edema b/l      MEDICATIONS  (STANDING):  artificial tears (preservative free) Ophthalmic Solution 1 Drop(s) Both EYES two times a day  aspirin enteric coated 81 milliGRAM(s) Oral daily  atorvastatin 40 milliGRAM(s) Oral at bedtime  enoxaparin Injectable 90 milliGRAM(s) SubCutaneous every 12 hours  famotidine    Tablet 40 milliGRAM(s) Oral two times a day  melatonin 3 milliGRAM(s) Oral at bedtime  multivitamin 1 Tablet(s) Oral at bedtime  nystatin Powder 1 Application(s) Topical two times a day  senna 2 Tablet(s) Oral at bedtime  trimethoprim  160 mG/sulfamethoxazole 800 mG 2 Tablet(s) Oral two times a day    MEDICATIONS  (PRN):  acetaminophen     Tablet .. 650 milliGRAM(s) Oral every 6 hours PRN Moderate Pain (4 - 6)  AQUAPHOR (petrolatum Ointment) 1 Application(s) Topical two times a day PRN dry skin  polyethylene glycol 3350 17 Gram(s) Oral two times a day PRN Constipation      Allergies    penicillins (Rash)    Intolerances              LABS:                           12.8   4.63  )-----------( 212      ( 11 May 2024 05:28 )             36.8     05-11    143  |  107  |  17  ----------------------------<  77  3.6   |  26  |  0.70    Ca    8.1<L>      11 May 2024 05:28    TPro  6.9  /  Alb  2.3<L>  /  TBili  0.7  /  DBili  x   /  AST  47<H>  /  ALT  36  /  AlkPhos  76  05-11      Urinalysis Basic - ( 11 May 2024 05:28 )    Color: x / Appearance: x / SG: x / pH: x  Gluc: 77 mg/dL / Ketone: x  / Bili: x / Urobili: x   Blood: x / Protein: x / Nitrite: x   Leuk Esterase: x / RBC: x / WBC x   Sq Epi: x / Non Sq Epi: x / Bacteria: x      CAPILLARY BLOOD GLUCOSE                    RECENT CULTURES:          RADIOLOGY & ADDITIONAL TESTS:   PROGRESS NOTE:     Patient is a 84y old  Male who presents with a chief complaint of Mononeuropathy (11 May 2024 12:41)          SUBJECTIVE & OBJECTIVE:   Pt seen and examined at bedside in AM    no overnight events.     REVIEW OF SYSTEMS: remaining ROS negative     PHYSICAL EXAM:  VITALS:  Vital Signs Last 24 Hrs  T(C): 36.6 (12 May 2024 08:32), Max: 36.6 (11 May 2024 20:04)  T(F): 97.9 (12 May 2024 08:32), Max: 97.9 (12 May 2024 08:32)  HR: 79 (12 May 2024 08:32) (79 - 81)  BP: 136/81 (12 May 2024 08:32) (103/66 - 136/81)  BP(mean): --  RR: 16 (12 May 2024 08:32) (16 - 16)  SpO2: 94% (12 May 2024 08:32) (94% - 94%)    Parameters below as of 12 May 2024 08:32  Patient On (Oxygen Delivery Method): room air          GENERAL: NAD,  no increased WOB  HEAD:  Atraumatic, Normocephalic  EYES: EOMI, conjunctiva and sclera clear  ENMT: Moist mucous membranes  NECK: Supple, No JVD  NERVOUS SYSTEM:  Alert & Oriented   CHEST/LUNG: Clear to auscultation bilaterally; No rales, rhonchi, wheezing  HEART: Regular rate and rhythm  ABDOMEN: Soft, Nontender, Nondistended; Bowel sounds present.  EXTREMITIES:  No clubbing, cyanosis, calf tenderness or edema b/l      MEDICATIONS  (STANDING):  artificial tears (preservative free) Ophthalmic Solution 1 Drop(s) Both EYES two times a day  aspirin enteric coated 81 milliGRAM(s) Oral daily  atorvastatin 40 milliGRAM(s) Oral at bedtime  enoxaparin Injectable 90 milliGRAM(s) SubCutaneous every 12 hours  famotidine    Tablet 40 milliGRAM(s) Oral two times a day  melatonin 3 milliGRAM(s) Oral at bedtime  multivitamin 1 Tablet(s) Oral at bedtime  nystatin Powder 1 Application(s) Topical two times a day  senna 2 Tablet(s) Oral at bedtime  trimethoprim  160 mG/sulfamethoxazole 800 mG 2 Tablet(s) Oral two times a day    MEDICATIONS  (PRN):  acetaminophen     Tablet .. 650 milliGRAM(s) Oral every 6 hours PRN Moderate Pain (4 - 6)  AQUAPHOR (petrolatum Ointment) 1 Application(s) Topical two times a day PRN dry skin  polyethylene glycol 3350 17 Gram(s) Oral two times a day PRN Constipation      Allergies    penicillins (Rash)    Intolerances              LABS:                           12.8   4.63  )-----------( 212      ( 11 May 2024 05:28 )             36.8     05-11    143  |  107  |  17  ----------------------------<  77  3.6   |  26  |  0.70    Ca    8.1<L>      11 May 2024 05:28    TPro  6.9  /  Alb  2.3<L>  /  TBili  0.7  /  DBili  x   /  AST  47<H>  /  ALT  36  /  AlkPhos  76  05-11      Urinalysis Basic - ( 11 May 2024 05:28 )    Color: x / Appearance: x / SG: x / pH: x  Gluc: 77 mg/dL / Ketone: x  / Bili: x / Urobili: x   Blood: x / Protein: x / Nitrite: x   Leuk Esterase: x / RBC: x / WBC x   Sq Epi: x / Non Sq Epi: x / Bacteria: x      CAPILLARY BLOOD GLUCOSE                    RECENT CULTURES:          RADIOLOGY & ADDITIONAL TESTS:

## 2024-05-12 NOTE — PROGRESS NOTE ADULT - ASSESSMENT
REHANA MEDINA is a 84y with CAD s/p CABG recent acute polyneuropathy diagnosis (march 2024).  Here after IVIG series c/b PE RUL and DVT RLE. Patient now admitted for a multidisciplinary rehab program. 05-10-24 @ 17:01    # Motor Axon Neuropathy  Comprehensive Multidisciplinary Rehab Program:  - Start comprehensive rehab program of PT/OT- 3 hours a day, 5 days a week. P&O as needed   -initially began after viral illness in March 2024  -following with Neurologist Dr. Liu  -s/p IVIG ending on 5/8    # CAD s/p CABG  # AF  -continue aspirin and statin  -on therapeutic AC    # PE  # DVT  - continue lovenox 90 mg BID- ? Transition to DOAC  - RUL PE and RLE DVT    # UTI  - sensitive proteus UTI  - bactrim 2 tab BID until 5/17  last  CC    # Pain  - Tylenol PRN    Mood / Cognition  - Neuropsychology consult PRN    Sleep  - Maintain quiet hours and a low stim environment.   - Melatonin 3 mg QHS    GI / Bowel  - Miralax daily  - Senna QHS  - GI ppx: famotidine 40 mg BID     / Bladder  - Continue bladder scans Q8 hours with straight cath for >400cc  - Toileting schedule every 3 hours while awake    Skin / Pressure injury  - Skin assessment on admission performed: MAD to b/l groin  - Turn q2 hours in bed while awake, air mattress  - nursing to monitor skin qShift  - soft heel protectors  - skin barrier cream PRN    Diet/Dysphagia:  - Diet Consistency: regular diet  - Aspiration Precautions  - Nutrition consult    DVT prophylaxis:   - has RLE DVT  - on full dose Lovenox  - Last Doppler on R soleal DVT on 5/8- ? transition to DOAC

## 2024-05-12 NOTE — PROGRESS NOTE ADULT - SUBJECTIVE AND OBJECTIVE BOX
Chief complaint- weakness UES/ LES difficulty ambulating    83M R-handed PMHx CAD s/p bypass x4 and HLD [facility]. Presented with a 'stomach virus' in 3/2024, reported significant weight loss, sensation of 'wobbling,' with repeated falls. Had seen Dr. 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 Patient received a full body MRI demonstrating no acute changes of the spine or brain. Received 5 doses of IVIG ending on 5/8. Patient found to have a bactrim sensitive proteus UTI. Patient incidentially found to have RLE DVT and PE in the RUL. Now on full dose AC w/ BID lovenox.  Patient seen by physiatry and recommended for acute inpatient rehab. Discharged to [facility] on 5/10.      ROS/subjective:  patient seen and evaluated bedside  tolerating [place] overnight- to ask nursing for urinal during day  last BM 5/10- took senna last night- will add miralax PRN  Zack hand/intrinsic contractures R>L- with pain on PROM  Softeeze splints placed- will ask OT- ? need for more rigid splinting  no dizziness, no headaches, no nausea, no vomiting, no SOB, No chest pain  happy with initial therapy here- maintains short sit    MEDICATIONS  (STANDING):  artificial tears (preservative free) Ophthalmic Solution 1 Drop(s) Both EYES two times a day  aspirin enteric coated 81 milliGRAM(s) Oral daily  atorvastatin 40 milliGRAM(s) Oral at bedtime  enoxaparin Injectable 90 milliGRAM(s) SubCutaneous every 12 hours  famotidine    Tablet 40 milliGRAM(s) Oral two times a day  melatonin 3 milliGRAM(s) Oral at bedtime  multivitamin 1 Tablet(s) Oral at bedtime  nystatin Powder 1 Application(s) Topical two times a day  senna 2 Tablet(s) Oral at bedtime  trimethoprim  160 mG/sulfamethoxazole 800 mG 2 Tablet(s) Oral two times a day    MEDICATIONS  (PRN):  acetaminophen     Tablet .. 650 milliGRAM(s) Oral every 6 hours PRN Moderate Pain (4 - 6)  AQUAPHOR (petrolatum Ointment) 1 Application(s) Topical two times a day PRN dry skin  polyethylene glycol 3350 17 Gram(s) Oral two times a day PRN Constipation                            12.8   4.63  )-----------( 212      ( 11 May 2024 05:28 )             36.8     05-11    143  |  107  |  17  ----------------------------<  77  3.6   |  26  |  0.70    Ca    8.1<L>      11 May 2024 05:28    TPro  6.9  /  Alb  2.3<L>  /  TBili  0.7  /  DBili  x   /  AST  47<H>  /  ALT  36  /  AlkPhos  76  05-11    Urinalysis Basic - ( 11 May 2024 05:28 )    Color: x / Appearance: x / SG: x / pH: x  Gluc: 77 mg/dL / Ketone: x  / Bili: x / Urobili: x   Blood: x / Protein: x / Nitrite: x   Leuk Esterase: x / RBC: x / WBC x   Sq Epi: x / Non Sq Epi: x / Bacteria: x        CAPILLARY BLOOD GLUCOSE          Vital Signs Last 24 Hrs  T(C): 36.6 (12 May 2024 08:32), Max: 36.6 (11 May 2024 20:04)  T(F): 97.9 (12 May 2024 08:32), Max: 97.9 (12 May 2024 08:32)  HR: 79 (12 May 2024 08:32) (79 - 81)  BP: 136/81 (12 May 2024 08:32) (103/66 - 136/81)  BP(mean): --  RR: 16 (12 May 2024 08:32) (16 - 16)  SpO2: 94% (12 May 2024 08:32) (94% - 94%)    Parameters below as of 12 May 2024 08:32  Patient On (Oxygen Delivery Method): room air         Gen - NAD, Comfortable  HEENT - NCAT, EOMI, MMM  Neck - Supple, No limited ROM  Pulm - CTAB, No wheeze, No rhonchi, No crackles  Cardiovascular - RRR, S1S2, No murmurs  Abdomen - Soft, NT/ND, +BS  Extremities - No calf tenderness, 2+ pitting pedal edema b/l, non-pitting edema to right hand  Neuro-     Cognitive - AAOx3, follows commands     Communication - Fluent, No dysarthria     Motor -                     LEFT    UE - ShF3/5, EF 3+/5, EE 3+/5, WE 1/5,  1/5                    RIGHT UE - ShAB 2/5, EF 3/5, EE 3/5, WE 1/5,  1/5                    LEFT    LE - HF 2+/5, KE 4/5, DF 0/5, PF 0/5 4/5 hip add/abd zack                    RIGHT LE - HF 2+/5, KE 4+/5, DF 0/5, PF 0/5        Sensory - Intact to LT     Reflexes - Negative clonus b/l, Negative Cuevas b/l     Tone - MAS 1+ in right bicep  Psychiatric - Mood stable, Affect WNL  Skin: MAD b/l groin

## 2024-05-13 LAB
-  AMPICILLIN: SIGNIFICANT CHANGE UP
-  CIPROFLOXACIN: SIGNIFICANT CHANGE UP
-  LEVOFLOXACIN: SIGNIFICANT CHANGE UP
-  NITROFURANTOIN: SIGNIFICANT CHANGE UP
-  TETRACYCLINE: SIGNIFICANT CHANGE UP
-  VANCOMYCIN: SIGNIFICANT CHANGE UP
ALBUMIN SERPL ELPH-MCNC: 2.6 G/DL — LOW (ref 3.3–5)
ALP SERPL-CCNC: 89 U/L — SIGNIFICANT CHANGE UP (ref 40–120)
ALT FLD-CCNC: 92 U/L — HIGH (ref 10–45)
ANION GAP SERPL CALC-SCNC: 8 MMOL/L — SIGNIFICANT CHANGE UP (ref 5–17)
AST SERPL-CCNC: 96 U/L — HIGH (ref 10–40)
BASOPHILS # BLD AUTO: 0.04 K/UL — SIGNIFICANT CHANGE UP (ref 0–0.2)
BASOPHILS NFR BLD AUTO: 0.5 % — SIGNIFICANT CHANGE UP (ref 0–2)
BILIRUB SERPL-MCNC: 0.5 MG/DL — SIGNIFICANT CHANGE UP (ref 0.2–1.2)
BUN SERPL-MCNC: 12 MG/DL — SIGNIFICANT CHANGE UP (ref 7–23)
CALCIUM SERPL-MCNC: 8.4 MG/DL — SIGNIFICANT CHANGE UP (ref 8.4–10.5)
CHLORIDE SERPL-SCNC: 107 MMOL/L — SIGNIFICANT CHANGE UP (ref 96–108)
CO2 SERPL-SCNC: 25 MMOL/L — SIGNIFICANT CHANGE UP (ref 22–31)
CREAT SERPL-MCNC: 0.77 MG/DL — SIGNIFICANT CHANGE UP (ref 0.5–1.3)
CULTURE RESULTS: ABNORMAL
CULTURE RESULTS: SIGNIFICANT CHANGE UP
EGFR: 89 ML/MIN/1.73M2 — SIGNIFICANT CHANGE UP
EOSINOPHIL # BLD AUTO: 0.17 K/UL — SIGNIFICANT CHANGE UP (ref 0–0.5)
EOSINOPHIL NFR BLD AUTO: 2.3 % — SIGNIFICANT CHANGE UP (ref 0–6)
GLUCOSE SERPL-MCNC: 77 MG/DL — SIGNIFICANT CHANGE UP (ref 70–99)
HCT VFR BLD CALC: 39.5 % — SIGNIFICANT CHANGE UP (ref 39–50)
HGB BLD-MCNC: 13.5 G/DL — SIGNIFICANT CHANGE UP (ref 13–17)
IMM GRANULOCYTES NFR BLD AUTO: 0.4 % — SIGNIFICANT CHANGE UP (ref 0–0.9)
LYMPHOCYTES # BLD AUTO: 2.3 K/UL — SIGNIFICANT CHANGE UP (ref 1–3.3)
LYMPHOCYTES # BLD AUTO: 30.7 % — SIGNIFICANT CHANGE UP (ref 13–44)
MCHC RBC-ENTMCNC: 32.1 PG — SIGNIFICANT CHANGE UP (ref 27–34)
MCHC RBC-ENTMCNC: 34.2 GM/DL — SIGNIFICANT CHANGE UP (ref 32–36)
MCV RBC AUTO: 94 FL — SIGNIFICANT CHANGE UP (ref 80–100)
METHOD TYPE: SIGNIFICANT CHANGE UP
MONOCYTES # BLD AUTO: 0.56 K/UL — SIGNIFICANT CHANGE UP (ref 0–0.9)
MONOCYTES NFR BLD AUTO: 7.5 % — SIGNIFICANT CHANGE UP (ref 2–14)
NEUTROPHILS # BLD AUTO: 4.38 K/UL — SIGNIFICANT CHANGE UP (ref 1.8–7.4)
NEUTROPHILS NFR BLD AUTO: 58.6 % — SIGNIFICANT CHANGE UP (ref 43–77)
NRBC # BLD: 0 /100 WBCS — SIGNIFICANT CHANGE UP (ref 0–0)
OLIGOCLONAL BANDS CSF ELPH-IMP: SIGNIFICANT CHANGE UP
ORGANISM # SPEC MICROSCOPIC CNT: ABNORMAL
PLATELET # BLD AUTO: 240 K/UL — SIGNIFICANT CHANGE UP (ref 150–400)
POTASSIUM SERPL-MCNC: 4.4 MMOL/L — SIGNIFICANT CHANGE UP (ref 3.5–5.3)
POTASSIUM SERPL-SCNC: 4.4 MMOL/L — SIGNIFICANT CHANGE UP (ref 3.5–5.3)
PROT SERPL-MCNC: 7.5 G/DL — SIGNIFICANT CHANGE UP (ref 6–8.3)
RBC # BLD: 4.2 M/UL — SIGNIFICANT CHANGE UP (ref 4.2–5.8)
RBC # FLD: 14.2 % — SIGNIFICANT CHANGE UP (ref 10.3–14.5)
SODIUM SERPL-SCNC: 140 MMOL/L — SIGNIFICANT CHANGE UP (ref 135–145)
SPECIMEN SOURCE: SIGNIFICANT CHANGE UP
SPECIMEN SOURCE: SIGNIFICANT CHANGE UP
WBC # BLD: 7.48 K/UL — SIGNIFICANT CHANGE UP (ref 3.8–10.5)
WBC # FLD AUTO: 7.48 K/UL — SIGNIFICANT CHANGE UP (ref 3.8–10.5)

## 2024-05-13 PROCEDURE — 99232 SBSQ HOSP IP/OBS MODERATE 35: CPT | Mod: GC

## 2024-05-13 PROCEDURE — 99232 SBSQ HOSP IP/OBS MODERATE 35: CPT

## 2024-05-13 RX ORDER — ONDANSETRON 8 MG/1
4 TABLET, FILM COATED ORAL ONCE
Refills: 0 | Status: COMPLETED | OUTPATIENT
Start: 2024-05-13 | End: 2024-05-13

## 2024-05-13 RX ORDER — APIXABAN 2.5 MG/1
10 TABLET, FILM COATED ORAL EVERY 12 HOURS
Refills: 0 | Status: COMPLETED | OUTPATIENT
Start: 2024-05-13 | End: 2024-05-20

## 2024-05-13 RX ADMIN — ENOXAPARIN SODIUM 90 MILLIGRAM(S): 100 INJECTION SUBCUTANEOUS at 05:36

## 2024-05-13 RX ADMIN — NYSTATIN CREAM 1 APPLICATION(S): 100000 CREAM TOPICAL at 05:37

## 2024-05-13 RX ADMIN — FAMOTIDINE 40 MILLIGRAM(S): 10 INJECTION INTRAVENOUS at 19:25

## 2024-05-13 RX ADMIN — APIXABAN 10 MILLIGRAM(S): 2.5 TABLET, FILM COATED ORAL at 19:25

## 2024-05-13 RX ADMIN — Medication 1 DROP(S): at 05:37

## 2024-05-13 RX ADMIN — Medication 2 TABLET(S): at 05:37

## 2024-05-13 RX ADMIN — ATORVASTATIN CALCIUM 40 MILLIGRAM(S): 80 TABLET, FILM COATED ORAL at 21:29

## 2024-05-13 RX ADMIN — Medication 1 TABLET(S): at 21:29

## 2024-05-13 RX ADMIN — ONDANSETRON 4 MILLIGRAM(S): 8 TABLET, FILM COATED ORAL at 18:24

## 2024-05-13 RX ADMIN — Medication 2 TABLET(S): at 19:26

## 2024-05-13 RX ADMIN — Medication 81 MILLIGRAM(S): at 12:43

## 2024-05-13 RX ADMIN — NYSTATIN CREAM 1 APPLICATION(S): 100000 CREAM TOPICAL at 19:25

## 2024-05-13 RX ADMIN — Medication 1 DROP(S): at 19:32

## 2024-05-13 RX ADMIN — FAMOTIDINE 40 MILLIGRAM(S): 10 INJECTION INTRAVENOUS at 05:37

## 2024-05-13 NOTE — DIETITIAN INITIAL EVALUATION ADULT - NS FNS DIET ORDER
on Regular Diet (IDDSI Level 7) w/ Thin Liquids (IDDSI Level 0) & Ensure Plus High Protein 8oz PO BID  Recommend Increase Supplement to TID (Per Patient Request)   Education Provided on Need for Supplementation & Proper Nutrition

## 2024-05-13 NOTE — DIETITIAN INITIAL EVALUATION ADULT - EDUCATION DIETARY MODIFICATIONS
Education Provided on Need for Supplementation & Proper Nutrition/(2) meets goals/outcomes/verbalization

## 2024-05-13 NOTE — DIETITIAN INITIAL EVALUATION ADULT - ORAL INTAKE PTA/DIET HISTORY
Patient Does Not Follow Diet @Home  Consumes 2-3Meals a Day   Usually Orders Takeout/Delivery Occasionally Cooks For Self/Wife Cooks  Does Take Vitamin/Supplements @Home (MVI)

## 2024-05-13 NOTE — DIETITIAN INITIAL EVALUATION ADULT - OTHER INFO
Initial Nutrition Assessment   84yr Old montse   Denies Food Allergy/Intolerance  Tolerates Diet Well - No Chewing/Swallowing Complications (Per Patient)  Consumed % of Meals (as Per Documentation)  No Pressure Ulcers (as Per Nursing Flow Sheets)  Non-Pitting Edema Noted to Hands & +1 Edema Noted to Feet (as Per Nursing Flow Sheets)  No Recent Nausea/Vomiting/Diarrhea/Constipation (as Per Patient)

## 2024-05-13 NOTE — DIETITIAN INITIAL EVALUATION ADULT - ORAL NUTRITION SUPPLEMENTS
on Ensure Plus High Protein 8oz PO BID (Provides 700kcal & 40grams of Protein)   Recommend Increase Supplement to TID

## 2024-05-13 NOTE — PROGRESS NOTE ADULT - SUBJECTIVE AND OBJECTIVE BOX
Chief complaint- weakness UES/ LES difficulty ambulating    83M R-handed PMHx CAD s/p bypass x4 and HLD [facility]. Presented with a 'stomach virus' in 3/2024, reported significant weight loss, sensation of 'wobbling,' with repeated falls. Had seen Dr. 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 Patient received a full body MRI demonstrating no acute changes of the spine or brain. Received 5 doses of IVIG ending on 5/8. Patient found to have a bactrim sensitive proteus UTI. Patient incidentially found to have RLE DVT and PE in the RUL. Now on full dose AC w/ BID lovenox.  Patient seen by physiatry and recommended for acute inpatient rehab. Discharged to [facility] on 5/10.      ROS/subjective:  patient seen and evaluated bedside  tolerating [place] overnight- to ask nursing for urinal during day- in diaper  moved bowels yesterday  Zack hand/intrinsic contractures R>L- with pain on PROM  Softeeze splints placed- OT to advise regarding more rigid splinting overnight  no dizziness, no headaches, no nausea, no vomiting, no SOB, No chest pain  happy with initial therapy here- maintains short sit    MEDICATIONS  (STANDING):  apixaban 10 milliGRAM(s) Oral every 12 hours  artificial tears (preservative free) Ophthalmic Solution 1 Drop(s) Both EYES two times a day  aspirin enteric coated 81 milliGRAM(s) Oral daily  atorvastatin 40 milliGRAM(s) Oral at bedtime  famotidine    Tablet 40 milliGRAM(s) Oral two times a day  melatonin 3 milliGRAM(s) Oral at bedtime  multivitamin 1 Tablet(s) Oral at bedtime  nystatin Powder 1 Application(s) Topical two times a day  senna 2 Tablet(s) Oral at bedtime  trimethoprim  160 mG/sulfamethoxazole 800 mG 2 Tablet(s) Oral two times a day    MEDICATIONS  (PRN):  acetaminophen     Tablet .. 650 milliGRAM(s) Oral every 6 hours PRN Moderate Pain (4 - 6)  AQUAPHOR (petrolatum Ointment) 1 Application(s) Topical two times a day PRN dry skin  polyethylene glycol 3350 17 Gram(s) Oral two times a day PRN Constipation                            13.5   7.48  )-----------( 240      ( 13 May 2024 05:29 )             39.5     05-13    140  |  107  |  12  ----------------------------<  77  4.4   |  25  |  0.77    Ca    8.4      13 May 2024 05:29    TPro  7.5  /  Alb  2.6<L>  /  TBili  0.5  /  DBili  x   /  AST  96<H>  /  ALT  92<H>  /  AlkPhos  89  05-13    Urinalysis Basic - ( 13 May 2024 05:29 )    Color: x / Appearance: x / SG: x / pH: x  Gluc: 77 mg/dL / Ketone: x  / Bili: x / Urobili: x   Blood: x / Protein: x / Nitrite: x   Leuk Esterase: x / RBC: x / WBC x   Sq Epi: x / Non Sq Epi: x / Bacteria: x        CAPILLARY BLOOD GLUCOSE          Vital Signs Last 24 Hrs  T(C): 36.8 (13 May 2024 07:57), Max: 36.8 (13 May 2024 07:57)  T(F): 98.2 (13 May 2024 07:57), Max: 98.2 (13 May 2024 07:57)  HR: 82 (13 May 2024 07:57) (81 - 82)  BP: 115/80 (13 May 2024 07:57) (115/80 - 128/72)  BP(mean): --  RR: 16 (13 May 2024 07:57) (15 - 16)  SpO2: 95% (13 May 2024 07:57) (95% - 95%)    Parameters below as of 13 May 2024 07:57  Patient On (Oxygen Delivery Method): room air       Gen - NAD, Comfortable  HEENT - NCAT, EOMI, MMM  Neck - Supple, No limited ROM  Pulm - CTAB, No wheeze, No rhonchi, No crackles  Cardiovascular - RRR, S1S2, No murmurs  Abdomen - Soft, NT/ND, +BS  Extremities - No calf tenderness, 2+ pitting pedal edema b/l, non-pitting edema to right hand  Neuro-     Cognitive - AAOx3, follows commands     Communication - Fluent, No dysarthria     Motor -                     LEFT    UE - ShF3/5, EF 3+/5, EE 3+/5, WE 1/5,  1/5                    RIGHT UE - ShAB 2/5, EF 3/5, EE 3/5, WE 1/5,  1/5                    LEFT    LE - HF 2+/5, KE 4/5, DF 0/5, PF 0/5 4/5 hip add/abd zack                    RIGHT LE - HF 2+/5, KE 4+/5, DF 0/5, PF 0/5        Sensory - Intact to LT     Reflexes - Negative clonus b/l, Negative Cuevas b/l     Tone - wnl  Psychiatric - Mood stable, Affect WNL  Skin: MAD b/l groin

## 2024-05-13 NOTE — PROGRESS NOTE ADULT - ASSESSMENT
84y with CAD s/p CABG recent acute polyneuropathy diagnosis (march 2024).  Here after IVIG series c/b PE RUL and DVT RLE. Patient now admitted for a multidisciplinary rehab program. 05-10-24 @ 17:01    # Motor Axon Neuropathy  -initially began after viral illness in March 2024  -following with Neurologist Dr. Liu  -s/p IVIG ending on 5/8    # CAD s/p CABG  # [place] AF  # HLD  - I reviewed his home medications.....was not on Beta blocker/Ace-I/ARB  - HR appears to be controlled while not being on AV martín blocking agent  -continue aspirin and statin  -on therapeutic AC; I do not understand why we are not starting oral DoAC....?????  - O/p cardiology follow up with cardiac MR    # PE  # DVT  - continue lovenox 90 mg BID; if no contraindication, I would suggest eliquis 10mg BID x 7 days; followed by 5mg BID and o/p hematology followup   - RUL PE and RLE DVT    # UTI  - sensitive proteus UTI  - bactrim 2 tab BID until 5/17      DVT PPX: Lovenox           84y with CAD s/p CABG recent acute polyneuropathy diagnosis (march 2024).  Here after IVIG series c/b PE RUL and DVT RLE. Patient now admitted for a multidisciplinary rehab program. 05-10-24 @ 17:01    # Motor Axon Neuropathy  -initially began after viral illness in March 2024  -following with Neurologist Dr. Liu  -s/p IVIG ending on 5/8    # CAD s/p CABG  # [place] AF  # HLD  - I reviewed his home medications.....was not on Beta blocker/Ace-I/ARB  - HR appears to be controlled while not being on AV martín blocking agent  -continue aspirin and statin  -Will d/c lovenox now; will start eliquis 10mg BID x 7 days followed by 5mg BID   - O/p cardiology follow up with cardiac MR    # PE  # DVT  - Will start eliquis 10mg BID x 7 days; followed by 5mg BID and o/p hematology followup     # UTI  - sensitive proteus UTI  - bactrim 2 tab BID until 5/17      DVT PPX: Eliquis

## 2024-05-13 NOTE — PROGRESS NOTE ADULT - ASSESSMENT
REHANA MEDINA is a 84y with CAD s/p CABG recent acute polyneuropathy diagnosis (march 2024).  Here after IVIG series c/b PE RUL and DVT RLE. Patient now admitted for a multidisciplinary rehab program. 05-10-24 @ 17:01    # Motor Axon Neuropathy  Comprehensive Multidisciplinary Rehab Program:  -comprehensive rehab program of PT/OT- 3 hours a day, 5 days a week. P&O as needed   -initially began after viral illness in March 2024  -following with Neurologist Dr. Liu  -s/p IVIG ending on 5/8  OT for rigid splints- ? Functional E Stim    # CAD s/p CABG  # AF  -continue aspirin and statin  -on therapeutic AC    # PE  # DVT  - continue lovenox 90 mg BID- hospitalist transitioned to DOAC  - RUL PE and RLE DVT    # UTI  - sensitive proteus UTI  - bactrim 2 tab BID until 5/17  PVRs low-to DC PVRs    # Pain  - Tylenol PRN    Mood / Cognition  - Neuropsychology consult PRN    Sleep  - Maintain quiet hours and a low stim environment.   - Melatonin 3 mg QHS    GI / Bowel  - Miralax daily  - Senna QHS  - GI ppx: famotidine 40 mg BID     / Bladder  - Continue bladder scans Q8 hours with straight cath for >400cc- PVRs Low- will DC  - Toileting schedule every 3 hours while awake    Skin / Pressure injury  - Skin assessment on admission performed: MAD to b/l groin  - Turn q2 hours in bed while awake, air mattress  - nursing to monitor skin qShift  - soft heel protectors  - skin barrier cream PRN    Diet/Dysphagia:  - Diet Consistency: regular diet  - Aspiration Precautions  - Nutrition consult    DVT prophylaxis:   - has RLE DVT  - on full dose Lovenox  - Last Doppler on R soleal DVT on 5/8- Eliquis started REHANA MEDINA is a 84y with CAD s/p CABG recent acute polyneuropathy diagnosis (march 2024).  Here after IVIG series c/b PE RUL and DVT RLE. Patient now admitted for a multidisciplinary rehab program. 05-10-24 @ 17:01    # Motor Axon Neuropathy  Comprehensive Multidisciplinary Rehab Program:  -comprehensive rehab program of PT/OT- 3 hours a day, 5 days a week. P&O as needed   -initially began after viral illness in March 2024  -following with Neurologist Dr. Liu  -s/p IVIG ending on 5/8  OT for rigid splints- ? Functional E Stim    # CAD s/p CABG  # AF  -continue aspirin and statin  -on therapeutic AC    # PE  # DVT  - continue lovenox 90 mg BID- hospitalist transitioned to DOAC  - RUL PE and RLE DVT    Elevated LFTs  ? secondary to IVIG  not taking tylenol  Follow LFTs thursday    # UTI  - sensitive proteus UTI  - bactrim 2 tab BID until 5/17  PVRs low-to DC PVRs    # Pain  - Tylenol PRN    Mood / Cognition  - Neuropsychology consult PRN    Sleep  - Maintain quiet hours and a low stim environment.   - Melatonin 3 mg QHS    GI / Bowel  - Miralax daily  - Senna QHS  - GI ppx: famotidine 40 mg BID     / Bladder  - Continue bladder scans Q8 hours with straight cath for >400cc- PVRs Low- will DC  - Toileting schedule every 3 hours while awake    Skin / Pressure injury  - Skin assessment on admission performed: MAD to b/l groin  - Turn q2 hours in bed while awake, air mattress  - nursing to monitor skin qShift  - soft heel protectors  - skin barrier cream PRN    Diet/Dysphagia:  - Diet Consistency: regular diet  - Aspiration Precautions  - Nutrition consult    DVT prophylaxis:   - has RLE DVT  - on full dose Lovenox  - Last Doppler on R soleal DVT on 5/8- Eliquis started

## 2024-05-13 NOTE — DIETITIAN INITIAL EVALUATION ADULT - PERTINENT LABORATORY DATA
05-13    140  |  107  |  12  ----------------------------<  77  4.4   |  25  |  0.77    Ca    8.4      13 May 2024 05:29    TPro  7.5  /  Alb  2.6<L>  /  TBili  0.5  /  DBili  x   /  AST  96<H>  /  ALT  92<H>  /  AlkPhos  89  05-13  A1C with Estimated Average Glucose Result: 5.7 % (05-02-24 @ 21:36)  A1C with Estimated Average Glucose Result: 5.8 % (10-02-23 @ 17:35)

## 2024-05-13 NOTE — PROGRESS NOTE ADULT - SUBJECTIVE AND OBJECTIVE BOX
Patient is a 84y old  Male who presents with a chief complaint of Polyneuropathy secondary to Acute Motor Axon Neuropathy      No events overnight  Denies chest pain, SOB    Patient seen and examined at bedside.    ALLERGIES:  penicillins (Rash)    MEDICATIONS  (STANDING):  artificial tears (preservative free) Ophthalmic Solution 1 Drop(s) Both EYES two times a day  aspirin enteric coated 81 milliGRAM(s) Oral daily  atorvastatin 40 milliGRAM(s) Oral at bedtime  enoxaparin Injectable 90 milliGRAM(s) SubCutaneous every 12 hours  famotidine    Tablet 40 milliGRAM(s) Oral two times a day  melatonin 3 milliGRAM(s) Oral at bedtime  multivitamin 1 Tablet(s) Oral at bedtime  nystatin Powder 1 Application(s) Topical two times a day  senna 2 Tablet(s) Oral at bedtime  trimethoprim  160 mG/sulfamethoxazole 800 mG 2 Tablet(s) Oral two times a day    MEDICATIONS  (PRN):  acetaminophen     Tablet .. 650 milliGRAM(s) Oral every 6 hours PRN Moderate Pain (4 - 6)  AQUAPHOR (petrolatum Ointment) 1 Application(s) Topical two times a day PRN dry skin  polyethylene glycol 3350 17 Gram(s) Oral two times a day PRN Constipation    Vital Signs Last 24 Hrs  T(F): 98.2 (13 May 2024 07:57), Max: 98.2 (13 May 2024 07:57)  HR: 82 (13 May 2024 07:57) (81 - 82)  BP: 115/80 (13 May 2024 07:57) (115/80 - 128/72)  RR: 16 (13 May 2024 07:57) (15 - 16)  SpO2: 95% (13 May 2024 07:57) (95% - 95%)  I&O's Summary    12 May 2024 07:01  -  13 May 2024 07:00  --------------------------------------------------------  IN: 0 mL / OUT: 200 mL / NET: -200 mL      BMI (kg/m2): 27.7 (05-10-24 @ 21:51)    GENERAL: NAD,  no increased WOB  HEAD:  Atraumatic, Normocephalic  EYES: EOMI, conjunctiva and sclera clear  ENMT: Moist mucous membranes  NECK: Supple, No JVD  NERVOUS SYSTEM:  Alert & Oriented   CHEST/LUNG: Clear to auscultation bilaterally; No rales, rhonchi, wheezing  HEART: Regular rate and rhythm  ABDOMEN: Soft, Nontender, Nondistended; Bowel sounds present.  EXTREMITIES:  No clubbing, cyanosis, calf tenderness or edema b/l        LABS:                        13.5   7.48  )-----------( 240      ( 13 May 2024 05:29 )             39.5       05-11    143  |  107  |  17  ----------------------------<  77  3.6   |  26  |  0.70    Ca    8.1      11 May 2024 05:28    TPro  6.9  /  Alb  2.3  /  TBili  0.7  /  DBili  x   /  AST  47  /  ALT  36  /  AlkPhos  76  05-11     05-02 Chol 119 mg/dL LDL -- HDL 44 mg/dL Trig 83 mg/dL    Urinalysis Basic - ( 11 May 2024 05:28 )    Color: x / Appearance: x / SG: x / pH: x  Gluc: 77 mg/dL / Ketone: x  / Bili: x / Urobili: x   Blood: x / Protein: x / Nitrite: x   Leuk Esterase: x / RBC: x / WBC x   Sq Epi: x / Non Sq Epi: x / Bacteria: x    Culture - Urine (collected 08 May 2024 14:05)  Source: Clean Catch Clean Catch (Midstream)  Preliminary Report (12 May 2024 19:10):    >100,000 CFU/ml Pseudomonas aeruginosa    >100,000 CFU/ml Enterococcus faecalis  Organism: Pseudomonas aeruginosa (11 May 2024 11:06)  Organism: Pseudomonas aeruginosa (11 May 2024 11:06)      -  Levofloxacin: S <=0.5      -  Aztreonam: S <=4      -  Cefepime: S <=2      -  Piperacillin/Tazobactam: S <=8      -  Ciprofloxacin: S <=0.25      -  Imipenem: S <=1      Method Type: DARRELL      -  Meropenem: S <=1      -  Ceftazidime: S <=1      -  Amikacin: S <=16    Culture - Blood (collected 08 May 2024 13:24)  Source: .Blood Blood  Preliminary Report (12 May 2024 15:00):    No growth at 4 days    Culture - Blood (collected 07 May 2024 03:51)  Source: .Blood Blood  Final Report (12 May 2024 10:00):    No growth at 5 days    Culture - Blood (collected 07 May 2024 03:51)  Source: .Blood Blood  Gram Stain (07 May 2024 23:48):    Growth in aerobic bottle: Gram Negative Rods  Final Report (09 May 2024 12:29):    Growth in aerobic bottle: Proteus mirabilis    Direct identification is available within approximately 3-5    hours either by Blood Panel Multiplexed PCR or Direct    MALDI-TOF. Details: [email or web address]  Organism: Blood Culture PCR  Proteus mirabilis (09 May 2024 12:29)  Organism: Proteus mirabilis (09 May 2024 12:29)      -  Levofloxacin: S <=0.5      -  Tobramycin: I 4      -  Aztreonam: S <=4      -  Gentamicin: S <=2      -  Cefazolin: S <=2      -  Cefepime: S <=2      -  Piperacillin/Tazobactam: S <=8      -  Ciprofloxacin: S <=0.25      -  Ceftriaxone: S <=1      -  Ampicillin: S <=8 These ampicillin results predict results for amoxicillin      Method Type: DARRELL      -  Meropenem: S <=1      -  Ampicillin/Sulbactam: S <=4/2      -  Cefoxitin: S <=8      -  Trimethoprim/Sulfamethoxazole: S <=0.5/9.5      -  Ertapenem: S <=0.5  Organism: Blood Culture PCR (09 May 2024 12:29)      -  Proteus species: Detec      Method Type: PCR      COVID-19 PCR: NotDetec (05-10-24 @ 23:12)  COVID-19 PCR: NotDetec (05-10-24 @ 23:12)  COVID-19 PCR: NotDetec (05-15-22 @ 17:26)

## 2024-05-13 NOTE — DIETITIAN INITIAL EVALUATION ADULT - PERTINENT MEDS FT
MEDICATIONS  (STANDING):  apixaban 10 milliGRAM(s) Oral every 12 hours  artificial tears (preservative free) Ophthalmic Solution 1 Drop(s) Both EYES two times a day  aspirin enteric coated 81 milliGRAM(s) Oral daily  atorvastatin 40 milliGRAM(s) Oral at bedtime  famotidine    Tablet 40 milliGRAM(s) Oral two times a day  melatonin 3 milliGRAM(s) Oral at bedtime  multivitamin 1 Tablet(s) Oral at bedtime  nystatin Powder 1 Application(s) Topical two times a day  senna 2 Tablet(s) Oral at bedtime  trimethoprim  160 mG/sulfamethoxazole 800 mG 2 Tablet(s) Oral two times a day    MEDICATIONS  (PRN):  acetaminophen     Tablet .. 650 milliGRAM(s) Oral every 6 hours PRN Moderate Pain (4 - 6)  AQUAPHOR (petrolatum Ointment) 1 Application(s) Topical two times a day PRN dry skin  polyethylene glycol 3350 17 Gram(s) Oral two times a day PRN Constipation

## 2024-05-13 NOTE — DIETITIAN INITIAL EVALUATION ADULT - ADD RECOMMEND
1) Monitor Weights, Intake, Tolerance, Skin & Labwork  2) Recommend Increase Supplement to Ensure Plus High Protein 8oz PO TID   3) Education Provided on Need for Supplementation & Proper Nutrition   4) Continue Nutrition Plan of Care

## 2024-05-14 ENCOUNTER — APPOINTMENT (OUTPATIENT)
Dept: MRI IMAGING | Facility: CLINIC | Age: 84
End: 2024-05-14

## 2024-05-14 PROCEDURE — 99232 SBSQ HOSP IP/OBS MODERATE 35: CPT | Mod: GC

## 2024-05-14 PROCEDURE — 99232 SBSQ HOSP IP/OBS MODERATE 35: CPT

## 2024-05-14 RX ORDER — ONDANSETRON 8 MG/1
4 TABLET, FILM COATED ORAL THREE TIMES A DAY
Refills: 0 | Status: DISCONTINUED | OUTPATIENT
Start: 2024-05-14 | End: 2024-06-04

## 2024-05-14 RX ORDER — CIPROFLOXACIN LACTATE 400MG/40ML
500 VIAL (ML) INTRAVENOUS EVERY 12 HOURS
Refills: 0 | Status: COMPLETED | OUTPATIENT
Start: 2024-05-14 | End: 2024-05-17

## 2024-05-14 RX ADMIN — Medication 500 MILLIGRAM(S): at 17:11

## 2024-05-14 RX ADMIN — APIXABAN 10 MILLIGRAM(S): 2.5 TABLET, FILM COATED ORAL at 05:44

## 2024-05-14 RX ADMIN — Medication 2 TABLET(S): at 05:44

## 2024-05-14 RX ADMIN — Medication 1 DROP(S): at 06:56

## 2024-05-14 RX ADMIN — NYSTATIN CREAM 1 APPLICATION(S): 100000 CREAM TOPICAL at 06:56

## 2024-05-14 RX ADMIN — ONDANSETRON 4 MILLIGRAM(S): 8 TABLET, FILM COATED ORAL at 11:44

## 2024-05-14 RX ADMIN — Medication 81 MILLIGRAM(S): at 11:23

## 2024-05-14 RX ADMIN — FAMOTIDINE 40 MILLIGRAM(S): 10 INJECTION INTRAVENOUS at 05:44

## 2024-05-14 RX ADMIN — Medication 1 DROP(S): at 17:12

## 2024-05-14 RX ADMIN — FAMOTIDINE 40 MILLIGRAM(S): 10 INJECTION INTRAVENOUS at 17:12

## 2024-05-14 RX ADMIN — NYSTATIN CREAM 1 APPLICATION(S): 100000 CREAM TOPICAL at 17:16

## 2024-05-14 RX ADMIN — ATORVASTATIN CALCIUM 40 MILLIGRAM(S): 80 TABLET, FILM COATED ORAL at 21:40

## 2024-05-14 RX ADMIN — APIXABAN 10 MILLIGRAM(S): 2.5 TABLET, FILM COATED ORAL at 17:12

## 2024-05-14 NOTE — PROGRESS NOTE ADULT - ASSESSMENT
84y with CAD s/p CABG recent acute polyneuropathy diagnosis (march 2024).  Here after IVIG series c/b PE RUL and DVT RLE. Patient now admitted for a multidisciplinary rehab program. 05-10-24 @ 17:01    # Motor Axon Neuropathy  -initially began after viral illness in March 2024  -following with Neurologist Dr. Liu  -s/p IVIG ending on 5/8    # CAD s/p CABG  # [place] AF  # HLD  - HR appears to be controlled while not being on AV martín blocking agent  -continue aspirin and statin  -eliquis for afib  - O/p cardiology follow up with cardiac MR    # PE  # DVT  - Will start eliquis 10mg BID x 7 days; followed by 5mg BID and o/p hematology followup     # UTI  - sensitive proteus UTI  - bactrim 2 tab BID until 5/17      DVT PPX: Eliquis           84y with CAD s/p CABG recent acute polyneuropathy diagnosis (march 2024).  Here after IVIG series c/b PE RUL and DVT RLE. Patient now admitted for a multidisciplinary rehab program. 05-10-24 @ 17:01    # Motor Axon Neuropathy  -initially began after viral illness in March 2024  -following with Neurologist Dr. Liu  -s/p IVIG ending on 5/8    # CAD s/p CABG  # New [place] AF  # HLD  - HR appears to be controlled while not being on AV martín blocking agent  -continue aspirin and statin  -eliquis for afib  - O/p cardiology follow up with cardiac MR    # PE  # DVT  - Will start eliquis 10mg BID x 7 days; followed by 5mg BID and o/p hematology followup     #Proteus bacteremia at [facility]  - patient was discharged on bactrim until 5/17  -patient c/o nausea, possible bactrim related  -recommend to switch to cipro until 5/17    DVT PPX: Eliquis

## 2024-05-14 NOTE — PROGRESS NOTE ADULT - SUBJECTIVE AND OBJECTIVE BOX
Chief complaint- weakness UES/ LES difficulty ambulating    83M R-handed PMHx CAD s/p bypass x4 and HLD [facility]. Presented with a 'stomach virus' in 3/2024, reported significant weight loss, sensation of 'wobbling,' with repeated falls. Had seen Dr. 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 Patient received a full body MRI demonstrating no acute changes of the spine or brain. Received 5 doses of IVIG ending on 5/8. Patient found to have a bactrim sensitive proteus UTI. Patient incidentially found to have RLE DVT and PE in the RUL. Now on full dose AC w/ BID lovenox.  Patient seen by physiatry and recommended for acute inpatient rehab. Discharged to [facility] on 5/10.      ROS/subjective:  patient seen and evaluated in OT  reports nausea- ? secondary to antibiotics- changed to Cipro  tolerating [place] overnight- to ask nursing for urinal during day- in diaper  moved bowels 5/13  Better extension of digits today with less pain  will add hector wrist supports/ Isotoner gloves per OT  Softeeze splints adjusted by OT  no dizziness, no headaches, no nausea, no vomiting, no SOB, No chest pain  happy with initial therapy here- maintains short sit    MEDICATIONS  (STANDING):  apixaban 10 milliGRAM(s) Oral every 12 hours  artificial tears (preservative free) Ophthalmic Solution 1 Drop(s) Both EYES two times a day  aspirin enteric coated 81 milliGRAM(s) Oral daily  atorvastatin 40 milliGRAM(s) Oral at bedtime  ciprofloxacin     Tablet 500 milliGRAM(s) Oral every 12 hours  famotidine    Tablet 40 milliGRAM(s) Oral two times a day  melatonin 3 milliGRAM(s) Oral at bedtime  nystatin Powder 1 Application(s) Topical two times a day  senna 2 Tablet(s) Oral at bedtime    MEDICATIONS  (PRN):  acetaminophen     Tablet .. 650 milliGRAM(s) Oral every 6 hours PRN Moderate Pain (4 - 6)  AQUAPHOR (petrolatum Ointment) 1 Application(s) Topical two times a day PRN dry skin  ondansetron   Disintegrating Tablet 4 milliGRAM(s) Oral three times a day PRN Nausea and/or Vomiting  polyethylene glycol 3350 17 Gram(s) Oral two times a day PRN Constipation                            13.5   7.48  )-----------( 240      ( 13 May 2024 05:29 )             39.5     05-13    140  |  107  |  12  ----------------------------<  77  4.4   |  25  |  0.77    Ca    8.4      13 May 2024 05:29    TPro  7.5  /  Alb  2.6<L>  /  TBili  0.5  /  DBili  x   /  AST  96<H>  /  ALT  92<H>  /  AlkPhos  89  05-13    Urinalysis Basic - ( 13 May 2024 05:29 )    Color: x / Appearance: x / SG: x / pH: x  Gluc: 77 mg/dL / Ketone: x  / Bili: x / Urobili: x   Blood: x / Protein: x / Nitrite: x   Leuk Esterase: x / RBC: x / WBC x   Sq Epi: x / Non Sq Epi: x / Bacteria: x        CAPILLARY BLOOD GLUCOSE          Vital Signs Last 24 Hrs  T(C): 36.7 (13 May 2024 21:29), Max: 36.7 (13 May 2024 21:29)  T(F): 98 (13 May 2024 21:29), Max: 98 (13 May 2024 21:29)  HR: 83 (14 May 2024 08:22) (79 - 83)  BP: 132/80 (14 May 2024 08:22) (100/60 - 132/80)  BP(mean): --  RR: 16 (14 May 2024 08:22) (15 - 16)  SpO2: 94% (14 May 2024 08:22) (94% - 95%)    Parameters below as of 14 May 2024 08:22  Patient On (Oxygen Delivery Method): room air     Gen - NAD, Comfortable  HEENT - NCAT, EOMI, MMM  Neck - Supple, No limited ROM  Pulm - CTAB, No wheeze, No rhonchi, No crackles  Cardiovascular - RRR, S1S2, No murmurs  Abdomen - Soft, NT/ND, +BS  Extremities - No calf tenderness,   Neuro-     Cognitive - AAOx3, follows commands     Communication - Fluent, No dysarthria     Motor -                     LEFT    UE - ShF3/5, EF 3+/5, EE 3+/5, WE 1/5,  2/5 better PROM Digits Both hands with less pain                    RIGHT UE - ShAB 2/5, EF 3/5, EE 3/5, WE 1/5,  1/5                    LEFT    LE - HF 2+/5, KE 4/5, DF 0/5, PF 0/5 4/5 hip add/abd hector                    RIGHT LE - HF 2+/5, KE 4+/5, DF 0/5, PF 0/5        Sensory - Intact to LT     Reflexes - Negative clonus b/l, Negative Cuevas b/l     Tone - wnl  Psychiatric - Mood stable, Affect WNL  Skin: MAD b/l groin

## 2024-05-14 NOTE — PROGRESS NOTE ADULT - ASSESSMENT
REHANA MEDINA is a 84y with CAD s/p CABG recent acute polyneuropathy diagnosis (march 2024).  Here after IVIG series c/b PE RUL and DVT RLE. Patient now admitted for a multidisciplinary rehab program. 05-10-24 @ 17:01    # Motor Axon Neuropathy  Comprehensive Multidisciplinary Rehab Program:  -comprehensive rehab program of PT/OT- 3 hours a day, 5 days a week. P&O as needed   -initially began after viral illness in March 2024  -following with Neurologist Dr. Liu  -s/p IVIG ending on 5/8  OT- isotoner gloves, wrist supports during the day- apply softeeze splints at night- ?Functional E Stim    # CAD s/p CABG  # AF  -continue aspirin and statin  -on therapeutic AC    # PE  # DVT  - continue lovenox 90 mg BID- hospitalist transitioned to DOAC  - RUL PE and RLE DVT    Elevated LFTs  ? secondary to IVIG  not taking tylenol  Follow LFTs thursday    # UTI  - sensitive proteus UTI  - bactrim 2 tab BID until 5/17  PVRs low-to DC PVRs  will change antibiotic to Cipro  zofran PRN ordered      # Pain  - Tylenol PRN    Mood / Cognition  - Neuropsychology consult PRN    Sleep  - Maintain quiet hours and a low stim environment.   - Melatonin 3 mg QHS    GI / Bowel  - Miralax daily  - Senna QHS  - GI ppx: famotidine 40 mg BID- HX GERD     / Bladder  - Continue bladder scans Q8 hours with straight cath for >400cc- PVRs Low- will DC  - Toileting schedule every 3 hours while awake    Skin / Pressure injury  - Skin assessment on admission performed: MAD to b/l groin  - Turn q2 hours in bed while awake, air mattress  - nursing to monitor skin qShift  - soft heel protectors  - skin barrier cream PRN    Diet/Dysphagia:  - Diet Consistency: regular diet  - Aspiration Precautions  - Nutrition consult    DVT prophylaxis:   - has RLE DVT  - on full dose Lovenox  - Last Doppler on R soleal DVT on 5/8- Eliquis started

## 2024-05-14 NOTE — PROGRESS NOTE ADULT - SUBJECTIVE AND OBJECTIVE BOX
Patient is a 84y old  Male who presents with a chief complaint of Polyneuropathy secondary to Acute Motor Axon Neuropathy    Patient seen and examined at bedside.  No events overnight  Denies chest pain, SOB  Slept really well    ALLERGIES:  penicillins (Rash)    MEDICATIONS  (STANDING):  artificial tears (preservative free) Ophthalmic Solution 1 Drop(s) Both EYES two times a day  aspirin enteric coated 81 milliGRAM(s) Oral daily  atorvastatin 40 milliGRAM(s) Oral at bedtime  enoxaparin Injectable 90 milliGRAM(s) SubCutaneous every 12 hours  famotidine    Tablet 40 milliGRAM(s) Oral two times a day  melatonin 3 milliGRAM(s) Oral at bedtime  multivitamin 1 Tablet(s) Oral at bedtime  nystatin Powder 1 Application(s) Topical two times a day  senna 2 Tablet(s) Oral at bedtime  trimethoprim  160 mG/sulfamethoxazole 800 mG 2 Tablet(s) Oral two times a day    MEDICATIONS  (PRN):  acetaminophen     Tablet .. 650 milliGRAM(s) Oral every 6 hours PRN Moderate Pain (4 - 6)  AQUAPHOR (petrolatum Ointment) 1 Application(s) Topical two times a day PRN dry skin  polyethylene glycol 3350 17 Gram(s) Oral two times a day PRN Constipation    Vital Signs Last 24 Hrs  T(C): 36.7 (13 May 2024 21:29), Max: 36.7 (13 May 2024 21:29)  T(F): 98 (13 May 2024 21:29), Max: 98 (13 May 2024 21:29)  HR: 83 (14 May 2024 08:22) (79 - 83)  BP: 132/80 (14 May 2024 08:22) (100/60 - 132/80)  BP(mean): --  RR: 16 (14 May 2024 08:22) (15 - 16)  SpO2: 94% (14 May 2024 08:22) (94% - 95%)    Parameters below as of 14 May 2024 08:22  Patient On (Oxygen Delivery Method): room air      BMI (kg/m2): 27.7 (05-10-24 @ 21:51)    GENERAL: NAD,  no increased WOB  HEAD:  Atraumatic, Normocephalic  EYES: EOMI, conjunctiva and sclera clear  ENMT: Moist mucous membranes  NECK: Supple, No JVD  NERVOUS SYSTEM:  Alert & Oriented   CHEST/LUNG: Clear to auscultation bilaterally; No rales, rhonchi, wheezing  HEART: Regular rate and rhythm  ABDOMEN: Soft, Nontender, Nondistended; Bowel sounds present.  EXTREMITIES:  No clubbing, cyanosis, calf tenderness or edema b/l        LABS:                        13.5   7.48  )-----------( 240      ( 13 May 2024 05:29 )             39.5       05-11    143  |  107  |  17  ----------------------------<  77  3.6   |  26  |  0.70    Ca    8.1      11 May 2024 05:28    TPro  6.9  /  Alb  2.3  /  TBili  0.7  /  DBili  x   /  AST  47  /  ALT  36  /  AlkPhos  76  05-11     05-02 Chol 119 mg/dL LDL -- HDL 44 mg/dL Trig 83 mg/dL    Urinalysis Basic - ( 11 May 2024 05:28 )    Color: x / Appearance: x / SG: x / pH: x  Gluc: 77 mg/dL / Ketone: x  / Bili: x / Urobili: x   Blood: x / Protein: x / Nitrite: x   Leuk Esterase: x / RBC: x / WBC x   Sq Epi: x / Non Sq Epi: x / Bacteria: x    Culture - Urine (collected 08 May 2024 14:05)  Source: Clean Catch Clean Catch (Midstream)  Preliminary Report (12 May 2024 19:10):    >100,000 CFU/ml Pseudomonas aeruginosa    >100,000 CFU/ml Enterococcus faecalis  Organism: Pseudomonas aeruginosa (11 May 2024 11:06)  Organism: Pseudomonas aeruginosa (11 May 2024 11:06)      -  Levofloxacin: S <=0.5      -  Aztreonam: S <=4      -  Cefepime: S <=2      -  Piperacillin/Tazobactam: S <=8      -  Ciprofloxacin: S <=0.25      -  Imipenem: S <=1      Method Type: DARRELL      -  Meropenem: S <=1      -  Ceftazidime: S <=1      -  Amikacin: S <=16    Culture - Blood (collected 08 May 2024 13:24)  Source: .Blood Blood  Preliminary Report (12 May 2024 15:00):    No growth at 4 days    Culture - Blood (collected 07 May 2024 03:51)  Source: .Blood Blood  Final Report (12 May 2024 10:00):    No growth at 5 days    Culture - Blood (collected 07 May 2024 03:51)  Source: .Blood Blood  Gram Stain (07 May 2024 23:48):    Growth in aerobic bottle: Gram Negative Rods  Final Report (09 May 2024 12:29):    Growth in aerobic bottle: Proteus mirabilis    Direct identification is available within approximately 3-5    hours either by Blood Panel Multiplexed PCR or Direct    MALDI-TOF. Details: [email or web address]  Organism: Blood Culture PCR  Proteus mirabilis (09 May 2024 12:29)  Organism: Proteus mirabilis (09 May 2024 12:29)      -  Levofloxacin: S <=0.5      -  Tobramycin: I 4      -  Aztreonam: S <=4      -  Gentamicin: S <=2      -  Cefazolin: S <=2      -  Cefepime: S <=2      -  Piperacillin/Tazobactam: S <=8      -  Ciprofloxacin: S <=0.25      -  Ceftriaxone: S <=1      -  Ampicillin: S <=8 These ampicillin results predict results for amoxicillin      Method Type: DARRELL      -  Meropenem: S <=1      -  Ampicillin/Sulbactam: S <=4/2      -  Cefoxitin: S <=8      -  Trimethoprim/Sulfamethoxazole: S <=0.5/9.5      -  Ertapenem: S <=0.5  Organism: Blood Culture PCR (09 May 2024 12:29)      -  Proteus species: Detec      Method Type: PCR      COVID-19 PCR: NotDetec (05-10-24 @ 23:12)  COVID-19 PCR: NotDetec (05-10-24 @ 23:12)  COVID-19 PCR: NotDetec (05-15-22 @ 17:26)

## 2024-05-15 PROCEDURE — 99232 SBSQ HOSP IP/OBS MODERATE 35: CPT

## 2024-05-15 PROCEDURE — 99232 SBSQ HOSP IP/OBS MODERATE 35: CPT | Mod: GC

## 2024-05-15 RX ORDER — FLUTICASONE PROPIONATE 50 MCG
1 SPRAY, SUSPENSION NASAL
Refills: 0 | Status: COMPLETED | OUTPATIENT
Start: 2024-05-15 | End: 2024-05-22

## 2024-05-15 RX ADMIN — Medication 1 DROP(S): at 05:32

## 2024-05-15 RX ADMIN — APIXABAN 10 MILLIGRAM(S): 2.5 TABLET, FILM COATED ORAL at 17:07

## 2024-05-15 RX ADMIN — NYSTATIN CREAM 1 APPLICATION(S): 100000 CREAM TOPICAL at 05:32

## 2024-05-15 RX ADMIN — Medication 1 DROP(S): at 17:08

## 2024-05-15 RX ADMIN — NYSTATIN CREAM 1 APPLICATION(S): 100000 CREAM TOPICAL at 18:02

## 2024-05-15 RX ADMIN — FAMOTIDINE 40 MILLIGRAM(S): 10 INJECTION INTRAVENOUS at 17:07

## 2024-05-15 RX ADMIN — Medication 500 MILLIGRAM(S): at 17:08

## 2024-05-15 RX ADMIN — Medication 500 MILLIGRAM(S): at 05:32

## 2024-05-15 RX ADMIN — ATORVASTATIN CALCIUM 40 MILLIGRAM(S): 80 TABLET, FILM COATED ORAL at 21:46

## 2024-05-15 RX ADMIN — APIXABAN 10 MILLIGRAM(S): 2.5 TABLET, FILM COATED ORAL at 05:32

## 2024-05-15 RX ADMIN — Medication 1 SPRAY(S): at 17:08

## 2024-05-15 RX ADMIN — FAMOTIDINE 40 MILLIGRAM(S): 10 INJECTION INTRAVENOUS at 05:32

## 2024-05-15 RX ADMIN — Medication 81 MILLIGRAM(S): at 11:17

## 2024-05-15 NOTE — PROGRESS NOTE ADULT - SUBJECTIVE AND OBJECTIVE BOX
HPI:  83M R-handed PMHx CAD s/p bypass x4 and HLD presented as a transfer from [facility]. Presented with a 'stomach virus' in 3/2024, reported significant weight loss, sensation of 'wobbling,' with repeated falls. Had seen Dr. 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 Patient received a full body MRI demonstrating no acute changes of the spine or brain. Received 5 doses of IVIG ending on 5/8. Patient found to have a bactrim sensitive proteus UTI. Patient incidentially found to have RLE DVT and PE in the RUL. Now on full dose AC w/ BID lovenox.  Patient seen by physiatry and recommended for acute inpatient rehab. Discharged to [facility] on 5/10.      ROS/subjective:  patient seen and evaluated in bed. Reports right ear fullness-will try Flonase.  reports nausea- ? secondary to antibiotics- changed to Cipro-no NV today-monitor, prn zofran  tolerating [place] overnight- limit use, toilet daytime  moved bowels 5/13-Miralax today   hector wrist supports/ Isotoner gloves per OT  Softeeze splints adjusted by OT  no dizziness, no headaches, no nausea, no vomiting, no SOB, No chest pain  Tolerating Eliquis, CBC in am, no overt bleeding      MEDICATIONS  (STANDING):  apixaban 10 milliGRAM(s) Oral every 12 hours  artificial tears (preservative free) Ophthalmic Solution 1 Drop(s) Both EYES two times a day  aspirin enteric coated 81 milliGRAM(s) Oral daily  atorvastatin 40 milliGRAM(s) Oral at bedtime  ciprofloxacin     Tablet 500 milliGRAM(s) Oral every 12 hours  famotidine    Tablet 40 milliGRAM(s) Oral two times a day  fluticasone propionate 50 MICROgram(s)/spray Nasal Spray 1 Spray(s) Both Nostrils two times a day  melatonin 3 milliGRAM(s) Oral at bedtime  nystatin Powder 1 Application(s) Topical two times a day  senna 2 Tablet(s) Oral at bedtime    MEDICATIONS  (PRN):  acetaminophen     Tablet .. 650 milliGRAM(s) Oral every 6 hours PRN Moderate Pain (4 - 6)  AQUAPHOR (petrolatum Ointment) 1 Application(s) Topical two times a day PRN dry skin  ondansetron   Disintegrating Tablet 4 milliGRAM(s) Oral three times a day PRN Nausea and/or Vomiting  polyethylene glycol 3350 17 Gram(s) Oral two times a day PRN Constipation        Vital Signs Last 24 Hrs  T(C): 36.7 (15 May 2024 07:23), Max: 36.9 (14 May 2024 21:41)  T(F): 98.1 (15 May 2024 07:23), Max: 98.4 (14 May 2024 21:41)  HR: 77 (15 May 2024 07:23) (76 - 77)  BP: 100/63 (15 May 2024 07:23) (100/63 - 118/76)  BP(mean): --  RR: 16 (15 May 2024 07:23) (15 - 16)  SpO2: 95% (15 May 2024 07:23) (95% - 96%)    Parameters below as of 15 May 2024 07:23  Patient On (Oxygen Delivery Method): room air       Gen - NAD, Comfortable  HEENT - NCAT, EOMI  Neck - Supple, No limited ROM  Pulm - CTAB, No wheeze, No rhonchi, No crackles  Cardiovascular - RRR, S1S2, No murmurs  Abdomen - Soft, NT/ND, +BS  Extremities - No calf tenderness,   Neuro-     Cognitive - AAOx3, follows commands     Communication - Fluent, No dysarthria     Motor -                     LEFT    UE - ShF3/5, EF 3+/5, EE 3+/5, WE 1/5,  2/5 better PROM Digits Both hands with less pain                    RIGHT UE - ShAB 2/5, EF 3/5, EE 3/5, WE 1/5,  1/5                    LEFT    LE - HF 2+/5, KE 4/5, DF 0/5, PF 0/5 4/5 hip add/abd hector                    RIGHT LE - HF 2+/5, KE 4+/5, DF 0/5, PF 0/5        Sensory - Intact to LT     Reflexes - Negative clonus b/l, Negative Cuevas b/l     Tone - wnl  Psychiatric - Mood stable, Affect WNL  Skin: MAD b/l groin    IDT 5/15, MARIIA 5/30 to [facility] vs Home    Continue comprehensive acute rehab program consisting of 3hrs/day of OT/PT . HPI:  83M R-handed PMHx CAD s/p bypass x4 and HLD presented as a transfer from [facility]. Presented with a 'stomach virus' in 3/2024, reported significant weight loss, sensation of 'wobbling,' with repeated falls. Had seen Dr. 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 Patient received a full body MRI demonstrating no acute changes of the spine or brain. Received 5 doses of IVIG ending on 5/8. Patient found to have a bactrim sensitive proteus UTI. Patient incidentially found to have RLE DVT and PE in the RUL. Now on full dose AC w/ BID lovenox.  Patient seen by physiatry and recommended for acute inpatient rehab. Discharged to [facility] on 5/10.      ROS/subjective:  patient seen and evaluated in bed. Reports right ear fullness-will try Flonase.  reports nausea- ? secondary to antibiotics- changed to Cipro-no NV today-monitor, prn zofran  tolerating [place] overnight- limit use, toilet daytime  moved bowels 5/13-Miralax today   hector wrist supports/ Isotoner gloves per OT  Softeeze splints adjusted by OT  no dizziness, no headaches, no nausea, no vomiting, no SOB, No chest pain  Tolerating Eliquis, CBC in am, no overt bleeding      MEDICATIONS  (STANDING):  apixaban 10 milliGRAM(s) Oral every 12 hours  artificial tears (preservative free) Ophthalmic Solution 1 Drop(s) Both EYES two times a day  aspirin enteric coated 81 milliGRAM(s) Oral daily  atorvastatin 40 milliGRAM(s) Oral at bedtime  ciprofloxacin     Tablet 500 milliGRAM(s) Oral every 12 hours  famotidine    Tablet 40 milliGRAM(s) Oral two times a day  fluticasone propionate 50 MICROgram(s)/spray Nasal Spray 1 Spray(s) Both Nostrils two times a day  melatonin 3 milliGRAM(s) Oral at bedtime  nystatin Powder 1 Application(s) Topical two times a day  senna 2 Tablet(s) Oral at bedtime    MEDICATIONS  (PRN):  acetaminophen     Tablet .. 650 milliGRAM(s) Oral every 6 hours PRN Moderate Pain (4 - 6)  AQUAPHOR (petrolatum Ointment) 1 Application(s) Topical two times a day PRN dry skin  ondansetron   Disintegrating Tablet 4 milliGRAM(s) Oral three times a day PRN Nausea and/or Vomiting  polyethylene glycol 3350 17 Gram(s) Oral two times a day PRN Constipation        Vital Signs Last 24 Hrs  T(C): 36.7 (15 May 2024 07:23), Max: 36.9 (14 May 2024 21:41)  T(F): 98.1 (15 May 2024 07:23), Max: 98.4 (14 May 2024 21:41)  HR: 77 (15 May 2024 07:23) (76 - 77)  BP: 100/63 (15 May 2024 07:23) (100/63 - 118/76)  RR: 16 (15 May 2024 07:23) (15 - 16)  SpO2: 95% (15 May 2024 07:23) (95% - 96%)  Parameters below as of 15 May 2024 07:23  Patient On (Oxygen Delivery Method): room air       Gen - NAD, Comfortable  HEENT - NCAT, EOMI  Neck - Supple, No limited ROM  Pulm - CTAB, No crackles  Cardiovascular - RRR, S1S2  Abdomen - Soft, NT, ND, (+) BS  Extremities - No calf tenderness,   Neuro-     Cognitive - AAOx3, follows commands     Communication - Fluent, No dysarthria     Motor -                     LEFT    UE - ShF3/5, EF 3+/5, EE 3+/5, WE 1/5,  2/5 better PROM Digits Both hands with less pain                    RIGHT UE - ShAB 2/5, EF 3/5, EE 3/5, WE 1/5,  1/5                    LEFT    LE - HF 2+/5, KE 4/5, DF 0/5, PF 0/5 4/5 hip add/abd hector                    RIGHT LE - HF 2+/5, KE 4+/5, DF 0/5, PF 0/5       Psychiatric - Mood stable, Affect WNL  Skin: MAD b/l groin    IDT 5/15, MARIIA 5/30 to [facility] vs Home    Continue comprehensive acute rehab program consisting of 3hrs/day of OT/PT .

## 2024-05-15 NOTE — PROGRESS NOTE ADULT - ASSESSMENT
84y with CAD s/p CABG recent acute polyneuropathy diagnosis (march 2024).  Here after IVIG series c/b PE RUL and DVT RLE. Patient now admitted for a multidisciplinary rehab program. 05-10-24 @ 17:01      # Motor Axon Neuropathy  -initially began after viral illness in March 2024  -following with Neurologist Dr. Liu  -s/p IVIG ending on 5/8    # CAD s/p CABG  # [place] AF  # HLD  - HR appears to be controlled while not being on AV martín blocking agent  -continue aspirin and statin  -eliquis for afib  - O/p cardiology follow up with cardiac MR    # PE  # DVT  - c/w eliquis 10mg BID x 7 days; followed by 5mg BID and o/p hematology followup     #Proteus bacteremia at [facility]  -continue cipro until 5/17    DVT PPX: Eliquis

## 2024-05-15 NOTE — PROGRESS NOTE ADULT - SUBJECTIVE AND OBJECTIVE BOX
no acute events overnight      PHYSICAL EXAM:    Vital Signs Last 24 Hrs  T(F): 98.1 (15 May 2024 07:23), Max: 98.4 (14 May 2024 21:41)  HR: 77 (15 May 2024 07:23) (76 - 77)  BP: 100/63 (15 May 2024 07:23) (100/63 - 118/76)  RR: 16 (15 May 2024 07:23) (15 - 16)  SpO2: 95% (15 May 2024 07:23) (95% - 96%)  I&O's Summary      GENERAL: NAD  HEENT: NCAT  CHEST/LUNG: No increased WOB, Clear to percussion bilaterally; No rales, rhonchi, wheezing  HEART: Regular rate and rhythm; No murmurs  ABDOMEN: Soft, Nontender, Nondistended; Bowel sounds present  MUSCULOSKELETAL/EXTREMITIES:  2+ Peripheral Pulses, No LE edema  PSYCH: Appropriate affect, Alert & Oriented    LABS:                        13.5   7.48  )-----------( 240      ( 13 May 2024 05:29 )             39.5       05-13    140  |  107  |  12  ----------------------------<  77  4.4   |  25  |  0.77    Ca    8.4      13 May 2024 05:29    TPro  7.5  /  Alb  2.6  /  TBili  0.5  /  DBili  x   /  AST  96  /  ALT  92  /  AlkPhos  89  05-13                05-02 Chol 119 mg/dL LDL -- HDL 44 mg/dL Trig 83 mg/dL                  Urinalysis Basic - ( 13 May 2024 05:29 )    Color: x / Appearance: x / SG: x / pH: x  Gluc: 77 mg/dL / Ketone: x  / Bili: x / Urobili: x   Blood: x / Protein: x / Nitrite: x   Leuk Esterase: x / RBC: x / WBC x   Sq Epi: x / Non Sq Epi: x / Bacteria: x        Culture - Urine (collected 08 May 2024 14:05)  Source: Clean Catch Clean Catch (Midstream)  Final Report (13 May 2024 19:58):    >100,000 CFU/ml Pseudomonas aeruginosa    >100,000 CFU/ml Enterococcus faecalis  Organism: Pseudomonas aeruginosa  Enterococcus faecalis (13 May 2024 19:58)  Organism: Enterococcus faecalis (13 May 2024 19:58)      Method Type: DARRELL      -  Ampicillin: S <=2 Predicts results to ampicillin/sulbactam, amoxacillin-clavulanate and  piperacillin-tazobactam.      -  Ciprofloxacin: R >2      -  Levofloxacin: R >4      -  Nitrofurantoin: S <=32 Should not be used to treat pyelonephritis.      -  Tetracycline: R >8      -  Vancomycin: S 2  Organism: Pseudomonas aeruginosa (13 May 2024 19:58)      Method Type: DARRELL      -  Amikacin: S <=16      -  Aztreonam: S <=4      -  Cefepime: S <=2      -  Ceftazidime: S <=1      -  Ciprofloxacin: S <=0.25      -  Imipenem: S <=1      -  Levofloxacin: S <=0.5      -  Meropenem: S <=1      -  Piperacillin/Tazobactam: S <=8      COVID-19 PCR: NotDetec (05-10-24 @ 23:12)      MEDICATIONS  (STANDING):  apixaban 10 milliGRAM(s) Oral every 12 hours  artificial tears (preservative free) Ophthalmic Solution 1 Drop(s) Both EYES two times a day  aspirin enteric coated 81 milliGRAM(s) Oral daily  atorvastatin 40 milliGRAM(s) Oral at bedtime  ciprofloxacin     Tablet 500 milliGRAM(s) Oral every 12 hours  famotidine    Tablet 40 milliGRAM(s) Oral two times a day  fluticasone propionate 50 MICROgram(s)/spray Nasal Spray 1 Spray(s) Both Nostrils two times a day  melatonin 3 milliGRAM(s) Oral at bedtime  nystatin Powder 1 Application(s) Topical two times a day  senna 2 Tablet(s) Oral at bedtime    MEDICATIONS  (PRN):  acetaminophen     Tablet .. 650 milliGRAM(s) Oral every 6 hours PRN Moderate Pain (4 - 6)  AQUAPHOR (petrolatum Ointment) 1 Application(s) Topical two times a day PRN dry skin  ondansetron   Disintegrating Tablet 4 milliGRAM(s) Oral three times a day PRN Nausea and/or Vomiting  polyethylene glycol 3350 17 Gram(s) Oral two times a day PRN Constipation      Care Discussed with Consultants/Other Providers: Yes

## 2024-05-16 LAB
ALBUMIN SERPL ELPH-MCNC: 2.6 G/DL — LOW (ref 3.3–5)
ALP SERPL-CCNC: 79 U/L — SIGNIFICANT CHANGE UP (ref 40–120)
ALT FLD-CCNC: 70 U/L — HIGH (ref 10–45)
ANION GAP SERPL CALC-SCNC: 7 MMOL/L — SIGNIFICANT CHANGE UP (ref 5–17)
AST SERPL-CCNC: 58 U/L — HIGH (ref 10–40)
BASOPHILS # BLD AUTO: 0.02 K/UL — SIGNIFICANT CHANGE UP (ref 0–0.2)
BASOPHILS NFR BLD AUTO: 0.3 % — SIGNIFICANT CHANGE UP (ref 0–2)
BILIRUB SERPL-MCNC: 0.5 MG/DL — SIGNIFICANT CHANGE UP (ref 0.2–1.2)
BUN SERPL-MCNC: 18 MG/DL — SIGNIFICANT CHANGE UP (ref 7–23)
CALCIUM SERPL-MCNC: 8.4 MG/DL — SIGNIFICANT CHANGE UP (ref 8.4–10.5)
CHLORIDE SERPL-SCNC: 104 MMOL/L — SIGNIFICANT CHANGE UP (ref 96–108)
CO2 SERPL-SCNC: 28 MMOL/L — SIGNIFICANT CHANGE UP (ref 22–31)
CREAT SERPL-MCNC: 0.69 MG/DL — SIGNIFICANT CHANGE UP (ref 0.5–1.3)
EGFR: 92 ML/MIN/1.73M2 — SIGNIFICANT CHANGE UP
EOSINOPHIL # BLD AUTO: 0.17 K/UL — SIGNIFICANT CHANGE UP (ref 0–0.5)
EOSINOPHIL NFR BLD AUTO: 2.5 % — SIGNIFICANT CHANGE UP (ref 0–6)
GLUCOSE SERPL-MCNC: 88 MG/DL — SIGNIFICANT CHANGE UP (ref 70–99)
HCT VFR BLD CALC: 36.2 % — LOW (ref 39–50)
HGB BLD-MCNC: 12.3 G/DL — LOW (ref 13–17)
IMM GRANULOCYTES NFR BLD AUTO: 0.4 % — SIGNIFICANT CHANGE UP (ref 0–0.9)
LYMPHOCYTES # BLD AUTO: 2.12 K/UL — SIGNIFICANT CHANGE UP (ref 1–3.3)
LYMPHOCYTES # BLD AUTO: 31.5 % — SIGNIFICANT CHANGE UP (ref 13–44)
MCHC RBC-ENTMCNC: 31.9 PG — SIGNIFICANT CHANGE UP (ref 27–34)
MCHC RBC-ENTMCNC: 34 GM/DL — SIGNIFICANT CHANGE UP (ref 32–36)
MCV RBC AUTO: 93.8 FL — SIGNIFICANT CHANGE UP (ref 80–100)
MONOCYTES # BLD AUTO: 0.62 K/UL — SIGNIFICANT CHANGE UP (ref 0–0.9)
MONOCYTES NFR BLD AUTO: 9.2 % — SIGNIFICANT CHANGE UP (ref 2–14)
NEUTROPHILS # BLD AUTO: 3.76 K/UL — SIGNIFICANT CHANGE UP (ref 1.8–7.4)
NEUTROPHILS NFR BLD AUTO: 56.1 % — SIGNIFICANT CHANGE UP (ref 43–77)
NRBC # BLD: 0 /100 WBCS — SIGNIFICANT CHANGE UP (ref 0–0)
PLATELET # BLD AUTO: 278 K/UL — SIGNIFICANT CHANGE UP (ref 150–400)
POTASSIUM SERPL-MCNC: 4.2 MMOL/L — SIGNIFICANT CHANGE UP (ref 3.5–5.3)
POTASSIUM SERPL-SCNC: 4.2 MMOL/L — SIGNIFICANT CHANGE UP (ref 3.5–5.3)
PROT SERPL-MCNC: 6.9 G/DL — SIGNIFICANT CHANGE UP (ref 6–8.3)
RBC # BLD: 3.86 M/UL — LOW (ref 4.2–5.8)
RBC # FLD: 15.1 % — HIGH (ref 10.3–14.5)
SODIUM SERPL-SCNC: 139 MMOL/L — SIGNIFICANT CHANGE UP (ref 135–145)
WBC # BLD: 6.72 K/UL — SIGNIFICANT CHANGE UP (ref 3.8–10.5)
WBC # FLD AUTO: 6.72 K/UL — SIGNIFICANT CHANGE UP (ref 3.8–10.5)

## 2024-05-16 PROCEDURE — 99232 SBSQ HOSP IP/OBS MODERATE 35: CPT | Mod: GC

## 2024-05-16 PROCEDURE — 99233 SBSQ HOSP IP/OBS HIGH 50: CPT

## 2024-05-16 RX ADMIN — NYSTATIN CREAM 1 APPLICATION(S): 100000 CREAM TOPICAL at 17:28

## 2024-05-16 RX ADMIN — Medication 81 MILLIGRAM(S): at 12:04

## 2024-05-16 RX ADMIN — Medication 1 DROP(S): at 06:41

## 2024-05-16 RX ADMIN — Medication 500 MILLIGRAM(S): at 06:37

## 2024-05-16 RX ADMIN — FAMOTIDINE 40 MILLIGRAM(S): 10 INJECTION INTRAVENOUS at 17:27

## 2024-05-16 RX ADMIN — Medication 500 MILLIGRAM(S): at 17:27

## 2024-05-16 RX ADMIN — Medication 1 SPRAY(S): at 17:26

## 2024-05-16 RX ADMIN — ATORVASTATIN CALCIUM 40 MILLIGRAM(S): 80 TABLET, FILM COATED ORAL at 21:20

## 2024-05-16 RX ADMIN — Medication 1 SPRAY(S): at 06:40

## 2024-05-16 RX ADMIN — APIXABAN 10 MILLIGRAM(S): 2.5 TABLET, FILM COATED ORAL at 06:37

## 2024-05-16 RX ADMIN — FAMOTIDINE 40 MILLIGRAM(S): 10 INJECTION INTRAVENOUS at 06:37

## 2024-05-16 RX ADMIN — Medication 1 DROP(S): at 17:27

## 2024-05-16 RX ADMIN — NYSTATIN CREAM 1 APPLICATION(S): 100000 CREAM TOPICAL at 06:40

## 2024-05-16 RX ADMIN — APIXABAN 10 MILLIGRAM(S): 2.5 TABLET, FILM COATED ORAL at 17:26

## 2024-05-16 NOTE — PROGRESS NOTE ADULT - SUBJECTIVE AND OBJECTIVE BOX
no acute events overnight    MEDICATIONS:  STANDING MEDICATIONS  apixaban 10 milliGRAM(s) Oral every 12 hours, 05-13-24 @ 18:00  artificial tears (preservative free) Ophthalmic Solution 1 Drop(s) Both EYES two times a day, 05-10-24 @ 21:22  aspirin enteric coated 81 milliGRAM(s) Oral daily, 05-11-24 @ 00:00  atorvastatin 40 milliGRAM(s) Oral at bedtime, 05-10-24 @ 21:22  ciprofloxacin     Tablet 500 milliGRAM(s) Oral every 12 hours, 05-14-24 @ 11:46  famotidine    Tablet 40 milliGRAM(s) Oral two times a day, 05-10-24 @ 21:22  fluticasone propionate 50 MICROgram(s)/spray Nasal Spray 1 Spray(s) Both Nostrils two times a day, 05-15-24 @ 12:37  melatonin 3 milliGRAM(s) Oral at bedtime, 05-10-24 @ 21:22  nystatin Powder 1 Application(s) Topical two times a day, 05-10-24 @ 22:23  senna 2 Tablet(s) Oral at bedtime, 05-10-24 @ 21:22    PRN MEDICATIONS  acetaminophen     Tablet .. 650 milliGRAM(s) Oral every 6 hours, 05-10-24 @ 21:22 PRN  AQUAPHOR (petrolatum Ointment) 1 Application(s) Topical two times a day, 05-10-24 @ 22:21 PRN  ondansetron   Disintegrating Tablet 4 milliGRAM(s) Oral three times a day, 05-14-24 @ 11:37 PRN  polyethylene glycol 3350 17 Gram(s) Oral two times a day, 05-10-24 @ 21:22 PRN    Diet, Regular:   Supplement Feeding Modality:  Oral  Ensure Plus High Protein Cans or Servings Per Day:  1       Frequency:  Three Times a day (05-13-24 @ 13:47) [Active]          Vital Signs Last 24 Hrs  T(C): 36.7 (16 May 2024 08:54), Max: 36.7 (15 May 2024 20:22)  T(F): 98.1 (16 May 2024 08:54), Max: 98.1 (16 May 2024 08:54)  HR: 67 (16 May 2024 08:54) (67 - 95)  BP: 107/74 (16 May 2024 08:54) (107/74 - 117/73)  RR: 17 (16 May 2024 08:54) (16 - 17)  SpO2: 96% (16 May 2024 08:54) (94% - 96%)    Parameters below as of 16 May 2024 08:54  Patient On (Oxygen Delivery Method): room air      I&Os:    LABS:                        12.3   6.72  )-----------( 278      ( 16 May 2024 05:51 )             36.2     WBC trend: 6.72 <--, 7.48 <--  Hgb: 12.3 [05-16-24 @ 05:51]<--, 13.5 [05-13-24 @ 05:29]<--, 12.8 [05-11-24 @ 05:28]<--    05-16    139  |  104  |  18  ----------------------------<  88  4.2   |  28  |  0.69    Ca    8.4      16 May 2024 05:51    TPro  6.9  /  Alb  2.6<L>  /  TBili  0.5  /  DBili  x   /  AST  58<H>  /  ALT  70<H>  /  AlkPhos  79  05-16    Creatinine trend: 0.69<--, 0.77<--, 0.70<--, 0.62<--, 0.69<--, 0.63<--, 0.63<--  SODIUM TREND: Sodium 139 [05-16 @ 05:51]<--, Sodium 140 [05-13 @ 05:29]<--      POC Glucose:     COVID-19 PCR: NotDetec (05-10-24 @ 23:12)      C-Reactive Protein: 4 mg/L (05-03-24 @ 16:24)  C-Reactive Protein: 4 mg/L (05-03-24 @ 06:15)      Sedimentation Rate, Erythrocyte: 37 mm/hr (05-03-24 @ 16:24)  Sedimentation Rate, Erythrocyte: 45 mm/hr (05-03-24 @ 06:11)      Culture - Urine (05.08.24 @ 14:05)   - Imipenem: S <=1  - Levofloxacin: S <=0.5  - Levofloxacin: R >4  - Meropenem: S <=1  - Nitrofurantoin: S <=32 Should not be used to treat pyelonephritis.  - Piperacillin/Tazobactam: S <=8  - Tetracycline: R >8  - Vancomycin: S 2  - Amikacin: S <=16  - Ampicillin: S <=2 Predicts results to ampicillin/sulbactam, amoxacillin-clavulanate and piperacillin-tazobactam.  - Aztreonam: S <=4  - Cefepime: S <=2  - Ceftazidime: S <=1  - Ciprofloxacin: S <=0.25  - Ciprofloxacin: R >2  Specimen Source: Clean Catch Clean Catch (Midstream)  Culture Results:   >100,000 CFU/ml Pseudomonas aeruginosa   >100,000 CFU/ml Enterococcus faecalis  Organism Identification: Pseudomonas aeruginosa     Culture - Blood (05.07.24 @ 03:51)   - Proteus species: Detec  - Tobramycin: I 4  - Trimethoprim/Sulfamethoxazole: S <=0.5/9.5  - Piperacillin/Tazobactam: S <=8  - Levofloxacin: S <=0.5  - Meropenem: S <=1  - Ertapenem: S <=0.5  - Gentamicin: S <=2  - Ceftriaxone: S <=1  - Ciprofloxacin: S <=0.25  - Cefoxitin: S <=8  - Cefazolin: S <=2  - Cefepime: S <=2  Gram Stain:   Growth in aerobic bottle: Gram Negative Rods  - Ampicillin: S <=8 These ampicillin results predict results for amoxicillin  - Ampicillin/Sulbactam: S <=4/2  - Aztreonam: S <=4  Specimen Source: .Blood Blood  Organism: Blood Culture PCR  Organism: Proteus mirabilis  Culture Results:   Growth in aerobic bottle: Proteus mirabilis     Urinalysis Basic - ( 16 May 2024 05:51 )    Color: x / Appearance: x / SG: x / pH: x  Gluc: 88 mg/dL / Ketone: x  / Bili: x / Urobili: x   Blood: x / Protein: x / Nitrite: x   Leuk Esterase: x / RBC: x / WBC x   Sq Epi: x / Non Sq Epi: x / Bacteria: x                                              -------------------------------------------------    RADIOLOGY:  < from: CT Angio Chest PE Protocol w/ IV Cont (05.08.24 @ 11:00) >  Embolus within the right upper lobe pulmonary artery. No evidence of right heart strain.    Circumferential wall thickening of the bladder. Otherwise, no acute finding within abdomen/pelvis.    < end of copied text >    < from: [facility] Duplex Lower Ext Vein Scan, Bilat (05.08.24 @ 10:35) >  There is acute below the knee DVT affecting a right soleal vein.    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     MEDICATIONS:  STANDING MEDICATIONS  apixaban 10 milliGRAM(s) Oral every 12 hours, 05-13-24 @ 18:00  artificial tears (preservative free) Ophthalmic Solution 1 Drop(s) Both EYES two times a day, 05-10-24 @ 21:22  aspirin enteric coated 81 milliGRAM(s) Oral daily, 05-11-24 @ 00:00  atorvastatin 40 milliGRAM(s) Oral at bedtime, 05-10-24 @ 21:22  ciprofloxacin     Tablet 500 milliGRAM(s) Oral every 12 hours, 05-14-24 @ 11:46  famotidine    Tablet 40 milliGRAM(s) Oral two times a day, 05-10-24 @ 21:22  fluticasone propionate 50 MICROgram(s)/spray Nasal Spray 1 Spray(s) Both Nostrils two times a day, 05-15-24 @ 12:37  melatonin 3 milliGRAM(s) Oral at bedtime, 05-10-24 @ 21:22  nystatin Powder 1 Application(s) Topical two times a day, 05-10-24 @ 22:23  senna 2 Tablet(s) Oral at bedtime, 05-10-24 @ 21:22    PRN MEDICATIONS  acetaminophen     Tablet .. 650 milliGRAM(s) Oral every 6 hours, 05-10-24 @ 21:22 PRN  AQUAPHOR (petrolatum Ointment) 1 Application(s) Topical two times a day, 05-10-24 @ 22:21 PRN  ondansetron   Disintegrating Tablet 4 milliGRAM(s) Oral three times a day, 05-14-24 @ 11:37 PRN  polyethylene glycol 3350 17 Gram(s) Oral two times a day, 05-10-24 @ 21:22 PRN    Diet, Regular:   Supplement Feeding Modality:  Oral  Ensure Plus High Protein Cans or Servings Per Day:  1       Frequency:  Three Times a day (05-13-24 @ 13:47) [Active]          Vital Signs Last 24 Hrs  T(C): 36.7 (16 May 2024 08:54), Max: 36.7 (15 May 2024 20:22)  T(F): 98.1 (16 May 2024 08:54), Max: 98.1 (16 May 2024 08:54)  HR: 67 (16 May 2024 08:54) (67 - 95)  BP: 107/74 (16 May 2024 08:54) (107/74 - 117/73)  RR: 17 (16 May 2024 08:54) (16 - 17)  SpO2: 96% (16 May 2024 08:54) (94% - 96%)    Parameters below as of 16 May 2024 08:54  Patient On (Oxygen Delivery Method): room air      I&Os:    LABS:                        12.3   6.72  )-----------( 278      ( 16 May 2024 05:51 )             36.2     WBC trend: 6.72 <--, 7.48 <--  Hgb: 12.3 [05-16-24 @ 05:51]<--, 13.5 [05-13-24 @ 05:29]<--, 12.8 [05-11-24 @ 05:28]<--    05-16    139  |  104  |  18  ----------------------------<  88  4.2   |  28  |  0.69    Ca    8.4      16 May 2024 05:51    TPro  6.9  /  Alb  2.6<L>  /  TBili  0.5  /  DBili  x   /  AST  58<H>  /  ALT  70<H>  /  AlkPhos  79  05-16    Creatinine trend: 0.69<--, 0.77<--, 0.70<--, 0.62<--, 0.69<--, 0.63<--, 0.63<--  SODIUM TREND: Sodium 139 [05-16 @ 05:51]<--, Sodium 140 [05-13 @ 05:29]<--      POC Glucose:     COVID-19 PCR: NotDetec (05-10-24 @ 23:12)      C-Reactive Protein: 4 mg/L (05-03-24 @ 16:24)  C-Reactive Protein: 4 mg/L (05-03-24 @ 06:15)      Sedimentation Rate, Erythrocyte: 37 mm/hr (05-03-24 @ 16:24)  Sedimentation Rate, Erythrocyte: 45 mm/hr (05-03-24 @ 06:11)      Culture - Urine (05.08.24 @ 14:05)   - Imipenem: S <=1  - Levofloxacin: S <=0.5  - Levofloxacin: R >4  - Meropenem: S <=1  - Nitrofurantoin: S <=32 Should not be used to treat pyelonephritis.  - Piperacillin/Tazobactam: S <=8  - Tetracycline: R >8  - Vancomycin: S 2  - Amikacin: S <=16  - Ampicillin: S <=2 Predicts results to ampicillin/sulbactam, amoxacillin-clavulanate and piperacillin-tazobactam.  - Aztreonam: S <=4  - Cefepime: S <=2  - Ceftazidime: S <=1  - Ciprofloxacin: S <=0.25  - Ciprofloxacin: R >2  Specimen Source: Clean Catch Clean Catch (Midstream)  Culture Results:   >100,000 CFU/ml Pseudomonas aeruginosa   >100,000 CFU/ml Enterococcus faecalis  Organism Identification: Pseudomonas aeruginosa     Culture - Blood (05.07.24 @ 03:51)   - Proteus species: Detec  - Tobramycin: I 4  - Trimethoprim/Sulfamethoxazole: S <=0.5/9.5  - Piperacillin/Tazobactam: S <=8  - Levofloxacin: S <=0.5  - Meropenem: S <=1  - Ertapenem: S <=0.5  - Gentamicin: S <=2  - Ceftriaxone: S <=1  - Ciprofloxacin: S <=0.25  - Cefoxitin: S <=8  - Cefazolin: S <=2  - Cefepime: S <=2  Gram Stain:   Growth in aerobic bottle: Gram Negative Rods  - Ampicillin: S <=8 These ampicillin results predict results for amoxicillin  - Ampicillin/Sulbactam: S <=4/2  - Aztreonam: S <=4  Specimen Source: .Blood Blood  Organism: Blood Culture PCR  Organism: Proteus mirabilis  Culture Results:   Growth in aerobic bottle: Proteus mirabilis     Urinalysis Basic - ( 16 May 2024 05:51 )    Color: x / Appearance: x / SG: x / pH: x  Gluc: 88 mg/dL / Ketone: x  / Bili: x / Urobili: x   Blood: x / Protein: x / Nitrite: x   Leuk Esterase: x / RBC: x / WBC x   Sq Epi: x / Non Sq Epi: x / Bacteria: x                                              -------------------------------------------------    RADIOLOGY:  < from: CT Angio Chest PE Protocol w/ IV Cont (05.08.24 @ 11:00) >  Embolus within the right upper lobe pulmonary artery. No evidence of right heart strain.    Circumferential wall thickening of the bladder. Otherwise, no acute finding within abdomen/pelvis.    < end of copied text >    < from: [facility] Duplex Lower Ext Vein Scan, Bilat (05.08.24 @ 10:35) >  There is acute below the knee DVT affecting a right soleal vein.    < end of copied text >          PHYSICAL EXAM:  GENERAL: NAD  HEENT: NCAT  CHEST/LUNG: No increased WOB, Clear to percussion bilaterally; No rales, rhonchi, wheezing  HEART: Regular rate and rhythm; No murmurs  ABDOMEN: Soft, Nontender, Nondistended; Bowel sounds present  MUSCULOSKELETAL/EXTREMITIES:  2+ Peripheral Pulses, No LE edema  PSYCH: Appropriate affect, Alert & Oriented

## 2024-05-16 NOTE — PROGRESS NOTE ADULT - ASSESSMENT
REHANA MEDINA is a 84y with CAD s/p CABG recent acute polyneuropathy diagnosis (march 2024).  Here after IVIG series c/b PE RUL and DVT RLE. Patient now admitted for a multidisciplinary rehab program. 05-10-24 @ 17:01    # Motor Axon Neuropathy  Comprehensive Multidisciplinary Rehab Program:  -comprehensive rehab program of PT/OT- 3 hours a day, 5 days a week. P&O as needed   -initially began after viral illness in March 2024  -following with Neurologist Dr. Liu  -s/p IVIG ending on 5/8  OT- isotoner gloves, wrist supports during the day- apply softeeze splints at night- ?Functional E Stim    # CAD s/p CABG  # AF  -continue aspirin and statin  -on therapeutic AC    # PE  # DVT  - continue lovenox 90 mg BID- hospitalist transitioned to DOAC  - RUL PE and RLE DVT    Elevated LFTs  ? secondary to IVIG  not taking tylenol  LFTS improving    # UTI  - sensitive proteus UTI  - bactrim 2 tab BID until 5/17  PVRs low-to DC PVRs  will change antibiotic to Cipro  zofran PRN ordered      # Pain  - Tylenol PRN    Mood / Cognition  - Neuropsychology consult PRN    Sleep  - Maintain quiet hours and a low stim environment.   - Melatonin 3 mg QHS    GI / Bowel  - Miralax daily  - Senna QHS  - GI ppx: famotidine 40 mg BID- HX GERD     / Bladder  - Continue bladder scans Q8 hours with straight cath for >400cc- PVRs Low- will DC  - Toileting schedule every 3 hours while awake    Skin / Pressure injury  - Skin assessment on admission performed: MAD to b/l groin  - Turn q2 hours in bed while awake, air mattress  - nursing to monitor skin qShift  - soft heel protectors  - skin barrier cream PRN    Diet/Dysphagia:  - Diet Consistency: regular diet  - Aspiration Precautions  - Nutrition consult    DVT prophylaxis:   - has RLE DVT  - on full dose Lovenox  - Last Doppler on R soleal DVT on 5/8- Eliquis started

## 2024-05-16 NOTE — PROGRESS NOTE ADULT - SUBJECTIVE AND OBJECTIVE BOX
HPI:  83M R-handed PMHx CAD s/p bypass x4 and HLD presented as a transfer from [facility]. Presented with a 'stomach virus' in 3/2024, reported significant weight loss, sensation of 'wobbling,' with repeated falls. Had seen Dr. 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 Patient received a full body MRI demonstrating no acute changes of the spine or brain. Received 5 doses of IVIG ending on 5/8. Patient found to have a bactrim sensitive proteus UTI. Patient incidentially found to have RLE DVT and PE in the RUL. Now on full dose AC w/ BID lovenox.  Patient seen by physiatry and recommended for acute inpatient rehab. Discharged to [facility] on 5/10.      ROS/subjective:  patient seen and evaluated in OT  Right ear clogged-better with flonase  less nausea- on Cipro  tolerating [place] overnight- limit use, toilet daytime  moved bowels 5/15iralax today   hector wrist supports/ Isotoner gloves per OT  Softeeze splints adjusted by OT  no dizziness, no headaches, no nausea, no vomiting, no SOB, No chest pain  Tolerating Eliquis,      MEDICATIONS  (STANDING):  apixaban 10 milliGRAM(s) Oral every 12 hours  artificial tears (preservative free) Ophthalmic Solution 1 Drop(s) Both EYES two times a day  aspirin enteric coated 81 milliGRAM(s) Oral daily  atorvastatin 40 milliGRAM(s) Oral at bedtime  ciprofloxacin     Tablet 500 milliGRAM(s) Oral every 12 hours  famotidine    Tablet 40 milliGRAM(s) Oral two times a day  fluticasone propionate 50 MICROgram(s)/spray Nasal Spray 1 Spray(s) Both Nostrils two times a day  melatonin 3 milliGRAM(s) Oral at bedtime  nystatin Powder 1 Application(s) Topical two times a day  senna 2 Tablet(s) Oral at bedtime    MEDICATIONS  (PRN):  acetaminophen     Tablet .. 650 milliGRAM(s) Oral every 6 hours PRN Moderate Pain (4 - 6)  AQUAPHOR (petrolatum Ointment) 1 Application(s) Topical two times a day PRN dry skin  ondansetron   Disintegrating Tablet 4 milliGRAM(s) Oral three times a day PRN Nausea and/or Vomiting  polyethylene glycol 3350 17 Gram(s) Oral two times a day PRN Constipation                            12.3   6.72  )-----------( 278      ( 16 May 2024 05:51 )             36.2     05-16    139  |  104  |  18  ----------------------------<  88  4.2   |  28  |  0.69    Ca    8.4      16 May 2024 05:51    TPro  6.9  /  Alb  2.6<L>  /  TBili  0.5  /  DBili  x   /  AST  58<H>  /  ALT  70<H>  /  AlkPhos  79  05-16    Urinalysis Basic - ( 16 May 2024 05:51 )    Color: x / Appearance: x / SG: x / pH: x  Gluc: 88 mg/dL / Ketone: x  / Bili: x / Urobili: x   Blood: x / Protein: x / Nitrite: x   Leuk Esterase: x / RBC: x / WBC x   Sq Epi: x / Non Sq Epi: x / Bacteria: x        CAPILLARY BLOOD GLUCOSE          Vital Signs Last 24 Hrs  T(C): 36.7 (16 May 2024 08:54), Max: 36.7 (15 May 2024 20:22)  T(F): 98.1 (16 May 2024 08:54), Max: 98.1 (16 May 2024 08:54)  HR: 67 (16 May 2024 08:54) (67 - 95)  BP: 107/74 (16 May 2024 08:54) (107/74 - 117/73)  BP(mean): --  RR: 17 (16 May 2024 08:54) (16 - 17)  SpO2: 96% (16 May 2024 08:54) (94% - 96%)    Parameters below as of 16 May 2024 08:54  Patient On (Oxygen Delivery Method): room air       Gen - NAD, Comfortable  HEENT - NCAT, EOMI  Neck - Supple, No limited ROM  Pulm - CTAB, No crackles  Cardiovascular - RRR, S1S2  Abdomen - Soft, NT, ND, (+) BS  Extremities - No calf tenderness,   Neuro-     Cognitive - AAOx3, follows commands     Communication - Fluent, No dysarthria     Motor -                     LEFT    UE - ShF3/5, EF 3+/5, EE 3+/5, WE 1/5,  2/5 better PROM Digits Both hands with less pain                    RIGHT UE - ShAB 2/5, EF 3/5, EE 3/5, WE 1/5,  1/5                    LEFT    LE - HF 2+/5, KE 4/5, DF 0/5, PF 0/5 4/5 hip add/abd hector                    RIGHT LE - HF 2+/5, KE 4+/5, DF 0/5, PF 0/5       Psychiatric - Mood stable, Affect WNL  Skin: MAD b/l groin    IDT 5/15, MARIIA 5/30 to [facility] vs Home    Continue comprehensive acute rehab program consisting of 3hrs/day of OT/PT .

## 2024-05-16 NOTE — PROGRESS NOTE ADULT - ASSESSMENT
84y with CAD s/p CABG recent acute polyneuropathy diagnosis (march 2024).  Here after IVIG series c/b PE RUL and DVT RLE. Patient now admitted for a multidisciplinary rehab program. 05-10-24 @ 17:01      # Motor Axon Neuropathy  -initially began after viral illness in March 2024  -following with Neurologist Dr. Liu  -s/p IVIG ending on 5/8    # CAD s/p CABG  # New onse AF  # HLD  - HR appears to be controlled while not being on AV martín blocking agent  -continue aspirin and statin  -eliquis for afib  - O/p cardiology follow up with cardiac MR    # PE  # DVT  - c/w eliquis 10mg BID x 7 days; followed by 5mg BID and o/p hematology followup     #Proteus bacteremia at [facility]  -continue cipro until 5/17    DVT PPX: Eliquis           84y with CAD s/p CABG recent acute polyneuropathy diagnosis (march 2024).  Here after IVIG series c/b PE RUL and DVT RLE. Patient now admitted for a multidisciplinary rehab program. 05-10-24 @ 17:01      # Motor Axon Neuropathy  -initially began after viral illness in March 2024  -following with Neurologist Dr. Liu  -s/p IVIG ending on 5/8    # CAD s/p CABG  # New onse AF  # HLD  - HR appears to be controlled while not being on AV martín blocking agent  -continue aspirin and statin  -eliquis for afib  - O/p cardiology follow up with cardiac MR    # PE  # DVT  - c/w eliquis 10mg BID x 7 days; followed by 5mg BID and o/p hematology followup     #Proteus bacteremia at [facility]  -continue cipro until 5/17    # UTI  - UCx growing pseudomonas and enterococcus  - psudomonas sensitive to cipro but enterococcus is resistant to cipro.  - would recommend to add augmentin 875 mg bid 3 day course as well.    DVT PPX: Eliquis  DIET: Regular + ensure        Total time spent to complete patient's bedside assessment, review medical chart, discuss medical plan of care with covering medical team was more than 50 minutes  with >50% of time spent face to face with patient, discussion with patient/family and/or coordination of care.

## 2024-05-17 PROCEDURE — 99232 SBSQ HOSP IP/OBS MODERATE 35: CPT

## 2024-05-17 RX ADMIN — APIXABAN 10 MILLIGRAM(S): 2.5 TABLET, FILM COATED ORAL at 17:42

## 2024-05-17 RX ADMIN — Medication 1 SPRAY(S): at 06:50

## 2024-05-17 RX ADMIN — NYSTATIN CREAM 1 APPLICATION(S): 100000 CREAM TOPICAL at 06:50

## 2024-05-17 RX ADMIN — ATORVASTATIN CALCIUM 40 MILLIGRAM(S): 80 TABLET, FILM COATED ORAL at 21:55

## 2024-05-17 RX ADMIN — FAMOTIDINE 40 MILLIGRAM(S): 10 INJECTION INTRAVENOUS at 06:49

## 2024-05-17 RX ADMIN — Medication 500 MILLIGRAM(S): at 06:49

## 2024-05-17 RX ADMIN — Medication 1 DROP(S): at 17:41

## 2024-05-17 RX ADMIN — FAMOTIDINE 40 MILLIGRAM(S): 10 INJECTION INTRAVENOUS at 17:42

## 2024-05-17 RX ADMIN — NYSTATIN CREAM 1 APPLICATION(S): 100000 CREAM TOPICAL at 21:57

## 2024-05-17 RX ADMIN — Medication 1 SPRAY(S): at 17:43

## 2024-05-17 RX ADMIN — Medication 1 DROP(S): at 06:49

## 2024-05-17 RX ADMIN — APIXABAN 10 MILLIGRAM(S): 2.5 TABLET, FILM COATED ORAL at 06:49

## 2024-05-17 RX ADMIN — Medication 81 MILLIGRAM(S): at 13:11

## 2024-05-17 NOTE — PROGRESS NOTE ADULT - ASSESSMENT
REHANA MEDINA is a 84y with CAD s/p CABG recent acute polyneuropathy diagnosis (march 2024).  Here after IVIG series c/b PE RUL and DVT RLE. Patient now admitted for a multidisciplinary rehab program. 05-10-24 @ 17:01    # Motor Axon Neuropathy  Comprehensive Multidisciplinary Rehab Program:  -comprehensive rehab program of PT/OT- 3 hours a day, 5 days a week. P&O as needed   -initially began after viral illness in March 2024  -following with Neurologist Dr. Liu  -s/p IVIG ending on 5/8  OT- isotoner gloves, wrist supports during the day- apply softeeze splints at night- ?Functional E Stim    # CAD s/p CABG  # AF  -continue aspirin and statin  -on therapeutic AC    # PE  # DVT  - continue apixaban 10mg BID (transitioned off full dose lovenox)  - RUL PE and RLE DVT    Elevated LFTs - stable   ? secondary to IVIG  not taking tylenol  LFTS improving    # UTI  - sensitive proteus UTI  - bactrim 2 tab BID until 5/17  DC PVRs  Continue Cipro    # Pain  - Tylenol PRN    Mood / Cognition  - Neuropsychology consult PRN    Sleep  - Maintain quiet hours and a low stim environment.   - Melatonin 3 mg QHS    GI / Bowel  - Miralax daily  - Senna QHS  - GI ppx: famotidine 40 mg BID- HX GERD     / Bladder  -Bladder scans dc'd -voiding   - Toileting schedule every 3 hours while awake    Skin / Pressure injury  - Skin assessment on admission performed: MAD to b/l groin  - Turn q2 hours in bed while awake, air mattress  - nursing to monitor skin qShift  - soft heel protectors  - skin barrier cream PRN    Diet/Dysphagia:  - Diet Consistency: regular diet  - Aspiration Precautions  - Nutrition consult    DVT prophylaxis:   - has RLE DVT  - on apixaban   - Last Doppler on R soleal DVT on 5/8- Eliquis started

## 2024-05-17 NOTE — PROGRESS NOTE ADULT - SUBJECTIVE AND OBJECTIVE BOX
LOS: 05-10-24 (6d)  Patient is a 84y old Male who presents with a chief complaint of Polyneuropathy secondary to Acute Motor Axon Neuropathy (16 May 2024 11:13)    Admit DIag: Mononeuropathy of lower extremity    Interval history: Reports feeling constipated. had a small bowel yesterday.    PMH:CAD (coronary artery disease)    S/P CABG x 1    HTN (hypertension)    HLD (hyperlipidemia)    Unilateral primary osteoarthritis, left knee    GERD (gastroesophageal reflux disease)    Unilateral primary osteoarthritis, right knee      MEDICATIONS:  STANDING MEDICATIONS  apixaban 10 milliGRAM(s) Oral every 12 hours, 05-13-24 @ 18:00  artificial tears (preservative free) Ophthalmic Solution 1 Drop(s) Both EYES two times a day, 05-10-24 @ 21:22  aspirin enteric coated 81 milliGRAM(s) Oral daily, 05-11-24 @ 00:00  atorvastatin 40 milliGRAM(s) Oral at bedtime, 05-10-24 @ 21:22  famotidine    Tablet 40 milliGRAM(s) Oral two times a day, 05-10-24 @ 21:22  fluticasone propionate 50 MICROgram(s)/spray Nasal Spray 1 Spray(s) Both Nostrils two times a day, 05-15-24 @ 12:37  melatonin 3 milliGRAM(s) Oral at bedtime, 05-10-24 @ 21:22  nystatin Powder 1 Application(s) Topical two times a day, 05-10-24 @ 22:23  senna 2 Tablet(s) Oral at bedtime, 05-10-24 @ 21:22    PRN MEDICATIONS  acetaminophen     Tablet .. 650 milliGRAM(s) Oral every 6 hours, 05-10-24 @ 21:22 PRN  AQUAPHOR (petrolatum Ointment) 1 Application(s) Topical two times a day, 05-10-24 @ 22:21 PRN  bisacodyl Suppository 10 milliGRAM(s) Rectal once, 05-17-24 @ 18:46 PRN  ondansetron   Disintegrating Tablet 4 milliGRAM(s) Oral three times a day, 05-14-24 @ 11:37 PRN  polyethylene glycol 3350 17 Gram(s) Oral two times a day, 05-10-24 @ 21:22 PRN    Diet, Regular:   Supplement Feeding Modality:  Oral  Ensure Plus High Protein Cans or Servings Per Day:  1       Frequency:  Three Times a day (05-13-24 @ 13:47) [Active]          Vital Signs Last 24 Hrs  T(C): 36.4 (17 May 2024 09:00), Max: 36.5 (16 May 2024 21:15)  T(F): 97.6 (17 May 2024 09:00), Max: 97.7 (16 May 2024 21:15)  HR: 83 (17 May 2024 09:00) (83 - 87)  BP: 101/67 (17 May 2024 09:00) (100/65 - 101/67)  RR: 17 (17 May 2024 09:00) (16 - 17)  SpO2: 95% (17 May 2024 09:00) (94% - 95%)    Parameters below as of 17 May 2024 09:00  Patient On (Oxygen Delivery Method): room air      I&Os:  05-16-24 @ 07:01  -  05-17-24 @ 07:00  --------------------------------------------------------  IN: 0 mL / OUT: 300 mL / NET: -300 mL        LABS:                        12.3   6.72  )-----------( 278      ( 16 May 2024 05:51 )             36.2     WBC trend: 6.72 <--  Hgb: 12.3 [05-16-24 @ 05:51]<--, 13.5 [05-13-24 @ 05:29]<--    05-16    139  |  104  |  18  ----------------------------<  88  4.2   |  28  |  0.69    Ca    8.4      16 May 2024 05:51    TPro  6.9  /  Alb  2.6<L>  /  TBili  0.5  /  DBili  x   /  AST  58<H>  /  ALT  70<H>  /  AlkPhos  79  05-16    Creatinine trend: 0.69<--, 0.77<--, 0.70<--, 0.62<--, 0.69<--, 0.63<--, 0.63<--  SODIUM TREND: Sodium 139 [05-16 @ 05:51]<--, Sodium 140 [05-13 @ 05:29]<--      POC Glucose:       COVID-19 PCR: NotDetec (05-10-24 @ 23:12)        C-Reactive Protein: 4 mg/L (05-03-24 @ 16:24)  C-Reactive Protein: 4 mg/L (05-03-24 @ 06:15)      Sedimentation Rate, Erythrocyte: 37 mm/hr (05-03-24 @ 16:24)  Sedimentation Rate, Erythrocyte: 45 mm/hr (05-03-24 @ 06:11)        Urinalysis Basic - ( 16 May 2024 05:51 )    Color: x / Appearance: x / SG: x / pH: x  Gluc: 88 mg/dL / Ketone: x  / Bili: x / Urobili: x   Blood: x / Protein: x / Nitrite: x   Leuk Esterase: x / RBC: x / WBC x   Sq Epi: x / Non Sq Epi: x / Bacteria: x                                              -------------------------------------------------                PHYSICAL EXAM:  GENERAL: NAD  HEENT: NCAT  CHEST/LUNG: No increased WOB, Clear to percussion bilaterally; No rales, rhonchi, wheezing  HEART: Regular rate and rhythm; No murmurs  ABDOMEN: Soft, Nontender, Nondistended; Bowel sounds present  MUSCULOSKELETAL/EXTREMITIES:  2+ Peripheral Pulses, No LE edema  PSYCH: Appropriate affect, Alert & Oriented

## 2024-05-17 NOTE — PROGRESS NOTE ADULT - ASSESSMENT
84y with CAD s/p CABG recent acute polyneuropathy diagnosis (march 2024).  Here after IVIG series c/b PE RUL and DVT RLE. Patient now admitted for a multidisciplinary rehab program. 05-10-24 @ 17:01      # Motor Axon Neuropathy  -initially began after viral illness in March 2024  -following with Neurologist Dr. Liu  -s/p IVIG ending on 5/8    # Constipation  - on senna standing + miralax, dulcolax prn.   - patient had taken miralax only once since it was ordered 6 days ago.  - would recommend to switch miralax standing with holding parameter for diarrhea.  - if no significant BM by tomorrow, may need trial of enema x 1.    # CAD s/p CABG  # New onse AF  # HLD  - HR appears to be controlled while not being on AV martín blocking agent  -continue aspirin and statin  -eliquis for afib  - O/p cardiology follow up with cardiac MR    # PE  # DVT  - c/w eliquis 10mg BID x 7 days; followed by 5mg BID and o/p hematology followup     #Proteus bacteremia at [facility]  -continue cipro until 5/17    # UTI  - UCx growing pseudomonas and enterococcus  - psudomonas sensitive to cipro but enterococcus is resistant to cipro.  - would recommend to add augmentin 875 mg bid 3 day course as well.    DVT PPX: Eliquis  DIET: Regular + ensure        Total time spent to complete patient's bedside assessment, review medical chart, discuss medical plan of care with covering medical team was more than 35 minutes  with >50% of time spent face to face with patient, discussion with patient/family and/or coordination of care.

## 2024-05-17 NOTE — PROGRESS NOTE ADULT - SUBJECTIVE AND OBJECTIVE BOX
HPI:  83M R-handed PMHx CAD s/p bypass x4 and HLD presented as a transfer from [facility]. Presented with a 'stomach virus' in 3/2024, reported significant weight loss, sensation of 'wobbling,' with repeated falls. Had seen Dr. 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 Patient received a full body MRI demonstrating no acute changes of the spine or brain. Received 5 doses of IVIG ending on 5/8. Patient found to have a bactrim sensitive proteus UTI. Patient incidentially found to have RLE DVT and PE in the RUL. Now on full dose AC w/ BID lovenox.  Patient seen by physiatry and recommended for acute inpatient rehab. Discharged to [facility] on 5/10.      ROS/subjective:  patient seen and evaluated at bedside.  He states he slept much better overnight.  His pain is improved today as well.  no dizziness, no headaches, no nausea, no vomiting, no SOB, No chest pain    MEDICATIONS  (STANDING):  apixaban 10 milliGRAM(s) Oral every 12 hours  artificial tears (preservative free) Ophthalmic Solution 1 Drop(s) Both EYES two times a day  aspirin enteric coated 81 milliGRAM(s) Oral daily  atorvastatin 40 milliGRAM(s) Oral at bedtime  ciprofloxacin     Tablet 500 milliGRAM(s) Oral every 12 hours  famotidine    Tablet 40 milliGRAM(s) Oral two times a day  fluticasone propionate 50 MICROgram(s)/spray Nasal Spray 1 Spray(s) Both Nostrils two times a day  melatonin 3 milliGRAM(s) Oral at bedtime  nystatin Powder 1 Application(s) Topical two times a day  senna 2 Tablet(s) Oral at bedtime    MEDICATIONS  (PRN):  acetaminophen     Tablet .. 650 milliGRAM(s) Oral every 6 hours PRN Moderate Pain (4 - 6)  AQUAPHOR (petrolatum Ointment) 1 Application(s) Topical two times a day PRN dry skin  ondansetron   Disintegrating Tablet 4 milliGRAM(s) Oral three times a day PRN Nausea and/or Vomiting  polyethylene glycol 3350 17 Gram(s) Oral two times a day PRN Constipation                            12.3   6.72  )-----------( 278      ( 16 May 2024 05:51 )             36.2     05-16    139  |  104  |  18  ----------------------------<  88  4.2   |  28  |  0.69    Ca    8.4      16 May 2024 05:51    TPro  6.9  /  Alb  2.6<L>  /  TBili  0.5  /  DBili  x   /  AST  58<H>  /  ALT  70<H>  /  AlkPhos  79  05-16      Vital Signs Last 24 Hrs  T(C): 36.4 (17 May 2024 09:00), Max: 36.5 (16 May 2024 21:15)  T(F): 97.6 (17 May 2024 09:00), Max: 97.7 (16 May 2024 21:15)  HR: 83 (17 May 2024 09:00) (83 - 87)  BP: 101/67 (17 May 2024 09:00) (100/65 - 101/67)  BP(mean): --  RR: 17 (17 May 2024 09:00) (16 - 17)  SpO2: 95% (17 May 2024 09:00) (94% - 95%)    Parameters below as of 17 May 2024 09:00  Patient On (Oxygen Delivery Method): room air             Gen - NAD, Comfortable  HEENT - NCAT, EOMI  Neck - Supple, No limited ROM  Pulm - CTAB, No crackles  Cardiovascular - RRR, S1S2  Abdomen - Soft, NT, ND, (+) BS  Extremities - No calf tenderness,   Neuro-     Cognitive - AAOx3, follows commands     Communication - Fluent, No dysarthria     Motor -                     LEFT    UE - ShF3/5, EF 3+/5, EE 3+/5, WE 1/5,  2/5 better PROM Digits Both hands with less pain                    RIGHT UE - ShAB 2/5, EF 3/5, EE 3/5, WE 1/5,  1/5                    LEFT    LE - HF 2+/5, KE 4/5, DF 0/5, PF 0/5 4/5 hip add/abd hector                    RIGHT LE - HF 2+/5, KE 4+/5, DF 0/5, PF 0/5       Psychiatric - Mood stable, Affect WNL  Skin: MAD b/l groin    IDT 5/15, MARIIA 5/30 to Home    Continue comprehensive acute rehab program consisting of 3hrs/day of OT/PT .

## 2024-05-18 PROCEDURE — 99232 SBSQ HOSP IP/OBS MODERATE 35: CPT

## 2024-05-18 RX ADMIN — APIXABAN 10 MILLIGRAM(S): 2.5 TABLET, FILM COATED ORAL at 06:18

## 2024-05-18 RX ADMIN — Medication 81 MILLIGRAM(S): at 12:05

## 2024-05-18 RX ADMIN — NYSTATIN CREAM 1 APPLICATION(S): 100000 CREAM TOPICAL at 18:14

## 2024-05-18 RX ADMIN — Medication 1 DROP(S): at 17:08

## 2024-05-18 RX ADMIN — FAMOTIDINE 40 MILLIGRAM(S): 10 INJECTION INTRAVENOUS at 06:18

## 2024-05-18 RX ADMIN — APIXABAN 10 MILLIGRAM(S): 2.5 TABLET, FILM COATED ORAL at 17:08

## 2024-05-18 RX ADMIN — NYSTATIN CREAM 1 APPLICATION(S): 100000 CREAM TOPICAL at 06:19

## 2024-05-18 RX ADMIN — ATORVASTATIN CALCIUM 40 MILLIGRAM(S): 80 TABLET, FILM COATED ORAL at 20:36

## 2024-05-18 RX ADMIN — FAMOTIDINE 40 MILLIGRAM(S): 10 INJECTION INTRAVENOUS at 17:08

## 2024-05-18 RX ADMIN — Medication 1 SPRAY(S): at 17:09

## 2024-05-18 RX ADMIN — Medication 1 DROP(S): at 06:18

## 2024-05-18 RX ADMIN — Medication 1 SPRAY(S): at 06:18

## 2024-05-18 NOTE — PROGRESS NOTE ADULT - SUBJECTIVE AND OBJECTIVE BOX
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 Reports progressing well with therapy. Had BM last night    no overnight events.   no new complaints    REVIEW OF SYSTEMS: remaining ROS negative     PHYSICAL EXAM:  VITALS:  Vital Signs Last 24 Hrs  T(C): 36.6 (18 May 2024 08:56), Max: 36.6 (17 May 2024 21:52)  T(F): 97.9 (18 May 2024 08:56), Max: 97.9 (18 May 2024 08:56)  HR: 96 (18 May 2024 08:56) (82 - 96)  BP: 115/72 (18 May 2024 08:56) (100/65 - 115/72)  BP(mean): --  RR: 16 (18 May 2024 08:56) (16 - 16)  SpO2: 95% (18 May 2024 08:56) (95% - 95%)    Parameters below as of 18 May 2024 08:56  Patient On (Oxygen Delivery Method): room air          GENERAL: NAD,  no increased WOB  HEAD:  Atraumatic, Normocephalic  EYES: EOMI, conjunctiva and sclera clear  ENMT: Moist mucous membranes  NECK: Supple, No JVD  NERVOUS SYSTEM:  Alert & Oriented  CHEST/LUNG: Clear to auscultation bilaterally; No rales, rhonchi, wheezing  HEART: Regular rate and rhythm  ABDOMEN: Soft, Nontender, Nondistended; Bowel sounds present  EXTREMITIES:  No clubbing, cyanosis, calf tenderness or edema b/l      MEDICATIONS  (STANDING):  apixaban 10 milliGRAM(s) Oral every 12 hours  artificial tears (preservative free) Ophthalmic Solution 1 Drop(s) Both EYES two times a day  aspirin enteric coated 81 milliGRAM(s) Oral daily  atorvastatin 40 milliGRAM(s) Oral at bedtime  famotidine    Tablet 40 milliGRAM(s) Oral two times a day  fluticasone propionate 50 MICROgram(s)/spray Nasal Spray 1 Spray(s) Both Nostrils two times a day  melatonin 3 milliGRAM(s) Oral at bedtime  nystatin Powder 1 Application(s) Topical two times a day  senna 2 Tablet(s) Oral at bedtime    MEDICATIONS  (PRN):  acetaminophen     Tablet .. 650 milliGRAM(s) Oral every 6 hours PRN Moderate Pain (4 - 6)  AQUAPHOR (petrolatum Ointment) 1 Application(s) Topical two times a day PRN dry skin  bisacodyl Suppository 10 milliGRAM(s) Rectal once PRN Constipation  ondansetron   Disintegrating Tablet 4 milliGRAM(s) Oral three times a day PRN Nausea and/or Vomiting  polyethylene glycol 3350 17 Gram(s) Oral two times a day PRN Constipation      Allergies    penicillins (Rash)    Intolerances              LABS:                 CAPILLARY BLOOD GLUCOSE                    RECENT CULTURES:          RADIOLOGY & ADDITIONAL TESTS:

## 2024-05-18 NOTE — PROGRESS NOTE ADULT - SUBJECTIVE AND OBJECTIVE BOX
Slept well.  Pain is not present.  No other new ROS.  Has been tolerating rehabilitation program.    VITALS  T(C): 36.6 (05-17-24 @ 21:52), Max: 36.6 (05-17-24 @ 21:52)  HR: 82 (05-17-24 @ 21:52) (82 - 83)  BP: 100/65 (05-17-24 @ 21:52) (100/65 - 101/67)  RR: 16 (05-17-24 @ 21:52) (16 - 17)  SpO2: 95% (05-17-24 @ 21:52) (95% - 95%)  Wt(kg): --     MEDICATIONS   acetaminophen     Tablet .. 650 milliGRAM(s) every 6 hours PRN  apixaban 10 milliGRAM(s) every 12 hours  AQUAPHOR (petrolatum Ointment) 1 Application(s) two times a day PRN  artificial tears (preservative free) Ophthalmic Solution 1 Drop(s) two times a day  aspirin enteric coated 81 milliGRAM(s) daily  atorvastatin 40 milliGRAM(s) at bedtime  bisacodyl Suppository 10 milliGRAM(s) once PRN  famotidine    Tablet 40 milliGRAM(s) two times a day  fluticasone propionate 50 MICROgram(s)/spray Nasal Spray 1 Spray(s) two times a day  melatonin 3 milliGRAM(s) at bedtime  nystatin Powder 1 Application(s) two times a day  ondansetron   Disintegrating Tablet 4 milliGRAM(s) three times a day PRN  polyethylene glycol 3350 17 Gram(s) two times a day PRN  senna 2 Tablet(s) at bedtime      RECENT LABS/IMAGING                     ------------------------------------------  PHYSICAL EXAM  Constitutional - NAD, Comfortable  Pulm - Breathing comfortably, No wheezing  Abd - Nondistended  Extremities - No edema  Neurologic Exam - Awake, Alert x3  Psychiatric - Stable    ASSESSMENT/PLAN  84y Male with impairments in mobility and ADLs   - Continue current rehabilitation program 3hrs a day   - Continue current medications, patient is medically stable   - DVT prophylaxis  - Skin - OOB and mobilization daily

## 2024-05-18 NOTE — PROGRESS NOTE ADULT - ASSESSMENT
84y with CAD s/p CABG recent acute polyneuropathy diagnosis (march 2024).  Here after IVIG series c/b PE RUL and DVT RLE. Patient now admitted for a multidisciplinary rehab program. 05-10-24 @ 17:01      # Motor Axon Neuropathy  -initially began after viral illness in March 2024  -following with Neurologist Dr. Liu  -s/p IVIG ending on 5/8    # Constipation  -on senna standing + miralax, dulcolax prn.   -Recommend switching miralax to standing order    # CAD s/p CABG  # New onse AF  # HLD  -HR appears to be controlled while not being on AV martín blocking agent  -continue aspirin and statin  -eliquis for afib  - O/p cardiology follow up with cardiac MR    # PE  # DVT  - c/w eliquis 10mg BID x 7 days; followed by 5mg BID and o/p hematology followup     #Proteus bacteremia at [facility]  -s/p course of bactrim    # UTI  -s/p course of bactrim    DVT PPX: Eliquis  DIET: Regular + ensure

## 2024-05-19 PROCEDURE — 99232 SBSQ HOSP IP/OBS MODERATE 35: CPT

## 2024-05-19 RX ADMIN — NYSTATIN CREAM 1 APPLICATION(S): 100000 CREAM TOPICAL at 05:32

## 2024-05-19 RX ADMIN — FAMOTIDINE 40 MILLIGRAM(S): 10 INJECTION INTRAVENOUS at 17:52

## 2024-05-19 RX ADMIN — Medication 1 SPRAY(S): at 17:53

## 2024-05-19 RX ADMIN — FAMOTIDINE 40 MILLIGRAM(S): 10 INJECTION INTRAVENOUS at 05:30

## 2024-05-19 RX ADMIN — Medication 1 DROP(S): at 05:33

## 2024-05-19 RX ADMIN — APIXABAN 10 MILLIGRAM(S): 2.5 TABLET, FILM COATED ORAL at 05:30

## 2024-05-19 RX ADMIN — Medication 650 MILLIGRAM(S): at 21:15

## 2024-05-19 RX ADMIN — NYSTATIN CREAM 1 APPLICATION(S): 100000 CREAM TOPICAL at 17:53

## 2024-05-19 RX ADMIN — ATORVASTATIN CALCIUM 40 MILLIGRAM(S): 80 TABLET, FILM COATED ORAL at 21:15

## 2024-05-19 RX ADMIN — Medication 1 DROP(S): at 17:53

## 2024-05-19 RX ADMIN — Medication 1 SPRAY(S): at 05:32

## 2024-05-19 RX ADMIN — Medication 81 MILLIGRAM(S): at 11:46

## 2024-05-19 RX ADMIN — APIXABAN 10 MILLIGRAM(S): 2.5 TABLET, FILM COATED ORAL at 17:53

## 2024-05-19 NOTE — PROGRESS NOTE ADULT - SUBJECTIVE AND OBJECTIVE BOX
Slept well.  Patient has no pain.  Feels he is making progress in rehab.  Has been tolerating rehabilitation program.    VITALS  T(C): 36.7 (05-19-24 @ 07:25), Max: 36.7 (05-19-24 @ 07:25)  HR: 83 (05-19-24 @ 07:25) (79 - 96)  BP: 97/68 (05-19-24 @ 07:25) (97/68 - 115/72)  RR: 16 (05-19-24 @ 07:25) (16 - 16)  SpO2: 94% (05-19-24 @ 07:25) (94% - 95%)  Wt(kg): --     MEDICATIONS   acetaminophen     Tablet .. 650 milliGRAM(s) every 6 hours PRN  apixaban 10 milliGRAM(s) every 12 hours  AQUAPHOR (petrolatum Ointment) 1 Application(s) two times a day PRN  artificial tears (preservative free) Ophthalmic Solution 1 Drop(s) two times a day  aspirin enteric coated 81 milliGRAM(s) daily  atorvastatin 40 milliGRAM(s) at bedtime  bisacodyl Suppository 10 milliGRAM(s) once PRN  famotidine    Tablet 40 milliGRAM(s) two times a day  fluticasone propionate 50 MICROgram(s)/spray Nasal Spray 1 Spray(s) two times a day  melatonin 3 milliGRAM(s) at bedtime  nystatin Powder 1 Application(s) two times a day  ondansetron   Disintegrating Tablet 4 milliGRAM(s) three times a day PRN  polyethylene glycol 3350 17 Gram(s) two times a day PRN  senna 2 Tablet(s) at bedtime      RECENT LABS/IMAGING                     ------------------------------------------  PHYSICAL EXAM  Constitutional - NAD, Comfortable  Pulm - Breathing comfortably, No wheezing  Abd - Nondistended  Extremities - No edema  Neurologic Exam - Awake, Alert  Psychiatric - Mood WNL     ASSESSMENT/PLAN  84y Male with impairments in mobility and ADLs   - Continue current rehabilitation program 3hrs a day   - Continue current medications, patient is medically stable   - DVT prophylaxis  - Skin - OOB and mobilization daily

## 2024-05-19 NOTE — PROGRESS NOTE ADULT - SUBJECTIVE AND OBJECTIVE BOX
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 Reports progressing well with therapy. Had BM last night    no overnight events.   no new complaints    REVIEW OF SYSTEMS: remaining ROS negative     PHYSICAL EXAM:  VITALS:  Vital Signs Last 24 Hrs  T(C): 36.7 (19 May 2024 07:25), Max: 36.7 (19 May 2024 07:25)  T(F): 98 (19 May 2024 07:25), Max: 98 (19 May 2024 07:25)  HR: 83 (19 May 2024 07:25) (79 - 83)  BP: 97/68 (19 May 2024 07:25) (97/68 - 112/73)  BP(mean): --  RR: 16 (19 May 2024 07:25) (16 - 16)  SpO2: 94% (19 May 2024 07:25) (94% - 94%)    Parameters below as of 19 May 2024 07:25  Patient On (Oxygen Delivery Method): room air          GENERAL: NAD,  no increased WOB  HEAD:  Atraumatic, Normocephalic  EYES: EOMI, conjunctiva and sclera clear  ENMT: Moist mucous membranes  NECK: Supple, No JVD  NERVOUS SYSTEM:  Alert & Oriented  CHEST/LUNG: Clear to auscultation bilaterally; No rales, rhonchi, wheezing  HEART: Regular rate and rhythm  ABDOMEN: Soft, Nontender, Nondistended; Bowel sounds present  EXTREMITIES:  No clubbing, cyanosis, calf tenderness or edema b/l      MEDICATIONS  (STANDING):  apixaban 10 milliGRAM(s) Oral every 12 hours  artificial tears (preservative free) Ophthalmic Solution 1 Drop(s) Both EYES two times a day  aspirin enteric coated 81 milliGRAM(s) Oral daily  atorvastatin 40 milliGRAM(s) Oral at bedtime  famotidine    Tablet 40 milliGRAM(s) Oral two times a day  fluticasone propionate 50 MICROgram(s)/spray Nasal Spray 1 Spray(s) Both Nostrils two times a day  melatonin 3 milliGRAM(s) Oral at bedtime  nystatin Powder 1 Application(s) Topical two times a day  senna 2 Tablet(s) Oral at bedtime    MEDICATIONS  (PRN):  acetaminophen     Tablet .. 650 milliGRAM(s) Oral every 6 hours PRN Moderate Pain (4 - 6)  AQUAPHOR (petrolatum Ointment) 1 Application(s) Topical two times a day PRN dry skin  bisacodyl Suppository 10 milliGRAM(s) Rectal once PRN Constipation  ondansetron   Disintegrating Tablet 4 milliGRAM(s) Oral three times a day PRN Nausea and/or Vomiting  polyethylene glycol 3350 17 Gram(s) Oral two times a day PRN Constipation      Allergies    penicillins (Rash)    Intolerances              LABS:                 CAPILLARY BLOOD GLUCOSE                    RECENT CULTURES:          RADIOLOGY & ADDITIONAL TESTS:

## 2024-05-19 NOTE — PROGRESS NOTE ADULT - ASSESSMENT
84y with CAD s/p CABG recent acute polyneuropathy diagnosis (march 2024).  Here after IVIG series c/b PE RUL and DVT RLE. Patient now admitted for a multidisciplinary rehab program. 05-10-24 @ 17:01      # Motor Axon Neuropathy  -initially began after viral illness in March 2024  -following with Neurologist Dr. Liu  -s/p IVIG ending on 5/8    # Constipation  -on senna standing + miralax, dulcolax prn.   -Recommend switching miralax to standing order    # CAD s/p CABG  # New onset AF  # HLD  -HR appears to be controlled while not being on AV martín blocking agent  -continue aspirin and statin  -eliquis for afib  - O/p cardiology follow up with cardiac MR    # PE  # DVT  - c/w eliquis 10mg BID x 7 days; followed by 5mg BID and o/p hematology followup     #Proteus bacteremia at [facility]  -s/p course of bactrim    # UTI  -s/p course of bactrim    DVT PPX: Eliquis  DIET: Regular + ensure

## 2024-05-20 LAB
ALBUMIN SERPL ELPH-MCNC: 2.7 G/DL — LOW (ref 3.3–5)
ALP SERPL-CCNC: 76 U/L — SIGNIFICANT CHANGE UP (ref 40–120)
ALT FLD-CCNC: 50 U/L — HIGH (ref 10–45)
ANION GAP SERPL CALC-SCNC: 6 MMOL/L — SIGNIFICANT CHANGE UP (ref 5–17)
AST SERPL-CCNC: 55 U/L — HIGH (ref 10–40)
BASOPHILS # BLD AUTO: 0.04 K/UL — SIGNIFICANT CHANGE UP (ref 0–0.2)
BASOPHILS NFR BLD AUTO: 0.6 % — SIGNIFICANT CHANGE UP (ref 0–2)
BILIRUB SERPL-MCNC: 0.6 MG/DL — SIGNIFICANT CHANGE UP (ref 0.2–1.2)
BUN SERPL-MCNC: 18 MG/DL — SIGNIFICANT CHANGE UP (ref 7–23)
CALCIUM SERPL-MCNC: 8.5 MG/DL — SIGNIFICANT CHANGE UP (ref 8.4–10.5)
CHLORIDE SERPL-SCNC: 104 MMOL/L — SIGNIFICANT CHANGE UP (ref 96–108)
CO2 SERPL-SCNC: 30 MMOL/L — SIGNIFICANT CHANGE UP (ref 22–31)
CREAT SERPL-MCNC: 0.54 MG/DL — SIGNIFICANT CHANGE UP (ref 0.5–1.3)
EGFR: 99 ML/MIN/1.73M2 — SIGNIFICANT CHANGE UP
EOSINOPHIL # BLD AUTO: 0.18 K/UL — SIGNIFICANT CHANGE UP (ref 0–0.5)
EOSINOPHIL NFR BLD AUTO: 2.8 % — SIGNIFICANT CHANGE UP (ref 0–6)
GLUCOSE SERPL-MCNC: 93 MG/DL — SIGNIFICANT CHANGE UP (ref 70–99)
HCT VFR BLD CALC: 38.3 % — LOW (ref 39–50)
HGB BLD-MCNC: 13.1 G/DL — SIGNIFICANT CHANGE UP (ref 13–17)
IMM GRANULOCYTES NFR BLD AUTO: 0.2 % — SIGNIFICANT CHANGE UP (ref 0–0.9)
LYMPHOCYTES # BLD AUTO: 2.18 K/UL — SIGNIFICANT CHANGE UP (ref 1–3.3)
LYMPHOCYTES # BLD AUTO: 33.8 % — SIGNIFICANT CHANGE UP (ref 13–44)
MCHC RBC-ENTMCNC: 32.7 PG — SIGNIFICANT CHANGE UP (ref 27–34)
MCHC RBC-ENTMCNC: 34.2 GM/DL — SIGNIFICANT CHANGE UP (ref 32–36)
MCV RBC AUTO: 95.5 FL — SIGNIFICANT CHANGE UP (ref 80–100)
MONOCYTES # BLD AUTO: 0.52 K/UL — SIGNIFICANT CHANGE UP (ref 0–0.9)
MONOCYTES NFR BLD AUTO: 8.1 % — SIGNIFICANT CHANGE UP (ref 2–14)
NEUTROPHILS # BLD AUTO: 3.52 K/UL — SIGNIFICANT CHANGE UP (ref 1.8–7.4)
NEUTROPHILS NFR BLD AUTO: 54.5 % — SIGNIFICANT CHANGE UP (ref 43–77)
NRBC # BLD: 0 /100 WBCS — SIGNIFICANT CHANGE UP (ref 0–0)
PLATELET # BLD AUTO: 311 K/UL — SIGNIFICANT CHANGE UP (ref 150–400)
POTASSIUM SERPL-MCNC: 3.7 MMOL/L — SIGNIFICANT CHANGE UP (ref 3.5–5.3)
POTASSIUM SERPL-SCNC: 3.7 MMOL/L — SIGNIFICANT CHANGE UP (ref 3.5–5.3)
PROT SERPL-MCNC: 6.9 G/DL — SIGNIFICANT CHANGE UP (ref 6–8.3)
RBC # BLD: 4.01 M/UL — LOW (ref 4.2–5.8)
RBC # FLD: 15.8 % — HIGH (ref 10.3–14.5)
SODIUM SERPL-SCNC: 140 MMOL/L — SIGNIFICANT CHANGE UP (ref 135–145)
WBC # BLD: 6.45 K/UL — SIGNIFICANT CHANGE UP (ref 3.8–10.5)
WBC # FLD AUTO: 6.45 K/UL — SIGNIFICANT CHANGE UP (ref 3.8–10.5)

## 2024-05-20 PROCEDURE — 99232 SBSQ HOSP IP/OBS MODERATE 35: CPT

## 2024-05-20 PROCEDURE — 99222 1ST HOSP IP/OBS MODERATE 55: CPT

## 2024-05-20 RX ORDER — APIXABAN 2.5 MG/1
5 TABLET, FILM COATED ORAL
Refills: 0 | Status: DISCONTINUED | OUTPATIENT
Start: 2024-05-20 | End: 2024-06-04

## 2024-05-20 RX ADMIN — Medication 81 MILLIGRAM(S): at 12:57

## 2024-05-20 RX ADMIN — Medication 650 MILLIGRAM(S): at 21:11

## 2024-05-20 RX ADMIN — Medication 1 SPRAY(S): at 06:14

## 2024-05-20 RX ADMIN — NYSTATIN CREAM 1 APPLICATION(S): 100000 CREAM TOPICAL at 17:10

## 2024-05-20 RX ADMIN — Medication 650 MILLIGRAM(S): at 22:11

## 2024-05-20 RX ADMIN — APIXABAN 5 MILLIGRAM(S): 2.5 TABLET, FILM COATED ORAL at 17:09

## 2024-05-20 RX ADMIN — FAMOTIDINE 40 MILLIGRAM(S): 10 INJECTION INTRAVENOUS at 17:09

## 2024-05-20 RX ADMIN — Medication 1 DROP(S): at 17:09

## 2024-05-20 RX ADMIN — FAMOTIDINE 40 MILLIGRAM(S): 10 INJECTION INTRAVENOUS at 06:14

## 2024-05-20 RX ADMIN — Medication 1 SPRAY(S): at 17:09

## 2024-05-20 RX ADMIN — NYSTATIN CREAM 1 APPLICATION(S): 100000 CREAM TOPICAL at 06:15

## 2024-05-20 RX ADMIN — Medication 1 DROP(S): at 06:13

## 2024-05-20 RX ADMIN — APIXABAN 10 MILLIGRAM(S): 2.5 TABLET, FILM COATED ORAL at 06:13

## 2024-05-20 RX ADMIN — ATORVASTATIN CALCIUM 40 MILLIGRAM(S): 80 TABLET, FILM COATED ORAL at 21:11

## 2024-05-20 NOTE — PROGRESS NOTE ADULT - SUBJECTIVE AND OBJECTIVE BOX
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  no overnight events.   no new complaints    REVIEW OF SYSTEMS: remaining ROS negative     PHYSICAL EXAM:  VITALS:  Vital Signs Last 24 Hrs  T(C): 36.5 (20 May 2024 08:12), Max: 36.8 (19 May 2024 20:09)  T(F): 97.7 (20 May 2024 08:12), Max: 98.2 (19 May 2024 20:09)  HR: 77 (20 May 2024 08:12) (77 - 94)  BP: 114/77 (20 May 2024 08:12) (100/63 - 115/76)  BP(mean): --  RR: 16 (20 May 2024 08:12) (14 - 16)  SpO2: 95% (20 May 2024 08:12) (95% - 96%)    Parameters below as of 20 May 2024 08:12  Patient On (Oxygen Delivery Method): room air          GENERAL: NAD,  no increased WOB  HEAD:  Atraumatic, Normocephalic  EYES: EOMI, conjunctiva and sclera clear  ENMT: Moist mucous membranes  NECK: Supple, No JVD  NERVOUS SYSTEM:  Alert & Oriented  CHEST/LUNG: Clear to auscultation bilaterally; No rales, rhonchi, wheezing  HEART: Regular rate and rhythm  ABDOMEN: Soft, Nontender, Nondistended; Bowel sounds present  EXTREMITIES:  No clubbing, cyanosis, calf tenderness or edema b/l      MEDICATIONS  (STANDING):  artificial tears (preservative free) Ophthalmic Solution 1 Drop(s) Both EYES two times a day  aspirin enteric coated 81 milliGRAM(s) Oral daily  atorvastatin 40 milliGRAM(s) Oral at bedtime  famotidine    Tablet 40 milliGRAM(s) Oral two times a day  fluticasone propionate 50 MICROgram(s)/spray Nasal Spray 1 Spray(s) Both Nostrils two times a day  melatonin 3 milliGRAM(s) Oral at bedtime  nystatin Powder 1 Application(s) Topical two times a day  senna 2 Tablet(s) Oral at bedtime    MEDICATIONS  (PRN):  acetaminophen     Tablet .. 650 milliGRAM(s) Oral every 6 hours PRN Moderate Pain (4 - 6)  AQUAPHOR (petrolatum Ointment) 1 Application(s) Topical two times a day PRN dry skin  bisacodyl Suppository 10 milliGRAM(s) Rectal once PRN Constipation  ondansetron   Disintegrating Tablet 4 milliGRAM(s) Oral three times a day PRN Nausea and/or Vomiting  polyethylene glycol 3350 17 Gram(s) Oral two times a day PRN Constipation      Allergies    penicillins (Rash)    Intolerances              LABS:                           13.1   6.45  )-----------( 311      ( 20 May 2024 05:25 )             38.3     05-20    140  |  104  |  18  ----------------------------<  93  3.7   |  30  |  0.54    Ca    8.5      20 May 2024 05:25    TPro  6.9  /  Alb  2.7<L>  /  TBili  0.6  /  DBili  x   /  AST  55<H>  /  ALT  50<H>  /  AlkPhos  76  05-20    LIVER FUNCTIONS - ( 20 May 2024 05:25 )  Alb: 2.7 g/dL / Pro: 6.9 g/dL / ALK PHOS: 76 U/L / ALT: 50 U/L / AST: 55 U/L / GGT: x             Urinalysis Basic - ( 20 May 2024 05:25 )    Color: x / Appearance: x / SG: x / pH: x  Gluc: 93 mg/dL / Ketone: x  / Bili: x / Urobili: x   Blood: x / Protein: x / Nitrite: x   Leuk Esterase: x / RBC: x / WBC x   Sq Epi: x / Non Sq Epi: x / Bacteria: x              CAPILLARY BLOOD GLUCOSE                    RECENT CULTURES:          RADIOLOGY & ADDITIONAL TESTS:

## 2024-05-20 NOTE — PROGRESS NOTE ADULT - ASSESSMENT
84y with CAD s/p CABG recent acute polyneuropathy diagnosis (march 2024).  Here after IVIG series c/b PE RUL and DVT RLE. Patient now admitted for a multidisciplinary rehab program. 05-10-24 @ 17:01      # Motor Axon Neuropathy  -initially began after viral illness in March 2024  -following with Neurologist Dr. Liu  -s/p IVIG ending on 5/8    # Constipation  -on senna standing + miralax, dulcolax prn.   -Recommend switching miralax to standing order    # CAD s/p CABG  # New onset AF  # HLD  -HR appears to be controlled while not being on AV martín blocking agent  -continue aspirin and statin  -eliquis for afib  - O/p cardiology follow up with cardiac MR    # PE  # DVT  - s/p eliquis 10mg BID x 7 days; Start on 5mg BID and o/p hematology followup     #Proteus bacteremia at [facility]  -s/p course of bactrim    # UTI  -s/p course of bactrim    DVT PPX: Eliquis  DIET: Regular + ensure

## 2024-05-20 NOTE — PROGRESS NOTE ADULT - SUBJECTIVE AND OBJECTIVE BOX
Patient is a 84y old  Male who presents with a chief complaint of Polyneuropathy secondary to Acute Motor Axon Neuropathy (20 May 2024 09:27)      HPI:  83M R-handed PMHx CAD s/p bypass x4 and HLD presented as a transfer from [facility]. Presented with a 'stomach virus' in 3/2024, reported significant weight loss, sensation of 'wobbling,' with repeated falls. Had seen Dr. 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 Patient received a full body MRI demonstrating no acute changes of the spine or brain. Received 5 doses of IVIG ending on 5/8. Patient found to have a bactrim sensitive proteus UTI. Patient incidentially found to have RLE DVT and PE in the RUL. Now on full dose AC w/ BID lovenox.  Patient seen by physiatry and recommended for acute inpatient rehab. Discharged to [facility] on 5/10.   (10 May 2024 16:55)      REVIEW OF SYSTEMS  Constitutional - No fever, No weight loss, No fatigue  HEENT - No eye pain, No visual disturbances, No difficulty hearing, No tinnitus, No vertigo, No neck pain  Respiratory - No cough, No wheezing, No shortness of breath  Cardiovascular - No chest pain, No palpitations  Gastrointestinal - No abdominal pain, No nausea, No vomiting, No diarrhea, No constipation  Genitourinary - No dysuria, No frequency, No hematuria, No incontinence  Neurological - No headaches, No memory loss, No loss of strength, No numbness, No tremors  Skin - No itching, No rashes, No lesions   Endocrine - No temperature intolerance  Musculoskeletal - No joint pain, No joint swelling, No muscle pain  Psychiatric - No depression, No anxiety    PAST MEDICAL & SURGICAL HISTORY  CAD (coronary artery disease)    S/P CABG x 1    HTN (hypertension)    HLD (hyperlipidemia)    Unilateral primary osteoarthritis, left knee    GERD (gastroesophageal reflux disease)    Unilateral primary osteoarthritis, right knee    S/P CABG x 4    History of colonoscopy    History of inguinal hernia repair        SOCIAL HISTORY  Smoking - Denied  EtOH - Denied   Drugs - Denied    FUNCTIONAL HISTORY  Lives   Independent    CURRENT FUNCTIONAL STATUS      FAMILY HISTORY   Family history of heart failure (Father)    Family history of Alzheimer's disease (Mother)        RECENT LABS/IMAGING  CBC Full  -  ( 20 May 2024 05:25 )  WBC Count : 6.45 K/uL  RBC Count : 4.01 M/uL  Hemoglobin : 13.1 g/dL  Hematocrit : 38.3 %  Platelet Count - Automated : 311 K/uL  Mean Cell Volume : 95.5 fl  Mean Cell Hemoglobin : 32.7 pg  Mean Cell Hemoglobin Concentration : 34.2 gm/dL  Auto Neutrophil # : 3.52 K/uL  Auto Lymphocyte # : 2.18 K/uL  Auto Monocyte # : 0.52 K/uL  Auto Eosinophil # : 0.18 K/uL  Auto Basophil # : 0.04 K/uL  Auto Neutrophil % : 54.5 %  Auto Lymphocyte % : 33.8 %  Auto Monocyte % : 8.1 %  Auto Eosinophil % : 2.8 %  Auto Basophil % : 0.6 %    05-20    140  |  104  |  18  ----------------------------<  93  3.7   |  30  |  0.54    Ca    8.5      20 May 2024 05:25    TPro  6.9  /  Alb  2.7<L>  /  TBili  0.6  /  DBili  x   /  AST  55<H>  /  ALT  50<H>  /  AlkPhos  76  05-20    Urinalysis Basic - ( 20 May 2024 05:25 )    Color: x / Appearance: x / SG: x / pH: x  Gluc: 93 mg/dL / Ketone: x  / Bili: x / Urobili: x   Blood: x / Protein: x / Nitrite: x   Leuk Esterase: x / RBC: x / WBC x   Sq Epi: x / Non Sq Epi: x / Bacteria: x        VITALS  T(C): 36.5 (05-20-24 @ 08:12), Max: 36.8 (05-19-24 @ 20:09)  HR: 77 (05-20-24 @ 08:12) (77 - 94)  BP: 114/77 (05-20-24 @ 08:12) (100/63 - 115/76)  RR: 16 (05-20-24 @ 08:12) (14 - 16)  SpO2: 95% (05-20-24 @ 08:12) (95% - 96%)  Wt(kg): --    ALLERGIES  penicillins (Rash)      MEDICATIONS   acetaminophen     Tablet .. 650 milliGRAM(s) Oral every 6 hours PRN  apixaban 5 milliGRAM(s) Oral two times a day  AQUAPHOR (petrolatum Ointment) 1 Application(s) Topical two times a day PRN  artificial tears (preservative free) Ophthalmic Solution 1 Drop(s) Both EYES two times a day  aspirin enteric coated 81 milliGRAM(s) Oral daily  atorvastatin 40 milliGRAM(s) Oral at bedtime  bisacodyl Suppository 10 milliGRAM(s) Rectal once PRN  famotidine    Tablet 40 milliGRAM(s) Oral two times a day  fluticasone propionate 50 MICROgram(s)/spray Nasal Spray 1 Spray(s) Both Nostrils two times a day  melatonin 3 milliGRAM(s) Oral at bedtime  nystatin Powder 1 Application(s) Topical two times a day  ondansetron   Disintegrating Tablet 4 milliGRAM(s) Oral three times a day PRN  polyethylene glycol 3350 17 Gram(s) Oral two times a day PRN  senna 2 Tablet(s) Oral at bedtime      ----------------------------------------------------------------------------------------  PHYSICAL EXAM  Gen - NAD, Comfortable  HEENT - NCAT, EOMI  Neck - Supple, No limited ROM  Pulm - CTAB  Cardiovascular - RRR, S1S2  Abdomen - Soft, NT, ND   Extremities - No calf tenderness  Neuro-     Cognitive - AAOx3, follows commands     Communication - Fluent, No dysarthria     Motor -                     LEFT    UE - ShAB 3/5, EF 3+/5, EE 3+/5, WE 1/5,  2/5 better PROM Digits Both hands with less pain                    RIGHT UE - ShAB 2/5, EF 3/5, EE 3/5, WE 1/5,  1/5                    LEFT    LE - HF 2+/5, KE 4/5, DF 0/5, PF 0/5                      RIGHT LE - HF 2+/5, KE 4+/5, DF 0/5, PF 0/5     Psychiatric - Mood stable, Affect WNL  ----------------------------------------------------------------------------------------  ASSESSMENT/PLAN  REHANA MEDINA is a 84y with CAD s/p CABG recent acute polyneuropathy diagnosis (march 2024).  Here after IVIG series c/b PE RUL and DVT RLE. Patient now admitted for a multidisciplinary rehab program. 05-10-24 @ 17:01    # Motor Axon Neuropathy  Comprehensive Multidisciplinary Rehab Program:  -comprehensive rehab program of PT/OT- 3 hours a day, 5 days a week. P&O as needed   -initially began after viral illness in March 2024  -following with Neurologist Dr. Liu  -s/p IVIG ending on 5/8  OT- isotoner gloves, wrist supports during the day- apply softeeze splints at night- ?Functional E Stim  - recommend bracing for b/l dorsiflexion weakness    # CAD s/p CABG  # AF  -continue aspirin and statin  -on therapeutic AC    # PE  # DVT  - continue apixaban 10mg BID (transitioned off full dose lovenox)  - RUL PE and RLE DVT    Elevated LFTs - stable   ? secondary to IVIG  not taking tylenol  LFTS improving    # UTI  - sensitive proteus UTI  - bactrim 2 tab BID until 5/17  DC PVRs  Continue Cipro    # Pain  - Tylenol PRN    Mood / Cognition  - Neuropsychology consult PRN    Sleep  - Maintain quiet hours and a low stim environment.   - Melatonin 3 mg QHS    GI / Bowel  - Miralax daily  - Senna QHS  - GI ppx: famotidine 40 mg BID- HX GERD     / Bladder  -Bladder scans dc'd -voiding   - Toileting schedule every 3 hours while awake    Skin / Pressure injury  - Skin assessment on admission performed: MAD to b/l groin  - Turn q2 hours in bed while awake, air mattress  - nursing to monitor skin qShift  - soft heel protectors  - skin barrier cream PRN    Diet/Dysphagia:  - Diet Consistency: regular diet  - Aspiration Precautions  - Nutrition consult    DVT prophylaxis:   - has RLE DVT  - on apixaban   - Last Doppler on R soleal DVT on 5/8    Patient is a 84y old  Male who presents with a chief complaint of 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 Presented with a 'stomach virus' in 3/2024, reported significant weight loss, sensation of 'wobbling,' with repeated falls. Had seen Dr. 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 Patient received a full body MRI demonstrating no acute changes of the spine or brain. Received 5 doses of IVIG ending on 5/8. Patient found to have a bactrim sensitive proteus UTI. Patient incidentially found to have RLE DVT and PE in the RUL. Now on full dose AC w/ BID lovenox.  Patient seen by physiatry and recommended for acute inpatient rehab. Discharged to [facility] on 5/10.   (10 May 2024 16:55)      REVIEW OF SYSTEMS  weakness    PAST MEDICAL & SURGICAL HISTORY  CAD (coronary artery disease)    S/P CABG x 1    HTN (hypertension)    HLD (hyperlipidemia)    Unilateral primary osteoarthritis, left knee    GERD (gastroesophageal reflux disease)    Unilateral primary osteoarthritis, right knee    S/P CABG x 4    History of colonoscopy    History of inguinal hernia repair    FAMILY HISTORY   Family history of heart failure (Father)    Family history of Alzheimer's disease (Mother)        RECENT LABS/IMAGING  CBC Full  -  ( 20 May 2024 05:25 )  WBC Count : 6.45 K/uL  RBC Count : 4.01 M/uL  Hemoglobin : 13.1 g/dL  Hematocrit : 38.3 %  Platelet Count - Automated : 311 K/uL  Mean Cell Volume : 95.5 fl  Mean Cell Hemoglobin : 32.7 pg  Mean Cell Hemoglobin Concentration : 34.2 gm/dL  Auto Neutrophil # : 3.52 K/uL  Auto Lymphocyte # : 2.18 K/uL  Auto Monocyte # : 0.52 K/uL  Auto Eosinophil # : 0.18 K/uL  Auto Basophil # : 0.04 K/uL  Auto Neutrophil % : 54.5 %  Auto Lymphocyte % : 33.8 %  Auto Monocyte % : 8.1 %  Auto Eosinophil % : 2.8 %  Auto Basophil % : 0.6 %    05-20    140  |  104  |  18  ----------------------------<  93  3.7   |  30  |  0.54    Ca    8.5      20 May 2024 05:25    TPro  6.9  /  Alb  2.7<L>  /  TBili  0.6  /  DBili  x   /  AST  55<H>  /  ALT  50<H>  /  AlkPhos  76  05-20    Urinalysis Basic - ( 20 May 2024 05:25 )    Color: x / Appearance: x / SG: x / pH: x  Gluc: 93 mg/dL / Ketone: x  / Bili: x / Urobili: x   Blood: x / Protein: x / Nitrite: x   Leuk Esterase: x / RBC: x / WBC x   Sq Epi: x / Non Sq Epi: x / Bacteria: x        VITALS  T(C): 36.5 (05-20-24 @ 08:12), Max: 36.8 (05-19-24 @ 20:09)  HR: 77 (05-20-24 @ 08:12) (77 - 94)  BP: 114/77 (05-20-24 @ 08:12) (100/63 - 115/76)  RR: 16 (05-20-24 @ 08:12) (14 - 16)  SpO2: 95% (05-20-24 @ 08:12) (95% - 96%)  Wt(kg): --    ALLERGIES  penicillins (Rash)      MEDICATIONS   acetaminophen     Tablet .. 650 milliGRAM(s) Oral every 6 hours PRN  apixaban 5 milliGRAM(s) Oral two times a day  AQUAPHOR (petrolatum Ointment) 1 Application(s) Topical two times a day PRN  artificial tears (preservative free) Ophthalmic Solution 1 Drop(s) Both EYES two times a day  aspirin enteric coated 81 milliGRAM(s) Oral daily  atorvastatin 40 milliGRAM(s) Oral at bedtime  bisacodyl Suppository 10 milliGRAM(s) Rectal once PRN  famotidine    Tablet 40 milliGRAM(s) Oral two times a day  fluticasone propionate 50 MICROgram(s)/spray Nasal Spray 1 Spray(s) Both Nostrils two times a day  melatonin 3 milliGRAM(s) Oral at bedtime  nystatin Powder 1 Application(s) Topical two times a day  ondansetron   Disintegrating Tablet 4 milliGRAM(s) Oral three times a day PRN  polyethylene glycol 3350 17 Gram(s) Oral two times a day PRN  senna 2 Tablet(s) Oral at bedtime      ----------------------------------------------------------------------------------------  PHYSICAL EXAM  Gen - NAD, Comfortable  HEENT - NCAT, EOMI  Neck - Supple, No limited ROM  Pulm - CTAB  Cardiovascular - RRR, S1S2  Abdomen - Soft, NT, ND   Extremities - No calf tenderness  Neuro-     Cognitive - AAOx3, follows commands     Communication - Fluent, No dysarthria     Motor -                     LEFT    UE - ShAB 3/5, EF 3+/5, EE 3+/5, WE 1/5,  2/5 better PROM Digits Both hands with less pain                    RIGHT UE - ShAB 2/5, EF 3/5, EE 3/5, WE 1/5,  1/5                    LEFT    LE - HF 2+/5, KE 4/5, DF 0/5, PF 0/5                      RIGHT LE - HF 2+/5, KE 4+/5, DF 0/5, PF 0/5     Psychiatric - Mood stable, Affect WNL  ----------------------------------------------------------------------------------------  ASSESSMENT/PLAN  REHANA MEDINA is a 84y with CAD s/p CABG recent acute polyneuropathy diagnosis (march 2024).  Here after IVIG series c/b PE RUL and DVT RLE. Patient now admitted for a multidisciplinary rehab program. 05-10-24 @ 17:01    # Motor Axon Neuropathy  Comprehensive Multidisciplinary Rehab Program:  -comprehensive rehab program of PT/OT- 3 hours a day, 5 days a week. P&O as needed   -initially began after viral illness in March 2024  -following with Neurologist Dr. Liu  -s/p IVIG ending on 5/8  OT- isotoner gloves, wrist supports during the day- apply softeeze splints at night- ?Functional E Stim  - recommend bracing for b/l dorsiflexion weakness    # CAD s/p CABG  # AF  -continue aspirin and statin  -on therapeutic AC    # PE  # DVT  - continue apixaban 10mg BID (transitioned off full dose lovenox)  - RUL PE and RLE DVT    Elevated LFTs - stable   ? secondary to IVIG  not taking tylenol  LFTS improving    # UTI  - sensitive proteus UTI  - bactrim 2 tab BID until 5/17  DC PVRs  Continue Cipro    # Pain  - Tylenol PRN    Mood / Cognition  - Neuropsychology consult PRN    Sleep  - Maintain quiet hours and a low stim environment.   - Melatonin 3 mg QHS    GI / Bowel  - Miralax daily  - Senna QHS  - GI ppx: famotidine 40 mg BID- HX GERD     / Bladder  -Bladder scans dc'd -voiding   - Toileting schedule every 3 hours while awake    Skin / Pressure injury  - Skin assessment on admission performed: MAD to b/l groin  - Turn q2 hours in bed while awake, air mattress  - nursing to monitor skin qShift  - soft heel protectors  - skin barrier cream PRN    Diet/Dysphagia:  - Diet Consistency: regular diet  - Aspiration Precautions  - Nutrition consult    DVT prophylaxis:   - has RLE DVT  - on apixaban   - Last Doppler on R soleal DVT on 5/8

## 2024-05-21 PROCEDURE — 99232 SBSQ HOSP IP/OBS MODERATE 35: CPT

## 2024-05-21 RX ADMIN — Medication 1 SPRAY(S): at 17:24

## 2024-05-21 RX ADMIN — APIXABAN 5 MILLIGRAM(S): 2.5 TABLET, FILM COATED ORAL at 17:24

## 2024-05-21 RX ADMIN — FAMOTIDINE 40 MILLIGRAM(S): 10 INJECTION INTRAVENOUS at 17:24

## 2024-05-21 RX ADMIN — Medication 1 DROP(S): at 05:19

## 2024-05-21 RX ADMIN — ATORVASTATIN CALCIUM 40 MILLIGRAM(S): 80 TABLET, FILM COATED ORAL at 21:30

## 2024-05-21 RX ADMIN — Medication 1 DROP(S): at 17:24

## 2024-05-21 RX ADMIN — APIXABAN 5 MILLIGRAM(S): 2.5 TABLET, FILM COATED ORAL at 05:18

## 2024-05-21 RX ADMIN — Medication 1 SPRAY(S): at 05:19

## 2024-05-21 RX ADMIN — FAMOTIDINE 40 MILLIGRAM(S): 10 INJECTION INTRAVENOUS at 05:19

## 2024-05-21 RX ADMIN — NYSTATIN CREAM 1 APPLICATION(S): 100000 CREAM TOPICAL at 17:26

## 2024-05-21 RX ADMIN — Medication 81 MILLIGRAM(S): at 12:22

## 2024-05-21 RX ADMIN — Medication 3 MILLIGRAM(S): at 21:30

## 2024-05-21 RX ADMIN — NYSTATIN CREAM 1 APPLICATION(S): 100000 CREAM TOPICAL at 05:19

## 2024-05-21 NOTE — PROGRESS NOTE ADULT - SUBJECTIVE AND OBJECTIVE BOX
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  no overnight events.       REVIEW OF SYSTEMS: remaining ROS negative     PHYSICAL EXAM:  VITALS:  Vital Signs Last 24 Hrs  T(C): 36.6 (21 May 2024 08:48), Max: 36.6 (20 May 2024 19:40)  T(F): 97.8 (21 May 2024 08:48), Max: 97.8 (20 May 2024 19:40)  HR: 82 (21 May 2024 08:48) (82 - 87)  BP: 123/80 (21 May 2024 08:48) (98/62 - 123/80)  BP(mean): --  RR: 17 (21 May 2024 08:48) (16 - 17)  SpO2: 96% (21 May 2024 08:48) (95% - 96%)    Parameters below as of 21 May 2024 08:48  Patient On (Oxygen Delivery Method): room air        GENERAL: NAD,  no increased WOB  HEAD:  Atraumatic, Normocephalic  EYES: EOMI, conjunctiva and sclera clear  ENMT: Moist mucous membranes  NECK: Supple, No JVD  NERVOUS SYSTEM:  Alert & Oriented  CHEST/LUNG: Clear to auscultation bilaterally; No rales, rhonchi, wheezing  HEART: Regular rate and rhythm  ABDOMEN: Soft, Nontender, Nondistended; Bowel sounds present  EXTREMITIES:  No clubbing, cyanosis, calf tenderness or edema b/l      MEDICATIONS  (STANDING):  apixaban 5 milliGRAM(s) Oral two times a day  artificial tears (preservative free) Ophthalmic Solution 1 Drop(s) Both EYES two times a day  aspirin enteric coated 81 milliGRAM(s) Oral daily  atorvastatin 40 milliGRAM(s) Oral at bedtime  famotidine    Tablet 40 milliGRAM(s) Oral two times a day  fluticasone propionate 50 MICROgram(s)/spray Nasal Spray 1 Spray(s) Both Nostrils two times a day  melatonin 3 milliGRAM(s) Oral at bedtime  nystatin Powder 1 Application(s) Topical two times a day    MEDICATIONS  (PRN):  acetaminophen     Tablet .. 650 milliGRAM(s) Oral every 6 hours PRN Moderate Pain (4 - 6)  AQUAPHOR (petrolatum Ointment) 1 Application(s) Topical two times a day PRN dry skin  bisacodyl Suppository 10 milliGRAM(s) Rectal once PRN Constipation  ondansetron   Disintegrating Tablet 4 milliGRAM(s) Oral three times a day PRN Nausea and/or Vomiting  polyethylene glycol 3350 17 Gram(s) Oral two times a day PRN Constipation      Allergies    penicillins (Rash)    Intolerances              LABS:                           13.1   6.45  )-----------( 311      ( 20 May 2024 05:25 )             38.3     05-20    140  |  104  |  18  ----------------------------<  93  3.7   |  30  |  0.54    Ca    8.5      20 May 2024 05:25    TPro  6.9  /  Alb  2.7<L>  /  TBili  0.6  /  DBili  x   /  AST  55<H>  /  ALT  50<H>  /  AlkPhos  76  05-20    LIVER FUNCTIONS - ( 20 May 2024 05:25 )  Alb: 2.7 g/dL / Pro: 6.9 g/dL / ALK PHOS: 76 U/L / ALT: 50 U/L / AST: 55 U/L / GGT: x             Urinalysis Basic - ( 20 May 2024 05:25 )    Color: x / Appearance: x / SG: x / pH: x  Gluc: 93 mg/dL / Ketone: x  / Bili: x / Urobili: x   Blood: x / Protein: x / Nitrite: x   Leuk Esterase: x / RBC: x / WBC x   Sq Epi: x / Non Sq Epi: x / Bacteria: x              CAPILLARY BLOOD GLUCOSE                    RECENT CULTURES:          RADIOLOGY & ADDITIONAL TESTS:

## 2024-05-21 NOTE — PROGRESS NOTE ADULT - ASSESSMENT
84y with CAD s/p CABG recent acute polyneuropathy diagnosis (march 2024).  Here after IVIG series c/b PE RUL and DVT RLE. Patient now admitted for a multidisciplinary rehab program. 05-10-24 @ 17:01      # Motor Axon Neuropathy  -initially began after viral illness in March 2024  -following with Neurologist Dr. Liu  -s/p IVIG ending on 5/8    # Constipation  -on senna standing + miralax, dulcolax prn.   -Recommend switching miralax to standing order    # CAD s/p CABG  # New onset AF  # HLD  -HR appears to be controlled while not being on AV martín blocking agent  -continue aspirin and statin  -eliquis for afib  -O/p cardiology follow up with cardiac MR    # PE  # DVT  - s/p eliquis 10mg BID x 7 days; C/w 5mg BID and o/p hematology followup     #Proteus bacteremia at [facility]  -s/p course of bactrim    # UTI  -s/p course of bactrim    DVT PPX: Eliquis  DIET: Regular + ensure

## 2024-05-21 NOTE — PROGRESS NOTE ADULT - SUBJECTIVE AND OBJECTIVE BOX
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 Presented with a 'stomach virus' in 3/2024, reported significant weight loss, sensation of 'wobbling,' with repeated falls. Had seen Dr. 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 Patient received a full body MRI demonstrating no acute changes of the spine or brain. Received 5 doses of IVIG ending on 5/8. Patient found to have a bactrim sensitive proteus UTI. Patient incidentially found to have RLE DVT and PE in the RUL. Now on full dose AC w/ BID lovenox.  Patient seen by physiatry and recommended for acute inpatient rehab. Discharged to [facility] on 5/10.   (10 May 2024 16:55)    patient reports he had frequent regular bm on ensure, improved when he stopped ensure.  Vi held    REVIEW OF SYSTEMS  weakness    PAST MEDICAL & SURGICAL HISTORY  CAD (coronary artery disease)    S/P CABG x 1    HTN (hypertension)    HLD (hyperlipidemia)    Unilateral primary osteoarthritis, left knee    GERD (gastroesophageal reflux disease)    Unilateral primary osteoarthritis, right knee    S/P CABG x 4    History of colonoscopy    History of inguinal hernia repair       FAMILY HISTORY   Family history of heart failure (Father)  Family history of Alzheimer's disease (Mother)        RECENT LABS/IMAGING  CBC Full  -  ( 20 May 2024 05:25 )  WBC Count : 6.45 K/uL  RBC Count : 4.01 M/uL  Hemoglobin : 13.1 g/dL  Hematocrit : 38.3 %  Platelet Count - Automated : 311 K/uL  Mean Cell Volume : 95.5 fl  Mean Cell Hemoglobin : 32.7 pg  Mean Cell Hemoglobin Concentration : 34.2 gm/dL  Auto Neutrophil # : 3.52 K/uL  Auto Lymphocyte # : 2.18 K/uL  Auto Monocyte # : 0.52 K/uL  Auto Eosinophil # : 0.18 K/uL  Auto Basophil # : 0.04 K/uL  Auto Neutrophil % : 54.5 %  Auto Lymphocyte % : 33.8 %  Auto Monocyte % : 8.1 %  Auto Eosinophil % : 2.8 %  Auto Basophil % : 0.6 %    05-20    140  |  104  |  18  ----------------------------<  93  3.7   |  30  |  0.54    Ca    8.5      20 May 2024 05:25    TPro  6.9  /  Alb  2.7<L>  /  TBili  0.6  /  DBili  x   /  AST  55<H>  /  ALT  50<H>  /  AlkPhos  76  05-20    Urinalysis Basic - ( 20 May 2024 05:25 )    Color: x / Appearance: x / SG: x / pH: x  Gluc: 93 mg/dL / Ketone: x  / Bili: x / Urobili: x   Blood: x / Protein: x / Nitrite: x   Leuk Esterase: x / RBC: x / WBC x   Sq Epi: x / Non Sq Epi: x / Bacteria: x        VITALS  T(C): 36.6 (05-21-24 @ 08:48), Max: 36.6 (05-20-24 @ 19:40)  HR: 82 (05-21-24 @ 08:48) (82 - 87)  BP: 123/80 (05-21-24 @ 08:48) (98/62 - 123/80)  RR: 17 (05-21-24 @ 08:48) (16 - 17)  SpO2: 96% (05-21-24 @ 08:48) (95% - 96%)  Wt(kg): --    ALLERGIES  penicillins (Rash)      MEDICATIONS   acetaminophen     Tablet .. 650 milliGRAM(s) Oral every 6 hours PRN  apixaban 5 milliGRAM(s) Oral two times a day  AQUAPHOR (petrolatum Ointment) 1 Application(s) Topical two times a day PRN  artificial tears (preservative free) Ophthalmic Solution 1 Drop(s) Both EYES two times a day  aspirin enteric coated 81 milliGRAM(s) Oral daily  atorvastatin 40 milliGRAM(s) Oral at bedtime  bisacodyl Suppository 10 milliGRAM(s) Rectal once PRN  famotidine    Tablet 40 milliGRAM(s) Oral two times a day  fluticasone propionate 50 MICROgram(s)/spray Nasal Spray 1 Spray(s) Both Nostrils two times a day  melatonin 3 milliGRAM(s) Oral at bedtime  nystatin Powder 1 Application(s) Topical two times a day  ondansetron   Disintegrating Tablet 4 milliGRAM(s) Oral three times a day PRN  polyethylene glycol 3350 17 Gram(s) Oral two times a day PRN      ----------------------------------------------------------------------------------------  ASSESSMENT/PLAN  REHANA MEDINA is a 84y with CAD s/p CABG recent acute polyneuropathy diagnosis (march 2024).  Here after IVIG series c/b PE RUL and DVT RLE. Patient now admitted for a multidisciplinary rehab program. 05-10-24 @ 17:01    # Motor Axon Neuropathy  Comprehensive Multidisciplinary Rehab Program:  -comprehensive rehab program of PT/OT- 3 hours a day, 5 days a week. P&O as needed   -initially began after viral illness in March 2024  -following with Neurologist Dr. Liu  -s/p IVIG ending on 5/8  OT- isotoner gloves, wrist supports during the day- apply softeeze splints at night- ?Functional E Stim  - recommend bracing for b/l dorsiflexion weakness    # CAD s/p CABG  # AF  -continue aspirin and statin  -on therapeutic AC    # PE  # DVT  - continue apixaban 10mg BID (transitioned off full dose lovenox)  - RUL PE and RLE DVT    Elevated LFTs - stable   ? secondary to IVIG  not taking tylenol  LFTS improving    # UTI  - sensitive proteus UTI  - bactrim 2 tab BID until 5/17  DC PVRs  Continue Cipro    # Pain  - Tylenol PRN    Mood / Cognition  - Neuropsychology consult PRN    Sleep  - Maintain quiet hours and a low stim environment.   - Melatonin 3 mg QHS    GI / Bowel  - Miralax daily  - Senna QHS  - GI ppx: famotidine 40 mg BID- HX GERD     / Bladder  -Bladder scans dc'd -voiding   - Toileting schedule every 3 hours while awake    Skin / Pressure injury  - Skin assessment on admission performed: MAD to b/l groin  - Turn q2 hours in bed while awake, air mattress  - nursing to monitor skin qShift  - soft heel protectors  - skin barrier cream PRN    Diet/Dysphagia:  - Diet Consistency: regular diet  - Aspiration Precautions  - Nutrition consult    DVT prophylaxis:   - has RLE DVT  - on apixaban   - Last Doppler on R soleal DVT on 5/8

## 2024-05-22 PROCEDURE — 99232 SBSQ HOSP IP/OBS MODERATE 35: CPT

## 2024-05-22 RX ADMIN — APIXABAN 5 MILLIGRAM(S): 2.5 TABLET, FILM COATED ORAL at 17:26

## 2024-05-22 RX ADMIN — ATORVASTATIN CALCIUM 40 MILLIGRAM(S): 80 TABLET, FILM COATED ORAL at 21:23

## 2024-05-22 RX ADMIN — NYSTATIN CREAM 1 APPLICATION(S): 100000 CREAM TOPICAL at 17:27

## 2024-05-22 RX ADMIN — Medication 1 DROP(S): at 06:58

## 2024-05-22 RX ADMIN — NYSTATIN CREAM 1 APPLICATION(S): 100000 CREAM TOPICAL at 05:47

## 2024-05-22 RX ADMIN — FAMOTIDINE 40 MILLIGRAM(S): 10 INJECTION INTRAVENOUS at 05:46

## 2024-05-22 RX ADMIN — Medication 81 MILLIGRAM(S): at 11:29

## 2024-05-22 RX ADMIN — APIXABAN 5 MILLIGRAM(S): 2.5 TABLET, FILM COATED ORAL at 05:46

## 2024-05-22 RX ADMIN — FAMOTIDINE 40 MILLIGRAM(S): 10 INJECTION INTRAVENOUS at 17:25

## 2024-05-22 RX ADMIN — Medication 1 DROP(S): at 17:24

## 2024-05-22 NOTE — PROGRESS NOTE ADULT - SUBJECTIVE AND OBJECTIVE BOX
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  no overnight events.       REVIEW OF SYSTEMS: remaining ROS negative     PHYSICAL EXAM:  VITALS:  Vital Signs Last 24 Hrs  T(C): 36.6 (22 May 2024 08:55), Max: 36.6 (22 May 2024 08:55)  T(F): 97.8 (22 May 2024 08:55), Max: 97.8 (22 May 2024 08:55)  HR: 61 (22 May 2024 08:55) (61 - 76)  BP: 108/68 (22 May 2024 08:55) (108/68 - 130/78)  BP(mean): --  RR: 16 (22 May 2024 08:55) (16 - 16)  SpO2: 96% (22 May 2024 08:55) (96% - 96%)    Parameters below as of 22 May 2024 08:55  Patient On (Oxygen Delivery Method): room air        GENERAL: NAD,  no increased WOB  HEAD:  Atraumatic, Normocephalic  EYES: EOMI, conjunctiva and sclera clear  ENMT: Moist mucous membranes  NECK: Supple, No JVD  NERVOUS SYSTEM:  Alert & Oriented  CHEST/LUNG: Clear to auscultation bilaterally; No rales, rhonchi, wheezing  HEART: Regular rate and rhythm  ABDOMEN: Soft, Nontender, Nondistended; Bowel sounds present  EXTREMITIES:  No clubbing, cyanosis, calf tenderness or edema b/l      MEDICATIONS  (STANDING):  apixaban 5 milliGRAM(s) Oral two times a day  artificial tears (preservative free) Ophthalmic Solution 1 Drop(s) Both EYES two times a day  aspirin enteric coated 81 milliGRAM(s) Oral daily  atorvastatin 40 milliGRAM(s) Oral at bedtime  famotidine    Tablet 40 milliGRAM(s) Oral two times a day  melatonin 3 milliGRAM(s) Oral at bedtime  nystatin Powder 1 Application(s) Topical two times a day    MEDICATIONS  (PRN):  acetaminophen     Tablet .. 650 milliGRAM(s) Oral every 6 hours PRN Moderate Pain (4 - 6)  AQUAPHOR (petrolatum Ointment) 1 Application(s) Topical two times a day PRN dry skin  bisacodyl Suppository 10 milliGRAM(s) Rectal once PRN Constipation  ondansetron   Disintegrating Tablet 4 milliGRAM(s) Oral three times a day PRN Nausea and/or Vomiting  polyethylene glycol 3350 17 Gram(s) Oral two times a day PRN Constipation      Allergies    penicillins (Rash)    Intolerances              LABS:                           13.1   6.45  )-----------( 311      ( 20 May 2024 05:25 )             38.3     05-20    140  |  104  |  18  ----------------------------<  93  3.7   |  30  |  0.54    Ca    8.5      20 May 2024 05:25    TPro  6.9  /  Alb  2.7<L>  /  TBili  0.6  /  DBili  x   /  AST  55<H>  /  ALT  50<H>  /  AlkPhos  76  05-20    LIVER FUNCTIONS - ( 20 May 2024 05:25 )  Alb: 2.7 g/dL / Pro: 6.9 g/dL / ALK PHOS: 76 U/L / ALT: 50 U/L / AST: 55 U/L / GGT: x             Urinalysis Basic - ( 20 May 2024 05:25 )    Color: x / Appearance: x / SG: x / pH: x  Gluc: 93 mg/dL / Ketone: x  / Bili: x / Urobili: x   Blood: x / Protein: x / Nitrite: x   Leuk Esterase: x / RBC: x / WBC x   Sq Epi: x / Non Sq Epi: x / Bacteria: x              CAPILLARY BLOOD GLUCOSE                    RECENT CULTURES:          RADIOLOGY & ADDITIONAL TESTS:

## 2024-05-22 NOTE — PROGRESS NOTE ADULT - ASSESSMENT
84y with CAD s/p CABG recent acute polyneuropathy diagnosis (march 2024).  Here after IVIG series c/b PE RUL and DVT RLE. Patient now admitted for a multidisciplinary rehab program. 05-10-24 @ 17:01      # Motor Axon Neuropathy  -initially began after viral illness in March 2024  -following with Neurologist Dr. Liu  -s/p IVIG ending on 5/8    # Constipation, improved  -on senna standing + miralax, dulcolax prn.       # CAD s/p CABG  # New onset AF  # HLD  -HR appears to be controlled while not being on AV martín blocking agent  -continue aspirin and statin  -eliquis for afib  -O/p cardiology follow up with cardiac MR    # PE  # DVT  - s/p eliquis 10mg BID x 7 days; C/w 5mg BID and o/p hematology followup     #Proteus bacteremia at [facility]  -s/p course of bactrim    # UTI  -s/p course of bactrim    DVT PPX: Eliquis  DIET: Regular + ensure

## 2024-05-23 LAB
ALBUMIN SERPL ELPH-MCNC: 2.6 G/DL — LOW (ref 3.3–5)
ALP SERPL-CCNC: 73 U/L — SIGNIFICANT CHANGE UP (ref 40–120)
ALT FLD-CCNC: 37 U/L — SIGNIFICANT CHANGE UP (ref 10–45)
ANION GAP SERPL CALC-SCNC: 7 MMOL/L — SIGNIFICANT CHANGE UP (ref 5–17)
AST SERPL-CCNC: 36 U/L — SIGNIFICANT CHANGE UP (ref 10–40)
BASOPHILS # BLD AUTO: 0.03 K/UL — SIGNIFICANT CHANGE UP (ref 0–0.2)
BASOPHILS NFR BLD AUTO: 0.5 % — SIGNIFICANT CHANGE UP (ref 0–2)
BILIRUB SERPL-MCNC: 0.7 MG/DL — SIGNIFICANT CHANGE UP (ref 0.2–1.2)
BUN SERPL-MCNC: 15 MG/DL — SIGNIFICANT CHANGE UP (ref 7–23)
CALCIUM SERPL-MCNC: 8.5 MG/DL — SIGNIFICANT CHANGE UP (ref 8.4–10.5)
CHLORIDE SERPL-SCNC: 105 MMOL/L — SIGNIFICANT CHANGE UP (ref 96–108)
CO2 SERPL-SCNC: 29 MMOL/L — SIGNIFICANT CHANGE UP (ref 22–31)
CREAT SERPL-MCNC: 0.65 MG/DL — SIGNIFICANT CHANGE UP (ref 0.5–1.3)
EGFR: 94 ML/MIN/1.73M2 — SIGNIFICANT CHANGE UP
EOSINOPHIL # BLD AUTO: 0.13 K/UL — SIGNIFICANT CHANGE UP (ref 0–0.5)
EOSINOPHIL NFR BLD AUTO: 2.3 % — SIGNIFICANT CHANGE UP (ref 0–6)
GLUCOSE SERPL-MCNC: 88 MG/DL — SIGNIFICANT CHANGE UP (ref 70–99)
HCT VFR BLD CALC: 36.4 % — LOW (ref 39–50)
HGB BLD-MCNC: 12.5 G/DL — LOW (ref 13–17)
IMM GRANULOCYTES NFR BLD AUTO: 0.4 % — SIGNIFICANT CHANGE UP (ref 0–0.9)
LYMPHOCYTES # BLD AUTO: 1.91 K/UL — SIGNIFICANT CHANGE UP (ref 1–3.3)
LYMPHOCYTES # BLD AUTO: 33.5 % — SIGNIFICANT CHANGE UP (ref 13–44)
MCHC RBC-ENTMCNC: 32.9 PG — SIGNIFICANT CHANGE UP (ref 27–34)
MCHC RBC-ENTMCNC: 34.3 GM/DL — SIGNIFICANT CHANGE UP (ref 32–36)
MCV RBC AUTO: 95.8 FL — SIGNIFICANT CHANGE UP (ref 80–100)
MONOCYTES # BLD AUTO: 0.52 K/UL — SIGNIFICANT CHANGE UP (ref 0–0.9)
MONOCYTES NFR BLD AUTO: 9.1 % — SIGNIFICANT CHANGE UP (ref 2–14)
NEUTROPHILS # BLD AUTO: 3.1 K/UL — SIGNIFICANT CHANGE UP (ref 1.8–7.4)
NEUTROPHILS NFR BLD AUTO: 54.2 % — SIGNIFICANT CHANGE UP (ref 43–77)
NRBC # BLD: 0 /100 WBCS — SIGNIFICANT CHANGE UP (ref 0–0)
PLATELET # BLD AUTO: 303 K/UL — SIGNIFICANT CHANGE UP (ref 150–400)
POTASSIUM SERPL-MCNC: 3.4 MMOL/L — LOW (ref 3.5–5.3)
POTASSIUM SERPL-SCNC: 3.4 MMOL/L — LOW (ref 3.5–5.3)
PROT SERPL-MCNC: 6.5 G/DL — SIGNIFICANT CHANGE UP (ref 6–8.3)
RBC # BLD: 3.8 M/UL — LOW (ref 4.2–5.8)
RBC # FLD: 16 % — HIGH (ref 10.3–14.5)
SODIUM SERPL-SCNC: 141 MMOL/L — SIGNIFICANT CHANGE UP (ref 135–145)
WBC # BLD: 5.71 K/UL — SIGNIFICANT CHANGE UP (ref 3.8–10.5)
WBC # FLD AUTO: 5.71 K/UL — SIGNIFICANT CHANGE UP (ref 3.8–10.5)

## 2024-05-23 PROCEDURE — 99233 SBSQ HOSP IP/OBS HIGH 50: CPT

## 2024-05-23 PROCEDURE — 99232 SBSQ HOSP IP/OBS MODERATE 35: CPT

## 2024-05-23 RX ORDER — POTASSIUM CHLORIDE 20 MEQ
40 PACKET (EA) ORAL ONCE
Refills: 0 | Status: COMPLETED | OUTPATIENT
Start: 2024-05-23 | End: 2024-05-23

## 2024-05-23 RX ADMIN — Medication 1 DROP(S): at 06:29

## 2024-05-23 RX ADMIN — APIXABAN 5 MILLIGRAM(S): 2.5 TABLET, FILM COATED ORAL at 06:29

## 2024-05-23 RX ADMIN — FAMOTIDINE 40 MILLIGRAM(S): 10 INJECTION INTRAVENOUS at 18:22

## 2024-05-23 RX ADMIN — NYSTATIN CREAM 1 APPLICATION(S): 100000 CREAM TOPICAL at 06:30

## 2024-05-23 RX ADMIN — NYSTATIN CREAM 1 APPLICATION(S): 100000 CREAM TOPICAL at 18:30

## 2024-05-23 RX ADMIN — APIXABAN 5 MILLIGRAM(S): 2.5 TABLET, FILM COATED ORAL at 18:22

## 2024-05-23 RX ADMIN — FAMOTIDINE 40 MILLIGRAM(S): 10 INJECTION INTRAVENOUS at 06:28

## 2024-05-23 RX ADMIN — Medication 40 MILLIEQUIVALENT(S): at 19:46

## 2024-05-23 RX ADMIN — ATORVASTATIN CALCIUM 40 MILLIGRAM(S): 80 TABLET, FILM COATED ORAL at 19:46

## 2024-05-23 RX ADMIN — Medication 1 DROP(S): at 18:23

## 2024-05-23 RX ADMIN — Medication 81 MILLIGRAM(S): at 11:50

## 2024-05-23 NOTE — PROGRESS NOTE ADULT - SUBJECTIVE AND OBJECTIVE BOX
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  no overnight events.       REVIEW OF SYSTEMS: remaining ROS negative     T(C): 36.6 (05-22-24 @ 08:55), Max: 36.6 (05-22-24 @ 08:55)  T(F): 97.8 (05-22-24 @ 08:55), Max: 97.8 (05-22-24 @ 08:55)  HR: 76 (05-23-24 @ 05:31) (61 - 76)  BP: 129/73 (05-23-24 @ 05:31) (108/68 - 129/73)  ABP: --  ABP(mean): --  RR: 16 (05-22-24 @ 08:55) (16 - 16)  SpO2: 96% (05-22-24 @ 08:55) (96% - 96%)      on exam:  GENERAL: NAD,  no increased WOB  HEAD:  Atraumatic, Normocephalic  EYES: EOMI, conjunctiva and sclera clear  ENMT: Moist mucous membranes  NECK: Supple, No JVD  NERVOUS SYSTEM:  Alert & Oriented  CHEST/LUNG: Clear to auscultation bilaterally; No rales, rhonchi, wheezing  HEART: Regular rate and rhythm  ABDOMEN: Soft, Nontender, Nondistended; Bowel sounds present  EXTREMITIES:  No clubbing, cyanosis, calf tenderness or edema b/l      MEDICATIONS  (STANDING):  apixaban 5 milliGRAM(s) Oral two times a day  artificial tears (preservative free) Ophthalmic Solution 1 Drop(s) Both EYES two times a day  aspirin enteric coated 81 milliGRAM(s) Oral daily  atorvastatin 40 milliGRAM(s) Oral at bedtime  famotidine    Tablet 40 milliGRAM(s) Oral two times a day  melatonin 3 milliGRAM(s) Oral at bedtime  nystatin Powder 1 Application(s) Topical two times a day    MEDICATIONS  (PRN):  acetaminophen     Tablet .. 650 milliGRAM(s) Oral every 6 hours PRN Moderate Pain (4 - 6)  AQUAPHOR (petrolatum Ointment) 1 Application(s) Topical two times a day PRN dry skin  bisacodyl Suppository 10 milliGRAM(s) Rectal once PRN Constipation  ondansetron   Disintegrating Tablet 4 milliGRAM(s) Oral three times a day PRN Nausea and/or Vomiting  polyethylene glycol 3350 17 Gram(s) Oral two times a day PRN Constipation      Allergies    penicillins (Rash)    Intolerances    LABS:                        12.5   5.71  )-----------( 303      ( 23 May 2024 05:36 )             36.4        PROGRESS NOTE:     Patient is a 84y old  Male who presents with a chief complaint of 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 Patient stated that his strength is improving.       REVIEW OF SYSTEMS: remaining ROS negative     T(C): --  T(F): --  HR: 79 (05-23-24 @ 08:04) (76 - 79)  BP: 107/71 (05-23-24 @ 08:04) (107/71 - 129/73)  ABP: --  ABP(mean): --  RR: 16 (05-23-24 @ 08:04) (16 - 16)  SpO2: 95% (05-23-24 @ 08:04) (95% - 95%)      on exam:  GENERAL: NAD,  no increased WOB  HEAD:  Atraumatic, Normocephalic  EYES: EOMI, conjunctiva and sclera clear  ENMT: Moist mucous membranes  NECK: Supple, No JVD  NERVOUS SYSTEM:  Alert & Oriented  CHEST/LUNG: Clear to auscultation bilaterally; No rales, rhonchi, wheezing  HEART: Regular rate and rhythm  ABDOMEN: Soft, Nontender, Nondistended; Bowel sounds present  EXTREMITIES:  No clubbing, cyanosis, calf tenderness or edema b/l      MEDICATIONS  (STANDING):  apixaban 5 milliGRAM(s) Oral two times a day  artificial tears (preservative free) Ophthalmic Solution 1 Drop(s) Both EYES two times a day  aspirin enteric coated 81 milliGRAM(s) Oral daily  atorvastatin 40 milliGRAM(s) Oral at bedtime  famotidine    Tablet 40 milliGRAM(s) Oral two times a day  melatonin 3 milliGRAM(s) Oral at bedtime  nystatin Powder 1 Application(s) Topical two times a day    MEDICATIONS  (PRN):  acetaminophen     Tablet .. 650 milliGRAM(s) Oral every 6 hours PRN Moderate Pain (4 - 6)  AQUAPHOR (petrolatum Ointment) 1 Application(s) Topical two times a day PRN dry skin  bisacodyl Suppository 10 milliGRAM(s) Rectal once PRN Constipation  ondansetron   Disintegrating Tablet 4 milliGRAM(s) Oral three times a day PRN Nausea and/or Vomiting  polyethylene glycol 3350 17 Gram(s) Oral two times a day PRN Constipation        Allergies    penicillins (Rash)    Intolerances    LABS:                        12.5   5.71  )-----------( 303      ( 23 May 2024 05:36 )             36.4     05-23    141  |  105  |  15  ----------------------------<  88  3.4<L>   |  29  |  0.65    Ca    8.5      23 May 2024 05:36    TPro  6.5  /  Alb  2.6<L>  /  TBili  0.7  /  DBili  x   /  AST  36  /  ALT  37  /  AlkPhos  73  05-23      Urinalysis Basic - ( 23 May 2024 05:36 )    Color: x / Appearance: x / SG: x / pH: x  Gluc: 88 mg/dL / Ketone: x  / Bili: x / Urobili: x   Blood: x / Protein: x / Nitrite: x   Leuk Esterase: x / RBC: x / WBC x   Sq Epi: x / Non Sq Epi: x / Bacteria: x

## 2024-05-23 NOTE — PROGRESS NOTE ADULT - TIME BILLING
Time spent includes direct patient care  (interview and examination of patient), discussion with other providers, support staff and/or patient's family members, review of medical records, ordering diagnostic tests and analyzing results, and documentation.
This includes reviewing results/imaging and discussions with specialists, nursing, case management/social work. Further tests, medications, and procedures have been ordered as indicated. Results and the plan of care were communicated to the patient and/or their family member. Supporting documentation was completed and added to the patient's chart.
Time spent includes direct patient care  (interview and examination of patient), discussion with other providers, support staff and/or patient's family members, review of medical records, ordering diagnostic tests and analyzing results, and documentation.

## 2024-05-23 NOTE — PROGRESS NOTE ADULT - ASSESSMENT
84y with CAD s/p CABG recent acute polyneuropathy diagnosis (march 2024).  Here after IVIG series c/b PE RUL and DVT RLE. Patient now admitted for a multidisciplinary rehab program. 05-10-24 @ 17:01      # Motor Axon Neuropathy  -initially began after viral illness in March 2024  -following with Neurologist Dr. Liu  -s/p IVIG ending on 5/8    # Constipation, improved  -on senna standing + miralax, dulcolax prn.       # CAD s/p CABG  # New onset AF  # HLD  -HR appears to be controlled while not being on AV martín blocking agent  -continue aspirin and statin  -eliquis for afib  -O/p cardiology follow up with cardiac MR    # PE  # DVT  - s/p eliquis 10mg BID x 7 days; C/w 5mg BID and o/p hematology followup     #Proteus bacteremia at [facility]  -s/p course of bactrim    # UTI  -s/p course of bactrim    DVT PPX: Eliquis  DIET: Regular + ensure

## 2024-05-23 NOTE — PROGRESS NOTE ADULT - SUBJECTIVE AND OBJECTIVE BOX
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. Presented with a 'stomach virus' in 3/2024, reported significant weight loss, sensation of 'wobbling,' with repeated falls. Had seen Dr. 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 Patient received a full body MRI demonstrating no acute changes of the spine or brain. Received 5 doses of IVIG ending on 5/8. Patient found to have a bactrim sensitive proteus UTI. Patient incidentially found to have RLE DVT and PE in the RUL. Now on full dose AC w/ BID lovenox.  Patient seen by physiatry and recommended for acute inpatient rehab. Discharged to [facility] on 5/10.   (10 May 2024 16:55)    seen in PT    REVIEW OF SYSTEMS  weakness    PAST MEDICAL & SURGICAL HISTORY  CAD (coronary artery disease)    S/P CABG x 1    HTN (hypertension)    HLD (hyperlipidemia)    Unilateral primary osteoarthritis, left knee    GERD (gastroesophageal reflux disease)    Unilateral primary osteoarthritis, right knee    S/P CABG x 4    History of colonoscopy    History of inguinal hernia repair      FAMILY HISTORY   Family history of heart failure (Father)  Family history of Alzheimer's disease (Mother)        RECENT LABS/IMAGING  CBC Full  -  ( 23 May 2024 05:36 )  WBC Count : 5.71 K/uL  RBC Count : 3.80 M/uL  Hemoglobin : 12.5 g/dL  Hematocrit : 36.4 %  Platelet Count - Automated : 303 K/uL  Mean Cell Volume : 95.8 fl  Mean Cell Hemoglobin : 32.9 pg  Mean Cell Hemoglobin Concentration : 34.3 gm/dL  Auto Neutrophil # : 3.10 K/uL  Auto Lymphocyte # : 1.91 K/uL  Auto Monocyte # : 0.52 K/uL  Auto Eosinophil # : 0.13 K/uL  Auto Basophil # : 0.03 K/uL  Auto Neutrophil % : 54.2 %  Auto Lymphocyte % : 33.5 %  Auto Monocyte % : 9.1 %  Auto Eosinophil % : 2.3 %  Auto Basophil % : 0.5 %    05-23    141  |  105  |  15  ----------------------------<  88  3.4<L>   |  29  |  0.65    Ca    8.5      23 May 2024 05:36    TPro  6.5  /  Alb  2.6<L>  /  TBili  0.7  /  DBili  x   /  AST  36  /  ALT  37  /  AlkPhos  73  05-23    Urinalysis Basic - ( 23 May 2024 05:36 )    Color: x / Appearance: x / SG: x / pH: x  Gluc: 88 mg/dL / Ketone: x  / Bili: x / Urobili: x   Blood: x / Protein: x / Nitrite: x   Leuk Esterase: x / RBC: x / WBC x   Sq Epi: x / Non Sq Epi: x / Bacteria: x      VITALS  T(C): --  HR: 79 (05-23-24 @ 08:04) (76 - 79)  BP: 107/71 (05-23-24 @ 08:04) (107/71 - 129/73)  RR: 16 (05-23-24 @ 08:04) (16 - 16)  SpO2: 95% (05-23-24 @ 08:04) (95% - 95%)  Wt(kg): --    ALLERGIES  penicillins (Rash)      MEDICATIONS   acetaminophen     Tablet .. 650 milliGRAM(s) Oral every 6 hours PRN  apixaban 5 milliGRAM(s) Oral two times a day  AQUAPHOR (petrolatum Ointment) 1 Application(s) Topical two times a day PRN  artificial tears (preservative free) Ophthalmic Solution 1 Drop(s) Both EYES two times a day  aspirin enteric coated 81 milliGRAM(s) Oral daily  atorvastatin 40 milliGRAM(s) Oral at bedtime  bisacodyl Suppository 10 milliGRAM(s) Rectal once PRN  famotidine    Tablet 40 milliGRAM(s) Oral two times a day  melatonin 3 milliGRAM(s) Oral at bedtime  nystatin Powder 1 Application(s) Topical two times a day  ondansetron   Disintegrating Tablet 4 milliGRAM(s) Oral three times a day PRN  polyethylene glycol 3350 17 Gram(s) Oral two times a day PRN      ----------------------------------------------------------------------------------------  PHYSICAL EXAM  Gen - NAD, Comfortable  HEENT - NCAT, EOMI  Neck - Supple, No limited ROM  Pulm - CTAB  Cardiovascular - RRR, S1S2  Abdomen - Soft, NT, ND   Extremities - No calf tenderness  Neuro-     Cognitive - AAOx3, follows commands     Communication - Fluent, No dysarthria     Motor -                     LEFT    UE - ShAB 3/5, EF 3+/5, EE 3+/5, WE 1/5,  2/5                    RIGHT UE - ShAB 2/5, EF 3/5, EE 3/5, WE 1/5,  1/5                    LEFT    LE - HF 2+/5, KE 4/5, DF 0/5, PF 0/5                      RIGHT LE - HF 2+/5, KE 4/5, DF 0/5, PF 0/5     Psychiatric - Mood stable, Affect WNL    ----------------------------------------------------------------------------------------  ASSESSMENT/PLANASSESSMENT/PLAN  REHANA MEDINA is a 84y with CAD s/p CABG recent acute polyneuropathy diagnosis (march 2024).  Here after IVIG series c/b PE RUL and DVT RLE. Patient now admitted for a multidisciplinary rehab program. 05-10-24 @ 17:01    # Motor Axon Neuropathy  Comprehensive Multidisciplinary Rehab Program:  -comprehensive rehab program of PT/OT- 3 hours a day, 5 days a week. P&O as needed   -initially began after viral illness in March 2024  -following with Neurologist Dr. Liu  -s/p IVIG ending on 5/8  OT- isotoner gloves, wrist supports during the day- apply softeeze splints at night- ?Functional E Stim  - recommend bracing for b/l dorsiflexion weakness    # CAD s/p CABG  # AF  -continue aspirin and statin  -on therapeutic AC    # PE  # DVT  - continue apixaban 10mg BID (transitioned off full dose lovenox)  - RUL PE and RLE DVT    Elevated LFTs - stable   ? secondary to IVIG  not taking tylenol  LFTS improving    # UTI  - sensitive proteus UTI  - bactrim 2 tab BID until 5/17  DC PVRs  Continue Cipro    # Pain  - Tylenol PRN    Mood / Cognition  - Neuropsychology consult PRN    Sleep  - Maintain quiet hours and a low stim environment.   - Melatonin 3 mg QHS    GI / Bowel  - Miralax daily  - Senna QHS  - GI ppx: famotidine 40 mg BID- HX GERD     / Bladder  -Bladder scans dc'd -voiding   - Toileting schedule every 3 hours while awake    Skin / Pressure injury  - Skin assessment on admission performed: MAD to b/l groin  - Turn q2 hours in bed while awake, air mattress  - nursing to monitor skin qShift  - soft heel protectors  - skin barrier cream PRN    Diet/Dysphagia:  - Diet Consistency: regular diet  - Aspiration Precautions  - Nutrition consult    DVT prophylaxis:   - has RLE DVT  - on apixaban   - Last Doppler on R soleal DVT on 5/8

## 2024-05-24 LAB
GENE STUDIED ID: SIGNIFICANT CHANGE UP
LAB TEST METHOD: SIGNIFICANT CHANGE UP
TEST PERFORMANCE INFO SPEC: SIGNIFICANT CHANGE UP
TEST PERFORMANCE INFO SPEC: SIGNIFICANT CHANGE UP
TTR GENE INTERPRETATION: SIGNIFICANT CHANGE UP
TTR GENE METHOD: SIGNIFICANT CHANGE UP
TTR GENE RELEASED BY: SIGNIFICANT CHANGE UP
TTR GENE RESULT SUMMARY: NEGATIVE — SIGNIFICANT CHANGE UP
TTR GENE RESULT: SIGNIFICANT CHANGE UP
TTR GENE SPECIMEN: SIGNIFICANT CHANGE UP

## 2024-05-24 PROCEDURE — 99232 SBSQ HOSP IP/OBS MODERATE 35: CPT

## 2024-05-24 RX ORDER — FLUTICASONE PROPIONATE 50 MCG
1 SPRAY, SUSPENSION NASAL
Refills: 0 | Status: DISCONTINUED | OUTPATIENT
Start: 2024-05-24 | End: 2024-06-04

## 2024-05-24 RX ORDER — FLUTICASONE PROPIONATE 50 MCG
1 SPRAY, SUSPENSION NASAL
Refills: 0 | Status: DISCONTINUED | OUTPATIENT
Start: 2024-05-24 | End: 2024-05-24

## 2024-05-24 RX ADMIN — Medication 1 DROP(S): at 06:02

## 2024-05-24 RX ADMIN — Medication 81 MILLIGRAM(S): at 11:42

## 2024-05-24 RX ADMIN — NYSTATIN CREAM 1 APPLICATION(S): 100000 CREAM TOPICAL at 06:02

## 2024-05-24 RX ADMIN — Medication 1 SPRAY(S): at 06:47

## 2024-05-24 RX ADMIN — Medication 1 DROP(S): at 18:19

## 2024-05-24 RX ADMIN — APIXABAN 5 MILLIGRAM(S): 2.5 TABLET, FILM COATED ORAL at 06:02

## 2024-05-24 RX ADMIN — ATORVASTATIN CALCIUM 40 MILLIGRAM(S): 80 TABLET, FILM COATED ORAL at 21:32

## 2024-05-24 RX ADMIN — FAMOTIDINE 40 MILLIGRAM(S): 10 INJECTION INTRAVENOUS at 06:01

## 2024-05-24 RX ADMIN — APIXABAN 5 MILLIGRAM(S): 2.5 TABLET, FILM COATED ORAL at 18:19

## 2024-05-24 RX ADMIN — Medication 1 SPRAY(S): at 18:19

## 2024-05-24 RX ADMIN — FAMOTIDINE 40 MILLIGRAM(S): 10 INJECTION INTRAVENOUS at 18:19

## 2024-05-24 NOTE — PROGRESS NOTE ADULT - ASSESSMENT
84y with CAD s/p CABG recent acute polyneuropathy diagnosis (march 2024).  Here after IVIG series c/b PE RUL and DVT RLE. Patient now admitted for a multidisciplinary rehab program. 05-10-24 @ 17:01    # Motor Axon Neuropathy  -initially began after viral illness in March 2024  -following with Neurologist Dr. Liu  -s/p IVIG ending on 5/8    # CAD s/p CABG  # New onset AF  # HLD  -Rate controlled off AV martín blocking agent  -ASA/Statin  -Eliquis  -O/p cardiology follow up with cardiac MR    # PE  # DVT  - s/p eliquis 10mg BID x 7 days; C/w 5mg BID and o/p hematology followup     #Proteus bacteremia at [facility]  -s/p course of bactrim    DVT PPX: Eliquis

## 2024-05-24 NOTE — PROGRESS NOTE ADULT - SUBJECTIVE AND OBJECTIVE BOX
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 No acute events overnight.  Pt denies cp, palpitations, sob, abd pain, N/V, fever, chills.    ROS:  All other review of systems negative    Allergies    penicillins (Rash)    Intolerances        MEDICATIONS  (STANDING):  apixaban 5 milliGRAM(s) Oral two times a day  artificial tears (preservative free) Ophthalmic Solution 1 Drop(s) Both EYES two times a day  aspirin enteric coated 81 milliGRAM(s) Oral daily  atorvastatin 40 milliGRAM(s) Oral at bedtime  famotidine    Tablet 40 milliGRAM(s) Oral two times a day  fluticasone propionate 50 MICROgram(s)/spray Nasal Spray 1 Spray(s) Both Nostrils two times a day  melatonin 3 milliGRAM(s) Oral at bedtime  nystatin Powder 1 Application(s) Topical two times a day    MEDICATIONS  (PRN):  acetaminophen     Tablet .. 650 milliGRAM(s) Oral every 6 hours PRN Moderate Pain (4 - 6)  AQUAPHOR (petrolatum Ointment) 1 Application(s) Topical two times a day PRN dry skin  bisacodyl Suppository 10 milliGRAM(s) Rectal once PRN Constipation  ondansetron   Disintegrating Tablet 4 milliGRAM(s) Oral three times a day PRN Nausea and/or Vomiting  polyethylene glycol 3350 17 Gram(s) Oral two times a day PRN Constipation      Vital Signs Last 24 Hrs  T(C): 36.8 (23 May 2024 20:01), Max: 36.8 (23 May 2024 20:01)  T(F): 98.2 (23 May 2024 20:01), Max: 98.2 (23 May 2024 20:01)  HR: 93 (23 May 2024 20:01) (93 - 93)  BP: 106/72 (23 May 2024 20:01) (106/72 - 106/72)  BP(mean): --  RR: 16 (23 May 2024 20:01) (16 - 16)  SpO2: 94% (23 May 2024 20:01) (94% - 94%)    Parameters below as of 23 May 2024 20:01  Patient On (Oxygen Delivery Method): room air      CAPILLARY BLOOD GLUCOSE        I&O's Summary      PHYSICAL EXAM:  GENERAL: NAD elderly male   HEAD:  Atraumatic, Normocephalic  NECK: Supple, No JVD  CHEST/LUNG: Clear to auscultation bilaterally; No wheeze, nonlabored breathing  HEART: Regular rate and rhythm; No murmurs, rubs, or gallops  ABDOMEN: Soft, Nontender, Nondistended; Bowel sounds present  EXTREMITIES: No clubbing, cyanosis, or edema  PSYCH: calm, appropriate mood    LABS:                        12.5   5.71  )-----------( 303      ( 23 May 2024 05:36 )             36.4     05-23    141  |  105  |  15  ----------------------------<  88  3.4<L>   |  29  |  0.65    Ca    8.5      23 May 2024 05:36    TPro  6.5  /  Alb  2.6<L>  /  TBili  0.7  /  DBili  x   /  AST  36  /  ALT  37  /  AlkPhos  73  05-23          Urinalysis Basic - ( 23 May 2024 05:36 )    Color: x / Appearance: x / SG: x / pH: x  Gluc: 88 mg/dL / Ketone: x  / Bili: x / Urobili: x   Blood: x / Protein: x / Nitrite: x   Leuk Esterase: x / RBC: x / WBC x   Sq Epi: x / Non Sq Epi: x / Bacteria: x        RADIOLOGY & ADDITIONAL TESTS:  Results Reviewed:   Imaging Personally Reviewed:  Electrocardiogram Personally Reviewed:    COORDINATION OF CARE:  Care Discussed with Consultants/Other Providers [Y/N]:  Prior or Outpatient Records Reviewed [Y/N]:

## 2024-05-24 NOTE — PROGRESS NOTE ADULT - SUBJECTIVE AND OBJECTIVE BOX
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 Presented with a 'stomach virus' in 3/2024, reported significant weight loss, sensation of 'wobbling,' with repeated falls. Had seen Dr. 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 Patient received a full body MRI demonstrating no acute changes of the spine or brain. Received 5 doses of IVIG ending on 5/8. Patient found to have a bactrim sensitive proteus UTI. Patient incidentially found to have RLE DVT and PE in the RUL. Now on full dose AC w/ BID lovenox.  Patient seen by physiatry and recommended for acute inpatient rehab. Discharged to [facility] on 5/10.   (10 May 2024 16:55)    patient reports bm yesterday.  tolerating therapy    REVIEW OF SYSTEMS  weakness    PAST MEDICAL & SURGICAL HISTORY  CAD (coronary artery disease)    S/P CABG x 1    HTN (hypertension)    HLD (hyperlipidemia)    Unilateral primary osteoarthritis, left knee    GERD (gastroesophageal reflux disease)    Unilateral primary osteoarthritis, right knee    S/P CABG x 4    History of colonoscopy    History of inguinal hernia repair       FAMILY HISTORY   Family history of heart failure (Father)    Family history of Alzheimer's disease (Mother)        RECENT LABS/IMAGING  CBC Full  -  ( 23 May 2024 05:36 )  WBC Count : 5.71 K/uL  RBC Count : 3.80 M/uL  Hemoglobin : 12.5 g/dL  Hematocrit : 36.4 %  Platelet Count - Automated : 303 K/uL  Mean Cell Volume : 95.8 fl  Mean Cell Hemoglobin : 32.9 pg  Mean Cell Hemoglobin Concentration : 34.3 gm/dL  Auto Neutrophil # : 3.10 K/uL  Auto Lymphocyte # : 1.91 K/uL  Auto Monocyte # : 0.52 K/uL  Auto Eosinophil # : 0.13 K/uL  Auto Basophil # : 0.03 K/uL  Auto Neutrophil % : 54.2 %  Auto Lymphocyte % : 33.5 %  Auto Monocyte % : 9.1 %  Auto Eosinophil % : 2.3 %  Auto Basophil % : 0.5 %    05-23    141  |  105  |  15  ----------------------------<  88  3.4<L>   |  29  |  0.65    Ca    8.5      23 May 2024 05:36    TPro  6.5  /  Alb  2.6<L>  /  TBili  0.7  /  DBili  x   /  AST  36  /  ALT  37  /  AlkPhos  73  05-23    Urinalysis Basic - ( 23 May 2024 05:36 )    Color: x / Appearance: x / SG: x / pH: x  Gluc: 88 mg/dL / Ketone: x  / Bili: x / Urobili: x   Blood: x / Protein: x / Nitrite: x   Leuk Esterase: x / RBC: x / WBC x   Sq Epi: x / Non Sq Epi: x / Bacteria: x        VITALS  T(C): 36.8 (05-23-24 @ 20:01), Max: 36.8 (05-23-24 @ 20:01)  HR: 93 (05-23-24 @ 20:01) (93 - 93)  BP: 106/72 (05-23-24 @ 20:01) (106/72 - 106/72)  RR: 16 (05-23-24 @ 20:01) (16 - 16)  SpO2: 94% (05-23-24 @ 20:01) (94% - 94%)  Wt(kg): --    ALLERGIES  penicillins (Rash)      MEDICATIONS   acetaminophen     Tablet .. 650 milliGRAM(s) Oral every 6 hours PRN  apixaban 5 milliGRAM(s) Oral two times a day  AQUAPHOR (petrolatum Ointment) 1 Application(s) Topical two times a day PRN  artificial tears (preservative free) Ophthalmic Solution 1 Drop(s) Both EYES two times a day  aspirin enteric coated 81 milliGRAM(s) Oral daily  atorvastatin 40 milliGRAM(s) Oral at bedtime  bisacodyl Suppository 10 milliGRAM(s) Rectal once PRN  famotidine    Tablet 40 milliGRAM(s) Oral two times a day  fluticasone propionate 50 MICROgram(s)/spray Nasal Spray 1 Spray(s) Both Nostrils two times a day  melatonin 3 milliGRAM(s) Oral at bedtime  nystatin Powder 1 Application(s) Topical two times a day  ondansetron   Disintegrating Tablet 4 milliGRAM(s) Oral three times a day PRN  polyethylene glycol 3350 17 Gram(s) Oral two times a day PRN      ----------------------------------------------------------------------------------------     PHYSICAL EXAM  Gen - NAD, Comfortable  HEENT - NCAT, EOMI  Neck - Supple, No limited ROM  Pulm - CTAB  Cardiovascular - RRR, S1S2  Abdomen - Soft, NT, ND   Extremities - No calf tenderness  Neuro-     Cognitive - AAOx3, follows commands     Communication - Fluent, No dysarthria     Motor -                     LEFT    UE - ShAB 3/5, EF 3+/5, EE 3+/5, WE 1/5,  2/5                    RIGHT UE - ShAB 2/5, EF 3/5, EE 3/5, WE 1/5,  1/5                    LEFT    LE - HF 2+/5, KE 4/5, DF 0/5, PF 0/5                      RIGHT LE - HF 2+/5, KE 4/5, DF 0/5, PF 0/5     Psychiatric - Mood stable, Affect WNL    ----------------------------------------------------------------------------------------  ASSESSMENT/PLANASSESSMENT/PLAN  REHANA MEDINA is a 84y with CAD s/p CABG recent acute polyneuropathy diagnosis (march 2024).  Here after IVIG series c/b PE RUL and DVT RLE. Patient now admitted for a multidisciplinary rehab program. 05-10-24 @ 17:01    # Motor Axon Neuropathy  Comprehensive Multidisciplinary Rehab Program:  -comprehensive rehab program of PT/OT- 3 hours a day, 5 days a week. P&O as needed   -initially began after viral illness in March 2024  -following with Neurologist Dr. Liu  -s/p IVIG ending on 5/8  OT- isotoner gloves, wrist supports during the day- apply softeeze splints at night- ?Functional E Stim  - recommend bracing for b/l dorsiflexion weakness    # CAD s/p CABG  # AF  -continue aspirin and statin  -on therapeutic AC    # PE  # DVT  - continue apixaban 10mg BID   - RUL PE and RLE DVT    Elevated LFTs - stable   ? secondary to IVIG  not taking tylenol  LFTS improving    # UTI  - sensitive proteus UTI  - completed antibiotics    # Pain  - Tylenol PRN    Mood / Cognition  - Neuropsychology consult PRN    Sleep  - Maintain quiet hours and a low stim environment.   - Melatonin 3 mg QHS    GI / Bowel  - Miralax daily  - Senna QHS  - GI ppx: famotidine 40 mg BID- HX GERD     / Bladder  -Bladder scans dc'd -voiding   - Toileting schedule every 3 hours while awake    Skin / Pressure injury  - Skin assessment on admission performed: MAD to b/l groin  - Turn q2 hours in bed while awake, air mattress  - nursing to monitor skin qShift  - soft heel protectors  - skin barrier cream PRN    Diet/Dysphagia:  - Diet Consistency: regular diet  - Aspiration Precautions  - Nutrition consult    DVT prophylaxis:   - has RLE DVT  - on apixaban   - Last Doppler on R soleal DVT on 5/8

## 2024-05-25 PROCEDURE — 99232 SBSQ HOSP IP/OBS MODERATE 35: CPT

## 2024-05-25 RX ADMIN — FAMOTIDINE 40 MILLIGRAM(S): 10 INJECTION INTRAVENOUS at 07:11

## 2024-05-25 RX ADMIN — Medication 1 SPRAY(S): at 17:16

## 2024-05-25 RX ADMIN — APIXABAN 5 MILLIGRAM(S): 2.5 TABLET, FILM COATED ORAL at 17:16

## 2024-05-25 RX ADMIN — Medication 81 MILLIGRAM(S): at 12:12

## 2024-05-25 RX ADMIN — Medication 1 DROP(S): at 17:16

## 2024-05-25 RX ADMIN — APIXABAN 5 MILLIGRAM(S): 2.5 TABLET, FILM COATED ORAL at 07:11

## 2024-05-25 RX ADMIN — NYSTATIN CREAM 1 APPLICATION(S): 100000 CREAM TOPICAL at 07:13

## 2024-05-25 RX ADMIN — Medication 1 SPRAY(S): at 07:11

## 2024-05-25 RX ADMIN — NYSTATIN CREAM 1 APPLICATION(S): 100000 CREAM TOPICAL at 17:14

## 2024-05-25 RX ADMIN — FAMOTIDINE 40 MILLIGRAM(S): 10 INJECTION INTRAVENOUS at 17:16

## 2024-05-25 RX ADMIN — ATORVASTATIN CALCIUM 40 MILLIGRAM(S): 80 TABLET, FILM COATED ORAL at 20:11

## 2024-05-25 RX ADMIN — Medication 1 DROP(S): at 07:13

## 2024-05-25 NOTE — PROGRESS NOTE ADULT - SUBJECTIVE AND OBJECTIVE BOX
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 no complaints. reports that he is participating well in therapy. no headache, dizziness, sob, cp, abd pain.     ALLERGIES:  penicillins (Rash)    MEDICATIONS  (STANDING):  apixaban 5 milliGRAM(s) Oral two times a day  artificial tears (preservative free) Ophthalmic Solution 1 Drop(s) Both EYES two times a day  aspirin enteric coated 81 milliGRAM(s) Oral daily  atorvastatin 40 milliGRAM(s) Oral at bedtime  famotidine    Tablet 40 milliGRAM(s) Oral two times a day  fluticasone propionate 50 MICROgram(s)/spray Nasal Spray 1 Spray(s) Both Nostrils two times a day  melatonin 3 milliGRAM(s) Oral at bedtime  nystatin Powder 1 Application(s) Topical two times a day    MEDICATIONS  (PRN):  acetaminophen     Tablet .. 650 milliGRAM(s) Oral every 6 hours PRN Moderate Pain (4 - 6)  AQUAPHOR (petrolatum Ointment) 1 Application(s) Topical two times a day PRN dry skin  bisacodyl Suppository 10 milliGRAM(s) Rectal once PRN Constipation  ondansetron   Disintegrating Tablet 4 milliGRAM(s) Oral three times a day PRN Nausea and/or Vomiting  polyethylene glycol 3350 17 Gram(s) Oral two times a day PRN Constipation    Vital Signs Last 24 Hrs  T(F): 98 (25 May 2024 08:35), Max: 98 (25 May 2024 08:35)  HR: 85 (25 May 2024 08:35) (85 - 89)  BP: 117/74 (25 May 2024 08:35) (100/60 - 117/74)  RR: 16 (25 May 2024 08:35) (16 - 16)  SpO2: 96% (25 May 2024 08:35) (95% - 96%)  I&O's Summary    PHYSICAL EXAM:  General: NAD, A/O x 3  ENT: MMM  Neck: Supple, No JVD  Lungs: Clear to auscultation bilaterally  Cardio: RRR, S1/S2, No murmurs  Abdomen: Soft, Nontender, Nondistended; Bowel sounds present  Extremities: No calf tenderness, No pitting edema    LABS:                        12.5   5.71  )-----------( 303      ( 23 May 2024 05:36 )             36.4     05-23    141  |  105  |  15  ----------------------------<  88  3.4   |  29  |  0.65    Ca    8.5      23 May 2024 05:36    TPro  6.5  /  Alb  2.6  /  TBili  0.7  /  DBili  x   /  AST  36  /  ALT  37  /  AlkPhos  73  05-23      05-02 Chol 119 mg/dL LDL -- HDL 44 mg/dL Trig 83 mg/dL        Urinalysis Basic - ( 23 May 2024 05:36 )    Color: x / Appearance: x / SG: x / pH: x  Gluc: 88 mg/dL / Ketone: x  / Bili: x / Urobili: x   Blood: x / Protein: x / Nitrite: x   Leuk Esterase: x / RBC: x / WBC x   Sq Epi: x / Non Sq Epi: x / Bacteria: x        COVID-19 PCR: NotDetec (05-10-24 @ 23:12)      RADIOLOGY & ADDITIONAL TESTS:    Care Discussed with Consultants/Other Providers:

## 2024-05-25 NOTE — PROGRESS NOTE ADULT - ASSESSMENT
84y with CAD s/p CABG recent acute polyneuropathy diagnosis (march 2024).  Here after IVIG series c/b PE RUL and DVT RLE. Patient now admitted for a multidisciplinary rehab program. 05-10-24 @ 17:01    # Motor Axon Neuropathy  -initially began after viral illness in March 2024  -following with Neurologist Dr. Liu  -s/p IVIG ending on 5/8    # CAD s/p CABG  # New onset AF  # HLD  -Rate controlled off AV martín blocking agent  -ASA/Statin  -Eliquis  -O/p cardiology follow up with cardiac MR    # PE  # DVT  - s/p eliquis 10mg BID x 7 days; C/w 5mg BID and o/p hematology followup     #Proteus bacteremia at [facility]  -s/p course of bactrim    DVT PPX: Eliquis

## 2024-05-25 NOTE — PROGRESS NOTE ADULT - SUBJECTIVE AND OBJECTIVE BOX
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  Pain controlled, no chest pain, no N/V, no Fevers/Chills. No other new ROS  Has been tolerating rehabilitation program.    acetaminophen     Tablet .. 650 milliGRAM(s) Oral every 6 hours PRN  apixaban 5 milliGRAM(s) Oral two times a day  AQUAPHOR (petrolatum Ointment) 1 Application(s) Topical two times a day PRN  artificial tears (preservative free) Ophthalmic Solution 1 Drop(s) Both EYES two times a day  aspirin enteric coated 81 milliGRAM(s) Oral daily  atorvastatin 40 milliGRAM(s) Oral at bedtime  bisacodyl Suppository 10 milliGRAM(s) Rectal once PRN  famotidine    Tablet 40 milliGRAM(s) Oral two times a day  fluticasone propionate 50 MICROgram(s)/spray Nasal Spray 1 Spray(s) Both Nostrils two times a day  melatonin 3 milliGRAM(s) Oral at bedtime  nystatin Powder 1 Application(s) Topical two times a day  ondansetron   Disintegrating Tablet 4 milliGRAM(s) Oral three times a day PRN  polyethylene glycol 3350 17 Gram(s) Oral two times a day PRN      T(C): 36.7 (05-25-24 @ 08:35), Max: 36.7 (05-25-24 @ 08:35)  HR: 85 (05-25-24 @ 08:35) (85 - 89)  BP: 117/74 (05-25-24 @ 08:35) (100/60 - 117/74)  RR: 16 (05-25-24 @ 08:35) (16 - 16)  SpO2: 96% (05-25-24 @ 08:35) (95% - 96%)     PHYSICAL EXAM  Gen - NAD, Comfortable  HEENT - NCAT, EOMI  Neck - Supple, No limited ROM  Pulm - CTAB  Cardiovascular - RRR, S1S2  Abdomen - Soft, NT, ND   Extremities - No calf tenderness  Neuro-     Cognitive - AAOx3, follows commands     Communication - Fluent, No dysarthria     Motor -                     LEFT    UE - ShAB 3/5, EF 3+/5, EE 3+/5, WE 1/5,  2/5                    RIGHT UE - ShAB 2/5, EF 3/5, EE 3/5, WE 1/5,  1/5                    LEFT    LE - HF 2+/5, KE 4/5, DF 0/5, PF 0/5                      RIGHT LE - HF 2+/5, KE 4/5, DF 0/5, PF 0/5     Psychiatric - Mood stable, Affect WNL    ----------------------------------------------------------------------------------------  ASSESSMENT/PLANASSESSMENT/PLAN  REHANA MEDINA is a 84y with CAD s/p CABG recent acute polyneuropathy diagnosis (march 2024).  Here after IVIG series c/b PE RUL and DVT RLE. Patient now admitted for a multidisciplinary rehab program. 05-10-24 @ 17:01    # Motor Axon Neuropathy  Comprehensive Multidisciplinary Rehab Program:  -comprehensive rehab program of PT/OT- 3 hours a day, 5 days a week. P&O as needed   -initially began after viral illness in March 2024  -following with Neurologist Dr. Liu  -s/p IVIG completed 5/8  OT- isotoner gloves, wrist supports during the day- apply softeeze splints at night- ?Functional E Stim  - recommend bracing for b/l dorsiflexion weakness    # CAD s/p CABG  # AF  -continue aspirin and statin  -on therapeutic AC    # PE  # DVT  - continue apixaban 10mg BID   - RUL PE and RLE DVT    Elevated LFTs - stable   ? secondary to IVIG  not taking tylenol  LFTS improving    # UTI  - sensitive proteus UTI  - completed antibiotics    # Pain  - Tylenol PRN    Mood / Cognition  - Neuropsychology consult PRN    Sleep  - Maintain quiet hours and a low stim environment.   - Melatonin 3 mg QHS    GI / Bowel  - Miralax daily  - Senna QHS  - GI ppx: famotidine 40 mg BID- HX GERD     / Bladder  -Bladder scans dc'd -voiding   - Toileting schedule every 3 hours while awake    Skin / Pressure injury  - Skin assessment on admission performed: MAD to b/l groin  - Turn q2 hours in bed while awake, air mattress  - nursing to monitor skin qShift  - soft heel protectors  - skin barrier cream PRN    Diet/Dysphagia:  - Diet Consistency: regular diet  - Aspiration Precautions  - Nutrition consult    DVT prophylaxis:   - has RLE DVT  - on apixaban   - Last Doppler on R soleal DVT on 5/8

## 2024-05-26 PROCEDURE — 99232 SBSQ HOSP IP/OBS MODERATE 35: CPT

## 2024-05-26 RX ADMIN — APIXABAN 5 MILLIGRAM(S): 2.5 TABLET, FILM COATED ORAL at 06:12

## 2024-05-26 RX ADMIN — FAMOTIDINE 40 MILLIGRAM(S): 10 INJECTION INTRAVENOUS at 17:58

## 2024-05-26 RX ADMIN — Medication 650 MILLIGRAM(S): at 22:09

## 2024-05-26 RX ADMIN — Medication 1 DROP(S): at 22:10

## 2024-05-26 RX ADMIN — Medication 1 SPRAY(S): at 22:11

## 2024-05-26 RX ADMIN — APIXABAN 5 MILLIGRAM(S): 2.5 TABLET, FILM COATED ORAL at 17:58

## 2024-05-26 RX ADMIN — FAMOTIDINE 40 MILLIGRAM(S): 10 INJECTION INTRAVENOUS at 06:11

## 2024-05-26 RX ADMIN — Medication 650 MILLIGRAM(S): at 22:42

## 2024-05-26 RX ADMIN — Medication 81 MILLIGRAM(S): at 11:14

## 2024-05-26 RX ADMIN — ATORVASTATIN CALCIUM 40 MILLIGRAM(S): 80 TABLET, FILM COATED ORAL at 22:10

## 2024-05-26 RX ADMIN — Medication 1 DROP(S): at 06:15

## 2024-05-26 RX ADMIN — NYSTATIN CREAM 1 APPLICATION(S): 100000 CREAM TOPICAL at 06:12

## 2024-05-26 RX ADMIN — Medication 1 SPRAY(S): at 06:12

## 2024-05-26 NOTE — PROGRESS NOTE ADULT - SUBJECTIVE AND OBJECTIVE BOX
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  Pain controlled, no chest pain, no N/V, no Fevers/Chills. No other new ROS  Has been tolerating rehabilitation program.    acetaminophen     Tablet .. 650 milliGRAM(s) Oral every 6 hours PRN  apixaban 5 milliGRAM(s) Oral two times a day  AQUAPHOR (petrolatum Ointment) 1 Application(s) Topical two times a day PRN  artificial tears (preservative free) Ophthalmic Solution 1 Drop(s) Both EYES two times a day  aspirin enteric coated 81 milliGRAM(s) Oral daily  atorvastatin 40 milliGRAM(s) Oral at bedtime  bisacodyl Suppository 10 milliGRAM(s) Rectal once PRN  famotidine    Tablet 40 milliGRAM(s) Oral two times a day  fluticasone propionate 50 MICROgram(s)/spray Nasal Spray 1 Spray(s) Both Nostrils two times a day  melatonin 3 milliGRAM(s) Oral at bedtime  nystatin Powder 1 Application(s) Topical two times a day  ondansetron   Disintegrating Tablet 4 milliGRAM(s) Oral three times a day PRN  polyethylene glycol 3350 17 Gram(s) Oral two times a day PRN      T(C): 36.8 (05-25-24 @ 20:13), Max: 36.8 (05-25-24 @ 20:13)  HR: 91 (05-25-24 @ 20:13) (91 - 91)  BP: 116/74 (05-25-24 @ 20:13) (116/74 - 116/74)  RR: 16 (05-25-24 @ 20:13) (16 - 16)  SpO2: 94% (05-25-24 @ 20:13) (94% - 94%)     PHYSICAL EXAM  Gen - NAD, Comfortable  HEENT - NCAT, EOMI  Neck - Supple, No limited ROM  Pulm - CTAB  Cardiovascular - RRR, S1S2  Abdomen - Soft, NT, ND   Extremities - No calf tenderness  Neuro-     Cognitive - AAOx3, follows commands     Communication - Fluent, No dysarthria     Motor -                     LEFT    UE - ShAB 3/5, EF 3+/5, EE 3+/5, WE 1/5,  2/5                    RIGHT UE - ShAB 2/5, EF 3/5, EE 3/5, WE 1/5,  1/5                    LEFT    LE - HF 2+/5, KE 4/5, DF 0/5, PF 0/5                      RIGHT LE - HF 2+/5, KE 4/5, DF 0/5, PF 0/5     Psychiatric - Mood stable, Affect WNL    ----------------------------------------------------------------------------------------  ASSESSMENT/PLANASSESSMENT/PLAN  REHANA MEDINA is a 84y with CAD s/p CABG recent acute polyneuropathy diagnosis (march 2024).  Here after IVIG series c/b PE RUL and DVT RLE. Patient now admitted for a multidisciplinary rehab program. 05-10-24 @ 17:01    # Motor Axon Neuropathy  Comprehensive Multidisciplinary Rehab Program:  -comprehensive rehab program of PT/OT- 3 hours a day, 5 days a week. P&O as needed   -initially began after viral illness in March 2024  -following with Neurologist Dr. Liu  -s/p IVIG completed 5/8  OT- isotoner gloves, wrist supports during the day- apply softeeze splints at night- ?Functional E Stim  - recommend bracing for b/l dorsiflexion weakness    # CAD s/p CABG  # AF  -continue aspirin and statin  -on therapeutic AC    # PE  # DVT  - continue apixaban 10mg BID   - RUL PE and RLE DVT    Elevated LFTs - stable   ? secondary to IVIG  not taking tylenol  LFTS improving    # UTI  - sensitive proteus UTI  - completed antibiotics    # Pain  - Tylenol PRN    Mood / Cognition  - Neuropsychology consult PRN    Sleep  - Maintain quiet hours and a low stim environment.   - Melatonin 3 mg QHS    GI / Bowel  - Miralax daily  - Senna QHS  - GI ppx: famotidine 40 mg BID- HX GERD     / Bladder  -Bladder scans dc'd -voiding   - Toileting schedule every 3 hours while awake    Skin / Pressure injury  - Skin assessment on admission performed: MAD to b/l groin  - Turn q2 hours in bed while awake, air mattress  - nursing to monitor skin qShift  - soft heel protectors  - skin barrier cream PRN    Diet/Dysphagia:  - Diet Consistency: regular diet  - Aspiration Precautions  - Nutrition consult    DVT prophylaxis:   - has RLE DVT  - on apixaban   - Last Doppler on R soleal DVT on 5/8

## 2024-05-27 LAB
ALBUMIN SERPL ELPH-MCNC: 2.4 G/DL — LOW (ref 3.3–5)
ALP SERPL-CCNC: 73 U/L — SIGNIFICANT CHANGE UP (ref 40–120)
ALT FLD-CCNC: 29 U/L — SIGNIFICANT CHANGE UP (ref 10–45)
ANION GAP SERPL CALC-SCNC: 8 MMOL/L — SIGNIFICANT CHANGE UP (ref 5–17)
AST SERPL-CCNC: 59 U/L — HIGH (ref 10–40)
BASOPHILS # BLD AUTO: 0.02 K/UL — SIGNIFICANT CHANGE UP (ref 0–0.2)
BASOPHILS NFR BLD AUTO: 0.4 % — SIGNIFICANT CHANGE UP (ref 0–2)
BILIRUB SERPL-MCNC: 0.8 MG/DL — SIGNIFICANT CHANGE UP (ref 0.2–1.2)
BUN SERPL-MCNC: 11 MG/DL — SIGNIFICANT CHANGE UP (ref 7–23)
CALCIUM SERPL-MCNC: 8.5 MG/DL — SIGNIFICANT CHANGE UP (ref 8.4–10.5)
CHLORIDE SERPL-SCNC: 104 MMOL/L — SIGNIFICANT CHANGE UP (ref 96–108)
CO2 SERPL-SCNC: 27 MMOL/L — SIGNIFICANT CHANGE UP (ref 22–31)
CREAT SERPL-MCNC: 0.47 MG/DL — LOW (ref 0.5–1.3)
EGFR: 104 ML/MIN/1.73M2 — SIGNIFICANT CHANGE UP
EOSINOPHIL # BLD AUTO: 0.15 K/UL — SIGNIFICANT CHANGE UP (ref 0–0.5)
EOSINOPHIL NFR BLD AUTO: 3 % — SIGNIFICANT CHANGE UP (ref 0–6)
GLUCOSE SERPL-MCNC: 81 MG/DL — SIGNIFICANT CHANGE UP (ref 70–99)
HCT VFR BLD CALC: 37.6 % — LOW (ref 39–50)
HGB BLD-MCNC: 12.9 G/DL — LOW (ref 13–17)
IMM GRANULOCYTES NFR BLD AUTO: 0.2 % — SIGNIFICANT CHANGE UP (ref 0–0.9)
LYMPHOCYTES # BLD AUTO: 1.55 K/UL — SIGNIFICANT CHANGE UP (ref 1–3.3)
LYMPHOCYTES # BLD AUTO: 31.1 % — SIGNIFICANT CHANGE UP (ref 13–44)
MCHC RBC-ENTMCNC: 32.7 PG — SIGNIFICANT CHANGE UP (ref 27–34)
MCHC RBC-ENTMCNC: 34.3 GM/DL — SIGNIFICANT CHANGE UP (ref 32–36)
MCV RBC AUTO: 95.4 FL — SIGNIFICANT CHANGE UP (ref 80–100)
MONOCYTES # BLD AUTO: 0.45 K/UL — SIGNIFICANT CHANGE UP (ref 0–0.9)
MONOCYTES NFR BLD AUTO: 9 % — SIGNIFICANT CHANGE UP (ref 2–14)
NEUTROPHILS # BLD AUTO: 2.8 K/UL — SIGNIFICANT CHANGE UP (ref 1.8–7.4)
NEUTROPHILS NFR BLD AUTO: 56.3 % — SIGNIFICANT CHANGE UP (ref 43–77)
NRBC # BLD: 0 /100 WBCS — SIGNIFICANT CHANGE UP (ref 0–0)
PLATELET # BLD AUTO: 201 K/UL — SIGNIFICANT CHANGE UP (ref 150–400)
POTASSIUM SERPL-MCNC: 3.9 MMOL/L — SIGNIFICANT CHANGE UP (ref 3.5–5.3)
POTASSIUM SERPL-SCNC: 3.9 MMOL/L — SIGNIFICANT CHANGE UP (ref 3.5–5.3)
PROT SERPL-MCNC: 6.5 G/DL — SIGNIFICANT CHANGE UP (ref 6–8.3)
RBC # BLD: 3.94 M/UL — LOW (ref 4.2–5.8)
RBC # FLD: 16.2 % — HIGH (ref 10.3–14.5)
SODIUM SERPL-SCNC: 139 MMOL/L — SIGNIFICANT CHANGE UP (ref 135–145)
WBC # BLD: 4.98 K/UL — SIGNIFICANT CHANGE UP (ref 3.8–10.5)
WBC # FLD AUTO: 4.98 K/UL — SIGNIFICANT CHANGE UP (ref 3.8–10.5)

## 2024-05-27 PROCEDURE — 99232 SBSQ HOSP IP/OBS MODERATE 35: CPT

## 2024-05-27 RX ADMIN — Medication 650 MILLIGRAM(S): at 21:36

## 2024-05-27 RX ADMIN — Medication 1 DROP(S): at 17:03

## 2024-05-27 RX ADMIN — Medication 1 SPRAY(S): at 17:04

## 2024-05-27 RX ADMIN — Medication 81 MILLIGRAM(S): at 12:13

## 2024-05-27 RX ADMIN — NYSTATIN CREAM 1 APPLICATION(S): 100000 CREAM TOPICAL at 17:07

## 2024-05-27 RX ADMIN — FAMOTIDINE 40 MILLIGRAM(S): 10 INJECTION INTRAVENOUS at 05:26

## 2024-05-27 RX ADMIN — APIXABAN 5 MILLIGRAM(S): 2.5 TABLET, FILM COATED ORAL at 05:26

## 2024-05-27 RX ADMIN — Medication 1 SPRAY(S): at 05:27

## 2024-05-27 RX ADMIN — ATORVASTATIN CALCIUM 40 MILLIGRAM(S): 80 TABLET, FILM COATED ORAL at 21:20

## 2024-05-27 RX ADMIN — APIXABAN 5 MILLIGRAM(S): 2.5 TABLET, FILM COATED ORAL at 17:04

## 2024-05-27 RX ADMIN — Medication 1 DROP(S): at 05:27

## 2024-05-27 RX ADMIN — NYSTATIN CREAM 1 APPLICATION(S): 100000 CREAM TOPICAL at 05:27

## 2024-05-27 RX ADMIN — FAMOTIDINE 40 MILLIGRAM(S): 10 INJECTION INTRAVENOUS at 17:03

## 2024-05-27 NOTE — PROGRESS NOTE ADULT - SUBJECTIVE AND OBJECTIVE BOX
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  Pain controlled, no chest pain, no N/V, no Fevers/Chills. No other new ROS  Has been tolerating rehabilitation program.    acetaminophen     Tablet .. 650 milliGRAM(s) Oral every 6 hours PRN  apixaban 5 milliGRAM(s) Oral two times a day  AQUAPHOR (petrolatum Ointment) 1 Application(s) Topical two times a day PRN  artificial tears (preservative free) Ophthalmic Solution 1 Drop(s) Both EYES two times a day  aspirin enteric coated 81 milliGRAM(s) Oral daily  atorvastatin 40 milliGRAM(s) Oral at bedtime  bisacodyl Suppository 10 milliGRAM(s) Rectal once PRN  famotidine    Tablet 40 milliGRAM(s) Oral two times a day  fluticasone propionate 50 MICROgram(s)/spray Nasal Spray 1 Spray(s) Both Nostrils two times a day  melatonin 3 milliGRAM(s) Oral at bedtime  nystatin Powder 1 Application(s) Topical two times a day  ondansetron   Disintegrating Tablet 4 milliGRAM(s) Oral three times a day PRN  polyethylene glycol 3350 17 Gram(s) Oral two times a day PRN      T(C): 36.3 (05-27-24 @ 08:35), Max: 36.7 (05-26-24 @ 22:03)  HR: 88 (05-27-24 @ 08:35) (88 - 91)  BP: 103/68 (05-27-24 @ 08:35) (103/68 - 107/73)  RR: 17 (05-27-24 @ 08:35) (16 - 17)  SpO2: 95% (05-27-24 @ 08:35) (95% - 95%)    In NAD  HEENT- EOMI  Heart- RRR, S1S2  Lungs- no respiratory distress  Abd- + BS, NT  Ext- No calf pain  Neuro- Exam unchanged       ASSESSMENT/PLAN  REHANA MEDINA is a 84y with CAD s/p CABG recent acute polyneuropathy diagnosis (march 2024).  Here after IVIG series c/b PE RUL and DVT RLE. Patient now admitted for a multidisciplinary rehab program. 05-10-24 @ 17:01    # Motor Axon Neuropathy  Comprehensive Multidisciplinary Rehab Program:  -comprehensive rehab program of PT/OT- 3 hours a day, 5 days a week. P&O as needed   -initially began after viral illness in March 2024  -following with Neurologist Dr. Liu  -s/p IVIG completed 5/8  OT- isotoner gloves, wrist supports during the day- apply softeeze splints at night- ?Functional E Stim  - recommend bracing for b/l dorsiflexion weakness    # CAD s/p CABG  # AF  -continue aspirin and statin  -on therapeutic AC    # PE  # DVT  - continue apixaban 10mg BID   - RUL PE and RLE DVT    Elevated LFTs - stable   ? secondary to IVIG  not taking tylenol  LFTS improving    # UTI  - sensitive proteus UTI  - completed antibiotics    # Pain  - Tylenol PRN    Mood / Cognition  - Neuropsychology consult PRN    Sleep  - Maintain quiet hours and a low stim environment.   - Melatonin 3 mg QHS    GI / Bowel  - Miralax daily  - Senna QHS  - GI ppx: famotidine 40 mg BID- HX GERD     / Bladder  -Bladder scans dc'd -voiding   - Toileting schedule every 3 hours while awake    Skin / Pressure injury  - Skin assessment on admission performed: MAD to b/l groin  - Turn q2 hours in bed while awake, air mattress  - nursing to monitor skin qShift  - soft heel protectors  - skin barrier cream PRN    Diet/Dysphagia:  - Diet Consistency: regular diet  - Aspiration Precautions  - Nutrition consult    DVT prophylaxis:   - has RLE DVT  - on apixaban   - Last Doppler on R soleal DVT on 5/8

## 2024-05-27 NOTE — PROGRESS NOTE ADULT - ASSESSMENT
84y with CAD s/p CABG recent acute polyneuropathy diagnosis (march 2024).  Here after IVIG series c/b PE RUL and DVT RLE. Patient now admitted for a multidisciplinary rehab program. 05-10-24 @ 17:01    #Motor Axon Neuropathy  -initially began after viral illness in March 2024  -following with Neurologist Dr. Liu  -s/p IVIG ending on 5/8    #CAD s/p CABG  #New onset AF  #HLD  -Heart rate controlled off AV martín blocking agent  -ASA/Statin  -Eliquis  -O/p cardiology follow up with cardiac MR    #PE  #DVT  -C/w 5mg BID and o/p hematology followup     #Proteus bacteremia at [facility]  -s/p course of bactrim    DVT PPX: Eliquis

## 2024-05-27 NOTE — PROGRESS NOTE ADULT - SUBJECTIVE AND OBJECTIVE BOX
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 no complaints. reports PT going well. no fever, chills, sob, cp, abd pain.     ALLERGIES:  penicillins (Rash)    MEDICATIONS  (STANDING):  apixaban 5 milliGRAM(s) Oral two times a day  artificial tears (preservative free) Ophthalmic Solution 1 Drop(s) Both EYES two times a day  aspirin enteric coated 81 milliGRAM(s) Oral daily  atorvastatin 40 milliGRAM(s) Oral at bedtime  famotidine    Tablet 40 milliGRAM(s) Oral two times a day  fluticasone propionate 50 MICROgram(s)/spray Nasal Spray 1 Spray(s) Both Nostrils two times a day  melatonin 3 milliGRAM(s) Oral at bedtime  nystatin Powder 1 Application(s) Topical two times a day    MEDICATIONS  (PRN):  acetaminophen     Tablet .. 650 milliGRAM(s) Oral every 6 hours PRN Moderate Pain (4 - 6)  AQUAPHOR (petrolatum Ointment) 1 Application(s) Topical two times a day PRN dry skin  bisacodyl Suppository 10 milliGRAM(s) Rectal once PRN Constipation  ondansetron   Disintegrating Tablet 4 milliGRAM(s) Oral three times a day PRN Nausea and/or Vomiting  polyethylene glycol 3350 17 Gram(s) Oral two times a day PRN Constipation    Vital Signs Last 24 Hrs  T(F): 97.4 (27 May 2024 08:35), Max: 98.1 (26 May 2024 22:03)  HR: 88 (27 May 2024 08:35) (88 - 91)  BP: 103/68 (27 May 2024 08:35) (103/68 - 107/73)  RR: 17 (27 May 2024 08:35) (16 - 17)  SpO2: 95% (27 May 2024 08:35) (95% - 95%)  I&O's Summary    PHYSICAL EXAM:  General: NAD, A/O x 3  ENT: MMM  Neck: Supple, No JVD  Lungs: Clear to auscultation bilaterally  Cardio: RRR, S1/S2, No murmurs  Abdomen: Soft, Nontender, Nondistended; Bowel sounds present  Extremities: No calf tenderness, No pitting edema    LABS:                        12.9   4.98  )-----------( 201      ( 27 May 2024 05:16 )             37.6     05-27    139  |  104  |  11  ----------------------------<  81  3.9   |  27  |  0.47    Ca    8.5      27 May 2024 05:16    TPro  6.5  /  Alb  2.4  /  TBili  0.8  /  DBili  x   /  AST  59  /  ALT  29  /  AlkPhos  73  05-27      05-02 Chol 119 mg/dL LDL -- HDL 44 mg/dL Trig 83 mg/dL          Urinalysis Basic - ( 27 May 2024 05:16 )    Color: x / Appearance: x / SG: x / pH: x  Gluc: 81 mg/dL / Ketone: x  / Bili: x / Urobili: x   Blood: x / Protein: x / Nitrite: x   Leuk Esterase: x / RBC: x / WBC x   Sq Epi: x / Non Sq Epi: x / Bacteria: x        COVID-19 PCR: NotDetec (05-10-24 @ 23:12)      RADIOLOGY & ADDITIONAL TESTS:    Care Discussed with Consultants/Other Providers:

## 2024-05-28 PROCEDURE — 99232 SBSQ HOSP IP/OBS MODERATE 35: CPT

## 2024-05-28 PROCEDURE — 99232 SBSQ HOSP IP/OBS MODERATE 35: CPT | Mod: GC

## 2024-05-28 RX ADMIN — APIXABAN 5 MILLIGRAM(S): 2.5 TABLET, FILM COATED ORAL at 06:14

## 2024-05-28 RX ADMIN — Medication 1 DROP(S): at 17:51

## 2024-05-28 RX ADMIN — NYSTATIN CREAM 1 APPLICATION(S): 100000 CREAM TOPICAL at 20:47

## 2024-05-28 RX ADMIN — FAMOTIDINE 40 MILLIGRAM(S): 10 INJECTION INTRAVENOUS at 06:15

## 2024-05-28 RX ADMIN — FAMOTIDINE 40 MILLIGRAM(S): 10 INJECTION INTRAVENOUS at 17:51

## 2024-05-28 RX ADMIN — Medication 1 DROP(S): at 06:15

## 2024-05-28 RX ADMIN — Medication 81 MILLIGRAM(S): at 11:23

## 2024-05-28 RX ADMIN — Medication 1 SPRAY(S): at 17:51

## 2024-05-28 RX ADMIN — ATORVASTATIN CALCIUM 40 MILLIGRAM(S): 80 TABLET, FILM COATED ORAL at 22:41

## 2024-05-28 RX ADMIN — NYSTATIN CREAM 1 APPLICATION(S): 100000 CREAM TOPICAL at 06:00

## 2024-05-28 RX ADMIN — Medication 1 SPRAY(S): at 06:14

## 2024-05-28 RX ADMIN — Medication 650 MILLIGRAM(S): at 07:00

## 2024-05-28 RX ADMIN — Medication 3 MILLIGRAM(S): at 22:41

## 2024-05-28 RX ADMIN — APIXABAN 5 MILLIGRAM(S): 2.5 TABLET, FILM COATED ORAL at 17:51

## 2024-05-28 NOTE — PROGRESS NOTE ADULT - ASSESSMENT
REHANA MEDINA is a 84y with CAD s/p CABG recent acute polyneuropathy diagnosis (march 2024).  Here after IVIG series c/b PE RUL and DVT RLE. Patient now admitted for a multidisciplinary rehab program. 05-10-24 @ 17:01    # Motor Axon Neuropathy  Comprehensive Multidisciplinary Rehab Program:  -comprehensive rehab program of PT/OT- 3 hours a day, 5 days a week. P&O as needed   -initially began after viral illness in March 2024  -following with Neurologist Dr. Liu  -s/p IVIG ending on 5/8  OT- isotoner gloves, wrist supports during the day- apply softeeze splints at night- ?Functional E Stim    # CAD s/p CABG  # AF  -continue aspirin and statin  -on therapeutic AC    # PE  # DVT  - continue lovenox 90 mg BID- hospitalist transitioned to [facility]  - RUL PE and RLE DVT    Elevated LFTs  ? secondary to IVIG  not taking tylenol  LFTS improving    # UTI  - sensitive proteus UTI  - bactrim 2 tab BID until 5/17  voiding well    # Pain  - Tylenol PRN    Mood / Cognition  - Neuropsychology consult PRN    Sleep  - Maintain quiet hours and a low stim environment.   - Melatonin 3 mg QHS    GI / Bowel  - Miralax daily> 2x/day  - GI ppx: famotidine 40 mg BID- HX GERD      Skin / Pressure injury  - Skin assessment on admission performed: MAD to b/l groin  - Turn q2 hours in bed while awake, air mattress  - nursing to monitor skin qShift  - soft heel protectors  - skin barrier cream PRN    Diet/Dysphagia:  - Diet Consistency: regular diet  - Aspiration Precautions  - Nutrition consult    DVT prophylaxis:   - has RLE DVT  - on full dose Lovenox  - Last Doppler on R soleal DVT on 5/8- Eliquis

## 2024-05-28 NOTE — PROGRESS NOTE ADULT - SUBJECTIVE AND OBJECTIVE BOX
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 No acute events overnight.  Pt denies cp, palpitations, sob, abd pain, N/V, fever, chills.    ROS:  All other review of systems negative    Allergies    penicillins (Rash)    Intolerances        MEDICATIONS  (STANDING):  apixaban 5 milliGRAM(s) Oral two times a day  artificial tears (preservative free) Ophthalmic Solution 1 Drop(s) Both EYES two times a day  aspirin enteric coated 81 milliGRAM(s) Oral daily  atorvastatin 40 milliGRAM(s) Oral at bedtime  famotidine    Tablet 40 milliGRAM(s) Oral two times a day  fluticasone propionate 50 MICROgram(s)/spray Nasal Spray 1 Spray(s) Both Nostrils two times a day  melatonin 3 milliGRAM(s) Oral at bedtime  nystatin Powder 1 Application(s) Topical two times a day    MEDICATIONS  (PRN):  acetaminophen     Tablet .. 650 milliGRAM(s) Oral every 6 hours PRN Moderate Pain (4 - 6)  AQUAPHOR (petrolatum Ointment) 1 Application(s) Topical two times a day PRN dry skin  bisacodyl Suppository 10 milliGRAM(s) Rectal once PRN Constipation  ondansetron   Disintegrating Tablet 4 milliGRAM(s) Oral three times a day PRN Nausea and/or Vomiting  polyethylene glycol 3350 17 Gram(s) Oral two times a day PRN Constipation      Vital Signs Last 24 Hrs  T(C): 36.9 (27 May 2024 19:50), Max: 36.9 (27 May 2024 19:50)  T(F): 98.5 (27 May 2024 19:50), Max: 98.5 (27 May 2024 19:50)  HR: 85 (27 May 2024 19:50) (85 - 88)  BP: 107/68 (27 May 2024 19:50) (103/68 - 107/68)  BP(mean): --  RR: 17 (27 May 2024 08:35) (17 - 17)  SpO2: 93% (27 May 2024 19:50) (93% - 95%)    Parameters below as of 27 May 2024 19:50  Patient On (Oxygen Delivery Method): room air      CAPILLARY BLOOD GLUCOSE        I&O's Summary      PHYSICAL EXAM:  GENERAL: NAD elderly male   HEAD:  Atraumatic, Normocephalic  NECK: Supple, No JVD  CHEST/LUNG: Clear to auscultation bilaterally; No wheeze, nonlabored breathing  HEART: Regular rate and rhythm; No murmurs, rubs, or gallops  ABDOMEN: Soft, Nontender, Nondistended; Bowel sounds present  EXTREMITIES: No clubbing, cyanosis, or edema  PSYCH: calm, appropriate mood      LABS:                        12.9   4.98  )-----------( 201      ( 27 May 2024 05:16 )             37.6     05-27    139  |  104  |  11  ----------------------------<  81  3.9   |  27  |  0.47<L>    Ca    8.5      27 May 2024 05:16    TPro  6.5  /  Alb  2.4<L>  /  TBili  0.8  /  DBili  x   /  AST  59<H>  /  ALT  29  /  AlkPhos  73  05-27          Urinalysis Basic - ( 27 May 2024 05:16 )    Color: x / Appearance: x / SG: x / pH: x  Gluc: 81 mg/dL / Ketone: x  / Bili: x / Urobili: x   Blood: x / Protein: x / Nitrite: x   Leuk Esterase: x / RBC: x / WBC x   Sq Epi: x / Non Sq Epi: x / Bacteria: x        RADIOLOGY & ADDITIONAL TESTS:  Results Reviewed:   Imaging Personally Reviewed:  Electrocardiogram Personally Reviewed:    COORDINATION OF CARE:  Care Discussed with Consultants/Other Providers [Y/N]:  Prior or Outpatient Records Reviewed [Y/N]:

## 2024-05-28 NOTE — PROGRESS NOTE ADULT - ASSESSMENT
84y with CAD s/p CABG recent acute polyneuropathy diagnosis (march 2024).  Here after IVIG series c/b PE RUL and DVT RLE. Patient now admitted for a multidisciplinary rehab program. 05-10-24 @ 17:01    #Motor Axon Neuropathy  -initially began after viral illness in March 2024  -following with Neurologist Dr. Liu  -s/p IVIG ending on 5/8    #CAD s/p CABG  #New onset AF  #HLD  -Heart rate controlled off AV martín blocking agent  -ASA/Statin  -Eliquis  -O/p cardiology follow up with cardiac MR    #PE  #DVT  -Eliquis  -Outpatient hematology followup     #Proteus bacteremia at [facility]  -s/p course of bactrim    DVT PPX: Eliquis

## 2024-05-28 NOTE — PROGRESS NOTE ADULT - SUBJECTIVE AND OBJECTIVE BOX
HPI:  83M R-handed PMHx CAD s/p bypass x4 and HLD presented as a transfer from [facility]. Presented with a 'stomach virus' in 3/2024, reported significant weight loss, sensation of 'wobbling,' with repeated falls. Had seen Dr. 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 Patient received a full body MRI demonstrating no acute changes of the spine or brain. Received 5 doses of IVIG ending on 5/8. Patient found to have a bactrim sensitive proteus UTI. Patient incidentially found to have RLE DVT and PE in the RUL. Now on full dose AC w/ BID lovenox.  Patient seen by physiatry and recommended for acute inpatient rehab. Discharged to [facility] on 5/10.      ROS/subjective:  patient seen and evaluated bedside  no events over weekend  Strength improving- working on one person transfers- In RALPH as well  Less hand pain Bilaterally  frinished antibiotics for UTI  last BM 5/26  tolerating [place] overnight- limit use, toilet daytime   hector wrist supports/ Isotoner gloves per OT  Softeeze splints adjusted by OT  no dizziness, no headaches, no nausea, no vomiting, no SOB, No chest pain  Tolerating Eliquis,      MEDICATIONS  (STANDING):  apixaban 5 milliGRAM(s) Oral two times a day  artificial tears (preservative free) Ophthalmic Solution 1 Drop(s) Both EYES two times a day  aspirin enteric coated 81 milliGRAM(s) Oral daily  atorvastatin 40 milliGRAM(s) Oral at bedtime  famotidine    Tablet 40 milliGRAM(s) Oral two times a day  fluticasone propionate 50 MICROgram(s)/spray Nasal Spray 1 Spray(s) Both Nostrils two times a day  melatonin 3 milliGRAM(s) Oral at bedtime  nystatin Powder 1 Application(s) Topical two times a day    MEDICATIONS  (PRN):  acetaminophen     Tablet .. 650 milliGRAM(s) Oral every 6 hours PRN Moderate Pain (4 - 6)  AQUAPHOR (petrolatum Ointment) 1 Application(s) Topical two times a day PRN dry skin  bisacodyl Suppository 10 milliGRAM(s) Rectal once PRN Constipation  ondansetron   Disintegrating Tablet 4 milliGRAM(s) Oral three times a day PRN Nausea and/or Vomiting  polyethylene glycol 3350 17 Gram(s) Oral two times a day PRN Constipation                            12.9   4.98  )-----------( 201      ( 27 May 2024 05:16 )             37.6     05-27    139  |  104  |  11  ----------------------------<  81  3.9   |  27  |  0.47<L>    Ca    8.5      27 May 2024 05:16    TPro  6.5  /  Alb  2.4<L>  /  TBili  0.8  /  DBili  x   /  AST  59<H>  /  ALT  29  /  AlkPhos  73  05-27    Urinalysis Basic - ( 27 May 2024 05:16 )    Color: x / Appearance: x / SG: x / pH: x  Gluc: 81 mg/dL / Ketone: x  / Bili: x / Urobili: x   Blood: x / Protein: x / Nitrite: x   Leuk Esterase: x / RBC: x / WBC x   Sq Epi: x / Non Sq Epi: x / Bacteria: x        CAPILLARY BLOOD GLUCOSE          Vital Signs Last 24 Hrs  T(C): 36.8 (28 May 2024 08:54), Max: 36.9 (27 May 2024 19:50)  T(F): 98.2 (28 May 2024 08:54), Max: 98.5 (27 May 2024 19:50)  HR: 74 (28 May 2024 08:54) (74 - 85)  BP: 112/74 (28 May 2024 08:54) (107/68 - 112/74)  BP(mean): --  RR: 16 (28 May 2024 08:54) (16 - 16)  SpO2: 96% (28 May 2024 08:54) (93% - 96%)    Parameters below as of 28 May 2024 08:54  Patient On (Oxygen Delivery Method): room air      Gen - NAD, Comfortable  HEENT - NCAT, EOMI  Neck - Supple, No limited ROM  Pulm - CTAB, No crackles  Cardiovascular - RRR, S1S2  Abdomen - Soft, NT, ND, (+) BS  Extremities - No calf tenderness,   Neuro-     Cognitive - AAOx3, follows commands     Communication - Fluent, No dysarthria     Motor -                     LEFT    UE - ShF3/5, EF 3+/5, EE 3+/5, WE 1/5,  2/5 better PROM Digits Both hands with less pain                    RIGHT UE - ShAB 2/5, EF 3/5, EE 3/5, WE 1/5,  1/5                    LEFT    LE - HF 2+/5, KE 4/5, DF 0/5, PF 0/5 4/5 hip add/abd hector                    RIGHT LE - HF 2+/5, KE 4+/5, DF 0/5, PF 0/5       Psychiatric - Mood stable, Affect WNL  Skin: MAD b/l groin    IDT 5/22, MARIIA 6/4 Home with HHA, VN services    Continue comprehensive acute rehab program consisting of 3hrs/day of OT/PT .

## 2024-05-29 PROCEDURE — 99232 SBSQ HOSP IP/OBS MODERATE 35: CPT

## 2024-05-29 PROCEDURE — 99233 SBSQ HOSP IP/OBS HIGH 50: CPT | Mod: GC

## 2024-05-29 RX ADMIN — ATORVASTATIN CALCIUM 40 MILLIGRAM(S): 80 TABLET, FILM COATED ORAL at 21:23

## 2024-05-29 RX ADMIN — APIXABAN 5 MILLIGRAM(S): 2.5 TABLET, FILM COATED ORAL at 05:40

## 2024-05-29 RX ADMIN — NYSTATIN CREAM 1 APPLICATION(S): 100000 CREAM TOPICAL at 05:40

## 2024-05-29 RX ADMIN — Medication 1 DROP(S): at 05:39

## 2024-05-29 RX ADMIN — APIXABAN 5 MILLIGRAM(S): 2.5 TABLET, FILM COATED ORAL at 17:19

## 2024-05-29 RX ADMIN — Medication 1 DROP(S): at 17:19

## 2024-05-29 RX ADMIN — NYSTATIN CREAM 1 APPLICATION(S): 100000 CREAM TOPICAL at 17:20

## 2024-05-29 RX ADMIN — FAMOTIDINE 40 MILLIGRAM(S): 10 INJECTION INTRAVENOUS at 05:40

## 2024-05-29 RX ADMIN — Medication 81 MILLIGRAM(S): at 12:21

## 2024-05-29 RX ADMIN — Medication 1 SPRAY(S): at 17:19

## 2024-05-29 RX ADMIN — Medication 1 SPRAY(S): at 05:39

## 2024-05-29 RX ADMIN — Medication 650 MILLIGRAM(S): at 21:22

## 2024-05-29 RX ADMIN — Medication 650 MILLIGRAM(S): at 22:52

## 2024-05-29 RX ADMIN — FAMOTIDINE 40 MILLIGRAM(S): 10 INJECTION INTRAVENOUS at 17:20

## 2024-05-29 NOTE — PROGRESS NOTE ADULT - ASSESSMENT
REHANA MEDINA is a 84y with CAD s/p CABG recent acute polyneuropathy diagnosis (march 2024).  Here after IVIG series c/b PE RUL and DVT RLE. Patient now admitted for a multidisciplinary rehab program. 05-10-24 @ 17:01    # Motor Axon Neuropathy  Comprehensive Multidisciplinary Rehab Program:  -comprehensive rehab program of PT/OT- 3 hours a day, 5 days a week. P&O as needed   -initially began after viral illness in March 2024  -following with Neurologist Dr. Liu  -s/p IVIG ending on 5/8  OT- isotoner gloves, wrist supports during the day- apply softeeze splints at night- ?Functional E Stim    # CAD s/p CABG  # AF  -continue aspirin and statin  -on therapeutic AC    # PE  # DVT  - continue lovenox 90 mg BID- hospitalist transitioned to DOAC  - RUL PE and RLE DVT    Elevated LFTs  ? secondary to IVIG  not taking tylenol  LFTS improving    # UTI  - sensitive proteus UTI  - bactrim 2 tab BID until 5/17  voiding well    # Pain  - Tylenol PRN    Mood / Cognition  - Neuropsychology consult PRN    Sleep  - Maintain quiet hours and a low stim environment.   - Melatonin 3 mg QHS    GI / Bowel  - Miralax daily> 2x/day  - GI ppx: famotidine 40 mg BID- HX GERD      Skin / Pressure injury  - Skin assessment on admission performed: MAD to b/l groin  - Turn q2 hours in bed while awake, air mattress  - nursing to monitor skin qShift  - soft heel protectors  - skin barrier cream PRN    Diet/Dysphagia:  - Diet Consistency: regular diet  - Aspiration Precautions  - Nutrition consult    DVT prophylaxis:   - has RLE DVT  - on full dose Lovenox  - Last Doppler on R soleal DVT on 5/8- Eliquis  REHANA MEDINA is a 84y with CAD s/p CABG recent acute polyneuropathy diagnosis (march 2024).  Here after IVIG series c/b PE RUL and DVT RLE. Patient now admitted for a multidisciplinary rehab program. 05-10-24 @ 17:01    # Motor Axon Neuropathy  Comprehensive Multidisciplinary Rehab Program:  -comprehensive rehab program of PT/OT- 3 hours a day, 5 days a week. P&O as needed   -initially began after viral illness in March 2024  -following with Neurologist Dr. Liu  -s/p IVIG ending on 5/8  OT- isotoner gloves, wrist supports during the day- apply softeeze splints at night- ?Functional E Stim    # CAD s/p CABG  # AF  -continue aspirin and statin  -on therapeutic AC    # PE  # DVT  - continue lovenox 90 mg BID- hospitalist transitioned to DOAC  - RUL PE and RLE DVT    Elevated LFTs  ? secondary to IVIG  not taking tylenol  LFTS improving    # UTI  - sensitive proteus UTI  - bactrim 2 tab BID until 5/17  voiding well    # Pain  - Tylenol PRN    Mood / Cognition  - Neuropsychology consult PRN    Sleep  - Maintain quiet hours and a low stim environment.   - Melatonin 3 mg QHS    GI / Bowel  - Miralax daily> 2x/day  - GI ppx: famotidine 40 mg BID- HX GERD      Skin / Pressure injury  - Skin assessment on admission performed: MAD to b/l groin  - Turn q2 hours in bed while awake, air mattress  - nursing to monitor skin qShift  - soft heel protectors  - skin barrier cream PRN    Diet/Dysphagia:  - Diet Consistency: regular diet  - Aspiration Precautions  - Nutrition consult    DVT prophylaxis:   - has RLE DVT  - on full dose Lovenox  - Last Doppler on R soleal DVT on 5/8- Eliquis     50 total minutes spent on today's encounter including team conference as well as conversation with patient's wife Shauna - I updated her on plan of care- All questions were answered by me as well. REHANA MEDINA is a 84y with CAD s/p CABG recent acute polyneuropathy diagnosis (march 2024).  Here after IVIG series c/b PE RUL and DVT RLE. Patient now admitted for a multidisciplinary rehab program. 05-10-24 @ 17:01    # Motor Axon Neuropathy  Comprehensive Multidisciplinary Rehab Program:  -comprehensive rehab program of PT/OT- 3 hours a day, 5 days a week. P&O as needed   -initially began after viral illness in March 2024  -following with Neurologist Dr. Liu  -s/p IVIG ending on 5/8  OT- isotoner gloves, wrist supports during the day- apply softeeze splints at night- ?Functional E Stim    # CAD s/p CABG  # AF  -continue aspirin and statin  -on therapeutic AC    # PE  # DVT  - continue lovenox 90 mg BID- hospitalist transitioned to DOAC  - RUL PE and RLE DVT    Elevated LFTs  ? secondary to IVIG  not taking tylenol  LFTS improving    # UTI  - sensitive proteus UTI  - bactrim 2 tab BID until 5/17  voiding well    # Pain  - Tylenol PRN    Mood / Cognition  - Neuropsychology consult PRN    Sleep  - Maintain quiet hours and a low stim environment.   - Melatonin 3 mg QHS    GI / Bowel  - Miralax daily> 2x/day  - GI ppx: famotidine 40 mg BID- HX GERD      Skin / Pressure injury  - Skin assessment on admission performed: MAD to b/l groin  - Turn q2 hours in bed while awake, air mattress  - nursing to monitor skin qShift  - soft heel protectors  - skin barrier cream PRN    Diet/Dysphagia:  - Diet Consistency: regular diet  - Aspiration Precautions  - Nutrition consult    DVT prophylaxis:   - has RLE DVT  - on full dose Lovenox  - Last Doppler on R soleal DVT on 5/8- Eliquis     50 total minutes spent on today's encounter including team conference as well as conversation with patient's wife Shauna - I updated her on plan of care- All questions were answered by me as well.    PATIENT IS A GOOD CANDIDATE FOR [facility] BED 2/2 HIS NEUROLOGIC CONDITION/  QUADRAPARESIS - PATIENT REQUIRES FREQUENT POSITION CHANGES OR IMMEDIATE NEED FOR A CHANGE OF POSITION REQUIRES POSITIONING OF THE BODY TO ALLEVIATE PAIN, PREVENT CONTRACTURES AND AVOID RESPIRATORY INFECTIONS IN WAYS NOT FEASIBLE IN ORDINARY BED.     PATIENT ALSO IS A GOOD CANDIDATE FOR [facility] TO FACILITATE [facility] OOB TRANSFERS FROM BED TO [facility]

## 2024-05-29 NOTE — PROGRESS NOTE ADULT - ASSESSMENT
84y with CAD s/p CABG recent acute polyneuropathy diagnosis (march 2024).  Here after IVIG series c/b PE RUL and DVT RLE. Patient now admitted for a multidisciplinary rehab program. 05-10-24 @ 17:01    #Motor Axon Neuropathy  -initially began after viral illness in March 2024  -following with Neurologist Dr. Liu  -s/p IVIG ending on 5/8    #CAD s/p CABG  #New onset AF  #HLD  -Heart rate controlled off AV martín blocking agent  -ASA/Statin  -Eliquis  -O/p cardiology follow up with cardiac MR    #PE  #DVT  -Eliquis  -Outpatient hematology followup     #Proteus bacteremia at [facility]  -s/p course of bactrim    DVT PPX: Eliquis

## 2024-05-29 NOTE — CHART NOTE - NSCHARTNOTEFT_GEN_A_CORE
Nutrition Follow Up Note  Hospital Course   (Per Electronic Medical Record)    Source:  Patient [X]  Medical Record [X]      Diet:   Regular Diet (IDDSI Level 7) w/ Thin Liquids (IDDSI Level 0)  Tolerates Diet Consistency Well  No Chewing/Swallowing Difficulties  No Recent Nausea, Vomiting or Constipation & Some Recent Diarrhea (as Per Patient)  Consumes % of Meals (as Per Documentation) - States Good PO Intake/Appetite (Per Patient)  on Ensure Plus High Protein 8oz PO TID (Provides 1,050kcal & 60grams of Protein)  Patient Takes Nutrition Supplement Usually Twice a Day  Recommend Decrease Supplement to Ensure Plus High Protein 8oz PO BID (Provides 700kcal & 40grams of Protein) - (Per Patient Request)  Education Provided on Need for Increased Fluids    Enteral/Parenteral Nutrition: Not Applicable    Current Weight: 186lb on 5/12  Weights Currently Stable @This Time  Obtain Weights Weekly     Pertinent Medications: MEDICATIONS  (STANDING):  apixaban 5 milliGRAM(s) Oral two times a day  artificial tears (preservative free) Ophthalmic Solution 1 Drop(s) Both EYES two times a day  aspirin enteric coated 81 milliGRAM(s) Oral daily  atorvastatin 40 milliGRAM(s) Oral at bedtime  famotidine    Tablet 40 milliGRAM(s) Oral two times a day  melatonin 3 milliGRAM(s) Oral at bedtime  nystatin Powder 1 Application(s) Topical two times a day    MEDICATIONS  (PRN):  acetaminophen     Tablet .. 650 milliGRAM(s) Oral every 6 hours PRN Moderate Pain (4 - 6)  AQUAPHOR (petrolatum Ointment) 1 Application(s) Topical two times a day PRN dry skin  bisacodyl Suppository 10 milliGRAM(s) Rectal once PRN Constipation  ondansetron   Disintegrating Tablet 4 milliGRAM(s) Oral three times a day PRN Nausea and/or Vomiting  polyethylene glycol 3350 17 Gram(s) Oral two times a day PRN Constipation    Pertinent Labs:  05-23 Na141 mmol/L Glu 88 mg/dL K+ 3.4 mmol/L<L> Cr  0.65 mg/dL BUN 15 mg/dL 05-23 Alb 2.6 g/dL<L> 05-02 Chol 119 mg/dL LDL --    HDL 44 mg/dL Trig 83 mg/dL    Skin: No Pressure Ulcers     Edema: +2 Edema Noted to Feet  (as Per Documentation)     Last Bowel Movement: on 5/20    Estimated Needs:   [X] No Change Since Previous Assessment    Previous Nutrition Diagnosis:   Severe Malnutrition     Nutrition Diagnosis is [X] Ongoing - Recommend Decrease Supplement to BID (Per Patient Request)     New Nutrition Diagnosis: [X] Not Applicable    Interventions:   1. Recommend Decrease Supplement to BID   2. Education Provided on Need for Increased Fluids  3. Recommend Continue Nutrition Plan of Care     Monitoring & Evaluation:   [X] Weights   [X] PO Intake   [X] Skin Integrity   [X] Follow Up (Per Protocol)  [X] Tolerance to Diet Prescription   [X] Other: Labs     Registered Dietitian/Nutritionist Remains Available.  Ari Javier, JOSH, CDN    Phone# [phone]
NUTRITION FOLLOW UP    SOURCE: Patient [X)   Medical Record (X)  Pt reports fair appetite with 50% po intake breakfast and 100% po intake Ensure Plus HP. Pt states that he would like to continue receiving Ensure Plus HP TID in setting of decreased appetite. LBM 5/17, denies GI issues at this time.     DIET: Regular, Ensure Plus HP TID     PATIENT REPORT  [x] other: no GI distress    PO INTAKE:   [x]   %             CURRENT WEIGHT:  186 lb - (5/12)    PERTINENT MEDS:   Pertinent Medications: MEDICATIONS  (STANDING):  apixaban 10 milliGRAM(s) Oral every 12 hours  artificial tears (preservative free) Ophthalmic Solution 1 Drop(s) Both EYES two times a day  aspirin enteric coated 81 milliGRAM(s) Oral daily  atorvastatin 40 milliGRAM(s) Oral at bedtime  famotidine    Tablet 40 milliGRAM(s) Oral two times a day  fluticasone propionate 50 MICROgram(s)/spray Nasal Spray 1 Spray(s) Both Nostrils two times a day  melatonin 3 milliGRAM(s) Oral at bedtime  nystatin Powder 1 Application(s) Topical two times a day  senna 2 Tablet(s) Oral at bedtime    MEDICATIONS  (PRN):  acetaminophen     Tablet .. 650 milliGRAM(s) Oral every 6 hours PRN Moderate Pain (4 - 6)  AQUAPHOR (petrolatum Ointment) 1 Application(s) Topical two times a day PRN dry skin  bisacodyl Suppository 10 milliGRAM(s) Rectal once PRN Constipation  ondansetron   Disintegrating Tablet 4 milliGRAM(s) Oral three times a day PRN Nausea and/or Vomiting  polyethylene glycol 3350 17 Gram(s) Oral two times a day PRN Constipation    PERTINENT LABS:  05-16 Na139 mmol/L Glu 88 mg/dL K+ 4.2 mmol/L Cr  0.69 mg/dL BUN 18 mg/dL 05-16 Alb 2.6 g/dL<L> 05-02 Chol 119 mg/dL LDL --    HDL 44 mg/dL Trig 83 mg/dL    SKIN:  WDL  EDEMA: WDL  LAST BM: 5/17    ESTIMATED NEEDS:   [X] no change since previous assessment    PREVIOUS NUTRITION DIAGNOSIS:  Acute Severe Malnutrition     NUTRITION DIAGNOSIS is :  (x)  Ongoing        NEW NUTRITION DIAGNOSIS: N/A    NUTRITION RECOMMENDATIONS: Continue Regular Diet and Ensure Plus HP TID as ordered     MONITORING AND EVALUATION:   1. Tolerance to diet prescription   2. PO intake  3. Weights  4. Labs  5. Follow Up per protocol     RD to remain available  Ilana Chopra MS,RD,CDN
Nutrition Follow Up Note  Hospital Course   (Per Electronic Medical Record)    Source:  Patient [X]  Medical Record [X]      Diet:   Regular Diet (IDDSI Level 7) w/ Thin Liquids (IDDSI Level 0)  Tolerates Diet Consistency Well  No Chewing/Swallowing Difficulties  No Recent Nausea, Vomiting, Diarrhea or Constipation (as Per Patient)  Consumes % of Meals (as Per Documentation) - States Fair-Good Intake (Per Patient)   on Ensure Plus High Protein 8oz PO BID (Provides 700kcal & 40grams of Protein)  Declines Nutrition Supplementation - Recommend Discontinue (Per Patient Request)   Obtained Food Preferences from Patient   Education Provided on Need for Increased Fiber    Enteral/Parenteral Nutrition: Not Applicable    Current Weight: 189.8lb on 5/28  Obtain Weights Weekly  Weights Currently Stable @This Time     Pertinent Medications: MEDICATIONS  (STANDING):  apixaban 5 milliGRAM(s) Oral two times a day  artificial tears (preservative free) Ophthalmic Solution 1 Drop(s) Both EYES two times a day  aspirin enteric coated 81 milliGRAM(s) Oral daily  atorvastatin 40 milliGRAM(s) Oral at bedtime  famotidine    Tablet 40 milliGRAM(s) Oral two times a day  fluticasone propionate 50 MICROgram(s)/spray Nasal Spray 1 Spray(s) Both Nostrils two times a day  melatonin 3 milliGRAM(s) Oral at bedtime  nystatin Powder 1 Application(s) Topical two times a day    MEDICATIONS  (PRN):  acetaminophen     Tablet .. 650 milliGRAM(s) Oral every 6 hours PRN Moderate Pain (4 - 6)  AQUAPHOR (petrolatum Ointment) 1 Application(s) Topical two times a day PRN dry skin  bisacodyl Suppository 10 milliGRAM(s) Rectal once PRN Constipation  ondansetron   Disintegrating Tablet 4 milliGRAM(s) Oral three times a day PRN Nausea and/or Vomiting  polyethylene glycol 3350 17 Gram(s) Oral two times a day PRN Constipation    Pertinent Labs:  05-27 Na139 mmol/L Glu 81 mg/dL K+ 3.9 mmol/L Cr  0.47 mg/dL<L> BUN 11 mg/dL 05-27 Alb 2.4 g/dL<L> 05-02 Chol 119 mg/dL LDL --    HDL 44 mg/dL Trig 83 mg/dL    Skin: No Pressure Ulcers     Edema: +1 Edema Noted to Bilateral Knee  (as Per Documentation)     Last Bowel Movement: on 5/29    Estimated Needs:   [X] No Change Since Previous Assessment    Previous Nutrition Diagnosis:   Severe Malnutrition     Nutrition Diagnosis is [X] Ongoing - Declines Nutrition Supplementation - Recommend Discontinue     New Nutrition Diagnosis: [X] Not Applicable    Interventions:   1. Education Provided on Need for Increased Fiber  2. Recommend Discontinue Ensure Plus High Protein 8oz PO BID   3. Recommend Continue Nutrition Plan of Care     Monitoring & Evaluation:   [X] Weights   [X] PO Intake   [X] Skin Integrity   [X] Follow Up (Per Protocol)  [X] Tolerance to Diet Prescription   [X] Other: Labs     Registered Dietitian/Nutritionist Remains Available.  Ari Javier, JOSH, CDN    Phone# [phone]

## 2024-05-29 NOTE — PROGRESS NOTE ADULT - SUBJECTIVE AND OBJECTIVE BOX
HPI:  83M R-handed PMHx CAD s/p bypass x4 and HLD presented as a transfer from [facility]. Presented with a 'stomach virus' in 3/2024, reported significant weight loss, sensation of 'wobbling,' with repeated falls. Had seen Dr. 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 Patient received a full body MRI demonstrating no acute changes of the spine or brain. Received 5 doses of IVIG ending on 5/8. Patient found to have a bactrim sensitive proteus UTI. Patient incidentially found to have RLE DVT and PE in the RUL. Now on full dose AC w/ BID lovenox.  Patient seen by physiatry and recommended for acute inpatient rehab. Discharged to [facility] on 5/10.      ROS/subjective:  patient seen and evaluated bedside  no events overnight  self feeding at times with adaptive equipment  Strength improving- working on one person transfers- In RALPH as well  Right > Left hand pain this AM violette on awakening-improves after therapy- uses resting hand splints at night  finished antibiotics for UTI- voiding well-   + BM this AM  tolerating [place] overnight- limit use, toilet daytime  some left ankle pain on ROM  no dizziness, no headaches, no nausea, no vomiting, no SOB, No chest pain  Tolerating Eliquis,      MEDICATIONS  (STANDING):  apixaban 5 milliGRAM(s) Oral two times a day  artificial tears (preservative free) Ophthalmic Solution 1 Drop(s) Both EYES two times a day  aspirin enteric coated 81 milliGRAM(s) Oral daily  atorvastatin 40 milliGRAM(s) Oral at bedtime  famotidine    Tablet 40 milliGRAM(s) Oral two times a day  fluticasone propionate 50 MICROgram(s)/spray Nasal Spray 1 Spray(s) Both Nostrils two times a day  melatonin 3 milliGRAM(s) Oral at bedtime  nystatin Powder 1 Application(s) Topical two times a day    MEDICATIONS  (PRN):  acetaminophen     Tablet .. 650 milliGRAM(s) Oral every 6 hours PRN Moderate Pain (4 - 6)  AQUAPHOR (petrolatum Ointment) 1 Application(s) Topical two times a day PRN dry skin  bisacodyl Suppository 10 milliGRAM(s) Rectal once PRN Constipation  ondansetron   Disintegrating Tablet 4 milliGRAM(s) Oral three times a day PRN Nausea and/or Vomiting  polyethylene glycol 3350 17 Gram(s) Oral two times a day PRN Constipation                            12.9   4.98  )-----------( 201      ( 27 May 2024 05:16 )             37.6     05-27    139  |  104  |  11  ----------------------------<  81  3.9   |  27  |  0.47<L>    Ca    8.5      27 May 2024 05:16    TPro  6.5  /  Alb  2.4<L>  /  TBili  0.8  /  DBili  x   /  AST  59<H>  /  ALT  29  /  AlkPhos  73  05-27    Urinalysis Basic - ( 27 May 2024 05:16 )    Color: x / Appearance: x / SG: x / pH: x  Gluc: 81 mg/dL / Ketone: x  / Bili: x / Urobili: x   Blood: x / Protein: x / Nitrite: x   Leuk Esterase: x / RBC: x / WBC x   Sq Epi: x / Non Sq Epi: x / Bacteria: x        Vital Signs Last 24 Hrs  T(C): 36.3 (29 May 2024 07:59), Max: 36.6 (28 May 2024 20:27)  T(F): 97.4 (29 May 2024 07:59), Max: 97.8 (28 May 2024 20:27)  HR: 79 (29 May 2024 07:59) (78 - 93)  BP: 108/69 (29 May 2024 07:59) (106/72 - 108/69)  BP(mean): 3 (28 May 2024 20:27) (3 - 3)  RR: 16 (29 May 2024 07:59) (16 - 17)  SpO2: 95% (29 May 2024 07:59) (95% - 95%)    Parameters below as of 29 May 2024 07:59  Patient On (Oxygen Delivery Method): room air      Gen - NAD, Comfortable  HEENT - NCAT, EOMI  Neck - Supple, No limited ROM  Pulm - CTAB, No crackles  Cardiovascular - RRR, S1S2  Abdomen - Soft, NT, ND, (+) BS  Extremities - No calf tenderness, 1-2 + edema LLE  Neuro-     Cognitive - AAOx3, follows commands     Communication - Fluent, No dysarthria     Motor -                     LEFT    UE - ShF3/5, EF 3+/5, EE 3+/5, WE 1/5,  2/5 better                     RIGHT UE - ShAB 2/5, EF 3/5, EE 3/5, WE 1/5,  1/5 PROM extension Right 4th , 5th digit painful                    LEFT    LE - HF 2+/5, KE 4/5, DF 0/5, PF 0/5 4/5 hip add/abd hector                    RIGHT LE - HF 2+/5, KE 4+/5, DF 0/5, PF 0/5       Psychiatric - Mood stable, Affect WNL  Skin: MAD b/l groin    IDT 5/22, MARIIA 6/4 Home with HHA, VN services    Continue comprehensive acute rehab program consisting of 3hrs/day of OT/PT . HPI:  83M R-handed PMHx CAD s/p bypass x4 and HLD presented as a transfer from [facility]. Presented with a 'stomach virus' in 3/2024, reported significant weight loss, sensation of 'wobbling,' with repeated falls. Had seen Dr. 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 Patient received a full body MRI demonstrating no acute changes of the spine or brain. Received 5 doses of IVIG ending on 5/8. Patient found to have a bactrim sensitive proteus UTI. Patient incidentially found to have RLE DVT and PE in the RUL. Now on full dose AC w/ BID lovenox.  Patient seen by physiatry and recommended for acute inpatient rehab. Discharged to [facility] on 5/10.      ROS/subjective:  patient seen and evaluated bedside  no events overnight  self feeding at times with adaptive equipment  Strength improving- working on one person transfers- In RALPH as well  Right > Left hand pain this AM violette on awakening-improves after therapy- uses resting hand splints at night  finished antibiotics for UTI- voiding well-   + BM this AM  tolerating [place] overnight- limit use, toilet daytime  some left ankle pain on ROM  no dizziness, no headaches, no nausea, no vomiting, no SOB, No chest pain  Tolerating Eliquis,      MEDICATIONS  (STANDING):  apixaban 5 milliGRAM(s) Oral two times a day  artificial tears (preservative free) Ophthalmic Solution 1 Drop(s) Both EYES two times a day  aspirin enteric coated 81 milliGRAM(s) Oral daily  atorvastatin 40 milliGRAM(s) Oral at bedtime  famotidine    Tablet 40 milliGRAM(s) Oral two times a day  fluticasone propionate 50 MICROgram(s)/spray Nasal Spray 1 Spray(s) Both Nostrils two times a day  melatonin 3 milliGRAM(s) Oral at bedtime  nystatin Powder 1 Application(s) Topical two times a day    MEDICATIONS  (PRN):  acetaminophen     Tablet .. 650 milliGRAM(s) Oral every 6 hours PRN Moderate Pain (4 - 6)  AQUAPHOR (petrolatum Ointment) 1 Application(s) Topical two times a day PRN dry skin  bisacodyl Suppository 10 milliGRAM(s) Rectal once PRN Constipation  ondansetron   Disintegrating Tablet 4 milliGRAM(s) Oral three times a day PRN Nausea and/or Vomiting  polyethylene glycol 3350 17 Gram(s) Oral two times a day PRN Constipation                            12.9   4.98  )-----------( 201      ( 27 May 2024 05:16 )             37.6     05-27    139  |  104  |  11  ----------------------------<  81  3.9   |  27  |  0.47<L>    Ca    8.5      27 May 2024 05:16    TPro  6.5  /  Alb  2.4<L>  /  TBili  0.8  /  DBili  x   /  AST  59<H>  /  ALT  29  /  AlkPhos  73  05-27    Urinalysis Basic - ( 27 May 2024 05:16 )    Color: x / Appearance: x / SG: x / pH: x  Gluc: 81 mg/dL / Ketone: x  / Bili: x / Urobili: x   Blood: x / Protein: x / Nitrite: x   Leuk Esterase: x / RBC: x / WBC x   Sq Epi: x / Non Sq Epi: x / Bacteria: x        Vital Signs Last 24 Hrs  T(C): 36.3 (29 May 2024 07:59), Max: 36.6 (28 May 2024 20:27)  T(F): 97.4 (29 May 2024 07:59), Max: 97.8 (28 May 2024 20:27)  HR: 79 (29 May 2024 07:59) (78 - 93)  BP: 108/69 (29 May 2024 07:59) (106/72 - 108/69)  BP(mean): 3 (28 May 2024 20:27) (3 - 3)  RR: 16 (29 May 2024 07:59) (16 - 17)  SpO2: 95% (29 May 2024 07:59) (95% - 95%)    Parameters below as of 29 May 2024 07:59  Patient On (Oxygen Delivery Method): room air      Gen - NAD, Comfortable  HEENT - NCAT, EOMI  Neck - Supple, No limited ROM  Pulm - CTAB, No crackles  Cardiovascular - RRR, S1S2  Abdomen - Soft, NT, ND, (+) BS  Extremities - No calf tenderness, 1-2 + edema LLE  Neuro-     Cognitive - AAOx3, follows commands     Communication - Fluent, No dysarthria     Motor -                     LEFT    UE - ShF3/5, EF 3+/5, EE 3+/5, WE 1/5,  2/5 better                     RIGHT UE - ShAB 2/5, EF 3/5, EE 3/5, WE 1/5,  1/5 PROM extension Right 4th , 5th digit painful                    LEFT    LE - HF 2+/5, KE 4/5, DF 0/5, PF 0/5 4/5 hip add/abd hector                    RIGHT LE - HF 2+/5, KE 4+/5, DF 0/5, PF 0/5       Psychiatric - Mood stable, Affect WNL  Skin: MAD b/l groin    IDT 5/28, MARIIA 6/4 Home with HHA, VN services    Continue comprehensive acute rehab program consisting of 3hrs/day of OT/PT .

## 2024-05-29 NOTE — PROGRESS NOTE ADULT - SUBJECTIVE AND OBJECTIVE BOX
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 No acute events overnight.  Pt denies cp, palpitations, sob, abd pain, N/V, fever, chills.    ROS:  All other review of systems negative    Allergies    penicillins (Rash)    Intolerances        MEDICATIONS  (STANDING):  apixaban 5 milliGRAM(s) Oral two times a day  artificial tears (preservative free) Ophthalmic Solution 1 Drop(s) Both EYES two times a day  aspirin enteric coated 81 milliGRAM(s) Oral daily  atorvastatin 40 milliGRAM(s) Oral at bedtime  famotidine    Tablet 40 milliGRAM(s) Oral two times a day  fluticasone propionate 50 MICROgram(s)/spray Nasal Spray 1 Spray(s) Both Nostrils two times a day  melatonin 3 milliGRAM(s) Oral at bedtime  nystatin Powder 1 Application(s) Topical two times a day    MEDICATIONS  (PRN):  acetaminophen     Tablet .. 650 milliGRAM(s) Oral every 6 hours PRN Moderate Pain (4 - 6)  AQUAPHOR (petrolatum Ointment) 1 Application(s) Topical two times a day PRN dry skin  bisacodyl Suppository 10 milliGRAM(s) Rectal once PRN Constipation  ondansetron   Disintegrating Tablet 4 milliGRAM(s) Oral three times a day PRN Nausea and/or Vomiting  polyethylene glycol 3350 17 Gram(s) Oral two times a day PRN Constipation      Vital Signs Last 24 Hrs  T(C): 36.3 (29 May 2024 07:59), Max: 36.6 (28 May 2024 20:27)  T(F): 97.4 (29 May 2024 07:59), Max: 97.8 (28 May 2024 20:27)  HR: 79 (29 May 2024 07:59) (78 - 93)  BP: 108/69 (29 May 2024 07:59) (106/72 - 108/69)  BP(mean): 3 (28 May 2024 20:27) (3 - 3)  RR: 16 (29 May 2024 07:59) (16 - 17)  SpO2: 95% (29 May 2024 07:59) (95% - 95%)    Parameters below as of 29 May 2024 07:59  Patient On (Oxygen Delivery Method): room air      CAPILLARY BLOOD GLUCOSE        I&O's Summary      PHYSICAL EXAM:  GENERAL: NAD elderly male   HEAD:  Atraumatic, Normocephalic  NECK: Supple, No JVD  CHEST/LUNG: Clear to auscultation bilaterally; No wheeze, nonlabored breathing  HEART: Regular rate and rhythm; No murmurs, rubs, or gallops  ABDOMEN: Soft, Nontender, Nondistended; Bowel sounds present  EXTREMITIES: No clubbing, cyanosis, or edema  PSYCH: calm, appropriate mood    LABS:                    RADIOLOGY & ADDITIONAL TESTS:  Results Reviewed:   Imaging Personally Reviewed:  Electrocardiogram Personally Reviewed:    COORDINATION OF CARE:  Care Discussed with Consultants/Other Providers [Y/N]:  Prior or Outpatient Records Reviewed [Y/N]:

## 2024-05-30 LAB
ALBUMIN SERPL ELPH-MCNC: 2.7 G/DL — LOW (ref 3.3–5)
ALP SERPL-CCNC: 81 U/L — SIGNIFICANT CHANGE UP (ref 40–120)
ALT FLD-CCNC: 26 U/L — SIGNIFICANT CHANGE UP (ref 10–45)
ANION GAP SERPL CALC-SCNC: 8 MMOL/L — SIGNIFICANT CHANGE UP (ref 5–17)
AST SERPL-CCNC: 29 U/L — SIGNIFICANT CHANGE UP (ref 10–40)
BASOPHILS # BLD AUTO: 0.03 K/UL — SIGNIFICANT CHANGE UP (ref 0–0.2)
BASOPHILS NFR BLD AUTO: 0.6 % — SIGNIFICANT CHANGE UP (ref 0–2)
BILIRUB SERPL-MCNC: 0.7 MG/DL — SIGNIFICANT CHANGE UP (ref 0.2–1.2)
BUN SERPL-MCNC: 9 MG/DL — SIGNIFICANT CHANGE UP (ref 7–23)
CALCIUM SERPL-MCNC: 8.5 MG/DL — SIGNIFICANT CHANGE UP (ref 8.4–10.5)
CHLORIDE SERPL-SCNC: 106 MMOL/L — SIGNIFICANT CHANGE UP (ref 96–108)
CO2 SERPL-SCNC: 28 MMOL/L — SIGNIFICANT CHANGE UP (ref 22–31)
CREAT SERPL-MCNC: 0.67 MG/DL — SIGNIFICANT CHANGE UP (ref 0.5–1.3)
EGFR: 93 ML/MIN/1.73M2 — SIGNIFICANT CHANGE UP
EOSINOPHIL # BLD AUTO: 0.17 K/UL — SIGNIFICANT CHANGE UP (ref 0–0.5)
EOSINOPHIL NFR BLD AUTO: 3.4 % — SIGNIFICANT CHANGE UP (ref 0–6)
GLUCOSE SERPL-MCNC: 125 MG/DL — HIGH (ref 70–99)
HCT VFR BLD CALC: 38.5 % — LOW (ref 39–50)
HGB BLD-MCNC: 13.2 G/DL — SIGNIFICANT CHANGE UP (ref 13–17)
IMM GRANULOCYTES NFR BLD AUTO: 0.2 % — SIGNIFICANT CHANGE UP (ref 0–0.9)
LYMPHOCYTES # BLD AUTO: 1.94 K/UL — SIGNIFICANT CHANGE UP (ref 1–3.3)
LYMPHOCYTES # BLD AUTO: 39 % — SIGNIFICANT CHANGE UP (ref 13–44)
MCHC RBC-ENTMCNC: 33 PG — SIGNIFICANT CHANGE UP (ref 27–34)
MCHC RBC-ENTMCNC: 34.3 GM/DL — SIGNIFICANT CHANGE UP (ref 32–36)
MCV RBC AUTO: 96.3 FL — SIGNIFICANT CHANGE UP (ref 80–100)
MONOCYTES # BLD AUTO: 0.38 K/UL — SIGNIFICANT CHANGE UP (ref 0–0.9)
MONOCYTES NFR BLD AUTO: 7.6 % — SIGNIFICANT CHANGE UP (ref 2–14)
NEUTROPHILS # BLD AUTO: 2.44 K/UL — SIGNIFICANT CHANGE UP (ref 1.8–7.4)
NEUTROPHILS NFR BLD AUTO: 49.2 % — SIGNIFICANT CHANGE UP (ref 43–77)
NRBC # BLD: 0 /100 WBCS — SIGNIFICANT CHANGE UP (ref 0–0)
PLATELET # BLD AUTO: 245 K/UL — SIGNIFICANT CHANGE UP (ref 150–400)
POTASSIUM SERPL-MCNC: 3.4 MMOL/L — LOW (ref 3.5–5.3)
POTASSIUM SERPL-SCNC: 3.4 MMOL/L — LOW (ref 3.5–5.3)
PROT SERPL-MCNC: 6.7 G/DL — SIGNIFICANT CHANGE UP (ref 6–8.3)
RBC # BLD: 4 M/UL — LOW (ref 4.2–5.8)
RBC # FLD: 16.1 % — HIGH (ref 10.3–14.5)
SODIUM SERPL-SCNC: 142 MMOL/L — SIGNIFICANT CHANGE UP (ref 135–145)
WBC # BLD: 4.97 K/UL — SIGNIFICANT CHANGE UP (ref 3.8–10.5)
WBC # FLD AUTO: 4.97 K/UL — SIGNIFICANT CHANGE UP (ref 3.8–10.5)

## 2024-05-30 PROCEDURE — 99232 SBSQ HOSP IP/OBS MODERATE 35: CPT | Mod: GC

## 2024-05-30 PROCEDURE — 99232 SBSQ HOSP IP/OBS MODERATE 35: CPT

## 2024-05-30 RX ADMIN — ATORVASTATIN CALCIUM 40 MILLIGRAM(S): 80 TABLET, FILM COATED ORAL at 21:32

## 2024-05-30 RX ADMIN — FAMOTIDINE 40 MILLIGRAM(S): 10 INJECTION INTRAVENOUS at 18:01

## 2024-05-30 RX ADMIN — Medication 1 SPRAY(S): at 18:02

## 2024-05-30 RX ADMIN — Medication 650 MILLIGRAM(S): at 21:32

## 2024-05-30 RX ADMIN — Medication 1 DROP(S): at 05:30

## 2024-05-30 RX ADMIN — APIXABAN 5 MILLIGRAM(S): 2.5 TABLET, FILM COATED ORAL at 18:01

## 2024-05-30 RX ADMIN — Medication 650 MILLIGRAM(S): at 22:30

## 2024-05-30 RX ADMIN — FAMOTIDINE 40 MILLIGRAM(S): 10 INJECTION INTRAVENOUS at 05:29

## 2024-05-30 RX ADMIN — NYSTATIN CREAM 1 APPLICATION(S): 100000 CREAM TOPICAL at 05:29

## 2024-05-30 RX ADMIN — Medication 1 SPRAY(S): at 05:29

## 2024-05-30 RX ADMIN — Medication 81 MILLIGRAM(S): at 11:22

## 2024-05-30 RX ADMIN — Medication 1 DROP(S): at 18:01

## 2024-05-30 RX ADMIN — NYSTATIN CREAM 1 APPLICATION(S): 100000 CREAM TOPICAL at 18:01

## 2024-05-30 RX ADMIN — APIXABAN 5 MILLIGRAM(S): 2.5 TABLET, FILM COATED ORAL at 05:29

## 2024-05-30 NOTE — PROGRESS NOTE ADULT - SUBJECTIVE AND OBJECTIVE BOX
HPI:  83M R-handed PMHx CAD s/p bypass x4 and HLD presented as a transfer from [facility]. Presented with a 'stomach virus' in 3/2024, reported significant weight loss, sensation of 'wobbling,' with repeated falls. Had seen Dr. 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 Patient received a full body MRI demonstrating no acute changes of the spine or brain. Received 5 doses of IVIG ending on 5/8. Patient found to have a bactrim sensitive proteus UTI. Patient incidentially found to have RLE DVT and PE in the RUL. Now on full dose AC w/ BID lovenox.  Patient seen by physiatry and recommended for acute inpatient rehab. Discharged to [facility] on 5/10.      ROS/subjective:  patient seen and evaluated bedside  no events overnight  self feeding at times with adaptive equipment  PT Reports Max A with transfers- weak core- patient states he transfers better with OT  Petra recommended but patient and family reluctant to purchase- HHA to be trained next week   Right > Left hand pain this AM violette on awakening-improves after therapy- uses resting hand splints at night  finished antibiotics for UTI- voiding well-   last BM 5/28  tolerating [place] overnight- limit use, toilet daytime  edema less LES- TEDS applied this AM  no dizziness, no headaches, no nausea, no vomiting, no SOB, No chest pain  Tolerating Eliquis,                                  12.9   4.98  )-----------( 201      ( 27 May 2024 05:16 )             37.6     MEDICATIONS  (STANDING):  apixaban 5 milliGRAM(s) Oral two times a day  artificial tears (preservative free) Ophthalmic Solution 1 Drop(s) Both EYES two times a day  aspirin enteric coated 81 milliGRAM(s) Oral daily  atorvastatin 40 milliGRAM(s) Oral at bedtime  famotidine    Tablet 40 milliGRAM(s) Oral two times a day  fluticasone propionate 50 MICROgram(s)/spray Nasal Spray 1 Spray(s) Both Nostrils two times a day  melatonin 3 milliGRAM(s) Oral at bedtime  nystatin Powder 1 Application(s) Topical two times a day    MEDICATIONS  (PRN):  acetaminophen     Tablet .. 650 milliGRAM(s) Oral every 6 hours PRN Moderate Pain (4 - 6)  AQUAPHOR (petrolatum Ointment) 1 Application(s) Topical two times a day PRN dry skin  bisacodyl Suppository 10 milliGRAM(s) Rectal once PRN Constipation  ondansetron   Disintegrating Tablet 4 milliGRAM(s) Oral three times a day PRN Nausea and/or Vomiting  polyethylene glycol 3350 17 Gram(s) Oral two times a day PRN Constipation                            13.2   4.97  )-----------( 245      ( 30 May 2024 08:45 )             38.5     05-30    142  |  106  |  9   ----------------------------<  125<H>  3.4<L>   |  28  |  0.67    Ca    8.5      30 May 2024 08:45    TPro  6.7  /  Alb  2.7<L>  /  TBili  0.7  /  DBili  x   /  AST  29  /  ALT  26  /  AlkPhos  81  05-30    Urinalysis Basic - ( 30 May 2024 08:45 )    Color: x / Appearance: x / SG: x / pH: x  Gluc: 125 mg/dL / Ketone: x  / Bili: x / Urobili: x   Blood: x / Protein: x / Nitrite: x   Leuk Esterase: x / RBC: x / WBC x   Sq Epi: x / Non Sq Epi: x / Bacteria: x        CAPILLARY BLOOD GLUCOSE          Vital Signs Last 24 Hrs  T(C): 36.4 (30 May 2024 08:04), Max: 36.6 (29 May 2024 19:54)  T(F): 97.6 (30 May 2024 08:04), Max: 97.8 (29 May 2024 19:54)  HR: 76 (30 May 2024 08:04) (76 - 93)  BP: 122/72 (30 May 2024 08:04) (96/64 - 122/72)  BP(mean): --  RR: 16 (30 May 2024 08:04) (16 - 16)  SpO2: 95% (30 May 2024 08:04) (93% - 95%)    Parameters below as of 30 May 2024 08:04  Patient On (Oxygen Delivery Method): room air        Gen - NAD, Comfortable  HEENT - NCAT, EOMI  Neck - Supple, No limited ROM  Pulm - CTAB, No crackles  Cardiovascular - RRR, S1S2  Abdomen - Soft, NT, ND, (+) BS  Extremities - No calf tenderness, 1-2 + edema LLE  Neuro-     Cognitive - AAOx3, follows commands     Communication - Fluent, No dysarthria     Motor -                     LEFT    UE - ShF3/5, EF 3+/5, EE 3+/5, WE 1/5,  2/5 better                     RIGHT UE - ShAB 2/5, EF 3/5, EE 3/5, WE 1/5,  1/5 PROM extension Right 4th , 5th digit painful                    LEFT    LE - HF 2+/5, KE 4/5, DF 0/5, PF 0/5 4/5 hip add/abd hector-  weak hip extensors hector                    RIGHT LE - HF 2+/5, KE 4+/5, DF 0/5, PF 0/5       Psychiatric - Mood stable, Affect WNL  Skin: MAD b/l groin    IDT 5/29, MARIIA 6/4 Home with HHA, ELICEO services    Continue comprehensive acute rehab program consisting of 3hrs/day of OT/PT .

## 2024-05-30 NOTE — PROGRESS NOTE ADULT - ASSESSMENT
84y with CAD s/p CABG recent acute polyneuropathy diagnosis (march 2024).  Here after IVIG series c/b PE RUL and DVT RLE. Patient now admitted for a multidisciplinary rehab program. 05-10-24 @ 17:01    #Motor Axon Neuropathy  -initially began after viral illness in March 2024  -following with Neurologist Dr. Liu  -s/p IVIG ending on 5/8    #CAD s/p CABG  #New onset AF  #HLD  -Heart rate controlled off AV martín blocking agent  -ASA/Statin  -Eliquis  -O/p cardiology follow up with cardiac MR    #PE  #DVT  -Eliquis  -Outpatient hematology followup     #Proteus bacteremia at [facility]  -s/p course of bactrim    DVT PPX: Eliquis

## 2024-05-30 NOTE — PROGRESS NOTE ADULT - ASSESSMENT
REHANA MEDINA is a 84y with CAD s/p CABG recent acute polyneuropathy diagnosis (march 2024).  Here after IVIG series c/b PE RUL and DVT RLE. Patient now admitted for a multidisciplinary rehab program. 05-10-24 @ 17:01    # Motor Axon Neuropathy  Comprehensive Multidisciplinary Rehab Program:  -comprehensive rehab program of PT/OT- 3 hours a day, 5 days a week. P&O as needed   -initially began after viral illness in March 2024  -following with Neurologist Dr. Liu  -s/p IVIG ending on 5/8  OT- isotoner gloves, wrist supports during the day- apply softeeze splints at night- ?Functional E Stim  family training next week with HHA to assess transfers- ? need for Jovan on DC- HHA 12 hrs x7     # CAD s/p CABG  # AF  -continue aspirin and statin  -on therapeutic AC    # PE  # DVT  - continue lovenox 90 mg BID- hospitalist transitioned to DOAC  - RUL PE and RLE DVT    Elevated LFTs  ? secondary to IVIG  not taking tylenol  LFTS normalized    # UTI  - sensitive proteus UTI  - bactrim 2 tab BID until 5/17  voiding well    # Pain  - Tylenol PRN    Mood / Cognition  - Neuropsychology consult PRN    Sleep  - Maintain quiet hours and a low stim environment.   - Melatonin 3 mg QHS    GI / Bowel  - Miralax daily> 2x/day  - GI ppx: famotidine 40 mg BID- HX GERD      Skin / Pressure injury  - Skin assessment on admission performed: MAD to b/l groin  - Turn q2 hours in bed while awake, air mattress  - nursing to monitor skin qShift  - soft heel protectors  - skin barrier cream PRN    Diet/Dysphagia:  - Diet Consistency: regular diet  - Aspiration Precautions  - Nutrition consult    DVT prophylaxis:   - has RLE DVT  - on full dose Lovenox  - Last Doppler on R soleal DVT on 5/8- Eliquis   TEDS BLES when OOB        PATIENT IS A GOOD CANDIDATE FOR [facility] BED 2/2 HIS NEUROLOGIC CONDITION/  QUADRIPARESIS - PATIENT REQUIRES FREQUENT POSITION CHANGES OR IMMEDIATE NEED FOR A CHANGE OF POSITION REQUIRES POSITIONING OF THE BODY TO ALLEVIATE PAIN, PREVENT CONTRACTURES AND AVOID RESPIRATORY INFECTIONS IN WAYS NOT FEASIBLE IN ORDINARY BED.     PATIENT ALSO IS A GOOD CANDIDATE FOR JOVAN LIFT TO FACILITATE SAFE OOB TRANSFERS FROM BED TO /[facility] REHANA MEDINA is a 84y with CAD s/p CABG recent acute polyneuropathy diagnosis (march 2024).  Here after IVIG series c/b PE RUL and DVT RLE. Patient now admitted for a multidisciplinary rehab program. 05-10-24 @ 17:01    # Motor Axon Neuropathy  Comprehensive Multidisciplinary Rehab Program:  -comprehensive rehab program of PT/OT- 3 hours a day, 5 days a week. P&O as needed   -initially began after viral illness in March 2024  -following with Neurologist Dr. Liu  -s/p IVIG ending on 5/8  OT- isotoner gloves, wrist supports during the day- apply softeeze splints at night- ?Functional E Stim  family training next week with HHA to assess transfers- ? need for Jovan on DC- HHA 12 hrs x7     # CAD s/p CABG  # AF  -continue aspirin and statin  -on therapeutic AC    # PE  # DVT  - continue lovenox 90 mg BID- hospitalist transitioned to DOAC  - RUL PE and RLE DVT    Elevated LFTs  ? secondary to IVIG  not taking tylenol  LFTS normalized    # UTI  - sensitive proteus UTI  - bactrim 2 tab BID until 5/17  voiding well    # Pain  - Tylenol PRN    Mood / Cognition  - Neuropsychology consult PRN    Sleep  - Maintain quiet hours and a low stim environment.   - Melatonin 3 mg QHS    GI / Bowel  - Miralax daily> 2x/day  - GI ppx: famotidine 40 mg BID- HX GERD      Skin / Pressure injury  - Skin assessment on admission performed: MAD to b/l groin  - Turn q2 hours in bed while awake, air mattress  - nursing to monitor skin qShift  - soft heel protectors  - skin barrier cream PRN    Diet/Dysphagia:  - Diet Consistency: regular diet  - Aspiration Precautions  - Nutrition consult    DVT prophylaxis:   - has RLE DVT  - on full dose Lovenox  - Last Doppler on R soleal DVT on 5/8- Eliquis   TEDS BLES when OOB        PATIENT IS A GOOD CANDIDATE FOR [facility] ELECTRIC HOSPITAL BED 2/2 HIS NEUROLOGIC CONDITION/  QUADRIPARESIS - PATIENT REQUIRES FREQUENT POSITION CHANGES OR IMMEDIATE NEED FOR A CHANGE OF POSITION REQUIRES POSITIONING OF THE BODY TO ALLEVIATE PAIN, PREVENT CONTRACTURES AND AVOID RESPIRATORY INFECTIONS IN WAYS NOT FEASIBLE IN ORDINARY BED.     PATIENT ALSO IS A GOOD CANDIDATE FOR JOVAN LIFT TO FACILITATE SAFE OOB TRANSFERS FROM BED TO /[facility]- WITHOUT THE USE OF THE LIFT THE PATIENT WOULD BE BED CONFINED

## 2024-05-30 NOTE — PROGRESS NOTE ADULT - SUBJECTIVE AND OBJECTIVE BOX
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    ALLERGIES:  penicillins (Rash)    MEDICATIONS  (STANDING):  apixaban 5 milliGRAM(s) Oral two times a day  artificial tears (preservative free) Ophthalmic Solution 1 Drop(s) Both EYES two times a day  aspirin enteric coated 81 milliGRAM(s) Oral daily  atorvastatin 40 milliGRAM(s) Oral at bedtime  famotidine    Tablet 40 milliGRAM(s) Oral two times a day  fluticasone propionate 50 MICROgram(s)/spray Nasal Spray 1 Spray(s) Both Nostrils two times a day  melatonin 3 milliGRAM(s) Oral at bedtime  nystatin Powder 1 Application(s) Topical two times a day    MEDICATIONS  (PRN):  acetaminophen     Tablet .. 650 milliGRAM(s) Oral every 6 hours PRN Moderate Pain (4 - 6)  AQUAPHOR (petrolatum Ointment) 1 Application(s) Topical two times a day PRN dry skin  bisacodyl Suppository 10 milliGRAM(s) Rectal once PRN Constipation  ondansetron   Disintegrating Tablet 4 milliGRAM(s) Oral three times a day PRN Nausea and/or Vomiting  polyethylene glycol 3350 17 Gram(s) Oral two times a day PRN Constipation    Vital Signs Last 24 Hrs  T(F): 97.6 (30 May 2024 08:04), Max: 97.8 (29 May 2024 19:54)  HR: 76 (30 May 2024 08:04) (76 - 93)  BP: 122/72 (30 May 2024 08:04) (96/64 - 122/72)  RR: 16 (30 May 2024 08:04) (16 - 16)  SpO2: 95% (30 May 2024 08:04) (93% - 95%)  I&O's Summary    PHYSICAL EXAM:  GENERAL: NAD elderly male   HEAD:  Atraumatic, Normocephalic  NECK: Supple, No JVD  CHEST/LUNG: Clear to auscultation bilaterally; No wheeze, nonlabored breathing  HEART: Regular rate and rhythm; No murmurs, rubs, or gallops  ABDOMEN: Soft, Nontender, Nondistended; Bowel sounds present  EXTREMITIES: No clubbing, cyanosis, or edema  PSYCH: calm, appropriate mood      LABS:                        13.2   4.97  )-----------( 245      ( 30 May 2024 08:45 )             38.5       05-02 Chol 119 mg/dL LDL -- HDL 44 mg/dL Trig 83 mg/dL    COVID-19 PCR: NotDetec (05-10-24 @ 23:12)  COVID-19 PCR: NotDetec (05-10-24 @ 23:12)

## 2024-05-31 ENCOUNTER — TRANSCRIPTION ENCOUNTER (OUTPATIENT)
Age: 84
End: 2024-05-31

## 2024-05-31 PROCEDURE — 99232 SBSQ HOSP IP/OBS MODERATE 35: CPT | Mod: GC

## 2024-05-31 PROCEDURE — 99232 SBSQ HOSP IP/OBS MODERATE 35: CPT

## 2024-05-31 RX ADMIN — Medication 1 SPRAY(S): at 05:45

## 2024-05-31 RX ADMIN — Medication 1 DROP(S): at 05:46

## 2024-05-31 RX ADMIN — APIXABAN 5 MILLIGRAM(S): 2.5 TABLET, FILM COATED ORAL at 05:45

## 2024-05-31 RX ADMIN — FAMOTIDINE 40 MILLIGRAM(S): 10 INJECTION INTRAVENOUS at 17:35

## 2024-05-31 RX ADMIN — Medication 3 MILLIGRAM(S): at 21:01

## 2024-05-31 RX ADMIN — Medication 1 DROP(S): at 17:36

## 2024-05-31 RX ADMIN — ATORVASTATIN CALCIUM 40 MILLIGRAM(S): 80 TABLET, FILM COATED ORAL at 21:01

## 2024-05-31 RX ADMIN — FAMOTIDINE 40 MILLIGRAM(S): 10 INJECTION INTRAVENOUS at 05:45

## 2024-05-31 RX ADMIN — NYSTATIN CREAM 1 APPLICATION(S): 100000 CREAM TOPICAL at 17:36

## 2024-05-31 RX ADMIN — NYSTATIN CREAM 1 APPLICATION(S): 100000 CREAM TOPICAL at 05:45

## 2024-05-31 RX ADMIN — Medication 1 SPRAY(S): at 17:36

## 2024-05-31 RX ADMIN — Medication 81 MILLIGRAM(S): at 11:44

## 2024-05-31 RX ADMIN — APIXABAN 5 MILLIGRAM(S): 2.5 TABLET, FILM COATED ORAL at 17:36

## 2024-05-31 NOTE — DISCHARGE NOTE NURSING/CASE MANAGEMENT/SOCIAL WORK - PATIENT PORTAL LINK FT
You can access the FollowMyHealth Patient Portal offered by [facility] by registering at the following website: [email or web address]. By joining [employer]’s FollowMyHealth portal, you will also be able to view your health information using other applications (apps) compatible with our system.

## 2024-05-31 NOTE — PROGRESS NOTE ADULT - ASSESSMENT
84y with CAD s/p CABG recent acute polyneuropathy diagnosis (march 2024).  Here after IVIG series c/b PE RUL and DVT RLE. Patient now admitted for a multidisciplinary rehab program. 05-10-24 @ 17:01    #Motor Axon Neuropathy  -initially began after viral illness in March 2024  -following with Neurologist Dr. Liu  -s/p IVIG ending on 5/8    #CAD s/p CABG  #New onset AF  #HLD  -Heart rate controlled off AV martín blocking agent  -ASA/Statin  -Eliquis  -O/p cardiology follow up with cardiac MR    #PE  #DVT  -Eliquis  -Outpatient hematology followup     #Proteus bacteremia at [facility]  -s/p course of bactrim    DVT PPX: Eliquis

## 2024-05-31 NOTE — DISCHARGE NOTE NURSING/CASE MANAGEMENT/SOCIAL WORK - NSSCCARECORD_GEN_ALL_CORE
Home Care Agency/Durable Medical Equipment Agency Home Care Agency/Durable Medical Equipment Agency/[facility]

## 2024-05-31 NOTE — PROGRESS NOTE ADULT - SUBJECTIVE AND OBJECTIVE BOX
HPI:  83M R-handed PMHx CAD s/p bypass x4 and HLD presented as a transfer from [facility]. Presented with a 'stomach virus' in 3/2024, reported significant weight loss, sensation of 'wobbling,' with repeated falls. Had seen Dr. 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 Patient received a full body MRI demonstrating no acute changes of the spine or brain. Received 5 doses of IVIG ending on 5/8. Patient found to have a bactrim sensitive proteus UTI. Patient incidentially found to have RLE DVT and PE in the RUL. Now on full dose AC w/ BID lovenox.  Patient seen by physiatry and recommended for acute inpatient rehab. Discharged to [facility] on 5/10.      ROS/subjective:  patient seen and evaluated bedside  no events overnight  self feeding at times with adaptive equipment  family training today with wife- david for Dc - HHA trained in [facility] transfers- not coming in for training  left hand painful today- needs to wear splints  finished antibiotics for UTI- voiding well-   last BM 5/29  tolerating [place] overnight- limit use, toilet daytime  edema less LES- TEDS applied   no dizziness, no headaches, no nausea, no vomiting, no SOB, No chest pain  Tolerating Eliquis,        MEDICATIONS  (STANDING):  apixaban 5 milliGRAM(s) Oral two times a day  artificial tears (preservative free) Ophthalmic Solution 1 Drop(s) Both EYES two times a day  aspirin enteric coated 81 milliGRAM(s) Oral daily  atorvastatin 40 milliGRAM(s) Oral at bedtime  famotidine    Tablet 40 milliGRAM(s) Oral two times a day  fluticasone propionate 50 MICROgram(s)/spray Nasal Spray 1 Spray(s) Both Nostrils two times a day  melatonin 3 milliGRAM(s) Oral at bedtime  nystatin Powder 1 Application(s) Topical two times a day    MEDICATIONS  (PRN):  acetaminophen     Tablet .. 650 milliGRAM(s) Oral every 6 hours PRN Moderate Pain (4 - 6)  AQUAPHOR (petrolatum Ointment) 1 Application(s) Topical two times a day PRN dry skin  bisacodyl Suppository 10 milliGRAM(s) Rectal once PRN Constipation  ondansetron   Disintegrating Tablet 4 milliGRAM(s) Oral three times a day PRN Nausea and/or Vomiting  polyethylene glycol 3350 17 Gram(s) Oral two times a day PRN Constipation                            13.2   4.97  )-----------( 245      ( 30 May 2024 08:45 )             38.5     05-30    142  |  106  |  9   ----------------------------<  125<H>  3.4<L>   |  28  |  0.67    Ca    8.5      30 May 2024 08:45    TPro  6.7  /  Alb  2.7<L>  /  TBili  0.7  /  DBili  x   /  AST  29  /  ALT  26  /  AlkPhos  81  05-30    Urinalysis Basic - ( 30 May 2024 08:45 )    Color: x / Appearance: x / SG: x / pH: x  Gluc: 125 mg/dL / Ketone: x  / Bili: x / Urobili: x   Blood: x / Protein: x / Nitrite: x   Leuk Esterase: x / RBC: x / WBC x   Sq Epi: x / Non Sq Epi: x / Bacteria: x        CAPILLARY BLOOD GLUCOSE          Vital Signs Last 24 Hrs  T(C): 36.6 (31 May 2024 08:06), Max: 36.6 (31 May 2024 08:06)  T(F): 97.8 (31 May 2024 08:06), Max: 97.8 (31 May 2024 08:06)  HR: 78 (31 May 2024 08:06) (78 - 94)  BP: 107/67 (31 May 2024 08:06) (99/64 - 107/67)  BP(mean): --  RR: 16 (31 May 2024 08:06) (16 - 16)  SpO2: 95% (31 May 2024 08:06) (95% - 97%)    Parameters below as of 31 May 2024 08:06  Patient On (Oxygen Delivery Method): room air      Gen - NAD, Comfortable  HEENT - NCAT, EOMI  Neck - Supple, No limited ROM  Pulm - CTAB, No crackles  Cardiovascular - RRR, S1S2  Abdomen - Soft, NT, ND, (+) BS  Extremities - No calf tenderness, 1-2 + edema LLE  Neuro-     Cognitive - AAOx3, follows commands     Communication - Fluent, No dysarthria     Motor -                     LEFT    UE - ShF3/5, EF 3+/5, EE 3+/5, WE 1/5,  2/5 better                     RIGHT UE - ShAB 2/5, EF 3/5, EE 3/5, WE 1/5,  1/5 PROM extension Right 4th , 5th digit painful                    LEFT    LE - HF 2+/5, KE 4/5, DF 0/5, PF 0/5 4/5 hip add/abd hector-  weak hip extensors hector                    RIGHT LE - HF 2+/5, KE 4+/5, DF 0/5, PF 0/5       Psychiatric - Mood stable, Affect WNL  Skin: MAD b/l groin    IDT 5/29, MARIIA 6/4 Home with ROSA, ELICEO services    Continue comprehensive acute rehab program consisting of 3hrs/day of OT/PT .

## 2024-05-31 NOTE — PROGRESS NOTE ADULT - SUBJECTIVE AND OBJECTIVE BOX
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    ALLERGIES:  penicillins (Rash)    MEDICATIONS  (STANDING):  apixaban 5 milliGRAM(s) Oral two times a day  artificial tears (preservative free) Ophthalmic Solution 1 Drop(s) Both EYES two times a day  aspirin enteric coated 81 milliGRAM(s) Oral daily  atorvastatin 40 milliGRAM(s) Oral at bedtime  famotidine    Tablet 40 milliGRAM(s) Oral two times a day  fluticasone propionate 50 MICROgram(s)/spray Nasal Spray 1 Spray(s) Both Nostrils two times a day  melatonin 3 milliGRAM(s) Oral at bedtime  nystatin Powder 1 Application(s) Topical two times a day    MEDICATIONS  (PRN):  acetaminophen     Tablet .. 650 milliGRAM(s) Oral every 6 hours PRN Moderate Pain (4 - 6)  AQUAPHOR (petrolatum Ointment) 1 Application(s) Topical two times a day PRN dry skin  bisacodyl Suppository 10 milliGRAM(s) Rectal once PRN Constipation  ondansetron   Disintegrating Tablet 4 milliGRAM(s) Oral three times a day PRN Nausea and/or Vomiting  polyethylene glycol 3350 17 Gram(s) Oral two times a day PRN Constipation    Vital Signs Last 24 Hrs  T(F): 97.8 (31 May 2024 08:06), Max: 97.8 (31 May 2024 08:06)  HR: 78 (31 May 2024 08:06) (78 - 94)  BP: 107/67 (31 May 2024 08:06) (99/64 - 107/67)  RR: 16 (31 May 2024 08:06) (16 - 16)  SpO2: 95% (31 May 2024 08:06) (95% - 97%)  I&O's Summary    30 May 2024 07:01  -  31 May 2024 07:00  --------------------------------------------------------  IN: 0 mL / OUT: 300 mL / NET: -300 mL    PHYSICAL EXAM:  GENERAL: NAD elderly male   HEAD:  Atraumatic, Normocephalic  NECK: Supple, No JVD  CHEST/LUNG: Clear to auscultation bilaterally; No wheeze, nonlabored breathing  HEART: Regular rate and rhythm; No murmurs, rubs, or gallops  ABDOMEN: Soft, Nontender, Nondistended; Bowel sounds present  EXTREMITIES: No clubbing, cyanosis, or edema  PSYCH: calm, appropriate mood    LABS:                        13.2   4.97  )-----------( 245      ( 30 May 2024 08:45 )             38.5       05-30    142  |  106  |  9   ----------------------------<  125  3.4   |  28  |  0.67    Ca    8.5      30 May 2024 08:45    TPro  6.7  /  Alb  2.7  /  TBili  0.7  /  DBili  x   /  AST  29  /  ALT  26  /  AlkPhos  81  05-30     05-02 Chol 119 mg/dL LDL -- HDL 44 mg/dL Trig 83 mg/dL    Urinalysis Basic - ( 30 May 2024 08:45 )    Color: x / Appearance: x / SG: x / pH: x  Gluc: 125 mg/dL / Ketone: x  / Bili: x / Urobili: x   Blood: x / Protein: x / Nitrite: x   Leuk Esterase: x / RBC: x / WBC x   Sq Epi: x / Non Sq Epi: x / Bacteria: x    COVID-19 PCR: NotDetec (05-10-24 @ 23:12)  COVID-19 PCR: NotDetec (05-10-24 @ 23:12)

## 2024-05-31 NOTE — PROGRESS NOTE ADULT - ASSESSMENT
REHANA MEDINA is a 84y with CAD s/p CABG recent acute polyneuropathy diagnosis (march 2024).  Here after IVIG series c/b PE RUL and DVT RLE. Patient now admitted for a multidisciplinary rehab program. 05-10-24 @ 17:01    # Motor Axon Neuropathy  Comprehensive Multidisciplinary Rehab Program:  -comprehensive rehab program of PT/OT- 3 hours a day, 5 days a week. P&O as needed   -initially began after viral illness in March 2024  -following with Neurologist Dr. Liu  -s/p IVIG ending on 5/8  OT- isotoner gloves, wrist supports during the day- apply softeeze splints at night- ?Functional E Stim  Jovan on Dc- HHA 12x7- no training for aide- ambulette for Dc home- not able to do car transfer    # CAD s/p CABG  # AF  -continue aspirin and statin  -on therapeutic AC    # PE  # DVT  - continue lovenox 90 mg BID- hospitalist transitioned to DOAC  - RUL PE and RLE DVT    Elevated LFTs  ? secondary to IVIG  not taking tylenol  LFTS normalized    # UTI  - sensitive proteus UTI  - bactrim 2 tab BID until 5/17  voiding well    # Pain  - Tylenol PRN    Mood / Cognition  - Neuropsychology consult PRN    Sleep  - Maintain quiet hours and a low stim environment.   - Melatonin 3 mg QHS    GI / Bowel  - Miralax daily> 2x/day  - GI ppx: famotidine 40 mg BID- HX GERD      Skin / Pressure injury  - Skin assessment on admission performed: MAD to b/l groin  - Turn q2 hours in bed while awake, air mattress  - nursing to monitor skin qShift  - soft heel protectors  - skin barrier cream PRN    Diet/Dysphagia:  - Diet Consistency: regular diet  - Aspiration Precautions  - Nutrition consult    DVT prophylaxis:   - has RLE DVT  - on full dose Lovenox  - Last Doppler on R soleal DVT on 5/8- Eliquis   TEDS BLES when OOB        PATIENT IS A GOOD CANDIDATE FOR SEMI ELECTRIC HOSPITAL BED 2/2 HIS NEUROLOGIC CONDITION/  QUADRIPARESIS - PATIENT REQUIRES FREQUENT POSITION CHANGES OR IMMEDIATE NEED FOR A CHANGE OF POSITION REQUIRES POSITIONING OF THE BODY TO ALLEVIATE PAIN, PREVENT CONTRACTURES AND AVOID RESPIRATORY INFECTIONS IN WAYS NOT FEASIBLE IN ORDINARY BED.     PATIENT ALSO IS A GOOD CANDIDATE FOR JOVAN LIFT TO FACILITATE SAFE OOB TRANSFERS FROM BED TO /COMMODE- WITHOUT THE USE OF THE LIFT THE PATIENT WOULD BE BED CONFINED

## 2024-06-01 LAB
CULTURE RESULTS: SIGNIFICANT CHANGE UP
SPECIMEN SOURCE: SIGNIFICANT CHANGE UP

## 2024-06-01 PROCEDURE — 99232 SBSQ HOSP IP/OBS MODERATE 35: CPT

## 2024-06-01 RX ADMIN — Medication 1 DROP(S): at 17:19

## 2024-06-01 RX ADMIN — FAMOTIDINE 40 MILLIGRAM(S): 10 INJECTION INTRAVENOUS at 05:30

## 2024-06-01 RX ADMIN — NYSTATIN CREAM 1 APPLICATION(S): 100000 CREAM TOPICAL at 17:20

## 2024-06-01 RX ADMIN — Medication 650 MILLIGRAM(S): at 22:43

## 2024-06-01 RX ADMIN — FAMOTIDINE 40 MILLIGRAM(S): 10 INJECTION INTRAVENOUS at 17:18

## 2024-06-01 RX ADMIN — Medication 1 SPRAY(S): at 17:18

## 2024-06-01 RX ADMIN — APIXABAN 5 MILLIGRAM(S): 2.5 TABLET, FILM COATED ORAL at 05:30

## 2024-06-01 RX ADMIN — Medication 81 MILLIGRAM(S): at 12:19

## 2024-06-01 RX ADMIN — APIXABAN 5 MILLIGRAM(S): 2.5 TABLET, FILM COATED ORAL at 17:18

## 2024-06-01 RX ADMIN — ATORVASTATIN CALCIUM 40 MILLIGRAM(S): 80 TABLET, FILM COATED ORAL at 21:01

## 2024-06-01 RX ADMIN — NYSTATIN CREAM 1 APPLICATION(S): 100000 CREAM TOPICAL at 05:31

## 2024-06-01 RX ADMIN — Medication 1 DROP(S): at 05:31

## 2024-06-01 RX ADMIN — Medication 650 MILLIGRAM(S): at 21:01

## 2024-06-01 RX ADMIN — Medication 1 SPRAY(S): at 05:31

## 2024-06-01 NOTE — PROGRESS NOTE ADULT - ASSESSMENT
ASSESSMENT/PLAN  84 year old male admitted for Acute Inpatient Rehabilitation at [facility] with functional due to Motor Axon Neuropathy  Continue current medical management.  Continue comprehensive rehab program.  English

## 2024-06-01 NOTE — PROGRESS NOTE ADULT - SUBJECTIVE AND OBJECTIVE BOX
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    ALLERGIES:  penicillins (Rash)    MEDICATIONS  (STANDING):  apixaban 5 milliGRAM(s) Oral two times a day  artificial tears (preservative free) Ophthalmic Solution 1 Drop(s) Both EYES two times a day  aspirin enteric coated 81 milliGRAM(s) Oral daily  atorvastatin 40 milliGRAM(s) Oral at bedtime  famotidine    Tablet 40 milliGRAM(s) Oral two times a day  fluticasone propionate 50 MICROgram(s)/spray Nasal Spray 1 Spray(s) Both Nostrils two times a day  melatonin 3 milliGRAM(s) Oral at bedtime  nystatin Powder 1 Application(s) Topical two times a day    MEDICATIONS  (PRN):  acetaminophen     Tablet .. 650 milliGRAM(s) Oral every 6 hours PRN Moderate Pain (4 - 6)  AQUAPHOR (petrolatum Ointment) 1 Application(s) Topical two times a day PRN dry skin  bisacodyl Suppository 10 milliGRAM(s) Rectal once PRN Constipation  ondansetron   Disintegrating Tablet 4 milliGRAM(s) Oral three times a day PRN Nausea and/or Vomiting  polyethylene glycol 3350 17 Gram(s) Oral two times a day PRN Constipation    Vital Signs Last 24 Hrs  T(F): 98.1 (01 Jun 2024 07:41), Max: 98.1 (01 Jun 2024 07:41)  HR: 75 (01 Jun 2024 07:41) (75 - 89)  BP: 119/80 (01 Jun 2024 07:41) (107/67 - 119/80)  RR: 16 (01 Jun 2024 07:41) (16 - 16)  SpO2: 96% (01 Jun 2024 07:41) (94% - 96%)  I&O's Summary      PHYSICAL EXAM:  GENERAL: NAD elderly male   HEAD:  Atraumatic, Normocephalic  NECK: Supple, No JVD  CHEST/LUNG: Clear to auscultation bilaterally; No wheeze, nonlabored breathing  HEART: Regular rate and rhythm; No murmurs, rubs, or gallops  ABDOMEN: Soft, Nontender, Nondistended; Bowel sounds present  EXTREMITIES: No clubbing, cyanosis, or edema  PSYCH: calm, appropriate mood    LABS:                        13.2   4.97  )-----------( 245      ( 30 May 2024 08:45 )             38.5       05-30    142  |  106  |  9   ----------------------------<  125  3.4   |  28  |  0.67    Ca    8.5      30 May 2024 08:45    TPro  6.7  /  Alb  2.7  /  TBili  0.7  /  DBili  x   /  AST  29  /  ALT  26  /  AlkPhos  81  05-30     5-02 Chol 119 mg/dL LDL -- HDL 44 mg/dL Trig 83 mg/dL    nalysis Basic - ( 30 May 2024 08:45 )    Color: x / Appearance: x / SG: x / pH: x  Gluc: 125 mg/dL / Ketone: x  / Bili: x / Urobili: x   Blood: x / Protein: x / Nitrite: x   Leuk Esterase: x / RBC: x / WBC x   Sq Epi: x / Non Sq Epi: x / Bacteria: x    COVID-19 PCR: NotDetec (05-10-24 @ 23:12)  COVID-19 PCR: NotDetec (05-10-24 @ 23:12)

## 2024-06-01 NOTE — PROGRESS NOTE ADULT - SUBJECTIVE AND OBJECTIVE BOX
HPI:  83M R-handed PMHx CAD s/p bypass x4 and HLD presented as a transfer from [facility]. Presented with a 'stomach virus' in 3/2024, reported significant weight loss, sensation of 'wobbling,' with repeated falls. Had seen Dr. 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 Patient received a full body MRI demonstrating no acute changes of the spine or brain. Received 5 doses of IVIG ending on 5/8. Patient found to have a bactrim sensitive proteus UTI. Patient incidentially found to have RLE DVT and PE in the RUL. Now on full dose AC w/ BID lovenox.  Patient seen by physiatry and recommended for acute inpatient rehab. Discharged to [facility] on 5/10.   (10 May 2024 16:55)    ___________________________________________________________________________    SUBJECTIVE/ROS  Patient was seen and evaluated at bedside today.  Reported no overnight events and is in no acute distress.  Less left hand pain today.  Tolerating Eliquis.  Eager to participate on the recommended rehabilitation program.  Denies any CP, SOB, ALAS, palpitations, fever, chills, body aches, cough, congestion, or any other symptoms at this time.   ___________________________________________________________________________    VITALS  T(C): 36.7 (05-31-24 @ 20:29), Max: 36.7 (05-31-24 @ 20:29)  HR: 89 (05-31-24 @ 20:29) (78 - 89)  BP: 110/72 (05-31-24 @ 20:29) (107/67 - 110/72)  RR: 16 (05-31-24 @ 20:29) (16 - 16)  SpO2: 94% (05-31-24 @ 20:29) (94% - 95%)  ___________________________________________________________________________    LABS                          13.2   4.97  )-----------( 245      ( 30 May 2024 08:45 )             38.5       05-30    142  |  106  |  9   ----------------------------<  125<H>  3.4<L>   |  28  |  0.67    Ca    8.5      30 May 2024 08:45    TPro  6.7  /  Alb  2.7<L>  /  TBili  0.7  /  DBili  x   /  AST  29  /  ALT  26  /  AlkPhos  81  05-30    ___________________________________________________________________________    MEDICATIONS  (STANDING):  apixaban 5 milliGRAM(s) Oral two times a day  artificial tears (preservative free) Ophthalmic Solution 1 Drop(s) Both EYES two times a day  aspirin enteric coated 81 milliGRAM(s) Oral daily  atorvastatin 40 milliGRAM(s) Oral at bedtime  famotidine    Tablet 40 milliGRAM(s) Oral two times a day  fluticasone propionate 50 MICROgram(s)/spray Nasal Spray 1 Spray(s) Both Nostrils two times a day  melatonin 3 milliGRAM(s) Oral at bedtime  nystatin Powder 1 Application(s) Topical two times a day    MEDICATIONS  (PRN):  acetaminophen     Tablet .. 650 milliGRAM(s) Oral every 6 hours PRN Moderate Pain (4 - 6)  AQUAPHOR (petrolatum Ointment) 1 Application(s) Topical two times a day PRN dry skin  bisacodyl Suppository 10 milliGRAM(s) Rectal once PRN Constipation  ondansetron   Disintegrating Tablet 4 milliGRAM(s) Oral three times a day PRN Nausea and/or Vomiting  polyethylene glycol 3350 17 Gram(s) Oral two times a day PRN Constipation    ___________________________________________________________________________    PHYSICAL EXAM:    Gen - NAD, Comfortable  HEENT - NCAT, EOMI  Pulm - CTAB, No crackles  Cardiovascular - RRR, S1S2  Abdomen - Soft, NT, ND, (+) BS  Extremities - No calf tenderness, 1-2 + edema LLE  Neuro - stable  Psychiatric - Mood stable, Affect WNL  Skin: MAD b/l groin    ___________________________________________________________________________

## 2024-06-02 PROCEDURE — 99232 SBSQ HOSP IP/OBS MODERATE 35: CPT

## 2024-06-02 RX ADMIN — Medication 81 MILLIGRAM(S): at 12:20

## 2024-06-02 RX ADMIN — NYSTATIN CREAM 1 APPLICATION(S): 100000 CREAM TOPICAL at 05:23

## 2024-06-02 RX ADMIN — FAMOTIDINE 40 MILLIGRAM(S): 10 INJECTION INTRAVENOUS at 05:22

## 2024-06-02 RX ADMIN — ATORVASTATIN CALCIUM 40 MILLIGRAM(S): 80 TABLET, FILM COATED ORAL at 21:40

## 2024-06-02 RX ADMIN — APIXABAN 5 MILLIGRAM(S): 2.5 TABLET, FILM COATED ORAL at 18:01

## 2024-06-02 RX ADMIN — Medication 1 SPRAY(S): at 05:22

## 2024-06-02 RX ADMIN — Medication 650 MILLIGRAM(S): at 22:40

## 2024-06-02 RX ADMIN — Medication 1 APPLICATION(S): at 18:03

## 2024-06-02 RX ADMIN — Medication 1 DROP(S): at 05:22

## 2024-06-02 RX ADMIN — APIXABAN 5 MILLIGRAM(S): 2.5 TABLET, FILM COATED ORAL at 05:22

## 2024-06-02 RX ADMIN — FAMOTIDINE 40 MILLIGRAM(S): 10 INJECTION INTRAVENOUS at 18:01

## 2024-06-02 RX ADMIN — Medication 1 DROP(S): at 18:01

## 2024-06-02 RX ADMIN — NYSTATIN CREAM 1 APPLICATION(S): 100000 CREAM TOPICAL at 18:03

## 2024-06-02 RX ADMIN — Medication 1 SPRAY(S): at 18:01

## 2024-06-02 RX ADMIN — Medication 650 MILLIGRAM(S): at 21:40

## 2024-06-02 NOTE — PROGRESS NOTE ADULT - ASSESSMENT
ASSESSMENT/PLAN  84 year old male admitted for Acute Inpatient Rehabilitation at [facility] with functional due to Motor Axon Neuropathy  Continue current medical management.  Continue comprehensive rehab program.

## 2024-06-02 NOTE — PROGRESS NOTE ADULT - SUBJECTIVE AND OBJECTIVE BOX
Patient is a 84y old  Male who presents with a chief complaint of Polyneuropathy secondary to Acute Motor Axon Neuropathy     Reports feeling well  Denies chest pain, SOB  BM yesterday      Patient seen and examined at bedside.    ALLERGIES:  penicillins (Rash)    MEDICATIONS  (STANDING):  apixaban 5 milliGRAM(s) Oral two times a day  artificial tears (preservative free) Ophthalmic Solution 1 Drop(s) Both EYES two times a day  aspirin enteric coated 81 milliGRAM(s) Oral daily  atorvastatin 40 milliGRAM(s) Oral at bedtime  famotidine    Tablet 40 milliGRAM(s) Oral two times a day  fluticasone propionate 50 MICROgram(s)/spray Nasal Spray 1 Spray(s) Both Nostrils two times a day  melatonin 3 milliGRAM(s) Oral at bedtime  nystatin Powder 1 Application(s) Topical two times a day    MEDICATIONS  (PRN):  acetaminophen     Tablet .. 650 milliGRAM(s) Oral every 6 hours PRN Moderate Pain (4 - 6)  AQUAPHOR (petrolatum Ointment) 1 Application(s) Topical two times a day PRN dry skin  bisacodyl Suppository 10 milliGRAM(s) Rectal once PRN Constipation  ondansetron   Disintegrating Tablet 4 milliGRAM(s) Oral three times a day PRN Nausea and/or Vomiting  polyethylene glycol 3350 17 Gram(s) Oral two times a day PRN Constipation    Vital Signs Last 24 Hrs  T(F): 98.7 (01 Jun 2024 20:04), Max: 98.7 (01 Jun 2024 20:04)  HR: 97 (01 Jun 2024 20:04) (97 - 97)  BP: 105/73 (01 Jun 2024 20:04) (105/73 - 105/73)  RR: 16 (01 Jun 2024 20:04) (16 - 16)  SpO2: 95% (01 Jun 2024 20:04) (95% - 95%)  I&O's Summary      PHYSICAL EXAM:  General: NAD, A/O x 3, [place] catheter on   ENT: MMM, no scleral icterus  Neck: Supple, No JVD, no thyroidomegaly  Lungs: Clear to auscultation bilaterally, no wheezes, no rales, no rhonchi, good inspiratory effort  Cardio: RRR, S1/S2, No murmurs  Abdomen: Soft, Nontender, Nondistended; Bowel sounds present  Extremities: Wearing splints on bilateral hands, boots on feet, No calf tenderness, No pitting edema, no skin changes    LABS:                        13.2   4.97  )-----------( 245      ( 30 May 2024 08:45 )             38.5       05-30    142  |  106  |  9   ----------------------------<  125  3.4   |  28  |  0.67    Ca    8.5      30 May 2024 08:45    TPro  6.7  /  Alb  2.7  /  TBili  0.7  /  DBili  x   /  AST  29  /  ALT  26  /  AlkPhos  81  05-30 05-02 Chol 119 mg/dL LDL -- HDL 44 mg/dL Trig 83 mg/dL    Urinalysis Basic - ( 30 May 2024 08:45 )    Color: x / Appearance: x / SG: x / pH: x  Gluc: 125 mg/dL / Ketone: x  / Bili: x / Urobili: x   Blood: x / Protein: x / Nitrite: x   Leuk Esterase: x / RBC: x / WBC x   Sq Epi: x / Non Sq Epi: x / Bacteria: x    COVID-19 PCR: NotDetec (05-10-24 @ 23:12)  COVID-19 PCR: NotDetec (05-10-24 @ 23:12)

## 2024-06-02 NOTE — PROGRESS NOTE ADULT - SUBJECTIVE AND OBJECTIVE BOX
HPI:  83M R-handed PMHx CAD s/p bypass x4 and HLD presented as a transfer from [facility]. Presented with a 'stomach virus' in 3/2024, reported significant weight loss, sensation of 'wobbling,' with repeated falls. Had seen Dr. 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 Patient received a full body MRI demonstrating no acute changes of the spine or brain. Received 5 doses of IVIG ending on 5/8. Patient found to have a bactrim sensitive proteus UTI. Patient incidentially found to have RLE DVT and PE in the RUL. Now on full dose AC w/ BID lovenox.  Patient seen by physiatry and recommended for acute inpatient rehab. Discharged to [facility] on 5/10.   (10 May 2024 16:55)    ___________________________________________________________________________    SUBJECTIVE/ROS  Patient was seen and evaluated at bedside today.  Reported no overnight events and is in no acute distress.  Eager to participate on the recommended rehabilitation program.  Denies any CP, SOB, ALAS, palpitations, fever, chills, body aches, cough, congestion, or any other symptoms at this time.   ___________________________________________________________________________    Vital Signs Last 24 Hrs  T(C): 37.1 (01 Jun 2024 20:04), Max: 37.1 (01 Jun 2024 20:04)  T(F): 98.7 (01 Jun 2024 20:04), Max: 98.7 (01 Jun 2024 20:04)  HR: 97 (01 Jun 2024 20:04) (97 - 97)  BP: 105/73 (01 Jun 2024 20:04) (105/73 - 105/73)  RR: 16 (01 Jun 2024 20:04) (16 - 16)  SpO2: 95% (01 Jun 2024 20:04) (95% - 95%)    ___________________________________________________________________________    LABS                          13.2   4.97  )-----------( 245      ( 30 May 2024 08:45 )             38.5       05-30    142  |  106  |  9   ----------------------------<  125<H>  3.4<L>   |  28  |  0.67    Ca    8.5      30 May 2024 08:45    TPro  6.7  /  Alb  2.7<L>  /  TBili  0.7  /  DBili  x   /  AST  29  /  ALT  26  /  AlkPhos  81  05-30    ___________________________________________________________________________    MEDICATIONS  (STANDING):  apixaban 5 milliGRAM(s) Oral two times a day  artificial tears (preservative free) Ophthalmic Solution 1 Drop(s) Both EYES two times a day  aspirin enteric coated 81 milliGRAM(s) Oral daily  atorvastatin 40 milliGRAM(s) Oral at bedtime  famotidine    Tablet 40 milliGRAM(s) Oral two times a day  fluticasone propionate 50 MICROgram(s)/spray Nasal Spray 1 Spray(s) Both Nostrils two times a day  melatonin 3 milliGRAM(s) Oral at bedtime  nystatin Powder 1 Application(s) Topical two times a day    MEDICATIONS  (PRN):  acetaminophen     Tablet .. 650 milliGRAM(s) Oral every 6 hours PRN Moderate Pain (4 - 6)  AQUAPHOR (petrolatum Ointment) 1 Application(s) Topical two times a day PRN dry skin  bisacodyl Suppository 10 milliGRAM(s) Rectal once PRN Constipation  ondansetron   Disintegrating Tablet 4 milliGRAM(s) Oral three times a day PRN Nausea and/or Vomiting  polyethylene glycol 3350 17 Gram(s) Oral two times a day PRN Constipation    ___________________________________________________________________________    PHYSICAL EXAM:    Gen - NAD, Comfortable  HEENT - NCAT, EOMI  Pulm - CTAB, No crackles  Cardiovascular - RRR, S1S2  Abdomen - Soft, NT, ND, (+) BS  Extremities - No calf tenderness, 1-2 + edema LLE  Neuro - stable  Psychiatric - Mood stable, Affect WNL  Skin: MAD b/l groin    ___________________________________________________________________________

## 2024-06-03 ENCOUNTER — TRANSCRIPTION ENCOUNTER (OUTPATIENT)
Age: 84
End: 2024-06-03

## 2024-06-03 LAB
ALBUMIN SERPL ELPH-MCNC: 2.5 G/DL — LOW (ref 3.3–5)
ALP SERPL-CCNC: 67 U/L — SIGNIFICANT CHANGE UP (ref 40–120)
ALT FLD-CCNC: 21 U/L — SIGNIFICANT CHANGE UP (ref 10–45)
ANION GAP SERPL CALC-SCNC: 6 MMOL/L — SIGNIFICANT CHANGE UP (ref 5–17)
AST SERPL-CCNC: 30 U/L — SIGNIFICANT CHANGE UP (ref 10–40)
BASOPHILS # BLD AUTO: 0.04 K/UL — SIGNIFICANT CHANGE UP (ref 0–0.2)
BASOPHILS NFR BLD AUTO: 0.7 % — SIGNIFICANT CHANGE UP (ref 0–2)
BILIRUB SERPL-MCNC: 0.6 MG/DL — SIGNIFICANT CHANGE UP (ref 0.2–1.2)
BUN SERPL-MCNC: 10 MG/DL — SIGNIFICANT CHANGE UP (ref 7–23)
CALCIUM SERPL-MCNC: 8.2 MG/DL — LOW (ref 8.4–10.5)
CHLORIDE SERPL-SCNC: 110 MMOL/L — HIGH (ref 96–108)
CO2 SERPL-SCNC: 28 MMOL/L — SIGNIFICANT CHANGE UP (ref 22–31)
CREAT SERPL-MCNC: 0.73 MG/DL — SIGNIFICANT CHANGE UP (ref 0.5–1.3)
EGFR: 91 ML/MIN/1.73M2 — SIGNIFICANT CHANGE UP
EOSINOPHIL # BLD AUTO: 0.23 K/UL — SIGNIFICANT CHANGE UP (ref 0–0.5)
EOSINOPHIL NFR BLD AUTO: 4.2 % — SIGNIFICANT CHANGE UP (ref 0–6)
GLUCOSE SERPL-MCNC: 93 MG/DL — SIGNIFICANT CHANGE UP (ref 70–99)
HCT VFR BLD CALC: 36.6 % — LOW (ref 39–50)
HGB BLD-MCNC: 12.4 G/DL — LOW (ref 13–17)
IMM GRANULOCYTES NFR BLD AUTO: 0.2 % — SIGNIFICANT CHANGE UP (ref 0–0.9)
LYMPHOCYTES # BLD AUTO: 1.92 K/UL — SIGNIFICANT CHANGE UP (ref 1–3.3)
LYMPHOCYTES # BLD AUTO: 35.3 % — SIGNIFICANT CHANGE UP (ref 13–44)
MAGNESIUM SERPL-MCNC: 1.9 MG/DL — SIGNIFICANT CHANGE UP (ref 1.6–2.6)
MCHC RBC-ENTMCNC: 32.9 PG — SIGNIFICANT CHANGE UP (ref 27–34)
MCHC RBC-ENTMCNC: 33.9 GM/DL — SIGNIFICANT CHANGE UP (ref 32–36)
MCV RBC AUTO: 97.1 FL — SIGNIFICANT CHANGE UP (ref 80–100)
MONOCYTES # BLD AUTO: 0.48 K/UL — SIGNIFICANT CHANGE UP (ref 0–0.9)
MONOCYTES NFR BLD AUTO: 8.8 % — SIGNIFICANT CHANGE UP (ref 2–14)
NEUTROPHILS # BLD AUTO: 2.76 K/UL — SIGNIFICANT CHANGE UP (ref 1.8–7.4)
NEUTROPHILS NFR BLD AUTO: 50.8 % — SIGNIFICANT CHANGE UP (ref 43–77)
NRBC # BLD: 0 /100 WBCS — SIGNIFICANT CHANGE UP (ref 0–0)
PLATELET # BLD AUTO: 224 K/UL — SIGNIFICANT CHANGE UP (ref 150–400)
POTASSIUM SERPL-MCNC: 3.4 MMOL/L — LOW (ref 3.5–5.3)
POTASSIUM SERPL-SCNC: 3.4 MMOL/L — LOW (ref 3.5–5.3)
PROT SERPL-MCNC: 5.9 G/DL — LOW (ref 6–8.3)
RBC # BLD: 3.77 M/UL — LOW (ref 4.2–5.8)
RBC # FLD: 15.4 % — HIGH (ref 10.3–14.5)
SODIUM SERPL-SCNC: 144 MMOL/L — SIGNIFICANT CHANGE UP (ref 135–145)
WBC # BLD: 5.44 K/UL — SIGNIFICANT CHANGE UP (ref 3.8–10.5)
WBC # FLD AUTO: 5.44 K/UL — SIGNIFICANT CHANGE UP (ref 3.8–10.5)

## 2024-06-03 PROCEDURE — 99233 SBSQ HOSP IP/OBS HIGH 50: CPT

## 2024-06-03 PROCEDURE — 99232 SBSQ HOSP IP/OBS MODERATE 35: CPT | Mod: GC

## 2024-06-03 RX ORDER — ATORVASTATIN CALCIUM 80 MG/1
1 TABLET, FILM COATED ORAL
Refills: 0 | DISCHARGE

## 2024-06-03 RX ORDER — POTASSIUM CHLORIDE 20 MEQ
40 PACKET (EA) ORAL ONCE
Refills: 0 | Status: COMPLETED | OUTPATIENT
Start: 2024-06-03 | End: 2024-06-03

## 2024-06-03 RX ORDER — FLUTICASONE PROPIONATE 50 MCG
1 SPRAY, SUSPENSION NASAL
Qty: 0 | Refills: 0 | DISCHARGE
Start: 2024-06-03

## 2024-06-03 RX ORDER — APIXABAN 2.5 MG/1
1 TABLET, FILM COATED ORAL
Qty: 0 | Refills: 0 | DISCHARGE
Start: 2024-06-03

## 2024-06-03 RX ORDER — ACETAMINOPHEN 500 MG
2 TABLET ORAL
Qty: 0 | Refills: 0 | DISCHARGE
Start: 2024-06-03

## 2024-06-03 RX ORDER — ONDANSETRON 8 MG/1
1 TABLET, FILM COATED ORAL
Qty: 0 | Refills: 0 | DISCHARGE
Start: 2024-06-03

## 2024-06-03 RX ORDER — POLYETHYLENE GLYCOL 3350 17 G/17G
17 POWDER, FOR SOLUTION ORAL
Qty: 0 | Refills: 0 | DISCHARGE
Start: 2024-06-03

## 2024-06-03 RX ORDER — APIXABAN 2.5 MG/1
1 TABLET, FILM COATED ORAL
Qty: 60 | Refills: 0
Start: 2024-06-03 | End: 2024-07-02

## 2024-06-03 RX ORDER — NYSTATIN CREAM 100000 [USP'U]/G
1 CREAM TOPICAL
Qty: 0 | Refills: 0 | DISCHARGE
Start: 2024-06-03

## 2024-06-03 RX ADMIN — Medication 81 MILLIGRAM(S): at 13:39

## 2024-06-03 RX ADMIN — Medication 1 SPRAY(S): at 05:18

## 2024-06-03 RX ADMIN — NYSTATIN CREAM 1 APPLICATION(S): 100000 CREAM TOPICAL at 05:18

## 2024-06-03 RX ADMIN — Medication 40 MILLIEQUIVALENT(S): at 09:07

## 2024-06-03 RX ADMIN — Medication 650 MILLIGRAM(S): at 20:26

## 2024-06-03 RX ADMIN — Medication 1 SPRAY(S): at 18:10

## 2024-06-03 RX ADMIN — APIXABAN 5 MILLIGRAM(S): 2.5 TABLET, FILM COATED ORAL at 05:19

## 2024-06-03 RX ADMIN — APIXABAN 5 MILLIGRAM(S): 2.5 TABLET, FILM COATED ORAL at 18:10

## 2024-06-03 RX ADMIN — NYSTATIN CREAM 1 APPLICATION(S): 100000 CREAM TOPICAL at 18:12

## 2024-06-03 RX ADMIN — ATORVASTATIN CALCIUM 40 MILLIGRAM(S): 80 TABLET, FILM COATED ORAL at 20:26

## 2024-06-03 RX ADMIN — FAMOTIDINE 40 MILLIGRAM(S): 10 INJECTION INTRAVENOUS at 18:10

## 2024-06-03 RX ADMIN — Medication 650 MILLIGRAM(S): at 21:55

## 2024-06-03 RX ADMIN — FAMOTIDINE 40 MILLIGRAM(S): 10 INJECTION INTRAVENOUS at 05:18

## 2024-06-03 RX ADMIN — Medication 1 DROP(S): at 05:18

## 2024-06-03 NOTE — DISCHARGE NOTE PROVIDER - NSDCFUADDAPPT_GEN_ALL_CORE_FT
Follow up with cardiology or your PCP regarding duration of anticoagulation for PE   Follow up with cardiology or your PCP regarding duration of anticoagulation for PE/ ? ischemia AGGARWAL for new onset A Fib

## 2024-06-03 NOTE — DISCHARGE NOTE PROVIDER - CARE PROVIDERS DIRECT ADDRESSES
,DirectAddress_Unknown,DirectAddress_Unknown,DirectAddress_Unknown ,DirectAddress_Unknown,DirectAddress_Unknown,DirectAddress_Unknown,[email or web address]

## 2024-06-03 NOTE — DISCHARGE NOTE PROVIDER - NSFTFHOMEHTHYNRD_GEN_ALL_CORE
Yes
Link To The Follow Chief Complaint: solitary
Note Text (......Xxx Chief Complaint.): This diagnosis correlates with the
Detail Level: Zone

## 2024-06-03 NOTE — DISCHARGE NOTE PROVIDER - NSDCMRMEDTOKEN_GEN_ALL_CORE_FT
acetaminophen 325 mg oral tablet: 2 tab(s) orally every 6 hours As needed Moderate Pain (4 - 6)  apixaban 5 mg oral tablet: 1 tab(s) orally 2 times a day  aspirin 81 mg oral tablet: 1 tab(s) orally once a day  Bactrim  mg-160 mg oral tablet: 1 tab(s) orally 2 times a day take twice a day  to   fluticasone 50 mcg/inh nasal spray: 1 spray(s) nasal 2 times a day  Multiple Vitamins oral tablet: 1 tab(s) orally once a day (at bedtime)  nystatin 100,000 units/g topical powder: 1 Apply topically to affected area 2 times a day  ocular lubricant ophthalmic solution: 1 drop(s) to each affected eye once a day As needed Dry Eyes  ocular lubricant ophthalmic solution: 1 drop(s) to each affected eye 2 times a day  ondansetron 4 mg oral tablet, disintegratin tab(s) orally 3 times a day As needed Nausea and/or Vomiting  Pepcid 40 mg oral tablet: 1 tab(s) orally 2 times a day  polyethylene glycol 3350 oral powder for reconstitution: 17 gram(s) orally 2 times a day As needed Constipation  Zetia 10 mg oral tablet: 1 tab(s) orally once a day (at bedtime)   acetaminophen 325 mg oral tablet: 2 tab(s) orally every 6 hours As needed Moderate Pain (4 - 6)  apixaban 5 mg oral tablet: 1 tab(s) orally 2 times a day  aspirin 81 mg oral tablet: 1 tab(s) orally once a day  fluticasone 50 mcg/inh nasal spray: 1 spray(s) nasal 2 times a day  Multiple Vitamins oral tablet: 1 tab(s) orally once a day (at bedtime)  nystatin 100,000 units/g topical powder: 1 Apply topically to affected area 2 times a day  ocular lubricant ophthalmic solution: 1 drop(s) to each affected eye once a day As needed Dry Eyes  ocular lubricant ophthalmic solution: 1 drop(s) to each affected eye 2 times a day  ondansetron 4 mg oral tablet, disintegratin tab(s) orally 3 times a day As needed Nausea and/or Vomiting  Pepcid 40 mg oral tablet: 1 tab(s) orally 2 times a day  polyethylene glycol 3350 oral powder for reconstitution: 17 gram(s) orally 2 times a day As needed Constipation  Zetia 10 mg oral tablet: 1 tab(s) orally once a day (at bedtime)

## 2024-06-03 NOTE — DISCHARGE NOTE PROVIDER - NSDCCPCAREPLAN_GEN_ALL_CORE_FT
PRINCIPAL DISCHARGE DIAGNOSIS  Diagnosis: KATLYN (acute motor axonal neuropathy)  Assessment and Plan of Treatment:       SECONDARY DISCHARGE DIAGNOSES  Diagnosis: Pulmonary embolism  Assessment and Plan of Treatment:     Diagnosis: DVT, lower extremity  Assessment and Plan of Treatment:      PRINCIPAL DISCHARGE DIAGNOSIS  Diagnosis: KATLYN (acute motor axonal neuropathy)  Assessment and Plan of Treatment: follow up with [facility] Neurologist regarding further treatment      SECONDARY DISCHARGE DIAGNOSES  Diagnosis: Pulmonary embolism  Assessment and Plan of Treatment: continue Eliquis - follow with primary or vascular regarding duration of treatment    Diagnosis: DVT, lower extremity  Assessment and Plan of Treatment: see above     PRINCIPAL DISCHARGE DIAGNOSIS  Diagnosis: KATLYN (acute motor axonal neuropathy)  Assessment and Plan of Treatment: follow up with [facility] Neurologist regarding further treatment      SECONDARY DISCHARGE DIAGNOSES  Diagnosis: Pulmonary embolism  Assessment and Plan of Treatment: continue Eliquis - follow with primary or cardiology regarding duration of treatment    Diagnosis: DVT, lower extremity  Assessment and Plan of Treatment: see above     PRINCIPAL DISCHARGE DIAGNOSIS  Diagnosis: KATLYN (acute motor axonal neuropathy)  Assessment and Plan of Treatment: follow up with [facility] Neurologist regarding further treatment      SECONDARY DISCHARGE DIAGNOSES  Diagnosis: Pulmonary embolism  Assessment and Plan of Treatment: continue Eliquis - follow with primary or cardiology regarding duration of treatment    Diagnosis: DVT, lower extremity  Assessment and Plan of Treatment: see above    Diagnosis: Atrial fibrillation, new onset  Assessment and Plan of Treatment: at [facility]- ? related to PE- Follow with your cardiologist- ? Ischemia AGGARWAL as OPD

## 2024-06-03 NOTE — DISCHARGE NOTE PROVIDER - NSDCFUADDINST_GEN_ALL_CORE_FT
Pt jessicaeng discharge home with DME delivered to home and here at hospital for home use. HHA set up by patient and will be in the care of wife and HHA.  Pt being discharge home with DME delivered to home and here at hospital for home use. HHA set up by patient and will be in the care of wife and HHA.

## 2024-06-03 NOTE — PROGRESS NOTE ADULT - SUBJECTIVE AND OBJECTIVE BOX
HPI:  83M R-handed PMHx CAD s/p bypass x4 and HLD presented as a transfer from [facility]. Presented with a 'stomach virus' in 3/2024, reported significant weight loss, sensation of 'wobbling,' with repeated falls. Had seen Dr. 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 Patient received a full body MRI demonstrating no acute changes of the spine or brain. Received 5 doses of IVIG ending on 5/8. Patient found to have a bactrim sensitive proteus UTI. Patient incidentially found to have RLE DVT and PE in the RUL. Now on full dose AC w/ BID lovenox.  Patient seen by physiatry and recommended for acute inpatient rehab. Discharged to [facility] on 5/10.      ROS/subjective:  patient seen and evaluated in PT  no events over the weekend  self feeding at times with adaptive equipment  Awaiting [facility], hospital bed to be delivered- HHA assignment  finished antibiotics for UTI- voiding well-   moving bowels 3x/day- formed  tolerating [place] overnight- limit use, toilet daytime  edema less LES- TEDS applied - hand edematous today  no dizziness, no headaches, no nausea, no vomiting, no SOB, No chest pain  Tolerating Eliquis,      MEDICATIONS  (STANDING):  apixaban 5 milliGRAM(s) Oral two times a day  artificial tears (preservative free) Ophthalmic Solution 1 Drop(s) Both EYES two times a day  aspirin enteric coated 81 milliGRAM(s) Oral daily  atorvastatin 40 milliGRAM(s) Oral at bedtime  famotidine    Tablet 40 milliGRAM(s) Oral two times a day  fluticasone propionate 50 MICROgram(s)/spray Nasal Spray 1 Spray(s) Both Nostrils two times a day  melatonin 3 milliGRAM(s) Oral at bedtime  nystatin Powder 1 Application(s) Topical two times a day    MEDICATIONS  (PRN):  acetaminophen     Tablet .. 650 milliGRAM(s) Oral every 6 hours PRN Moderate Pain (4 - 6)  AQUAPHOR (petrolatum Ointment) 1 Application(s) Topical two times a day PRN dry skin  bisacodyl Suppository 10 milliGRAM(s) Rectal once PRN Constipation  ondansetron   Disintegrating Tablet 4 milliGRAM(s) Oral three times a day PRN Nausea and/or Vomiting  polyethylene glycol 3350 17 Gram(s) Oral two times a day PRN Constipation                            12.4   5.44  )-----------( 224      ( 03 Jun 2024 05:25 )             36.6     06-03    144  |  110<H>  |  10  ----------------------------<  93  3.4<L>   |  28  |  0.73    Ca    8.2<L>      03 Jun 2024 05:25  Mg     1.9     06-03    TPro  5.9<L>  /  Alb  2.5<L>  /  TBili  0.6  /  DBili  x   /  AST  30  /  ALT  21  /  AlkPhos  67  06-03    Urinalysis Basic - ( 03 Jun 2024 05:25 )    Color: x / Appearance: x / SG: x / pH: x  Gluc: 93 mg/dL / Ketone: x  / Bili: x / Urobili: x   Blood: x / Protein: x / Nitrite: x   Leuk Esterase: x / RBC: x / WBC x   Sq Epi: x / Non Sq Epi: x / Bacteria: x        CAPILLARY BLOOD GLUCOSE          Vital Signs Last 24 Hrs  T(C): 36.7 (03 Jun 2024 07:52), Max: 37.1 (02 Jun 2024 20:22)  T(F): 98 (03 Jun 2024 07:52), Max: 98.8 (02 Jun 2024 20:22)  HR: 75 (03 Jun 2024 07:52) (75 - 85)  BP: 109/67 (03 Jun 2024 07:52) (109/67 - 119/78)  BP(mean): --  RR: 16 (03 Jun 2024 07:52) (16 - 16)  SpO2: 95% (03 Jun 2024 07:52) (95% - 96%)    Parameters below as of 03 Jun 2024 07:52  Patient On (Oxygen Delivery Method): room air                  Gen - NAD, Comfortable  HEENT - NCAT, EOMI  Neck - Supple, No limited ROM  Pulm - CTAB, No crackles  Cardiovascular - RRR, S1S2  Abdomen - Soft, NT, ND, (+) BS  Extremities - No calf tenderness, 1-2 + edema LLE  Neuro-     Cognitive - AAOx3, follows commands     Communication - Fluent, No dysarthria     Motor -                     LEFT    UE - ShF3/5, EF 3+/5, EE 3+/5, WE 1/5,  2/5 better                     RIGHT UE - ShAB 2/5, EF 3/5, EE 3/5, WE 1/5,  1/5 PROM extension Right 4th , 5th digit painful                    LEFT    LE - HF 2+/5, KE 4/5, DF 0/5, PF 0/5 4/5 hip add/abd hector-  weak hip extensors hector                    RIGHT LE - HF 2+/5, KE 4+/5, DF 0/5, PF 0/5       Psychiatric - Mood stable, Affect WNL  Skin: MAD b/l groin    IDT 5/29, MARIIA 6/4 Home with ROSA, ELICEO services    Continue comprehensive acute rehab program consisting of 3hrs/day of OT/PT .

## 2024-06-03 NOTE — DISCHARGE NOTE PROVIDER - CARE PROVIDER_API CALL
Yvette Liu [facility]  Neurology  [address] [place] [address]  Phone: [phone]  Fax: [phone]  Established Patient  Follow Up Time:     Urology,   [facility] [place].   [address], [place] [address]  Phone: [phone]  Fax: (   )    -  Established Patient  Follow Up Time:     Cardiology,   [facility]  at [facility]  [address] [place] [address]  Phone: [phone]  Fax: (   )    -  Established Patient  Follow Up Time:    Yvette Liu [facility]  Neurology  [address] [place] [address]  Phone: [phone]  Fax: [phone]  Established Patient  Follow Up Time:     Urology,   [facility] [place].   [address] [place], [place] [address]  Phone: [phone]  Fax: (   )    -  Established Patient  Follow Up Time:     Cardiology,   [facility]ology  at [facility]  [address] [place] [address]  Phone: [phone]  Fax: (   )    -  Established Patient  Follow Up Time:     Miguel A Gaines  Physical/Rehab Medicine  [address] [place] [address]  Phone: [phone]  Fax: [phone]  Follow Up Time: 1 month

## 2024-06-03 NOTE — PROGRESS NOTE ADULT - SUBJECTIVE AND OBJECTIVE BOX
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  Denies chest pain, SOB    Vital Signs Last 24 Hrs  T(C): 36.7 (03 Jun 2024 07:52), Max: 37.1 (02 Jun 2024 20:22)  T(F): 98 (03 Jun 2024 07:52), Max: 98.8 (02 Jun 2024 20:22)  HR: 75 (03 Jun 2024 07:52) (75 - 85)  BP: 109/67 (03 Jun 2024 07:52) (109/67 - 119/78)  BP(mean): --  RR: 16 (03 Jun 2024 07:52) (16 - 16)  SpO2: 95% (03 Jun 2024 07:52) (95% - 96%)    Parameters below as of 03 Jun 2024 07:52  Patient On (Oxygen Delivery Method): room air      GENERAL- NAD  EAR/NOSE/MOUTH/THROAT -  MMM  EYES- OLEG, conjunctiva and Sclera clear  NECK- supple  RESPIRATORY-  clear to auscultation bilaterally, non laboured breathing  CARDIOVASCULAR - SIS2, RRR  GI - soft NT BS present  EXTREMITIES- no pedal edema  NEUROLOGY- b/l LE and UE weakness  PSYCHIATRY- AAO X 3                12.4                 144  | 28   | 10           5.44  >-----------< 224     ------------------------< 93                    36.6                 3.4  | 110  | 0.73                                         Ca 8.2   Mg 1.9   Ph x          MEDICATIONS  (STANDING):  apixaban 5 milliGRAM(s) Oral two times a day  artificial tears (preservative free) Ophthalmic Solution 1 Drop(s) Both EYES two times a day  aspirin enteric coated 81 milliGRAM(s) Oral daily  atorvastatin 40 milliGRAM(s) Oral at bedtime  famotidine    Tablet 40 milliGRAM(s) Oral two times a day  fluticasone propionate 50 MICROgram(s)/spray Nasal Spray 1 Spray(s) Both Nostrils two times a day  melatonin 3 milliGRAM(s) Oral at bedtime  nystatin Powder 1 Application(s) Topical two times a day    MEDICATIONS  (PRN):  acetaminophen     Tablet .. 650 milliGRAM(s) Oral every 6 hours PRN Moderate Pain (4 - 6)  AQUAPHOR (petrolatum Ointment) 1 Application(s) Topical two times a day PRN dry skin  bisacodyl Suppository 10 milliGRAM(s) Rectal once PRN Constipation  ondansetron   Disintegrating Tablet 4 milliGRAM(s) Oral three times a day PRN Nausea and/or Vomiting  polyethylene glycol 3350 17 Gram(s) Oral two times a day PRN Constipation

## 2024-06-03 NOTE — PROGRESS NOTE ADULT - ASSESSMENT
84y with CAD s/p CABG recent acute polyneuropathy diagnosis (march 2024).  Here after IVIG series c/b PE RUL and DVT RLE. Patient now admitted for a multidisciplinary rehab program- pt/ot/dvt ppx    #Motor Axon Neuropathy  -initially began after viral illness in March 2024  -following with Neurologist Dr. Liu  -s/p IVIG ending on 5/8    #CAD s/p CABG  #New onset AF  #HLD  -Heart rate controlled off AV martín blocking agent  -ASA/Statin  -Eliquis  -O/p cardiology follow up with cardiac MR    #PE  #DVT  -Eliquis  -Outpatient hematology followup     #Hypokalemia  - replaced, will monitor    #Proteus bacteremia at [facility]  -s/p course of bactrim    DVT PPX: Eliquis

## 2024-06-03 NOTE — DISCHARGE NOTE PROVIDER - PROVIDER TOKENS
PROVIDER:[TOKEN:[[id number]],ESTABLISHEDPATIENT:[T]],FREE:[LAST:[Urology],PHONE:[phone]],FAX:[(   )    -],ADDRESS:[[facility] [place].   [address] [place] [address]],ESTABLISHEDPATIENT:[T]],FREE:[LAST:[Cardiology],PHONE:[phone]],FAX:[(   )    -],ADDRESS:[[place] [address]],ESTABLISHEDPATIENT:[T]] PROVIDER:[TOKEN:[[id number]],ESTABLISHEDPATIENT:[T]],FREE:[LAST:[Urology],PHONE:[phone]],FAX:[(   )    -],ADDRESS:[[facility] [place].   [address] [place] [address]],ESTABLISHEDPATIENT:[T]],FREE:[LAST:[Cardiology],PHONE:[phone]],FAX:[(   )    -],ADDRESS:[[place] [address]],ESTABLISHEDPATIENT:[T]],PROVIDER:[TOKEN:[[id number]],FOLLOWUP:[1 month]]

## 2024-06-03 NOTE — DISCHARGE NOTE PROVIDER - HOSPITAL COURSE
HPI:  83M R-handed PMHx CAD s/p bypass x4 and HLD presented as a transfer from [facility]. Presented with a 'stomach virus' in 3/2024, reported significant weight loss, sensation of 'wobbling,' with repeated falls. Had seen Dr. 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 Patient received a full body MRI demonstrating no acute changes of the spine or brain. Received 5 doses of IVIG ending on 5/8. Patient found to have a bactrim sensitive proteus UTI. Patient incidentally found to have RLE DVT and PE in the RUL. Now on full dose AC w/ BID Lovenox.  Patient seen by physiatry and recommended for acute inpatient rehab.  Admitted to [facility] acute rehab on 5/10. Patient participated in daily therapies and made good functional gains.  Rehab course notable for hypokalemia treated with po potassium. will discharge on potassium supplements. UTI s/p bactrim and cipro. Will continue Eliquis for DVT/PE treatment. Pt should continue Isotoner glove, wrist splint during the day and softeeze splint at night     Patient tolerated course of inpatient PT/OT/SLP rehab with significant functional improvements. Patient seen and examined on day of discharge.  Medications, medication side effects, and discharge instructions were reviewed with the patient, who expressed understanding of all information.  Patient was medically and functionally optimized and cleared for discharge home. DME delivered to home and hospital.     (10 May 2024 16:55)

## 2024-06-03 NOTE — PROGRESS NOTE ADULT - ASSESSMENT
REHANA MEDINA is a 84y with CAD s/p CABG recent acute polyneuropathy diagnosis (march 2024).  Here after IVIG series c/b PE RUL and DVT RLE. Patient now admitted for a multidisciplinary rehab program. 05-10-24 @ 17:01    # Motor Axon Neuropathy  Comprehensive Multidisciplinary Rehab Program:  -comprehensive rehab program of PT/OT- 3 hours a day, 5 days a week. P&O as needed   -initially began after viral illness in March 2024  -following with Neurologist Dr. Liu  -s/p IVIG ending on 5/8  OT- isotoner gloves, wrist supports during the day- apply softeeze splints at night- ?Functional E Stim  Jovan on Dc- [facility] 12x7- no training for aide- ambulette for Dc home- not able to do car transfer  Anticipate Dc in AM    # CAD s/p CABG  # AF  -continue aspirin and statin  -on therapeutic AC    # PE  # DVT  - continue lovenox 90 mg BID- hospitalist transitioned to DOAC  - RUL PE and RLE DVT    Elevated LFTs  ? secondary to IVIG  not taking tylenol  LFTS normalized    Hypokalemia  K 3.4  supplemented   will Dc on Daily supplementation    # UTI  - sensitive proteus UTI  - bactrim 2 tab BID until 5/17  voiding well    # Pain  - Tylenol PRN    Mood / Cognition  - Neuropsychology consult PRN    Sleep  - Maintain quiet hours and a low stim environment.   - Melatonin 3 mg QHS    GI / Bowel  - Miralax daily> 2x/day  - GI ppx: famotidine 40 mg BID- HX GERD      Skin / Pressure injury  - Skin assessment on admission performed: MAD to b/l groin  - Turn q2 hours in bed while awake, air mattress  - nursing to monitor skin qShift  - soft heel protectors  - skin barrier cream PRN    Diet/Dysphagia:  - Diet Consistency: regular diet  - Aspiration Precautions  - Nutrition consult    DVT prophylaxis:   - has RLE DVT  - on full dose Lovenox  - Last Doppler on R soleal DVT on 5/8- Eliquis   TEDS BLES when OOB        PATIENT IS A GOOD CANDIDATE FOR [facility] BED 2/2 HIS NEUROLOGIC CONDITION/  QUADRIPARESIS - PATIENT REQUIRES FREQUENT POSITION CHANGES OR IMMEDIATE NEED FOR A CHANGE OF POSITION REQUIRES POSITIONING OF THE BODY TO ALLEVIATE PAIN, PREVENT CONTRACTURES AND AVOID RESPIRATORY INFECTIONS IN WAYS NOT FEASIBLE IN ORDINARY BED.     PATIENT ALSO IS A GOOD CANDIDATE FOR JOVAN LIFT TO FACILITATE SAFE OOB TRANSFERS FROM BED TO /COMMODE- WITHOUT THE USE OF THE LIFT THE PATIENT WOULD BE BED CONFINED

## 2024-06-04 VITALS
SYSTOLIC BLOOD PRESSURE: 112 MMHG | HEART RATE: 86 BPM | TEMPERATURE: 98 F | DIASTOLIC BLOOD PRESSURE: 74 MMHG | RESPIRATION RATE: 16 BRPM | OXYGEN SATURATION: 96 %

## 2024-06-04 PROCEDURE — 99238 HOSP IP/OBS DSCHRG MGMT 30/<: CPT | Mod: GC

## 2024-06-04 PROCEDURE — 99232 SBSQ HOSP IP/OBS MODERATE 35: CPT

## 2024-06-04 RX ADMIN — Medication 1 SPRAY(S): at 05:38

## 2024-06-04 RX ADMIN — APIXABAN 5 MILLIGRAM(S): 2.5 TABLET, FILM COATED ORAL at 05:38

## 2024-06-04 RX ADMIN — Medication 81 MILLIGRAM(S): at 11:08

## 2024-06-04 RX ADMIN — NYSTATIN CREAM 1 APPLICATION(S): 100000 CREAM TOPICAL at 05:38

## 2024-06-04 RX ADMIN — FAMOTIDINE 40 MILLIGRAM(S): 10 INJECTION INTRAVENOUS at 05:38

## 2024-06-04 RX ADMIN — Medication 1 DROP(S): at 05:39

## 2024-06-04 NOTE — PROGRESS NOTE ADULT - PROVIDER SPECIALTY LIST ADULT
Internal Medicine
Internal Medicine
Physiatry
Rehab Medicine
Hospitalist
Internal Medicine
Physiatry
Physiatry
Rehab Medicine
Hospitalist
Internal Medicine
Physiatry
Rehab Medicine
Hospitalist
Internal Medicine
Physiatry
Physiatry
Rehab Medicine
Hospitalist
Physiatry
Physiatry
Hospitalist

## 2024-06-04 NOTE — PROGRESS NOTE ADULT - REASON FOR ADMISSION
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

## 2024-06-04 NOTE — PROGRESS NOTE ADULT - SUBJECTIVE AND OBJECTIVE BOX
HPI:  83M R-handed PMHx CAD s/p bypass x4 and HLD presented as a transfer from [facility]. Presented with a 'stomach virus' in 3/2024, reported significant weight loss, sensation of 'wobbling,' with repeated falls. Had seen Dr. 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 Patient received a full body MRI demonstrating no acute changes of the spine or brain. Received 5 doses of IVIG ending on 5/8. Patient found to have a bactrim sensitive proteus UTI. Patient incidentially found to have RLE DVT and PE in the RUL. Now on full dose AC w/ BID lovenox.  Patient seen by physiatry and recommended for acute inpatient rehab. Discharged to [facility] on 5/10.      ROS/subjective:  patient seen and evaluated bedside  ready for discharge  all equipment available, HHA Available  last BM 6/3  tolerating [place]   no dizziness, no headaches, no nausea, no vomiting, no SOB, No chest pain  Tolerating Eliquis,      MEDICATIONS  (STANDING):  apixaban 5 milliGRAM(s) Oral two times a day  artificial tears (preservative free) Ophthalmic Solution 1 Drop(s) Both EYES two times a day  aspirin enteric coated 81 milliGRAM(s) Oral daily  atorvastatin 40 milliGRAM(s) Oral at bedtime  famotidine    Tablet 40 milliGRAM(s) Oral two times a day  fluticasone propionate 50 MICROgram(s)/spray Nasal Spray 1 Spray(s) Both Nostrils two times a day  melatonin 3 milliGRAM(s) Oral at bedtime  nystatin Powder 1 Application(s) Topical two times a day    MEDICATIONS  (PRN):  acetaminophen     Tablet .. 650 milliGRAM(s) Oral every 6 hours PRN Moderate Pain (4 - 6)  AQUAPHOR (petrolatum Ointment) 1 Application(s) Topical two times a day PRN dry skin  bisacodyl Suppository 10 milliGRAM(s) Rectal once PRN Constipation  ondansetron   Disintegrating Tablet 4 milliGRAM(s) Oral three times a day PRN Nausea and/or Vomiting  polyethylene glycol 3350 17 Gram(s) Oral two times a day PRN Constipation                            12.4   5.44  )-----------( 224      ( 03 Jun 2024 05:25 )             36.6     06-03    144  |  110<H>  |  10  ----------------------------<  93  3.4<L>   |  28  |  0.73    Ca    8.2<L>      03 Jun 2024 05:25  Mg     1.9     06-03    TPro  5.9<L>  /  Alb  2.5<L>  /  TBili  0.6  /  DBili  x   /  AST  30  /  ALT  21  /  AlkPhos  67  06-03    Urinalysis Basic - ( 03 Jun 2024 05:25 )    Color: x / Appearance: x / SG: x / pH: x  Gluc: 93 mg/dL / Ketone: x  / Bili: x / Urobili: x   Blood: x / Protein: x / Nitrite: x   Leuk Esterase: x / RBC: x / WBC x   Sq Epi: x / Non Sq Epi: x / Bacteria: x        CAPILLARY BLOOD GLUCOSE          Vital Signs Last 24 Hrs  T(C): 36.7 (04 Jun 2024 08:08), Max: 36.7 (03 Jun 2024 19:43)  T(F): 98 (04 Jun 2024 08:08), Max: 98.1 (03 Jun 2024 19:43)  HR: 71 (04 Jun 2024 08:08) (71 - 87)  BP: 122/81 (04 Jun 2024 08:08) (107/69 - 122/81)  BP(mean): --  RR: 16 (04 Jun 2024 08:08) (16 - 16)  SpO2: 96% (04 Jun 2024 08:08) (95% - 96%)    Parameters below as of 04 Jun 2024 08:08  Patient On (Oxygen Delivery Method): room air        Gen - NAD, Comfortable  HEENT - NCAT, EOMI  Neck - Supple, No limited ROM  Pulm - CTAB, No crackles  Cardiovascular - RRR, S1S2  Abdomen - Soft, NT, ND, (+) BS  Extremities - No calf tenderness, 1-2 + edema LLE  Neuro-     Cognitive - AAOx3, follows commands     Communication - Fluent, No dysarthria     Motor -                     LEFT    UE - ShF3/5, EF 3+/5, EE 3+/5, WE 1/5,  2/5 better                     RIGHT UE - ShAB 2/5, EF 3/5, EE 3/5, WE 1/5,  1/5 PROM extension Right 4th , 5th digit painful                    LEFT    LE - HF 2+/5, KE 4/5, DF 0/5, PF 0/5 4/5 hip add/abd hector-  weak hip extensors hector                    RIGHT LE - HF 2+/5, KE 4+/5, DF 0/5, PF 0/5       Psychiatric - Mood stable, Affect WNL  Skin: MAD b/l groin    IDT 5/29, MARIIA 6/4 Home with HHA, VN services    Continue comprehensive acute rehab program consisting of 3hrs/day of OT/PT .

## 2024-06-04 NOTE — PROGRESS NOTE ADULT - ASSESSMENT
REHANA MEDINA is a 84y with CAD s/p CABG recent acute polyneuropathy diagnosis (march 2024).  Here after IVIG series c/b PE RUL and DVT RLE. Patient now admitted for a multidisciplinary rehab program. 05-10-24 @ 17:01    # Motor Axon Neuropathy  Comprehensive Multidisciplinary Rehab Program:  -comprehensive rehab program of PT/OT- 3 hours a day, 5 days a week. P&O as needed   -initially began after viral illness in March 2024  -following with Neurologist Dr. Liu  -s/p IVIG ending on 5/8  OT- isotoner gloves, wrist supports during the day- apply softeeze splints at night- ?Functional E Stim  Jovan on Dc- [facility] 12x7- no training for aide- ambulette for Dc home- not able to do car transfer  DC home with VN services/ HHA  FU Neurology-DR Liu 1-2 weeks  FU Pvt cardiology 2 weeks - new onset A Fib/ PE/DVT- AC with Eliquis 6 months?  FU PMD 7-10 days  FU PMR Dr Gaines 4-6 weeks    # CAD s/p CABG  # AF  -continue aspirin and statin  -on therapeutic AC    # PE  # DVT  - continue lovenox 90 mg BID- hospitalist transitioned to DOAC  - RUL PE and RLE DVT    Elevated LFTs  ? secondary to IVIG  not taking tylenol  LFTS normalized    Hypokalemia  K 3.4  supplemented   will Dc on Daily supplementation    # UTI  - sensitive proteus UTI  - bactrim 2 tab BID until 5/17  voiding well    # Pain  - Tylenol PRN    Mood / Cognition  - Neuropsychology consult PRN    Sleep  - Maintain quiet hours and a low stim environment.   - Melatonin 3 mg QHS    GI / Bowel  - Miralax daily> 2x/day  - GI ppx: famotidine 40 mg BID- HX GERD      Skin / Pressure injury  - Skin assessment on admission performed: MAD to b/l groin  - Turn q2 hours in bed while awake, air mattress  - nursing to monitor skin qShift  - soft heel protectors  - skin barrier cream PRN    Diet/Dysphagia:  - Diet Consistency: regular diet  - Aspiration Precautions  - Nutrition consult    DVT prophylaxis:   - has RLE DVT  - on full dose Lovenox  - Last Doppler on R soleal DVT on 5/8- Eliquis   TEDS BLES when OOB        PATIENT IS A GOOD CANDIDATE FOR [facility] BED 2/2 HIS NEUROLOGIC CONDITION/  QUADRIPARESIS - PATIENT REQUIRES FREQUENT POSITION CHANGES OR IMMEDIATE NEED FOR A CHANGE OF POSITION REQUIRES POSITIONING OF THE BODY TO ALLEVIATE PAIN, PREVENT CONTRACTURES AND AVOID RESPIRATORY INFECTIONS IN WAYS NOT FEASIBLE IN ORDINARY BED.     PATIENT ALSO IS A GOOD CANDIDATE FOR JOVAN LIFT TO FACILITATE SAFE OOB TRANSFERS FROM BED TO /COMMODE- WITHOUT THE USE OF THE LIFT THE PATIENT WOULD BE BED CONFINED    30 MINUTES SPENT TODAY ON PATIENT'S DISCHARGE

## 2024-06-04 NOTE — PROGRESS NOTE ADULT - NUTRITIONAL ASSESSMENT
This patient has been assessed with a concern for Malnutrition and has been determined to have a diagnosis/diagnoses of Severe protein-calorie malnutrition.    This patient is being managed with:   Diet Regular-  Entered: May 29 2024 10:13AM  
This patient has been assessed with a concern for Malnutrition and has been determined to have a diagnosis/diagnoses of Severe protein-calorie malnutrition.    This patient is being managed with:   Diet Regular-  Entered: May 29 2024 10:13AM  
This patient has been assessed with a concern for Malnutrition and has been determined to have a diagnosis/diagnoses of Severe protein-calorie malnutrition.    This patient is being managed with:   Diet Regular-  Supplement Feeding Modality:  Oral  Ensure Plus High Protein Cans or Servings Per Day:  1       Frequency:  Three Times a day  Entered: May 13 2024  1:47PM  
This patient has been assessed with a concern for Malnutrition and has been determined to have a diagnosis/diagnoses of Severe protein-calorie malnutrition.    This patient is being managed with:   Diet Regular-  Supplement Feeding Modality:  Oral  Ensure Plus High Protein Cans or Servings Per Day:  1       Frequency:  Two Times a day  Entered: May 23 2024 10:06AM  
This patient has been assessed with a concern for Malnutrition and has been determined to have a diagnosis/diagnoses of Severe protein-calorie malnutrition.    This patient is being managed with:   Diet Regular-  Supplement Feeding Modality:  Oral  Ensure Plus High Protein Cans or Servings Per Day:  1       Frequency:  Two Times a day  Entered: May 23 2024 10:06AM    The following pending diet order is being considered for treatment of Severe protein-calorie malnutrition:  Diet Regular-  Entered: May 29 2024 10:13AM  
This patient has been assessed with a concern for Malnutrition and has been determined to have a diagnosis/diagnoses of Severe protein-calorie malnutrition.    This patient is being managed with:   Diet Regular-  Supplement Feeding Modality:  Oral  Ensure Plus High Protein Cans or Servings Per Day:  1       Frequency:  Two Times a day  Entered: May 23 2024 10:06AM    The following pending diet order is being considered for treatment of Severe protein-calorie malnutrition:  Diet Regular-  Entered: May 29 2024 10:13AM  
This patient has been assessed with a concern for Malnutrition and has been determined to have a diagnosis/diagnoses of Severe protein-calorie malnutrition.    This patient is being managed with:   Diet Regular-  Entered: May 29 2024 10:13AM  
This patient has been assessed with a concern for Malnutrition and has been determined to have a diagnosis/diagnoses of Severe protein-calorie malnutrition.    This patient is being managed with:   Diet Regular-  Supplement Feeding Modality:  Oral  Ensure Plus High Protein Cans or Servings Per Day:  1       Frequency:  Three Times a day  Entered: May 13 2024  1:47PM  
This patient has been assessed with a concern for Malnutrition and has been determined to have a diagnosis/diagnoses of Severe protein-calorie malnutrition.    This patient is being managed with:   Diet Regular-  Supplement Feeding Modality:  Oral  Ensure Plus High Protein Cans or Servings Per Day:  1       Frequency:  Three Times a day  Entered: May 13 2024  1:47PM    The following pending diet order is being considered for treatment of Severe protein-calorie malnutrition:  Diet Regular-  Supplement Feeding Modality:  Oral  Ensure Plus High Protein Cans or Servings Per Day:  1       Frequency:  Two Times a day  Entered: May 23 2024 10:06AM  
This patient has been assessed with a concern for Malnutrition and has been determined to have a diagnosis/diagnoses of Severe protein-calorie malnutrition.    This patient is being managed with:   Diet Regular-  Supplement Feeding Modality:  Oral  Ensure Plus High Protein Cans or Servings Per Day:  1       Frequency:  Two Times a day  Entered: May 23 2024 10:06AM  
This patient has been assessed with a concern for Malnutrition and has been determined to have a diagnosis/diagnoses of Severe protein-calorie malnutrition.    This patient is being managed with:   Diet Regular-  Supplement Feeding Modality:  Oral  Ensure Plus High Protein Cans or Servings Per Day:  1       Frequency:  Two Times a day  Entered: May 23 2024 10:06AM    The following pending diet order is being considered for treatment of Severe protein-calorie malnutrition:  Diet Regular-  Entered: May 29 2024 10:13AM  
This patient has been assessed with a concern for Malnutrition and has been determined to have a diagnosis/diagnoses of Severe protein-calorie malnutrition.    This patient is being managed with:   Diet Regular-  Entered: May 29 2024 10:13AM  
This patient has been assessed with a concern for Malnutrition and has been determined to have a diagnosis/diagnoses of Severe protein-calorie malnutrition.    This patient is being managed with:   Diet Regular-  Supplement Feeding Modality:  Oral  Ensure Plus High Protein Cans or Servings Per Day:  1       Frequency:  Three Times a day  Entered: May 13 2024  1:47PM  
This patient has been assessed with a concern for Malnutrition and has been determined to have a diagnosis/diagnoses of Severe protein-calorie malnutrition.    This patient is being managed with:   Diet Regular-  Supplement Feeding Modality:  Oral  Ensure Plus High Protein Cans or Servings Per Day:  1       Frequency:  Three Times a day  Entered: May 13 2024  1:47PM    Diet Regular-  Supplement Feeding Modality:  Oral  Ensure Plus High Protein Cans or Servings Per Day:  1       Frequency:  Two Times a day  Entered: May 10 2024  9:22PM    The following pending diet order is being considered for treatment of Severe protein-calorie malnutrition:null
This patient has been assessed with a concern for Malnutrition and has been determined to have a diagnosis/diagnoses of Severe protein-calorie malnutrition.    This patient is being managed with:   Diet Regular-  Supplement Feeding Modality:  Oral  Ensure Plus High Protein Cans or Servings Per Day:  1       Frequency:  Two Times a day  Entered: May 23 2024 10:06AM    The following pending diet order is being considered for treatment of Severe protein-calorie malnutrition:  Diet Regular-  Entered: May 29 2024 10:13AM  
This patient has been assessed with a concern for Malnutrition and has been determined to have a diagnosis/diagnoses of Severe protein-calorie malnutrition.    This patient is being managed with:   Diet Regular-  Supplement Feeding Modality:  Oral  Ensure Plus High Protein Cans or Servings Per Day:  1       Frequency:  Three Times a day  Entered: May 13 2024  1:47PM  
This patient has been assessed with a concern for Malnutrition and has been determined to have a diagnosis/diagnoses of Severe protein-calorie malnutrition.    This patient is being managed with:   Diet Regular-  Supplement Feeding Modality:  Oral  Ensure Plus High Protein Cans or Servings Per Day:  1       Frequency:  Two Times a day  Entered: May 10 2024  9:22PM    The following pending diet order is being considered for treatment of Severe protein-calorie malnutrition:  Diet Regular-  Supplement Feeding Modality:  Oral  Ensure Plus High Protein Cans or Servings Per Day:  1       Frequency:  Three Times a day  Entered: May 13 2024  1:47PM  
This patient has been assessed with a concern for Malnutrition and has been determined to have a diagnosis/diagnoses of Severe protein-calorie malnutrition.    This patient is being managed with:   Diet Regular-  Supplement Feeding Modality:  Oral  Ensure Plus High Protein Cans or Servings Per Day:  1       Frequency:  Two Times a day  Entered: May 23 2024 10:06AM  
This patient has been assessed with a concern for Malnutrition and has been determined to have a diagnosis/diagnoses of Severe protein-calorie malnutrition.    This patient is being managed with:   Diet Regular-  Supplement Feeding Modality:  Oral  Ensure Plus High Protein Cans or Servings Per Day:  1       Frequency:  Two Times a day  Entered: May 23 2024 10:06AM  
This patient has been assessed with a concern for Malnutrition and has been determined to have a diagnosis/diagnoses of Severe protein-calorie malnutrition.    This patient is being managed with:   Diet Regular-  Supplement Feeding Modality:  Oral  Ensure Plus High Protein Cans or Servings Per Day:  1       Frequency:  Two Times a day  Entered: May 23 2024 10:06AM    The following pending diet order is being considered for treatment of Severe protein-calorie malnutrition:  Diet Regular-  Entered: May 29 2024 10:13AM  
This patient has been assessed with a concern for Malnutrition and has been determined to have a diagnosis/diagnoses of Severe protein-calorie malnutrition.    This patient is being managed with:   Diet Regular-  Supplement Feeding Modality:  Oral  Ensure Plus High Protein Cans or Servings Per Day:  1       Frequency:  Two Times a day  Entered: May 23 2024 10:06AM    This patient has been assessed with a concern for Malnutrition and has been determined to have a diagnosis/diagnoses of Severe protein-calorie malnutrition.    This patient is being managed with:   Diet Regular-  Supplement Feeding Modality:  Oral  Ensure Plus High Protein Cans or Servings Per Day:  1       Frequency:  Two Times a day  Entered: May 23 2024 10:06AM  
This patient has been assessed with a concern for Malnutrition and has been determined to have a diagnosis/diagnoses of Severe protein-calorie malnutrition.    This patient is being managed with:   Diet Regular-  Entered: May 29 2024 10:13AM  
This patient has been assessed with a concern for Malnutrition and has been determined to have a diagnosis/diagnoses of Severe protein-calorie malnutrition.    This patient is being managed with:   Diet Regular-  Supplement Feeding Modality:  Oral  Ensure Plus High Protein Cans or Servings Per Day:  1       Frequency:  Three Times a day  Entered: May 13 2024  1:47PM  
This patient has been assessed with a concern for Malnutrition and has been determined to have a diagnosis/diagnoses of Severe protein-calorie malnutrition.    This patient is being managed with:   Diet Regular-  Supplement Feeding Modality:  Oral  Ensure Plus High Protein Cans or Servings Per Day:  1       Frequency:  Two Times a day  Entered: May 23 2024 10:06AM    The following pending diet order is being considered for treatment of Severe protein-calorie malnutrition:  Diet Regular-  Entered: May 29 2024 10:13AM  
This patient has been assessed with a concern for Malnutrition and has been determined to have a diagnosis/diagnoses of Severe protein-calorie malnutrition.    This patient is being managed with:   Diet Regular-  Supplement Feeding Modality:  Oral  Ensure Plus High Protein Cans or Servings Per Day:  1       Frequency:  Two Times a day  Entered: May 23 2024 10:06AM    The following pending diet order is being considered for treatment of Severe protein-calorie malnutrition:  Diet Regular-  Entered: May 29 2024 10:13AM  
This patient has been assessed with a concern for Malnutrition and has been determined to have a diagnosis/diagnoses of Severe protein-calorie malnutrition.    This patient is being managed with:   Diet Regular-  Supplement Feeding Modality:  Oral  Ensure Plus High Protein Cans or Servings Per Day:  1       Frequency:  Three Times a day  Entered: May 13 2024  1:47PM

## 2024-06-04 NOTE — PROGRESS NOTE ADULT - SUBJECTIVE AND OBJECTIVE BOX
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  No overnight events.  No new complaints.    ALLERGIES:  penicillins (Rash)    MEDICATIONS  (STANDING):  apixaban 5 milliGRAM(s) Oral two times a day  artificial tears (preservative free) Ophthalmic Solution 1 Drop(s) Both EYES two times a day  aspirin enteric coated 81 milliGRAM(s) Oral daily  atorvastatin 40 milliGRAM(s) Oral at bedtime  famotidine    Tablet 40 milliGRAM(s) Oral two times a day  fluticasone propionate 50 MICROgram(s)/spray Nasal Spray 1 Spray(s) Both Nostrils two times a day  melatonin 3 milliGRAM(s) Oral at bedtime  nystatin Powder 1 Application(s) Topical two times a day    MEDICATIONS  (PRN):  acetaminophen     Tablet .. 650 milliGRAM(s) Oral every 6 hours PRN Moderate Pain (4 - 6)  AQUAPHOR (petrolatum Ointment) 1 Application(s) Topical two times a day PRN dry skin  bisacodyl Suppository 10 milliGRAM(s) Rectal once PRN Constipation  ondansetron   Disintegrating Tablet 4 milliGRAM(s) Oral three times a day PRN Nausea and/or Vomiting  polyethylene glycol 3350 17 Gram(s) Oral two times a day PRN Constipation    Vital Signs Last 24 Hrs  T(F): 98 (04 Jun 2024 08:08), Max: 98.1 (03 Jun 2024 19:43)  HR: 71 (04 Jun 2024 08:08) (71 - 87)  BP: 122/81 (04 Jun 2024 08:08) (107/69 - 122/81)  RR: 16 (04 Jun 2024 08:08) (16 - 16)  SpO2: 96% (04 Jun 2024 08:08) (95% - 96%)  I&O's Summary    03 Jun 2024 07:01  -  04 Jun 2024 07:00  --------------------------------------------------------  IN: 0 mL / OUT: 1 mL / NET: -1 mL          PHYSICAL EXAM:  GENERAL: NAD  CHEST/LUNG: No rales, rhonchi, wheezing; normal respiratory effort  HEART: RRR; No murmurs, rubs, or gallops  ABDOMEN: Soft, Nontender, Nondistended; Bowel sounds present  MSK/EXT:  No peripheral edema, 2+ Peripheral Pulses, No clubbing or digital cyanosis  PSYCH: Appropriate affect, Alert & Oriented    LABS:                        12.4   5.44  )-----------( 224      ( 03 Jun 2024 05:25 )             36.6       06-03    144  |  110  |  10  ----------------------------<  93  3.4   |  28  |  0.73    Ca    8.2      03 Jun 2024 05:25  Mg     1.9     06-03    TPro  5.9  /  Alb  2.5  /  TBili  0.6  /  DBili  x   /  AST  30  /  ALT  21  /  AlkPhos  67  06-03            05-02 Chol 119 mg/dL LDL -- HDL 44 mg/dL Trig 83 mg/dL          Urinalysis Basic - ( 03 Jun 2024 05:25 )    Color: x / Appearance: x / SG: x / pH: x  Gluc: 93 mg/dL / Ketone: x  / Bili: x / Urobili: x   Blood: x / Protein: x / Nitrite: x   Leuk Esterase: x / RBC: x / WBC x   Sq Epi: x / Non Sq Epi: x / Bacteria: x        COVID-19 PCR: NotDetec (05-10-24 @ 23:12)      Care Discussed with Consultants/Other Providers: Yes

## 2024-06-04 NOTE — PROGRESS NOTE ADULT - ASSESSMENT
84y with CAD s/p CABG recent acute polyneuropathy diagnosis (march 2024).  Here after IVIG series c/b PE RUL and DVT RLE. Patient now admitted for a multidisciplinary rehab program- pt/ot/dvt ppx    #Motor Axon Neuropathy  -initially began after viral illness in March 2024  -following with Neurologist Dr. Liu  -s/p IVIG ending on 5/8    #CAD s/p CABG  #New onset AF  #HLD  -Heart rate controlled off AV martín blocking agent  -ASA/Statin  -Eliquis  -outpatient cardiology follow up with cardiac MR    #PE  #DVT  -Eliquis  -Outpatient hematology followup     #Hypokalemia  - repleted, will monitor on routine labs    #Proteus bacteremia at [facility]  -s/p course of bactrim    DVT PPX: Eliquis    6/3 labs reviewed

## 2024-06-19 LAB
CULTURE RESULTS: SIGNIFICANT CHANGE UP
SPECIMEN SOURCE: SIGNIFICANT CHANGE UP

## 2024-06-28 DIAGNOSIS — G62.81 CRITICAL ILLNESS POLYNEUROPATHY: ICD-10-CM

## 2024-07-12 PROCEDURE — 94640 AIRWAY INHALATION TREATMENT: CPT

## 2024-07-12 PROCEDURE — 97161 PT EVAL LOW COMPLEX 20 MIN: CPT | Mod: GP

## 2024-07-12 PROCEDURE — 97530 THERAPEUTIC ACTIVITIES: CPT | Mod: GO

## 2024-07-12 PROCEDURE — 83735 ASSAY OF MAGNESIUM: CPT

## 2024-07-12 PROCEDURE — 97112 NEUROMUSCULAR REEDUCATION: CPT | Mod: GP

## 2024-07-12 PROCEDURE — 97116 GAIT TRAINING THERAPY: CPT | Mod: GP

## 2024-07-12 PROCEDURE — 97165 OT EVAL LOW COMPLEX 30 MIN: CPT | Mod: GO

## 2024-07-12 PROCEDURE — 85025 COMPLETE CBC W/AUTO DIFF WBC: CPT

## 2024-07-12 PROCEDURE — 80053 COMPREHEN METABOLIC PANEL: CPT

## 2024-07-12 PROCEDURE — 87635 SARS-COV-2 COVID-19 AMP PRB: CPT

## 2024-07-12 PROCEDURE — 36415 COLL VENOUS BLD VENIPUNCTURE: CPT

## 2024-07-12 PROCEDURE — 97110 THERAPEUTIC EXERCISES: CPT | Mod: GP

## 2024-07-12 PROCEDURE — 97535 SELF CARE MNGMENT TRAINING: CPT | Mod: GO

## 2024-07-24 ENCOUNTER — APPOINTMENT (OUTPATIENT)
Dept: PHYSICAL MEDICINE AND REHAB | Facility: CLINIC | Age: 84
End: 2024-07-24

## 2024-09-30 DIAGNOSIS — K81.1 CHRONIC CHOLECYSTITIS: ICD-10-CM

## 2024-10-01 ENCOUNTER — TRANSCRIPTION ENCOUNTER (OUTPATIENT)
Age: 84
End: 2024-10-01

## 2024-10-02 ENCOUNTER — TRANSCRIPTION ENCOUNTER (OUTPATIENT)
Age: 84
End: 2024-10-02

## 2024-11-20 ENCOUNTER — RESULT REVIEW (OUTPATIENT)
Age: 84
End: 2024-11-20

## 2024-11-20 ENCOUNTER — OUTPATIENT (OUTPATIENT)
Dept: INPATIENT UNIT | Facility: HOSPITAL | Age: 84
LOS: 1 days | Discharge: HOME | End: 2024-11-20
Payer: MEDICARE

## 2024-11-20 ENCOUNTER — TRANSCRIPTION ENCOUNTER (OUTPATIENT)
Age: 84
End: 2024-11-20

## 2024-11-20 VITALS
HEART RATE: 73 BPM | HEIGHT: 69 IN | TEMPERATURE: 98 F | WEIGHT: 179.9 LBS | SYSTOLIC BLOOD PRESSURE: 105 MMHG | DIASTOLIC BLOOD PRESSURE: 68 MMHG | RESPIRATION RATE: 16 BRPM | OXYGEN SATURATION: 98 %

## 2024-11-20 VITALS
RESPIRATION RATE: 16 BRPM | TEMPERATURE: 98 F | HEART RATE: 83 BPM | OXYGEN SATURATION: 97 % | DIASTOLIC BLOOD PRESSURE: 64 MMHG | SYSTOLIC BLOOD PRESSURE: 111 MMHG

## 2024-11-20 DIAGNOSIS — Z95.1 PRESENCE OF AORTOCORONARY BYPASS GRAFT: Chronic | ICD-10-CM

## 2024-11-20 DIAGNOSIS — K81.1 CHRONIC CHOLECYSTITIS: ICD-10-CM

## 2024-11-20 DIAGNOSIS — Z98.890 OTHER SPECIFIED POSTPROCEDURAL STATES: Chronic | ICD-10-CM

## 2024-11-20 DIAGNOSIS — Z43.4 ENCOUNTER FOR ATTENTION TO OTHER ARTIFICIAL OPENINGS OF DIGESTIVE TRACT: ICD-10-CM

## 2024-11-20 LAB
ANION GAP SERPL CALC-SCNC: 3 MMOL/L — LOW (ref 5–17)
BUN SERPL-MCNC: 14 MG/DL — SIGNIFICANT CHANGE UP (ref 7–23)
CALCIUM SERPL-MCNC: 9.1 MG/DL — SIGNIFICANT CHANGE UP (ref 8.5–10.1)
CHLORIDE SERPL-SCNC: 112 MMOL/L — HIGH (ref 96–108)
CO2 SERPL-SCNC: 24 MMOL/L — SIGNIFICANT CHANGE UP (ref 22–31)
CREAT SERPL-MCNC: 0.66 MG/DL — SIGNIFICANT CHANGE UP (ref 0.5–1.3)
EGFR: 94 ML/MIN/1.73M2 — SIGNIFICANT CHANGE UP
GLUCOSE SERPL-MCNC: 118 MG/DL — HIGH (ref 70–99)
HCT VFR BLD CALC: 44.3 % — SIGNIFICANT CHANGE UP (ref 39–50)
HGB BLD-MCNC: 14.5 G/DL — SIGNIFICANT CHANGE UP (ref 13–17)
INR BLD: 1.74 RATIO — HIGH (ref 0.85–1.16)
MCHC RBC-ENTMCNC: 32.6 PG — SIGNIFICANT CHANGE UP (ref 27–34)
MCHC RBC-ENTMCNC: 32.7 G/DL — SIGNIFICANT CHANGE UP (ref 32–36)
MCV RBC AUTO: 99.6 FL — SIGNIFICANT CHANGE UP (ref 80–100)
PLATELET # BLD AUTO: 198 K/UL — SIGNIFICANT CHANGE UP (ref 150–400)
POTASSIUM SERPL-MCNC: 3.6 MMOL/L — SIGNIFICANT CHANGE UP (ref 3.5–5.3)
POTASSIUM SERPL-SCNC: 3.6 MMOL/L — SIGNIFICANT CHANGE UP (ref 3.5–5.3)
PROTHROM AB SERPL-ACNC: 20.4 SEC — HIGH (ref 9.9–13.4)
RBC # BLD: 4.45 M/UL — SIGNIFICANT CHANGE UP (ref 4.2–5.8)
RBC # FLD: 13.1 % — SIGNIFICANT CHANGE UP (ref 10.3–14.5)
SODIUM SERPL-SCNC: 139 MMOL/L — SIGNIFICANT CHANGE UP (ref 135–145)
WBC # BLD: 6.19 K/UL — SIGNIFICANT CHANGE UP (ref 3.8–10.5)
WBC # FLD AUTO: 6.19 K/UL — SIGNIFICANT CHANGE UP (ref 3.8–10.5)

## 2024-11-20 PROCEDURE — 85610 PROTHROMBIN TIME: CPT

## 2024-11-20 PROCEDURE — 85027 COMPLETE CBC AUTOMATED: CPT

## 2024-11-20 PROCEDURE — 36415 COLL VENOUS BLD VENIPUNCTURE: CPT

## 2024-11-20 PROCEDURE — 47531 INJECTION FOR CHOLANGIOGRAM: CPT | Mod: 26

## 2024-11-20 PROCEDURE — 80048 BASIC METABOLIC PNL TOTAL CA: CPT

## 2024-11-20 PROCEDURE — 47531 INJECTION FOR CHOLANGIOGRAM: CPT

## 2024-11-20 RX ORDER — APIXABAN 5 MG/1
1 TABLET, FILM COATED ORAL
Refills: 0 | DISCHARGE

## 2024-11-20 NOTE — ASU DISCHARGE PLAN (ADULT/PEDIATRIC) - FINANCIAL ASSISTANCE
[facility] provides services at a reduced cost to those who are determined to be eligible through [facility]’s financial assistance program. Information regarding [facility]’s financial assistance program can be found by going to [email or web address] or by calling [phone].

## 2024-11-20 NOTE — ASU PATIENT PROFILE, ADULT - NSICDXPASTMEDICALHX_GEN_ALL_CORE_FT
PAST MEDICAL HISTORY:  Acute motor axonal neuropathy     CAD (coronary artery disease)     GERD (gastroesophageal reflux disease)     HLD (hyperlipidemia)     HTN (hypertension)     Unilateral primary osteoarthritis, right knee

## 2024-11-20 NOTE — ASU DISCHARGE PLAN (ADULT/PEDIATRIC) - ASU DC SPECIAL INSTRUCTIONSFT
Non obstructing Stone in the cystic duct. Drain maintained in place. Follow up with surgery for cholecystectomy consideration.

## 2024-11-20 NOTE — PROCEDURE NOTE - PLAN
Capping trial was discussed with the pt. Pt understands that he is at risk for recurrence of cholecystitis. Prefers cholecystectomy. Planning to follow up with surgery. Therefore, drain was maintained in place.

## 2024-11-20 NOTE — ASU PATIENT PROFILE, ADULT - FALL HARM RISK - HARM RISK INTERVENTIONS

## 2024-11-20 NOTE — ASU DISCHARGE PLAN (ADULT/PEDIATRIC) - NS MD DC FALL RISK RISK
For information on Fall & Injury Prevention, visit: [email or web address] [email or web address] OR  [email or web address]://[email or web address] [email or web address] OR  https://www.cdc.gov/steadi/patient.html

## 2024-11-22 NOTE — OCCUPATIONAL THERAPY INITIAL EVALUATION ADULT - PREDICTED DURATION OF THERAPY (DAYS/WKS), OT EVAL
18F/10cc Kathleen placed for end of life care. 500ml tea colored urine return. Sterile technique utilized. Patient tolerated well.    4 weeks

## 2024-12-23 PROBLEM — G62.81 CRITICAL ILLNESS POLYNEUROPATHY: Chronic | Status: ACTIVE | Noted: 2024-11-20

## 2025-01-14 ENCOUNTER — OUTPATIENT (OUTPATIENT)
Dept: OUTPATIENT SERVICES | Facility: HOSPITAL | Age: 85
LOS: 1 days | End: 2025-01-14
Payer: MEDICARE

## 2025-01-14 VITALS
DIASTOLIC BLOOD PRESSURE: 80 MMHG | HEIGHT: 69 IN | OXYGEN SATURATION: 98 % | HEART RATE: 73 BPM | SYSTOLIC BLOOD PRESSURE: 130 MMHG | TEMPERATURE: 98 F | RESPIRATION RATE: 16 BRPM | WEIGHT: 179.9 LBS

## 2025-01-14 DIAGNOSIS — Z01.818 ENCOUNTER FOR OTHER PREPROCEDURAL EXAMINATION: ICD-10-CM

## 2025-01-14 DIAGNOSIS — Z98.890 OTHER SPECIFIED POSTPROCEDURAL STATES: Chronic | ICD-10-CM

## 2025-01-14 DIAGNOSIS — K81.9 CHOLECYSTITIS, UNSPECIFIED: ICD-10-CM

## 2025-01-14 DIAGNOSIS — I25.10 ATHEROSCLEROTIC HEART DISEASE OF NATIVE CORONARY ARTERY WITHOUT ANGINA PECTORIS: ICD-10-CM

## 2025-01-14 DIAGNOSIS — Z87.19 PERSONAL HISTORY OF OTHER DISEASES OF THE DIGESTIVE SYSTEM: ICD-10-CM

## 2025-01-14 DIAGNOSIS — Z95.1 PRESENCE OF AORTOCORONARY BYPASS GRAFT: Chronic | ICD-10-CM

## 2025-01-14 DIAGNOSIS — Z96.651 PRESENCE OF RIGHT ARTIFICIAL KNEE JOINT: Chronic | ICD-10-CM

## 2025-01-14 LAB
ABO RH CONFIRMATION: SIGNIFICANT CHANGE UP
ALBUMIN SERPL ELPH-MCNC: 3.1 G/DL — LOW (ref 3.3–5)
ALP SERPL-CCNC: 75 U/L — SIGNIFICANT CHANGE UP (ref 40–120)
ALT FLD-CCNC: 18 U/L — SIGNIFICANT CHANGE UP (ref 12–78)
AMYLASE P1 CFR SERPL: 60 U/L — SIGNIFICANT CHANGE UP (ref 25–115)
ANION GAP SERPL CALC-SCNC: 3 MMOL/L — LOW (ref 5–17)
APTT BLD: 33.8 SEC — SIGNIFICANT CHANGE UP (ref 24.5–35.6)
AST SERPL-CCNC: 22 U/L — SIGNIFICANT CHANGE UP (ref 15–37)
BASOPHILS # BLD AUTO: 0.02 K/UL — SIGNIFICANT CHANGE UP (ref 0–0.2)
BASOPHILS NFR BLD AUTO: 0.3 % — SIGNIFICANT CHANGE UP (ref 0–2)
BILIRUB SERPL-MCNC: 0.9 MG/DL — SIGNIFICANT CHANGE UP (ref 0.2–1.2)
BUN SERPL-MCNC: 15 MG/DL — SIGNIFICANT CHANGE UP (ref 7–23)
CALCIUM SERPL-MCNC: 8.9 MG/DL — SIGNIFICANT CHANGE UP (ref 8.5–10.1)
CHLORIDE SERPL-SCNC: 106 MMOL/L — SIGNIFICANT CHANGE UP (ref 96–108)
CO2 SERPL-SCNC: 29 MMOL/L — SIGNIFICANT CHANGE UP (ref 22–31)
CREAT SERPL-MCNC: 0.61 MG/DL — SIGNIFICANT CHANGE UP (ref 0.5–1.3)
EGFR: 96 ML/MIN/1.73M2 — SIGNIFICANT CHANGE UP
EOSINOPHIL # BLD AUTO: 0.04 K/UL — SIGNIFICANT CHANGE UP (ref 0–0.5)
EOSINOPHIL NFR BLD AUTO: 0.7 % — SIGNIFICANT CHANGE UP (ref 0–6)
GLUCOSE SERPL-MCNC: 104 MG/DL — HIGH (ref 70–99)
HCT VFR BLD CALC: 38.4 % — LOW (ref 39–50)
HGB BLD-MCNC: 13.2 G/DL — SIGNIFICANT CHANGE UP (ref 13–17)
IMM GRANULOCYTES NFR BLD AUTO: 0.2 % — SIGNIFICANT CHANGE UP (ref 0–0.9)
INR BLD: 1.47 RATIO — HIGH (ref 0.85–1.16)
LIDOCAIN IGE QN: 34 U/L — SIGNIFICANT CHANGE UP (ref 13–75)
LYMPHOCYTES # BLD AUTO: 2.41 K/UL — SIGNIFICANT CHANGE UP (ref 1–3.3)
LYMPHOCYTES # BLD AUTO: 40.9 % — SIGNIFICANT CHANGE UP (ref 13–44)
MCHC RBC-ENTMCNC: 32.7 PG — SIGNIFICANT CHANGE UP (ref 27–34)
MCHC RBC-ENTMCNC: 34.4 G/DL — SIGNIFICANT CHANGE UP (ref 32–36)
MCV RBC AUTO: 95 FL — SIGNIFICANT CHANGE UP (ref 80–100)
MONOCYTES # BLD AUTO: 0.43 K/UL — SIGNIFICANT CHANGE UP (ref 0–0.9)
MONOCYTES NFR BLD AUTO: 7.3 % — SIGNIFICANT CHANGE UP (ref 2–14)
NEUTROPHILS # BLD AUTO: 2.98 K/UL — SIGNIFICANT CHANGE UP (ref 1.8–7.4)
NEUTROPHILS NFR BLD AUTO: 50.6 % — SIGNIFICANT CHANGE UP (ref 43–77)
PLATELET # BLD AUTO: 179 K/UL — SIGNIFICANT CHANGE UP (ref 150–400)
POTASSIUM SERPL-MCNC: 3.6 MMOL/L — SIGNIFICANT CHANGE UP (ref 3.5–5.3)
POTASSIUM SERPL-SCNC: 3.6 MMOL/L — SIGNIFICANT CHANGE UP (ref 3.5–5.3)
PROT SERPL-MCNC: 8.1 GM/DL — SIGNIFICANT CHANGE UP (ref 6–8.3)
PROTHROM AB SERPL-ACNC: 17.3 SEC — HIGH (ref 9.9–13.4)
RBC # BLD: 4.04 M/UL — LOW (ref 4.2–5.8)
RBC # FLD: 14.3 % — SIGNIFICANT CHANGE UP (ref 10.3–14.5)
SODIUM SERPL-SCNC: 138 MMOL/L — SIGNIFICANT CHANGE UP (ref 135–145)
WBC # BLD: 5.89 K/UL — SIGNIFICANT CHANGE UP (ref 3.8–10.5)
WBC # FLD AUTO: 5.89 K/UL — SIGNIFICANT CHANGE UP (ref 3.8–10.5)

## 2025-01-14 PROCEDURE — 99214 OFFICE O/P EST MOD 30 MIN: CPT | Mod: 25

## 2025-01-14 PROCEDURE — 85730 THROMBOPLASTIN TIME PARTIAL: CPT

## 2025-01-14 PROCEDURE — 85610 PROTHROMBIN TIME: CPT

## 2025-01-14 PROCEDURE — 80053 COMPREHEN METABOLIC PANEL: CPT

## 2025-01-14 PROCEDURE — 36415 COLL VENOUS BLD VENIPUNCTURE: CPT

## 2025-01-14 PROCEDURE — 86901 BLOOD TYPING SEROLOGIC RH(D): CPT

## 2025-01-14 PROCEDURE — 86900 BLOOD TYPING SEROLOGIC ABO: CPT

## 2025-01-14 PROCEDURE — 82150 ASSAY OF AMYLASE: CPT

## 2025-01-14 PROCEDURE — 83690 ASSAY OF LIPASE: CPT

## 2025-01-14 PROCEDURE — 85025 COMPLETE CBC W/AUTO DIFF WBC: CPT

## 2025-01-14 PROCEDURE — 86850 RBC ANTIBODY SCREEN: CPT

## 2025-01-14 NOTE — H&P PST ADULT - PROBLEM SELECTOR PLAN 2
Cardiac optimization scheduled pending reports  eliquis  as per cardiology.  Patient verbalized understanding.

## 2025-01-14 NOTE — H&P PST ADULT - NSICDXPASTMEDICALHX_GEN_ALL_CORE_FT
PAST MEDICAL HISTORY:  Acute motor axonal neuropathy     CAD (coronary artery disease)     Deep vein thrombosis (DVT)     GERD (gastroesophageal reflux disease)     H/O cholecystitis     HLD (hyperlipidemia)     HTN (hypertension)     Pulmonary embolism     Unilateral primary osteoarthritis, right knee

## 2025-01-14 NOTE — H&P PST ADULT - ASSESSMENT
85 y/o male presents to [facility] for scheduled laparoscopic cholecystectomy on 1/22/25

## 2025-01-14 NOTE — H&P PST ADULT - NSICDXPASTSURGICALHX_GEN_ALL_CORE_FT
PAST SURGICAL HISTORY:  H/O total knee replacement, right     History of biliary T-tube placement     History of colonoscopy     History of inguinal hernia repair     S/P CABG x 4

## 2025-01-14 NOTE — H&P PST ADULT - HISTORY OF PRESENT ILLNESS
83 y/o male presents to [facility] for scheduled laparoscopic cholecystectomy on 1/22/25. Patient with abdominal pain seen in the ED noted with elevated wbc, and diagnostic testing done gallstones noted. Patient billary tube placed.

## 2025-01-14 NOTE — H&P PST ADULT - GENERAL
Dr. Calle called me late yesterday afternoon asking me to help navigate patient for a colon cancer at 20 CM.  I notified Dr. Chaparro of patient since he sees pt's . Dr. Chaparro  asked that pt get scans and see him right after. Called Central Scheduling to schedule scans, but had to send Radiology MRI/CT Managers messages to help expedite pt's MRI pelvis with RP and CT C/A/P.  
negative

## 2025-01-14 NOTE — H&P PST ADULT - PROBLEM SELECTOR PLAN 1
Pre op and chlorhexidine instructions given and explained.  Avoid NSAIDs and OTC supplements ( MV)  Patient verbalized understanding  medical optimization requested by surgeon  continue pantoprazole on the DOS

## 2025-01-14 NOTE — H&P PST ADULT - COMMENTS
allergies unable to bare weight currently in a wheel chair -patient reported weight and height from 12/2024

## 2025-01-14 NOTE — H&P PST ADULT - NSALCOHOLTYPE_GEN__A_CORE_SD
wine Klisyri Counseling:  I discussed with the patient the risks of Klisyri including but not limited to erythema, scaling, itching, weeping, crusting, and pain.

## 2025-01-15 DIAGNOSIS — K81.9 CHOLECYSTITIS, UNSPECIFIED: ICD-10-CM

## 2025-01-15 DIAGNOSIS — Z01.818 ENCOUNTER FOR OTHER PREPROCEDURAL EXAMINATION: ICD-10-CM

## 2025-01-22 ENCOUNTER — RESULT REVIEW (OUTPATIENT)
Age: 85
End: 2025-01-22

## 2025-01-22 ENCOUNTER — TRANSCRIPTION ENCOUNTER (OUTPATIENT)
Age: 85
End: 2025-01-22

## 2025-01-22 ENCOUNTER — OUTPATIENT (OUTPATIENT)
Dept: INPATIENT UNIT | Facility: HOSPITAL | Age: 85
LOS: 1 days | Discharge: HOME | End: 2025-01-22
Payer: MEDICARE

## 2025-01-22 VITALS
RESPIRATION RATE: 16 BRPM | DIASTOLIC BLOOD PRESSURE: 68 MMHG | SYSTOLIC BLOOD PRESSURE: 108 MMHG | HEIGHT: 69 IN | TEMPERATURE: 98 F | OXYGEN SATURATION: 100 % | HEART RATE: 80 BPM | WEIGHT: 179.9 LBS

## 2025-01-22 VITALS
SYSTOLIC BLOOD PRESSURE: 122 MMHG | HEART RATE: 71 BPM | OXYGEN SATURATION: 98 % | DIASTOLIC BLOOD PRESSURE: 65 MMHG | TEMPERATURE: 97 F | RESPIRATION RATE: 12 BRPM

## 2025-01-22 DIAGNOSIS — Z98.890 OTHER SPECIFIED POSTPROCEDURAL STATES: Chronic | ICD-10-CM

## 2025-01-22 DIAGNOSIS — Z96.651 PRESENCE OF RIGHT ARTIFICIAL KNEE JOINT: Chronic | ICD-10-CM

## 2025-01-22 DIAGNOSIS — Z95.1 PRESENCE OF AORTOCORONARY BYPASS GRAFT: Chronic | ICD-10-CM

## 2025-01-22 DIAGNOSIS — K81.9 CHOLECYSTITIS, UNSPECIFIED: ICD-10-CM

## 2025-01-22 PROCEDURE — C1889: CPT

## 2025-01-22 PROCEDURE — C9399: CPT

## 2025-01-22 PROCEDURE — 88304 TISSUE EXAM BY PATHOLOGIST: CPT

## 2025-01-22 PROCEDURE — 88304 TISSUE EXAM BY PATHOLOGIST: CPT | Mod: 26

## 2025-01-22 PROCEDURE — S2900: CPT

## 2025-01-22 RX ORDER — ASCORBIC ACID 1000 MG
1 TABLET ORAL
Refills: 0 | DISCHARGE

## 2025-01-22 RX ORDER — FENTANYL 75 UG/H
50 PATCH, EXTENDED RELEASE TRANSDERMAL
Refills: 0 | Status: DISCONTINUED | OUTPATIENT
Start: 2025-01-22 | End: 2025-01-22

## 2025-01-22 RX ORDER — SODIUM CHLORIDE 9 MG/ML
1000 INJECTION, SOLUTION INTRAVENOUS
Refills: 0 | Status: DISCONTINUED | OUTPATIENT
Start: 2025-01-22 | End: 2025-01-22

## 2025-01-22 RX ORDER — BUPIVACAINE 13.3 MG/ML
20 INJECTION, SUSPENSION, LIPOSOMAL INFILTRATION ONCE
Refills: 0 | Status: DISCONTINUED | OUTPATIENT
Start: 2025-01-22 | End: 2025-01-22

## 2025-01-22 RX ORDER — HYDROMORPHONE HCL 4 MG
0.5 TABLET ORAL
Refills: 0 | Status: DISCONTINUED | OUTPATIENT
Start: 2025-01-22 | End: 2025-01-22

## 2025-01-22 RX ORDER — OXYCODONE AND ACETAMINOPHEN 5; 325 MG/1; MG/1
1 TABLET ORAL
Qty: 12 | Refills: 0
Start: 2025-01-22

## 2025-01-22 RX ORDER — PANTOPRAZOLE 40 MG/1
1 TABLET, DELAYED RELEASE ORAL
Refills: 0 | DISCHARGE

## 2025-01-22 RX ORDER — FAMOTIDINE 20 MG/1
1 TABLET, FILM COATED ORAL
Refills: 0 | DISCHARGE

## 2025-01-22 RX ORDER — ONDANSETRON 4 MG/1
4 TABLET ORAL ONCE
Refills: 0 | Status: COMPLETED | OUTPATIENT
Start: 2025-01-22 | End: 2025-01-22

## 2025-01-22 RX ORDER — OXYCODONE HCL 15 MG
5 TABLET ORAL ONCE
Refills: 0 | Status: DISCONTINUED | OUTPATIENT
Start: 2025-01-22 | End: 2025-01-22

## 2025-01-22 RX ORDER — OXYCODONE AND ACETAMINOPHEN 5; 325 MG/1; MG/1
1 TABLET ORAL
Qty: 12 | Refills: 0
Start: 2025-01-22 | End: 2025-01-24

## 2025-01-22 RX ADMIN — Medication 5 MILLIGRAM(S): at 11:38

## 2025-01-22 RX ADMIN — ONDANSETRON 4 MILLIGRAM(S): 4 TABLET ORAL at 11:09

## 2025-01-22 RX ADMIN — Medication 5 MILLIGRAM(S): at 11:09

## 2025-01-22 NOTE — ASU DISCHARGE PLAN (ADULT/PEDIATRIC) - NS MD DC FALL RISK RISK
For information on Fall & Injury Prevention, visit: [email or web address]/[email or web address]-[email or web address]-[email or web address]-mobility OR  https://www.[email or web address]/news/fall-prevention-tips-to-avoid-injury OR  https://www.cdc.gov/steadi/patient.html

## 2025-01-22 NOTE — ASU DISCHARGE PLAN (ADULT/PEDIATRIC) - CARE PROVIDER_API CALL
Reagan Morris  Surgery  [address] [place] [address], [address]  [place] [address]  Phone: [phone]  Fax: [phone]  Follow Up Time: 2 weeks

## 2025-01-22 NOTE — ASU PATIENT PROFILE, ADULT - TEACHING/LEARNING LEARNING PREFERENCES
computer/internet/group instruction/individual instruction/pictorial/verbal instruction/video/written material

## 2025-01-22 NOTE — ASU DISCHARGE PLAN (ADULT/PEDIATRIC) - ASU DC SPECIAL INSTRUCTIONSFT
OK to resume Eliquis on 1/25/2025  OK to remove tegaderm over drain site and replace with bandaid. Replace bandaid daily. Once drain site no longer draining, can keep open to air.

## 2025-01-22 NOTE — ASU DISCHARGE PLAN (ADULT/PEDIATRIC) - FINANCIAL ASSISTANCE
[facility] provides services at a reduced cost to those who are determined to be eligible through [facility]’s financial assistance program. Information regarding [facility]’s financial assistance program can be found by going to [email or web address].[email or web address]/assistance or by calling [phone].

## 2025-01-22 NOTE — ASU PATIENT PROFILE, ADULT - NSICDXPASTSURGICALHX_GEN_ALL_CORE_FT
Physician Progress Note      PATIENT:               BENJAMIN METCALF  CSN #:                  [id number]  :                       1939  ADMIT DATE:       2024 12:06 PM  DISCH DATE:  RESPONDING  PROVIDER #:        Harshil Norman MD          QUERY TEXT:    Dear Dr. Swift and/or Dr. Jarvis,  Patient admitted with toxic encephalopathy due to polypharmacy, dehydration,   ANAHI. Noted to have documentation of \"thin\", \"frail\" and \"not eating or   drinking much\" in H and P and Progress notes and BMI=19. If possible, please   document in progress notes and discharge summary if you are evaluating and /or   treating any of the following:    The medical record reflects the following:  Risk Factors: 84 years old, Hx anxiety, CP, HLD, HTN, Thyroid disease, DM2,   Currently noted decreased intake  Clinical Indicators: BMI=19, 5', 6\", 117 lb,  ED notes \"frail elderly\", H   and P notes  \"suspected dehydration\" and \"not eating or drinking much.\" \"low   appetite\", \"thin body habitus\", RN intake flowsheet notes 1-25% of meal trays  Treatment: Low Na pureed diet, honey thickened liquids, Oral nutritional   supplement TID, I and O, Daily weight, monitor labs, IVF, hold sedating meds  Thank you,  Bobbi Martinez RN, CDS  [email or web address]    ASPEN Criteria:    https://aspenjournals.onlinelibrary.andrea.com/doi/full/10.1177/893093045371794  5  Options provided:  -- Underweight with BMI 19  -- Protein calorie malnutrition mild  -- Other - I will add my own diagnosis  -- Disagree - Not applicable / Not valid  -- Disagree - Clinically unable to determine / Unknown  -- Refer to Clinical Documentation Reviewer    PROVIDER RESPONSE TEXT:    This patient is underweight with a BMI of 19.    Query created by: Bobbi Guo on 2024 2:11 PM      Electronically signed by:  Harshil Norman MD 2024 2:30 PM           PAST SURGICAL HISTORY:  History of colonoscopy     History of inguinal hernia repair     S/P CABG x 4

## 2025-01-23 LAB — SURGICAL PATHOLOGY STUDY: SIGNIFICANT CHANGE UP

## 2025-01-24 DIAGNOSIS — E78.5 HYPERLIPIDEMIA, UNSPECIFIED: ICD-10-CM

## 2025-01-24 DIAGNOSIS — K21.9 GASTRO-ESOPHAGEAL REFLUX DISEASE WITHOUT ESOPHAGITIS: ICD-10-CM

## 2025-01-24 DIAGNOSIS — I25.10 ATHEROSCLEROTIC HEART DISEASE OF NATIVE CORONARY ARTERY WITHOUT ANGINA PECTORIS: ICD-10-CM

## 2025-01-24 DIAGNOSIS — Z96.651 PRESENCE OF RIGHT ARTIFICIAL KNEE JOINT: ICD-10-CM

## 2025-01-24 DIAGNOSIS — Z86.711 PERSONAL HISTORY OF PULMONARY EMBOLISM: ICD-10-CM

## 2025-01-24 DIAGNOSIS — Z87.891 PERSONAL HISTORY OF NICOTINE DEPENDENCE: ICD-10-CM

## 2025-01-24 DIAGNOSIS — Z88.0 ALLERGY STATUS TO PENICILLIN: ICD-10-CM

## 2025-01-24 DIAGNOSIS — I10 ESSENTIAL (PRIMARY) HYPERTENSION: ICD-10-CM

## 2025-01-24 DIAGNOSIS — K80.10 CALCULUS OF GALLBLADDER WITH CHRONIC CHOLECYSTITIS WITHOUT OBSTRUCTION: ICD-10-CM

## 2025-01-24 DIAGNOSIS — K81.1 CHRONIC CHOLECYSTITIS: ICD-10-CM

## 2025-01-24 DIAGNOSIS — Z86.718 PERSONAL HISTORY OF OTHER VENOUS THROMBOSIS AND EMBOLISM: ICD-10-CM

## 2025-01-24 DIAGNOSIS — Z79.01 LONG TERM (CURRENT) USE OF ANTICOAGULANTS: ICD-10-CM

## 2025-01-24 DIAGNOSIS — Z95.1 PRESENCE OF AORTOCORONARY BYPASS GRAFT: ICD-10-CM

## 2025-04-21 NOTE — ASU PATIENT PROFILE, ADULT - FALL HARM RISK - FALL HARM RISK
Chaperone for Intimate Exam  Chaperone was offered as part of the rooming process. Patient declined and agrees to continue with exam without a chaperone.        weakness, mx falls  takes baby asa qd/Other

## 2025-08-26 ENCOUNTER — NON-APPOINTMENT (OUTPATIENT)
Age: 85
End: 2025-08-26

## (undated) DEVICE — WOUND IRR SURGIPHOR

## (undated) DEVICE — WARMING BLANKET UPPER ADULT

## (undated) DEVICE — GLV 8 PROTEXIS (WHITE)

## (undated) DEVICE — DRAPE 3/4 SHEET W REINFORCEMENT 56X77"

## (undated) DEVICE — SOL BETADINE POUCH 0.75OZ STERILE

## (undated) DEVICE — DRSG AQUACEL 3.5 X 10"

## (undated) DEVICE — GLV 8.5 PROTEXIS (WHITE)

## (undated) DEVICE — SAW BLADE STRYKER SAGITTAL 3 HOLE OSCILLATING

## (undated) DEVICE — SOL IRR POUR H2O 1000ML

## (undated) DEVICE — SPECIMEN CONTAINER 100ML

## (undated) DEVICE — GOWN TRIMAX LG

## (undated) DEVICE — TOURNIQUET CUFF 34" DUAL PORT W PLC

## (undated) DEVICE — PACK TOTAL KNEE (2 PACKS)

## (undated) DEVICE — SUT MONOCRYL 3-0 27" PS-2 UNDYED

## (undated) DEVICE — DRAPE MAYO STAND 30"

## (undated) DEVICE — SOL IRR POUR NS 0.9% 500ML

## (undated) DEVICE — POSITIONER CARDIAC BUMP

## (undated) DEVICE — POSITIONER FOAM EGG CRATE ULNAR 2PCS (PINK)

## (undated) DEVICE — GLV 7.5 PROTEXIS (WHITE)

## (undated) DEVICE — MEDICATION LABELS W MARKER

## (undated) DEVICE — DRILL BIT BIOMET QUICK CONNECT 1/8

## (undated) DEVICE — STRYKER MIXEVAC 3 BONE CEMENT MIXER

## (undated) DEVICE — NDL HYPO SAFE 18G X 1.5" (PINK)

## (undated) DEVICE — VENODYNE/SCD SLEEVE CALF LARGE

## (undated) DEVICE — DRSG WEBRIL 6"

## (undated) DEVICE — SOL IRR BAG NS 0.9% 3000ML

## (undated) DEVICE — SUT POLYSORB 1 36" GS-21 UNDYED

## (undated) DEVICE — TAPE SILK 3"

## (undated) DEVICE — HOOD FLYTE STRYKER HELMET SHIELD

## (undated) DEVICE — SAW BLADE MICROAIRE OSCILATING 25.4MM X 90MM X 1.27MM

## (undated) DEVICE — BLADE SCALPEL SAFETYLOCK #15

## (undated) DEVICE — SUT POLYSORB 2-0 30" GS-21 UNDYED

## (undated) DEVICE — SYR LUER LOK 20CC

## (undated) DEVICE — GLV 7 PROTEXIS (WHITE)

## (undated) DEVICE — DRSG DERMABOND PRINEO 60CM

## (undated) DEVICE — ELCTR BOVIE PENCIL SMOKE EVACUATION